# Patient Record
Sex: FEMALE | Race: WHITE | Employment: OTHER | URBAN - METROPOLITAN AREA
[De-identification: names, ages, dates, MRNs, and addresses within clinical notes are randomized per-mention and may not be internally consistent; named-entity substitution may affect disease eponyms.]

---

## 2017-01-03 RX ORDER — SODIUM CHLORIDE, SODIUM LACTATE, POTASSIUM CHLORIDE, CALCIUM CHLORIDE 600; 310; 30; 20 MG/100ML; MG/100ML; MG/100ML; MG/100ML
INJECTION, SOLUTION INTRAVENOUS CONTINUOUS
Status: CANCELLED | OUTPATIENT
Start: 2017-01-06

## 2017-01-06 ENCOUNTER — HOSPITAL ENCOUNTER (OUTPATIENT)
Dept: PAIN MANAGEMENT | Age: 67
Discharge: HOME | End: 2017-01-06
Attending: PHYSICAL MEDICINE & REHABILITATION | Admitting: PHYSICAL MEDICINE & REHABILITATION

## 2017-01-09 RX ORDER — SODIUM CHLORIDE, SODIUM LACTATE, POTASSIUM CHLORIDE, CALCIUM CHLORIDE 600; 310; 30; 20 MG/100ML; MG/100ML; MG/100ML; MG/100ML
INJECTION, SOLUTION INTRAVENOUS CONTINUOUS
Status: CANCELLED | OUTPATIENT
Start: 2017-01-16

## 2017-01-10 ENCOUNTER — NURSE ONLY (OUTPATIENT)
Dept: FAMILY MEDICINE CLINIC | Age: 67
End: 2017-01-10

## 2017-01-10 DIAGNOSIS — E53.8 B12 DEFICIENCY: Primary | ICD-10-CM

## 2017-01-10 PROCEDURE — 96372 THER/PROPH/DIAG INJ SC/IM: CPT | Performed by: FAMILY MEDICINE

## 2017-01-10 RX ORDER — CYANOCOBALAMIN 1000 UG/ML
1000 INJECTION INTRAMUSCULAR; SUBCUTANEOUS ONCE
Status: COMPLETED | OUTPATIENT
Start: 2017-01-10 | End: 2017-01-10

## 2017-01-10 RX ADMIN — CYANOCOBALAMIN 1000 MCG: 1000 INJECTION INTRAMUSCULAR; SUBCUTANEOUS at 09:39

## 2017-01-16 ENCOUNTER — HOSPITAL ENCOUNTER (OUTPATIENT)
Dept: PAIN MANAGEMENT | Age: 67
Discharge: HOME | End: 2017-01-16
Attending: PHYSICAL MEDICINE & REHABILITATION | Admitting: PHYSICAL MEDICINE & REHABILITATION

## 2017-01-16 VITALS
WEIGHT: 113 LBS | RESPIRATION RATE: 18 BRPM | HEIGHT: 64 IN | SYSTOLIC BLOOD PRESSURE: 134 MMHG | DIASTOLIC BLOOD PRESSURE: 66 MMHG | BODY MASS INDEX: 19.29 KG/M2 | HEART RATE: 63 BPM | TEMPERATURE: 98.5 F | OXYGEN SATURATION: 96 %

## 2017-01-16 RX ORDER — SODIUM CHLORIDE, SODIUM LACTATE, POTASSIUM CHLORIDE, CALCIUM CHLORIDE 600; 310; 30; 20 MG/100ML; MG/100ML; MG/100ML; MG/100ML
INJECTION, SOLUTION INTRAVENOUS
Status: DISPENSED
Start: 2017-01-16 | End: 2017-01-16

## 2017-01-16 RX ORDER — LIDOCAINE HYDROCHLORIDE 10 MG/ML
INJECTION, SOLUTION EPIDURAL; INFILTRATION; INTRACAUDAL; PERINEURAL
Status: DISPENSED
Start: 2017-01-16 | End: 2017-01-16

## 2017-01-16 RX ORDER — SODIUM CHLORIDE, SODIUM LACTATE, POTASSIUM CHLORIDE, CALCIUM CHLORIDE 600; 310; 30; 20 MG/100ML; MG/100ML; MG/100ML; MG/100ML
INJECTION, SOLUTION INTRAVENOUS CONTINUOUS
Status: DISCONTINUED | OUTPATIENT
Start: 2017-01-16 | End: 2017-01-17 | Stop reason: HOSPADM

## 2017-01-16 RX ADMIN — SODIUM CHLORIDE, SODIUM LACTATE, POTASSIUM CHLORIDE, CALCIUM CHLORIDE: 600; 310; 30; 20 INJECTION, SOLUTION INTRAVENOUS at 08:34

## 2017-01-16 ASSESSMENT — PAIN - FUNCTIONAL ASSESSMENT
PAIN_FUNCTIONAL_ASSESSMENT: 0-10
PAIN_FUNCTIONAL_ASSESSMENT: 0-10

## 2017-01-16 ASSESSMENT — PAIN DESCRIPTION - DESCRIPTORS: DESCRIPTORS: ACHING

## 2017-01-16 ASSESSMENT — ACTIVITIES OF DAILY LIVING (ADL): EFFECT OF PAIN ON DAILY ACTIVITIES: LIFTING ANYTHING HEAVY

## 2017-01-20 ENCOUNTER — HOSPITAL ENCOUNTER (OUTPATIENT)
Dept: SURGERY | Age: 67
Discharge: HOME | End: 2017-01-20
Attending: OPHTHALMOLOGY | Admitting: OPHTHALMOLOGY

## 2017-01-20 VITALS
DIASTOLIC BLOOD PRESSURE: 73 MMHG | HEIGHT: 64 IN | WEIGHT: 113 LBS | RESPIRATION RATE: 16 BRPM | TEMPERATURE: 98.8 F | SYSTOLIC BLOOD PRESSURE: 166 MMHG | BODY MASS INDEX: 19.29 KG/M2 | OXYGEN SATURATION: 96 % | HEART RATE: 85 BPM

## 2017-01-20 RX ORDER — LIDOCAINE HYDROCHLORIDE 10 MG/ML
2 INJECTION, SOLUTION INFILTRATION; PERINEURAL
Status: COMPLETED | OUTPATIENT
Start: 2017-01-20 | End: 2017-01-20

## 2017-01-20 RX ORDER — LIDOCAINE HYDROCHLORIDE 10 MG/ML
INJECTION, SOLUTION EPIDURAL; INFILTRATION; INTRACAUDAL; PERINEURAL
Status: DISPENSED
Start: 2017-01-20 | End: 2017-01-20

## 2017-01-20 RX ORDER — PREDNISOLONE ACETATE 10 MG/ML
1 SUSPENSION/ DROPS OPHTHALMIC SEE ADMIN INSTRUCTIONS
Status: DISCONTINUED | OUTPATIENT
Start: 2017-01-20 | End: 2017-01-21 | Stop reason: HOSPADM

## 2017-01-20 RX ORDER — PHENYLEPHRINE HYDROCHLORIDE 100 MG/ML
SOLUTION/ DROPS OPHTHALMIC
Status: DISPENSED
Start: 2017-01-20 | End: 2017-01-20

## 2017-01-20 RX ORDER — PHENYLEPHRINE HYDROCHLORIDE 100 MG/ML
1 SOLUTION/ DROPS OPHTHALMIC ONCE
Status: COMPLETED | OUTPATIENT
Start: 2017-01-20 | End: 2017-01-20

## 2017-01-20 RX ORDER — SODIUM CHLORIDE, SODIUM LACTATE, POTASSIUM CHLORIDE, CALCIUM CHLORIDE 600; 310; 30; 20 MG/100ML; MG/100ML; MG/100ML; MG/100ML
INJECTION, SOLUTION INTRAVENOUS CONTINUOUS
Status: DISCONTINUED | OUTPATIENT
Start: 2017-01-20 | End: 2017-01-21 | Stop reason: HOSPADM

## 2017-01-20 RX ORDER — SODIUM CHLORIDE, SODIUM LACTATE, POTASSIUM CHLORIDE, CALCIUM CHLORIDE 600; 310; 30; 20 MG/100ML; MG/100ML; MG/100ML; MG/100ML
INJECTION, SOLUTION INTRAVENOUS
Status: DISPENSED
Start: 2017-01-20 | End: 2017-01-20

## 2017-01-20 RX ORDER — TOBRAMYCIN AND DEXAMETHASONE 3; 1 MG/ML; MG/ML
1 SUSPENSION/ DROPS OPHTHALMIC SEE ADMIN INSTRUCTIONS
Status: DISCONTINUED | OUTPATIENT
Start: 2017-01-20 | End: 2017-01-21 | Stop reason: HOSPADM

## 2017-01-20 RX ADMIN — SODIUM CHLORIDE, SODIUM LACTATE, POTASSIUM CHLORIDE, CALCIUM CHLORIDE: 600; 310; 30; 20 INJECTION, SOLUTION INTRAVENOUS at 08:07

## 2017-01-20 RX ADMIN — LIDOCAINE HYDROCHLORIDE 2 ML: 10 INJECTION, SOLUTION INFILTRATION; PERINEURAL at 08:06

## 2017-01-20 RX ADMIN — PHENYLEPHRINE HYDROCHLORIDE 1 DROP: 100 SOLUTION/ DROPS OPHTHALMIC at 07:55

## 2017-01-20 ASSESSMENT — PAIN SCALES - GENERAL: PAINLEVEL_OUTOF10: 0

## 2017-01-20 ASSESSMENT — PAIN - FUNCTIONAL ASSESSMENT: PAIN_FUNCTIONAL_ASSESSMENT: 0-10

## 2017-01-20 ASSESSMENT — ENCOUNTER SYMPTOMS: SHORTNESS OF BREATH: 0

## 2017-02-10 ENCOUNTER — NURSE ONLY (OUTPATIENT)
Dept: FAMILY MEDICINE CLINIC | Age: 67
End: 2017-02-10

## 2017-02-10 DIAGNOSIS — E53.8 B12 DEFICIENCY: Primary | ICD-10-CM

## 2017-02-10 PROCEDURE — 96372 THER/PROPH/DIAG INJ SC/IM: CPT | Performed by: FAMILY MEDICINE

## 2017-02-10 RX ORDER — CYANOCOBALAMIN 1000 UG/ML
1000 INJECTION INTRAMUSCULAR; SUBCUTANEOUS ONCE
Status: COMPLETED | OUTPATIENT
Start: 2017-02-10 | End: 2017-02-10

## 2017-02-10 RX ADMIN — CYANOCOBALAMIN 1000 MCG: 1000 INJECTION INTRAMUSCULAR; SUBCUTANEOUS at 11:47

## 2017-02-27 RX ORDER — DICYCLOMINE HYDROCHLORIDE 10 MG/1
CAPSULE ORAL
Qty: 100 CAPSULE | Refills: 3 | Status: SHIPPED | OUTPATIENT
Start: 2017-02-27 | End: 2018-02-27 | Stop reason: SDUPTHER

## 2017-02-28 ENCOUNTER — NURSE ONLY (OUTPATIENT)
Dept: FAMILY MEDICINE CLINIC | Age: 67
End: 2017-02-28

## 2017-02-28 DIAGNOSIS — E53.8 B12 DEFICIENCY: Primary | ICD-10-CM

## 2017-02-28 PROCEDURE — 96372 THER/PROPH/DIAG INJ SC/IM: CPT | Performed by: FAMILY MEDICINE

## 2017-02-28 RX ORDER — CYANOCOBALAMIN 1000 UG/ML
1000 INJECTION INTRAMUSCULAR; SUBCUTANEOUS ONCE
Status: COMPLETED | OUTPATIENT
Start: 2017-02-28 | End: 2017-02-28

## 2017-02-28 RX ADMIN — CYANOCOBALAMIN 1000 MCG: 1000 INJECTION INTRAMUSCULAR; SUBCUTANEOUS at 08:37

## 2017-03-08 ASSESSMENT — ENCOUNTER SYMPTOMS: SHORTNESS OF BREATH: 1

## 2017-03-09 ENCOUNTER — HOSPITAL ENCOUNTER (OUTPATIENT)
Dept: SURGERY | Age: 67
Discharge: HOME | End: 2017-03-09
Attending: OPHTHALMOLOGY | Admitting: OPHTHALMOLOGY

## 2017-03-09 VITALS
DIASTOLIC BLOOD PRESSURE: 64 MMHG | HEIGHT: 64 IN | SYSTOLIC BLOOD PRESSURE: 132 MMHG | WEIGHT: 114 LBS | BODY MASS INDEX: 19.46 KG/M2 | OXYGEN SATURATION: 95 % | RESPIRATION RATE: 16 BRPM | TEMPERATURE: 97.2 F | HEART RATE: 79 BPM

## 2017-03-09 DIAGNOSIS — R06.09 OTHER DYSPNEA AND RESPIRATORY ABNORMALITY: ICD-10-CM

## 2017-03-09 DIAGNOSIS — R09.89 OTHER DYSPNEA AND RESPIRATORY ABNORMALITY: ICD-10-CM

## 2017-03-09 RX ORDER — MORPHINE SULFATE 4 MG/ML
2 INJECTION, SOLUTION INTRAMUSCULAR; INTRAVENOUS EVERY 5 MIN PRN
Status: DISCONTINUED | OUTPATIENT
Start: 2017-03-09 | End: 2017-03-10 | Stop reason: HOSPADM

## 2017-03-09 RX ORDER — PROMETHAZINE HYDROCHLORIDE 25 MG/ML
6.25 INJECTION, SOLUTION INTRAMUSCULAR; INTRAVENOUS
Status: DISCONTINUED | OUTPATIENT
Start: 2017-03-09 | End: 2017-03-10 | Stop reason: HOSPADM

## 2017-03-09 RX ORDER — DIPHENHYDRAMINE HYDROCHLORIDE 50 MG/ML
12.5 INJECTION INTRAMUSCULAR; INTRAVENOUS
Status: ACTIVE | OUTPATIENT
Start: 2017-03-09 | End: 2017-03-09

## 2017-03-09 RX ORDER — HYDRALAZINE HYDROCHLORIDE 20 MG/ML
5 INJECTION INTRAMUSCULAR; INTRAVENOUS EVERY 10 MIN PRN
Status: DISCONTINUED | OUTPATIENT
Start: 2017-03-09 | End: 2017-03-10 | Stop reason: HOSPADM

## 2017-03-09 RX ORDER — MORPHINE SULFATE 4 MG/ML
1 INJECTION, SOLUTION INTRAMUSCULAR; INTRAVENOUS EVERY 5 MIN PRN
Status: DISCONTINUED | OUTPATIENT
Start: 2017-03-09 | End: 2017-03-10 | Stop reason: HOSPADM

## 2017-03-09 RX ORDER — TOBRAMYCIN AND DEXAMETHASONE 3; 1 MG/ML; MG/ML
1 SUSPENSION/ DROPS OPHTHALMIC CONTINUOUS
Status: DISCONTINUED | OUTPATIENT
Start: 2017-03-09 | End: 2017-03-10 | Stop reason: HOSPADM

## 2017-03-09 RX ORDER — SODIUM CHLORIDE 0.9 % (FLUSH) 0.9 %
10 SYRINGE (ML) INJECTION EVERY 12 HOURS SCHEDULED
Status: DISCONTINUED | OUTPATIENT
Start: 2017-03-09 | End: 2017-03-10 | Stop reason: HOSPADM

## 2017-03-09 RX ORDER — HYDROMORPHONE HCL 110MG/55ML
0.25 PATIENT CONTROLLED ANALGESIA SYRINGE INTRAVENOUS EVERY 5 MIN PRN
Status: DISCONTINUED | OUTPATIENT
Start: 2017-03-09 | End: 2017-03-10 | Stop reason: HOSPADM

## 2017-03-09 RX ORDER — LIDOCAINE HYDROCHLORIDE 10 MG/ML
1 INJECTION, SOLUTION EPIDURAL; INFILTRATION; INTRACAUDAL; PERINEURAL
Status: COMPLETED | OUTPATIENT
Start: 2017-03-09 | End: 2017-03-09

## 2017-03-09 RX ORDER — MEPERIDINE HYDROCHLORIDE 25 MG/ML
12.5 INJECTION INTRAMUSCULAR; INTRAVENOUS; SUBCUTANEOUS EVERY 5 MIN PRN
Status: DISCONTINUED | OUTPATIENT
Start: 2017-03-09 | End: 2017-03-10 | Stop reason: HOSPADM

## 2017-03-09 RX ORDER — LABETALOL HYDROCHLORIDE 5 MG/ML
5 INJECTION, SOLUTION INTRAVENOUS EVERY 10 MIN PRN
Status: DISCONTINUED | OUTPATIENT
Start: 2017-03-09 | End: 2017-03-10 | Stop reason: HOSPADM

## 2017-03-09 RX ORDER — PREDNISOLONE ACETATE 10 MG/ML
1 SUSPENSION/ DROPS OPHTHALMIC CONTINUOUS
Status: DISCONTINUED | OUTPATIENT
Start: 2017-03-09 | End: 2017-03-10 | Stop reason: HOSPADM

## 2017-03-09 RX ORDER — SODIUM CHLORIDE, SODIUM LACTATE, POTASSIUM CHLORIDE, CALCIUM CHLORIDE 600; 310; 30; 20 MG/100ML; MG/100ML; MG/100ML; MG/100ML
INJECTION, SOLUTION INTRAVENOUS CONTINUOUS
Status: DISCONTINUED | OUTPATIENT
Start: 2017-03-09 | End: 2017-03-10 | Stop reason: HOSPADM

## 2017-03-09 RX ORDER — ONDANSETRON 2 MG/ML
4 INJECTION INTRAMUSCULAR; INTRAVENOUS PRN
Status: DISCONTINUED | OUTPATIENT
Start: 2017-03-09 | End: 2017-03-10 | Stop reason: HOSPADM

## 2017-03-09 RX ORDER — SODIUM CHLORIDE 0.9 % (FLUSH) 0.9 %
10 SYRINGE (ML) INJECTION PRN
Status: DISCONTINUED | OUTPATIENT
Start: 2017-03-09 | End: 2017-03-10 | Stop reason: HOSPADM

## 2017-03-09 RX ORDER — OXYCODONE HYDROCHLORIDE AND ACETAMINOPHEN 5; 325 MG/1; MG/1
2 TABLET ORAL PRN
Status: ACTIVE | OUTPATIENT
Start: 2017-03-09 | End: 2017-03-09

## 2017-03-09 RX ORDER — OXYCODONE HYDROCHLORIDE AND ACETAMINOPHEN 5; 325 MG/1; MG/1
1 TABLET ORAL PRN
Status: ACTIVE | OUTPATIENT
Start: 2017-03-09 | End: 2017-03-09

## 2017-03-09 RX ORDER — HYDROMORPHONE HCL 110MG/55ML
0.5 PATIENT CONTROLLED ANALGESIA SYRINGE INTRAVENOUS EVERY 5 MIN PRN
Status: DISCONTINUED | OUTPATIENT
Start: 2017-03-09 | End: 2017-03-10 | Stop reason: HOSPADM

## 2017-03-09 RX ADMIN — LIDOCAINE HYDROCHLORIDE: 10 INJECTION, SOLUTION EPIDURAL; INFILTRATION; INTRACAUDAL; PERINEURAL at 07:02

## 2017-03-09 RX ADMIN — SODIUM CHLORIDE, SODIUM LACTATE, POTASSIUM CHLORIDE, CALCIUM CHLORIDE: 600; 310; 30; 20 INJECTION, SOLUTION INTRAVENOUS at 07:02

## 2017-03-09 ASSESSMENT — PAIN SCALES - GENERAL
PAINLEVEL_OUTOF10: 0
PAINLEVEL_OUTOF10: 0

## 2017-03-09 ASSESSMENT — PAIN - FUNCTIONAL ASSESSMENT: PAIN_FUNCTIONAL_ASSESSMENT: 0-10

## 2017-04-27 ENCOUNTER — TELEPHONE (OUTPATIENT)
Dept: ORTHOPEDIC SURGERY | Age: 67
End: 2017-04-27

## 2017-04-27 ENCOUNTER — OFFICE VISIT (OUTPATIENT)
Dept: ORTHOPEDIC SURGERY | Age: 67
End: 2017-04-27

## 2017-04-27 VITALS
SYSTOLIC BLOOD PRESSURE: 124 MMHG | HEIGHT: 64 IN | HEART RATE: 78 BPM | WEIGHT: 113.98 LBS | BODY MASS INDEX: 19.46 KG/M2 | DIASTOLIC BLOOD PRESSURE: 75 MMHG

## 2017-04-27 DIAGNOSIS — M43.22 CERVICAL VERTEBRAL FUSION: ICD-10-CM

## 2017-04-27 DIAGNOSIS — M50.30 DDD (DEGENERATIVE DISC DISEASE), CERVICAL: Primary | ICD-10-CM

## 2017-04-27 DIAGNOSIS — M54.12 CERVICAL RADICULITIS: ICD-10-CM

## 2017-04-27 PROCEDURE — 4040F PNEUMOC VAC/ADMIN/RCVD: CPT | Performed by: PHYSICIAN ASSISTANT

## 2017-04-27 PROCEDURE — 99214 OFFICE O/P EST MOD 30 MIN: CPT | Performed by: PHYSICIAN ASSISTANT

## 2017-04-27 PROCEDURE — 3014F SCREEN MAMMO DOC REV: CPT | Performed by: PHYSICIAN ASSISTANT

## 2017-04-27 PROCEDURE — G8427 DOCREV CUR MEDS BY ELIG CLIN: HCPCS | Performed by: PHYSICIAN ASSISTANT

## 2017-04-27 PROCEDURE — 1123F ACP DISCUSS/DSCN MKR DOCD: CPT | Performed by: PHYSICIAN ASSISTANT

## 2017-04-27 PROCEDURE — 3017F COLORECTAL CA SCREEN DOC REV: CPT | Performed by: PHYSICIAN ASSISTANT

## 2017-04-27 PROCEDURE — G8419 CALC BMI OUT NRM PARAM NOF/U: HCPCS | Performed by: PHYSICIAN ASSISTANT

## 2017-04-27 PROCEDURE — 4004F PT TOBACCO SCREEN RCVD TLK: CPT | Performed by: PHYSICIAN ASSISTANT

## 2017-04-27 PROCEDURE — G8400 PT W/DXA NO RESULTS DOC: HCPCS | Performed by: PHYSICIAN ASSISTANT

## 2017-04-27 PROCEDURE — G8599 NO ASA/ANTIPLAT THER USE RNG: HCPCS | Performed by: PHYSICIAN ASSISTANT

## 2017-04-27 PROCEDURE — 1090F PRES/ABSN URINE INCON ASSESS: CPT | Performed by: PHYSICIAN ASSISTANT

## 2017-04-28 ENCOUNTER — HOSPITAL ENCOUNTER (OUTPATIENT)
Dept: CT IMAGING | Age: 67
Discharge: HOME | End: 2017-04-28
Attending: PHYSICAL MEDICINE & REHABILITATION | Admitting: PHYSICAL MEDICINE & REHABILITATION

## 2017-04-28 DIAGNOSIS — M43.22 CERVICAL VERTEBRAL FUSION: ICD-10-CM

## 2017-04-28 DIAGNOSIS — M50.30 DDD (DEGENERATIVE DISC DISEASE), CERVICAL: ICD-10-CM

## 2017-04-28 DIAGNOSIS — M54.12 CERVICAL RADICULITIS: ICD-10-CM

## 2017-04-28 DIAGNOSIS — M50.30 OTHER CERVICAL DISC DEGENERATION, UNSPECIFIED CERVICAL REGION: ICD-10-CM

## 2017-05-18 ENCOUNTER — OFFICE VISIT (OUTPATIENT)
Dept: ORTHOPEDIC SURGERY | Age: 67
End: 2017-05-18

## 2017-05-18 VITALS
BODY MASS INDEX: 19.46 KG/M2 | SYSTOLIC BLOOD PRESSURE: 132 MMHG | HEIGHT: 64 IN | WEIGHT: 113.98 LBS | DIASTOLIC BLOOD PRESSURE: 75 MMHG | HEART RATE: 81 BPM

## 2017-05-18 DIAGNOSIS — G56.01 CARPAL TUNNEL SYNDROME OF RIGHT WRIST: Primary | ICD-10-CM

## 2017-05-18 DIAGNOSIS — S16.1XXD CERVICAL MYOFASCIAL STRAIN, SUBSEQUENT ENCOUNTER: Primary | ICD-10-CM

## 2017-05-18 DIAGNOSIS — M54.12 CERVICAL RADICULOPATHY: ICD-10-CM

## 2017-05-18 PROCEDURE — G8599 NO ASA/ANTIPLAT THER USE RNG: HCPCS | Performed by: PHYSICAL MEDICINE & REHABILITATION

## 2017-05-18 PROCEDURE — 1123F ACP DISCUSS/DSCN MKR DOCD: CPT | Performed by: PHYSICAL MEDICINE & REHABILITATION

## 2017-05-18 PROCEDURE — G8419 CALC BMI OUT NRM PARAM NOF/U: HCPCS | Performed by: PHYSICAL MEDICINE & REHABILITATION

## 2017-05-18 PROCEDURE — 3014F SCREEN MAMMO DOC REV: CPT | Performed by: PHYSICAL MEDICINE & REHABILITATION

## 2017-05-18 PROCEDURE — 3017F COLORECTAL CA SCREEN DOC REV: CPT | Performed by: PHYSICAL MEDICINE & REHABILITATION

## 2017-05-18 PROCEDURE — G8427 DOCREV CUR MEDS BY ELIG CLIN: HCPCS | Performed by: PHYSICAL MEDICINE & REHABILITATION

## 2017-05-18 PROCEDURE — 4004F PT TOBACCO SCREEN RCVD TLK: CPT | Performed by: PHYSICAL MEDICINE & REHABILITATION

## 2017-05-18 PROCEDURE — 95908 NRV CNDJ TST 3-4 STUDIES: CPT | Performed by: PHYSICAL MEDICINE & REHABILITATION

## 2017-05-18 PROCEDURE — G8400 PT W/DXA NO RESULTS DOC: HCPCS | Performed by: PHYSICAL MEDICINE & REHABILITATION

## 2017-05-18 PROCEDURE — 95886 MUSC TEST DONE W/N TEST COMP: CPT | Performed by: PHYSICAL MEDICINE & REHABILITATION

## 2017-05-18 PROCEDURE — 4040F PNEUMOC VAC/ADMIN/RCVD: CPT | Performed by: PHYSICAL MEDICINE & REHABILITATION

## 2017-05-18 PROCEDURE — 1090F PRES/ABSN URINE INCON ASSESS: CPT | Performed by: PHYSICAL MEDICINE & REHABILITATION

## 2017-05-18 PROCEDURE — 99213 OFFICE O/P EST LOW 20 MIN: CPT | Performed by: PHYSICAL MEDICINE & REHABILITATION

## 2017-05-18 RX ORDER — MELOXICAM 15 MG/1
15 TABLET ORAL DAILY PRN
Qty: 30 TABLET | Refills: 1 | Status: SHIPPED | OUTPATIENT
Start: 2017-05-18 | End: 2017-07-07

## 2017-06-29 RX ORDER — DULOXETIN HYDROCHLORIDE 60 MG/1
CAPSULE, DELAYED RELEASE ORAL
Qty: 30 CAPSULE | Refills: 5 | Status: SHIPPED | OUTPATIENT
Start: 2017-06-29 | End: 2018-01-02 | Stop reason: SDUPTHER

## 2017-07-05 ENCOUNTER — OFFICE VISIT (OUTPATIENT)
Dept: ORTHOPEDIC SURGERY | Age: 67
End: 2017-07-05

## 2017-07-05 VITALS — WEIGHT: 113.98 LBS | BODY MASS INDEX: 19.46 KG/M2 | HEIGHT: 64 IN

## 2017-07-05 DIAGNOSIS — G56.01 CARPAL TUNNEL SYNDROME OF RIGHT WRIST: Primary | ICD-10-CM

## 2017-07-05 DIAGNOSIS — M54.12 CERVICAL RADICULOPATHY: ICD-10-CM

## 2017-07-05 PROCEDURE — 3017F COLORECTAL CA SCREEN DOC REV: CPT | Performed by: ORTHOPAEDIC SURGERY

## 2017-07-05 PROCEDURE — 4004F PT TOBACCO SCREEN RCVD TLK: CPT | Performed by: ORTHOPAEDIC SURGERY

## 2017-07-05 PROCEDURE — G8599 NO ASA/ANTIPLAT THER USE RNG: HCPCS | Performed by: ORTHOPAEDIC SURGERY

## 2017-07-05 PROCEDURE — 1090F PRES/ABSN URINE INCON ASSESS: CPT | Performed by: ORTHOPAEDIC SURGERY

## 2017-07-05 PROCEDURE — G8420 CALC BMI NORM PARAMETERS: HCPCS | Performed by: ORTHOPAEDIC SURGERY

## 2017-07-05 PROCEDURE — 3014F SCREEN MAMMO DOC REV: CPT | Performed by: ORTHOPAEDIC SURGERY

## 2017-07-05 PROCEDURE — G8427 DOCREV CUR MEDS BY ELIG CLIN: HCPCS | Performed by: ORTHOPAEDIC SURGERY

## 2017-07-05 PROCEDURE — 99215 OFFICE O/P EST HI 40 MIN: CPT | Performed by: ORTHOPAEDIC SURGERY

## 2017-07-05 PROCEDURE — 4040F PNEUMOC VAC/ADMIN/RCVD: CPT | Performed by: ORTHOPAEDIC SURGERY

## 2017-07-06 ENCOUNTER — OFFICE VISIT (OUTPATIENT)
Dept: ORTHOPEDIC SURGERY | Age: 67
End: 2017-07-06

## 2017-07-06 ENCOUNTER — TELEPHONE (OUTPATIENT)
Dept: ORTHOPEDIC SURGERY | Age: 67
End: 2017-07-06

## 2017-07-06 VITALS
BODY MASS INDEX: 19.46 KG/M2 | HEIGHT: 64 IN | HEART RATE: 78 BPM | SYSTOLIC BLOOD PRESSURE: 150 MMHG | DIASTOLIC BLOOD PRESSURE: 88 MMHG | WEIGHT: 113.98 LBS

## 2017-07-06 DIAGNOSIS — M51.36 DDD (DEGENERATIVE DISC DISEASE), LUMBAR: Primary | ICD-10-CM

## 2017-07-06 PROCEDURE — G8400 PT W/DXA NO RESULTS DOC: HCPCS | Performed by: PHYSICIAN ASSISTANT

## 2017-07-06 PROCEDURE — 4040F PNEUMOC VAC/ADMIN/RCVD: CPT | Performed by: PHYSICIAN ASSISTANT

## 2017-07-06 PROCEDURE — 1123F ACP DISCUSS/DSCN MKR DOCD: CPT | Performed by: PHYSICIAN ASSISTANT

## 2017-07-06 PROCEDURE — G8427 DOCREV CUR MEDS BY ELIG CLIN: HCPCS | Performed by: PHYSICIAN ASSISTANT

## 2017-07-06 PROCEDURE — 99213 OFFICE O/P EST LOW 20 MIN: CPT | Performed by: PHYSICIAN ASSISTANT

## 2017-07-06 PROCEDURE — 1036F TOBACCO NON-USER: CPT | Performed by: PHYSICIAN ASSISTANT

## 2017-07-06 PROCEDURE — G8599 NO ASA/ANTIPLAT THER USE RNG: HCPCS | Performed by: PHYSICIAN ASSISTANT

## 2017-07-06 PROCEDURE — 3017F COLORECTAL CA SCREEN DOC REV: CPT | Performed by: PHYSICIAN ASSISTANT

## 2017-07-06 PROCEDURE — 1090F PRES/ABSN URINE INCON ASSESS: CPT | Performed by: PHYSICIAN ASSISTANT

## 2017-07-06 PROCEDURE — G8420 CALC BMI NORM PARAMETERS: HCPCS | Performed by: PHYSICIAN ASSISTANT

## 2017-07-06 PROCEDURE — 3014F SCREEN MAMMO DOC REV: CPT | Performed by: PHYSICIAN ASSISTANT

## 2017-07-07 ENCOUNTER — OFFICE VISIT (OUTPATIENT)
Dept: FAMILY MEDICINE CLINIC | Age: 67
End: 2017-07-07

## 2017-07-07 VITALS
WEIGHT: 112.6 LBS | BODY MASS INDEX: 19.22 KG/M2 | HEIGHT: 64 IN | TEMPERATURE: 97.7 F | OXYGEN SATURATION: 96 % | DIASTOLIC BLOOD PRESSURE: 72 MMHG | HEART RATE: 77 BPM | SYSTOLIC BLOOD PRESSURE: 110 MMHG

## 2017-07-07 DIAGNOSIS — Z01.818 PREOP EXAMINATION: Primary | ICD-10-CM

## 2017-07-07 DIAGNOSIS — G56.01 CARPAL TUNNEL SYNDROME OF RIGHT WRIST: ICD-10-CM

## 2017-07-07 DIAGNOSIS — J44.9 CHRONIC OBSTRUCTIVE PULMONARY DISEASE, UNSPECIFIED COPD TYPE (HCC): ICD-10-CM

## 2017-07-07 DIAGNOSIS — D48.5 NEOPLASM OF UNCERTAIN BEHAVIOR OF SKIN: ICD-10-CM

## 2017-07-07 PROCEDURE — 3023F SPIROM DOC REV: CPT | Performed by: FAMILY MEDICINE

## 2017-07-07 PROCEDURE — 1036F TOBACCO NON-USER: CPT | Performed by: FAMILY MEDICINE

## 2017-07-07 PROCEDURE — G8400 PT W/DXA NO RESULTS DOC: HCPCS | Performed by: FAMILY MEDICINE

## 2017-07-07 PROCEDURE — G8926 SPIRO NO PERF OR DOC: HCPCS | Performed by: FAMILY MEDICINE

## 2017-07-07 PROCEDURE — G8599 NO ASA/ANTIPLAT THER USE RNG: HCPCS | Performed by: FAMILY MEDICINE

## 2017-07-07 PROCEDURE — 99213 OFFICE O/P EST LOW 20 MIN: CPT | Performed by: FAMILY MEDICINE

## 2017-07-07 PROCEDURE — G8420 CALC BMI NORM PARAMETERS: HCPCS | Performed by: FAMILY MEDICINE

## 2017-07-07 PROCEDURE — 1123F ACP DISCUSS/DSCN MKR DOCD: CPT | Performed by: FAMILY MEDICINE

## 2017-07-07 PROCEDURE — G8427 DOCREV CUR MEDS BY ELIG CLIN: HCPCS | Performed by: FAMILY MEDICINE

## 2017-07-07 PROCEDURE — 3017F COLORECTAL CA SCREEN DOC REV: CPT | Performed by: FAMILY MEDICINE

## 2017-07-07 PROCEDURE — 3014F SCREEN MAMMO DOC REV: CPT | Performed by: FAMILY MEDICINE

## 2017-07-07 PROCEDURE — 1090F PRES/ABSN URINE INCON ASSESS: CPT | Performed by: FAMILY MEDICINE

## 2017-07-07 PROCEDURE — 4040F PNEUMOC VAC/ADMIN/RCVD: CPT | Performed by: FAMILY MEDICINE

## 2017-07-07 ASSESSMENT — ENCOUNTER SYMPTOMS
SHORTNESS OF BREATH: 0
CHEST TIGHTNESS: 0

## 2017-07-13 ENCOUNTER — HOSPITAL ENCOUNTER (OUTPATIENT)
Dept: SURGERY | Age: 67
Discharge: HOME | End: 2017-07-13
Attending: ORTHOPAEDIC SURGERY | Admitting: ORTHOPAEDIC SURGERY

## 2017-07-13 VITALS
SYSTOLIC BLOOD PRESSURE: 137 MMHG | WEIGHT: 112 LBS | RESPIRATION RATE: 16 BRPM | HEART RATE: 53 BPM | HEIGHT: 64 IN | TEMPERATURE: 97 F | DIASTOLIC BLOOD PRESSURE: 75 MMHG | BODY MASS INDEX: 19.12 KG/M2 | OXYGEN SATURATION: 97 %

## 2017-07-13 DIAGNOSIS — R06.09 OTHER DYSPNEA AND RESPIRATORY ABNORMALITY: ICD-10-CM

## 2017-07-13 DIAGNOSIS — R09.89 OTHER DYSPNEA AND RESPIRATORY ABNORMALITY: ICD-10-CM

## 2017-07-13 RX ORDER — MEPERIDINE HYDROCHLORIDE 25 MG/ML
12.5 INJECTION INTRAMUSCULAR; INTRAVENOUS; SUBCUTANEOUS EVERY 5 MIN PRN
Status: DISCONTINUED | OUTPATIENT
Start: 2017-07-13 | End: 2017-07-14 | Stop reason: HOSPADM

## 2017-07-13 RX ORDER — OXYCODONE HYDROCHLORIDE AND ACETAMINOPHEN 5; 325 MG/1; MG/1
1 TABLET ORAL EVERY 8 HOURS PRN
Qty: 25 TABLET | Refills: 0 | Status: SHIPPED | OUTPATIENT
Start: 2017-07-13 | End: 2017-07-20

## 2017-07-13 RX ORDER — MORPHINE SULFATE 10 MG/ML
1 INJECTION, SOLUTION INTRAMUSCULAR; INTRAVENOUS EVERY 5 MIN PRN
Status: DISCONTINUED | OUTPATIENT
Start: 2017-07-13 | End: 2017-07-14 | Stop reason: HOSPADM

## 2017-07-13 RX ORDER — OXYCODONE HYDROCHLORIDE AND ACETAMINOPHEN 5; 325 MG/1; MG/1
1 TABLET ORAL PRN
Status: ACTIVE | OUTPATIENT
Start: 2017-07-13 | End: 2017-07-13

## 2017-07-13 RX ORDER — LIDOCAINE HYDROCHLORIDE 10 MG/ML
INJECTION, SOLUTION EPIDURAL; INFILTRATION; INTRACAUDAL; PERINEURAL
Status: DISPENSED
Start: 2017-07-13 | End: 2017-07-13

## 2017-07-13 RX ORDER — SODIUM CHLORIDE, SODIUM LACTATE, POTASSIUM CHLORIDE, CALCIUM CHLORIDE 600; 310; 30; 20 MG/100ML; MG/100ML; MG/100ML; MG/100ML
INJECTION, SOLUTION INTRAVENOUS CONTINUOUS
Status: DISCONTINUED | OUTPATIENT
Start: 2017-07-13 | End: 2017-07-14 | Stop reason: HOSPADM

## 2017-07-13 RX ORDER — HYDROMORPHONE HCL 110MG/55ML
0.25 PATIENT CONTROLLED ANALGESIA SYRINGE INTRAVENOUS EVERY 5 MIN PRN
Status: DISCONTINUED | OUTPATIENT
Start: 2017-07-13 | End: 2017-07-14 | Stop reason: HOSPADM

## 2017-07-13 RX ORDER — MORPHINE SULFATE 10 MG/ML
2 INJECTION, SOLUTION INTRAMUSCULAR; INTRAVENOUS EVERY 5 MIN PRN
Status: DISCONTINUED | OUTPATIENT
Start: 2017-07-13 | End: 2017-07-14 | Stop reason: HOSPADM

## 2017-07-13 RX ORDER — ONDANSETRON 2 MG/ML
4 INJECTION INTRAMUSCULAR; INTRAVENOUS
Status: ACTIVE | OUTPATIENT
Start: 2017-07-13 | End: 2017-07-13

## 2017-07-13 RX ORDER — LABETALOL HYDROCHLORIDE 5 MG/ML
5 INJECTION, SOLUTION INTRAVENOUS EVERY 10 MIN PRN
Status: DISCONTINUED | OUTPATIENT
Start: 2017-07-13 | End: 2017-07-14 | Stop reason: HOSPADM

## 2017-07-13 RX ORDER — SODIUM CHLORIDE, SODIUM LACTATE, POTASSIUM CHLORIDE, CALCIUM CHLORIDE 600; 310; 30; 20 MG/100ML; MG/100ML; MG/100ML; MG/100ML
INJECTION, SOLUTION INTRAVENOUS
Status: DISPENSED
Start: 2017-07-13 | End: 2017-07-13

## 2017-07-13 RX ORDER — HYDROMORPHONE HCL 110MG/55ML
0.5 PATIENT CONTROLLED ANALGESIA SYRINGE INTRAVENOUS EVERY 5 MIN PRN
Status: DISCONTINUED | OUTPATIENT
Start: 2017-07-13 | End: 2017-07-14 | Stop reason: HOSPADM

## 2017-07-13 RX ORDER — OXYCODONE HYDROCHLORIDE AND ACETAMINOPHEN 5; 325 MG/1; MG/1
2 TABLET ORAL PRN
Status: ACTIVE | OUTPATIENT
Start: 2017-07-13 | End: 2017-07-13

## 2017-07-13 RX ORDER — HYDRALAZINE HYDROCHLORIDE 20 MG/ML
5 INJECTION INTRAMUSCULAR; INTRAVENOUS
Status: DISCONTINUED | OUTPATIENT
Start: 2017-07-13 | End: 2017-07-14 | Stop reason: HOSPADM

## 2017-07-13 RX ADMIN — SODIUM CHLORIDE, SODIUM LACTATE, POTASSIUM CHLORIDE, CALCIUM CHLORIDE: 600; 310; 30; 20 INJECTION, SOLUTION INTRAVENOUS at 10:11

## 2017-07-13 ASSESSMENT — PAIN SCALES - GENERAL
PAINLEVEL_OUTOF10: 0

## 2017-07-13 ASSESSMENT — ENCOUNTER SYMPTOMS: SHORTNESS OF BREATH: 1

## 2017-07-13 ASSESSMENT — PAIN - FUNCTIONAL ASSESSMENT: PAIN_FUNCTIONAL_ASSESSMENT: 0-10

## 2017-07-13 ASSESSMENT — ACTIVITIES OF DAILY LIVING (ADL): EFFECT OF PAIN ON DAILY ACTIVITIES: USING HAND

## 2017-07-13 ASSESSMENT — PAIN DESCRIPTION - DESCRIPTORS: DESCRIPTORS: ACHING

## 2017-07-24 ENCOUNTER — OFFICE VISIT (OUTPATIENT)
Dept: ORTHOPEDIC SURGERY | Age: 67
End: 2017-07-24

## 2017-07-24 DIAGNOSIS — G56.01 CARPAL TUNNEL SYNDROME OF RIGHT WRIST: Primary | ICD-10-CM

## 2017-07-24 PROCEDURE — 99024 POSTOP FOLLOW-UP VISIT: CPT | Performed by: ORTHOPAEDIC SURGERY

## 2017-07-24 PROCEDURE — L3908 WHO COCK-UP NONMOLDE PRE OTS: HCPCS | Performed by: ORTHOPAEDIC SURGERY

## 2017-07-26 DIAGNOSIS — Z12.31 SCREENING MAMMOGRAM, ENCOUNTER FOR: Primary | ICD-10-CM

## 2017-09-05 ENCOUNTER — OFFICE VISIT (OUTPATIENT)
Dept: ORTHOPEDIC SURGERY | Age: 67
End: 2017-09-05

## 2017-09-05 VITALS
SYSTOLIC BLOOD PRESSURE: 118 MMHG | HEIGHT: 64 IN | WEIGHT: 111.99 LBS | DIASTOLIC BLOOD PRESSURE: 72 MMHG | BODY MASS INDEX: 19.12 KG/M2 | HEART RATE: 70 BPM

## 2017-09-05 DIAGNOSIS — M51.36 LUMBAR DEGENERATIVE DISC DISEASE: Primary | ICD-10-CM

## 2017-09-05 PROCEDURE — 1090F PRES/ABSN URINE INCON ASSESS: CPT | Performed by: ORTHOPAEDIC SURGERY

## 2017-09-05 PROCEDURE — G8400 PT W/DXA NO RESULTS DOC: HCPCS | Performed by: ORTHOPAEDIC SURGERY

## 2017-09-05 PROCEDURE — 1123F ACP DISCUSS/DSCN MKR DOCD: CPT | Performed by: ORTHOPAEDIC SURGERY

## 2017-09-05 PROCEDURE — 1036F TOBACCO NON-USER: CPT | Performed by: ORTHOPAEDIC SURGERY

## 2017-09-05 PROCEDURE — G8599 NO ASA/ANTIPLAT THER USE RNG: HCPCS | Performed by: ORTHOPAEDIC SURGERY

## 2017-09-05 PROCEDURE — 3014F SCREEN MAMMO DOC REV: CPT | Performed by: ORTHOPAEDIC SURGERY

## 2017-09-05 PROCEDURE — G8420 CALC BMI NORM PARAMETERS: HCPCS | Performed by: ORTHOPAEDIC SURGERY

## 2017-09-05 PROCEDURE — 3017F COLORECTAL CA SCREEN DOC REV: CPT | Performed by: ORTHOPAEDIC SURGERY

## 2017-09-05 PROCEDURE — 99213 OFFICE O/P EST LOW 20 MIN: CPT | Performed by: ORTHOPAEDIC SURGERY

## 2017-09-05 PROCEDURE — 4040F PNEUMOC VAC/ADMIN/RCVD: CPT | Performed by: ORTHOPAEDIC SURGERY

## 2017-09-05 PROCEDURE — G8427 DOCREV CUR MEDS BY ELIG CLIN: HCPCS | Performed by: ORTHOPAEDIC SURGERY

## 2017-09-11 PROBLEM — G95.9 CERVICAL MYELOPATHY (HCC): Status: ACTIVE | Noted: 2017-09-11

## 2017-09-19 ENCOUNTER — TELEPHONE (OUTPATIENT)
Dept: ORTHOPEDIC SURGERY | Age: 67
End: 2017-09-19

## 2017-11-06 ENCOUNTER — TELEPHONE (OUTPATIENT)
Dept: FAMILY MEDICINE CLINIC | Age: 67
End: 2017-11-06

## 2017-11-08 ENCOUNTER — OFFICE VISIT (OUTPATIENT)
Dept: FAMILY MEDICINE CLINIC | Age: 67
End: 2017-11-08

## 2017-11-08 VITALS
WEIGHT: 116 LBS | HEART RATE: 80 BPM | DIASTOLIC BLOOD PRESSURE: 92 MMHG | HEIGHT: 64 IN | BODY MASS INDEX: 19.81 KG/M2 | SYSTOLIC BLOOD PRESSURE: 130 MMHG | OXYGEN SATURATION: 97 %

## 2017-11-08 DIAGNOSIS — G95.9 CERVICAL MYELOPATHY (HCC): Primary | ICD-10-CM

## 2017-11-08 DIAGNOSIS — F33.41 MAJOR DEPRESSIVE DISORDER, RECURRENT, IN PARTIAL REMISSION (HCC): ICD-10-CM

## 2017-11-08 DIAGNOSIS — F41.9 ANXIETY: ICD-10-CM

## 2017-11-08 DIAGNOSIS — F32.A DEPRESSION, UNSPECIFIED DEPRESSION TYPE: ICD-10-CM

## 2017-11-08 DIAGNOSIS — M54.5 CHRONIC LOW BACK PAIN, UNSPECIFIED BACK PAIN LATERALITY, WITH SCIATICA PRESENCE UNSPECIFIED: ICD-10-CM

## 2017-11-08 DIAGNOSIS — G89.29 CHRONIC LOW BACK PAIN, UNSPECIFIED BACK PAIN LATERALITY, WITH SCIATICA PRESENCE UNSPECIFIED: ICD-10-CM

## 2017-11-08 PROCEDURE — 3014F SCREEN MAMMO DOC REV: CPT | Performed by: FAMILY MEDICINE

## 2017-11-08 PROCEDURE — 4040F PNEUMOC VAC/ADMIN/RCVD: CPT | Performed by: FAMILY MEDICINE

## 2017-11-08 PROCEDURE — 3017F COLORECTAL CA SCREEN DOC REV: CPT | Performed by: FAMILY MEDICINE

## 2017-11-08 PROCEDURE — G8484 FLU IMMUNIZE NO ADMIN: HCPCS | Performed by: FAMILY MEDICINE

## 2017-11-08 PROCEDURE — 4004F PT TOBACCO SCREEN RCVD TLK: CPT | Performed by: FAMILY MEDICINE

## 2017-11-08 PROCEDURE — G8427 DOCREV CUR MEDS BY ELIG CLIN: HCPCS | Performed by: FAMILY MEDICINE

## 2017-11-08 PROCEDURE — G8420 CALC BMI NORM PARAMETERS: HCPCS | Performed by: FAMILY MEDICINE

## 2017-11-08 PROCEDURE — 99213 OFFICE O/P EST LOW 20 MIN: CPT | Performed by: FAMILY MEDICINE

## 2017-11-08 PROCEDURE — G8599 NO ASA/ANTIPLAT THER USE RNG: HCPCS | Performed by: FAMILY MEDICINE

## 2017-11-08 PROCEDURE — G8400 PT W/DXA NO RESULTS DOC: HCPCS | Performed by: FAMILY MEDICINE

## 2017-11-08 PROCEDURE — 1123F ACP DISCUSS/DSCN MKR DOCD: CPT | Performed by: FAMILY MEDICINE

## 2017-11-08 PROCEDURE — 1090F PRES/ABSN URINE INCON ASSESS: CPT | Performed by: FAMILY MEDICINE

## 2017-11-08 RX ORDER — GABAPENTIN 100 MG/1
100 CAPSULE ORAL 3 TIMES DAILY
Qty: 90 CAPSULE | Refills: 3 | Status: SHIPPED | OUTPATIENT
Start: 2017-11-08 | End: 2019-01-22

## 2017-11-08 ASSESSMENT — ENCOUNTER SYMPTOMS
BACK PAIN: 1
SHORTNESS OF BREATH: 0

## 2017-11-08 NOTE — PROGRESS NOTES
Subjective:      Patient ID: James Thayer is a 75 y.o. female. Chief Complaint   Patient presents with    Anxiety   has had episodes where dream she had dreamed before and the assoc emotions come over her while awake. Feeling of dread. Has diff sleeping. Neck pain worse. Has been on cymbalta for some time. Did not magda higher dose. Has been seeing dr Dixon Robbins and then dr Michelle Covarrubias for neck and back. Injections in neck have not helped much. HPI    Review of Systems   Constitutional: Negative for fever. Respiratory: Negative for shortness of breath. Musculoskeletal: Positive for back pain and neck pain. Psychiatric/Behavioral: Positive for dysphoric mood and sleep disturbance. Objective:   Physical Exam   Constitutional: She is oriented to person, place, and time. She appears well-developed and well-nourished. HENT:   Head: Normocephalic and atraumatic. Right Ear: Hearing normal.   Left Ear: Hearing normal.   Eyes: Conjunctivae and EOM are normal.   Cardiovascular: Normal rate, regular rhythm, S1 normal, S2 normal and normal heart sounds. Pulmonary/Chest: Effort normal and breath sounds normal. No accessory muscle usage. No respiratory distress. Abdominal: Soft. There is no tenderness. Musculoskeletal: Normal range of motion. Cervical back: She exhibits tenderness. Lumbar back: She exhibits tenderness. Neurological: She is alert and oriented to person, place, and time. Skin: Skin is warm and dry. Psychiatric: She exhibits a depressed mood. Vitals reviewed. BP (!) 148/100   Pulse 80   Ht 5' 4.17\" (1.63 m)   Wt 116 lb (52.6 kg)   SpO2 97%   BMI 19.81 kg/m²    Assessment/Plan   1. Cervical myelopathy ([address]  Ongoing issues w/ next pain. May be aggravating sleep and how she feels. Try add back neurontin and see how tolerates and if improves sleep and pain, may improve mood. F/u in 6 weeks  - gabapentin (NEURONTIN) 100 MG capsule;  Take 1 capsule by mouth 3 times daily  Dispense: 90 capsule; Refill: 3    2. Anxiety  See above. Cont cymbalta. May need to adjust meds if not better    3. Chronic low back pain, unspecified back pain laterality, with sciatica presence unspecified  See above. Has been seen by ortho. Reviewed prior notes    4. Depression, unspecified depression type  See above. If not better w/ improved pain control and sleep, may need to adjust meds. bp elevated today. Typically well controlled. Rpt better. Monitor.

## 2017-11-08 NOTE — PATIENT INSTRUCTIONS
Patient Education        Stopping Smoking: Care Instructions  Your Care Instructions  Cigarette smokers crave the nicotine in cigarettes. Giving it up is much harder than simply changing a habit. Your body has to stop craving the nicotine. It is hard to quit, but you can do it. There are many tools that people use to quit smoking. You may find that combining tools works best for you. There are several steps to quitting. First you get ready to quit. Then you get support to help you. After that, you learn new skills and behaviors to become a nonsmoker. For many people, a necessary step is getting and using medicine. Your doctor will help you set up the plan that best meets your needs. You may want to attend a smoking cessation program to help you quit smoking. When you choose a program, look for one that has proven success. Ask your doctor for ideas. You will greatly increase your chances of success if you take medicine as well as get counseling or join a cessation program.  Some of the changes you feel when you first quit tobacco are uncomfortable. Your body will miss the nicotine at first, and you may feel short-tempered and grumpy. You may have trouble sleeping or concentrating. Medicine can help you deal with these symptoms. You may struggle with changing your smoking habits and rituals. The last step is the tricky one: Be prepared for the smoking urge to continue for a time. This is a lot to deal with, but keep at it. You will feel better. Follow-up care is a key part of your treatment and safety. Be sure to make and go to all appointments, and call your doctor if you are having problems. Its also a good idea to know your test results and keep a list of the medicines you take. How can you care for yourself at home? · Ask your family, friends, and coworkers for support. You have a better chance of quitting if you have help and support.   · Join a support group, such as Nicotine Anonymous, for people who are trying to quit smoking. · Consider signing up for a smoking cessation program, such as the American Lung Association's Freedom from Smoking program.  · Set a quit date. Pick your date carefully so that it is not right in the middle of a big deadline or stressful time. Once you quit, do not even take a puff. Get rid of all ashtrays and lighters after your last cigarette. Clean your house and your clothes so that they do not smell of smoke. · Learn how to be a nonsmoker. Think about ways you can avoid those things that make you reach for a cigarette. ¨ Avoid situations that put you at greatest risk for smoking. For some people, it is hard to have a drink with friends without smoking. For others, they might skip a coffee break with coworkers who smoke. ¨ Change your daily routine. Take a different route to work or eat a meal in a different place. · Cut down on stress. Calm yourself or release tension by doing an activity you enjoy, such as reading a book, taking a hot bath, or gardening. · Talk to your doctor or pharmacist about nicotine replacement therapy, which replaces the nicotine in your body. You still get nicotine but you do not use tobacco. Nicotine replacement products help you slowly reduce the amount of nicotine you need. These products come in several forms, many of them available over-the-counter:  ¨ Nicotine patches  ¨ Nicotine gum and lozenges  ¨ Nicotine inhaler  · Ask your doctor about bupropion (Wellbutrin) or varenicline (Chantix), which are prescription medicines. They do not contain nicotine. They help you by reducing withdrawal symptoms, such as stress and anxiety. · Some people find hypnosis, acupuncture, and massage helpful for ending the smoking habit. · Eat a healthy diet and get regular exercise. Having healthy habits will help your body move past its craving for nicotine. · Be prepared to keep trying. Most people are not successful the first few times they try to quit.  Do not get mad at yourself if you smoke again. Make a list of things you learned and think about when you want to try again, such as next week, next month, or next year. Where can you learn more? Go to https://Color Eighteduardo.Doctor Evidence. org and sign in to your [employer] account. Enter U899 in the Package Concierge box to learn more about \"Stopping Smoking: Care Instructions. \"     If you do not have an account, please click on the \"Sign Up Now\" link. Current as of: March 20, 2017  Content Version: 11.3  © 5903-9568 [employer]. Care instructions adapted under license by [facility] ([facility]). If you have questions about a medical condition or this instruction, always ask your healthcare professional. [address] any warranty or liability for your use of this information.

## 2017-11-27 ENCOUNTER — OFFICE VISIT (OUTPATIENT)
Dept: FAMILY MEDICINE CLINIC | Age: 67
End: 2017-11-27

## 2017-11-27 VITALS
SYSTOLIC BLOOD PRESSURE: 104 MMHG | HEART RATE: 75 BPM | BODY MASS INDEX: 19.46 KG/M2 | DIASTOLIC BLOOD PRESSURE: 60 MMHG | TEMPERATURE: 97.6 F | OXYGEN SATURATION: 98 % | HEIGHT: 64 IN | WEIGHT: 114 LBS

## 2017-11-27 DIAGNOSIS — H61.21 IMPACTED CERUMEN OF RIGHT EAR: ICD-10-CM

## 2017-11-27 DIAGNOSIS — H60.392 OTHER INFECTIVE ACUTE OTITIS EXTERNA OF LEFT EAR: Primary | ICD-10-CM

## 2017-11-27 DIAGNOSIS — H92.02 OTALGIA, LEFT EAR: ICD-10-CM

## 2017-11-27 PROCEDURE — 4040F PNEUMOC VAC/ADMIN/RCVD: CPT | Performed by: NURSE PRACTITIONER

## 2017-11-27 PROCEDURE — G8400 PT W/DXA NO RESULTS DOC: HCPCS | Performed by: NURSE PRACTITIONER

## 2017-11-27 PROCEDURE — 1090F PRES/ABSN URINE INCON ASSESS: CPT | Performed by: NURSE PRACTITIONER

## 2017-11-27 PROCEDURE — 99213 OFFICE O/P EST LOW 20 MIN: CPT | Performed by: NURSE PRACTITIONER

## 2017-11-27 PROCEDURE — G8420 CALC BMI NORM PARAMETERS: HCPCS | Performed by: NURSE PRACTITIONER

## 2017-11-27 PROCEDURE — 3017F COLORECTAL CA SCREEN DOC REV: CPT | Performed by: NURSE PRACTITIONER

## 2017-11-27 PROCEDURE — 1123F ACP DISCUSS/DSCN MKR DOCD: CPT | Performed by: NURSE PRACTITIONER

## 2017-11-27 PROCEDURE — G8599 NO ASA/ANTIPLAT THER USE RNG: HCPCS | Performed by: NURSE PRACTITIONER

## 2017-11-27 PROCEDURE — 4004F PT TOBACCO SCREEN RCVD TLK: CPT | Performed by: NURSE PRACTITIONER

## 2017-11-27 PROCEDURE — G8484 FLU IMMUNIZE NO ADMIN: HCPCS | Performed by: NURSE PRACTITIONER

## 2017-11-27 PROCEDURE — G8427 DOCREV CUR MEDS BY ELIG CLIN: HCPCS | Performed by: NURSE PRACTITIONER

## 2017-11-27 PROCEDURE — 4130F TOPICAL PREP RX AOE: CPT | Performed by: NURSE PRACTITIONER

## 2017-11-27 PROCEDURE — 3014F SCREEN MAMMO DOC REV: CPT | Performed by: NURSE PRACTITIONER

## 2017-11-27 RX ORDER — OFLOXACIN 3 MG/ML
5 SOLUTION AURICULAR (OTIC) 2 TIMES DAILY
Qty: 10 ML | Refills: 0 | Status: SHIPPED | OUTPATIENT
Start: 2017-11-27 | End: 2017-12-07

## 2017-11-27 ASSESSMENT — ENCOUNTER SYMPTOMS
SORE THROAT: 0
GASTROINTESTINAL NEGATIVE: 1
SINUS PRESSURE: 0
COUGH: 0
EYES NEGATIVE: 1
SINUS PAIN: 0
RESPIRATORY NEGATIVE: 1

## 2017-11-27 NOTE — PROGRESS NOTES
bowel sounds are normal. There is no tenderness. Musculoskeletal: Normal range of motion. Lymphadenopathy:     She has cervical adenopathy. Right cervical: No superficial cervical, no deep cervical and no posterior cervical adenopathy present. Left cervical: Superficial cervical adenopathy present. No deep cervical and no posterior cervical adenopathy present. Neurological: She is alert and oriented to person, place, and time. Skin: Skin is warm, dry and intact. Psychiatric: She has a normal mood and affect. Her speech is normal and behavior is normal.   Vitals reviewed. Assessment/Plan:   1. Other infective acute otitis externa of left ear  Patient presents today with complaints of left ear pain ×2 weeks. On exam noted canal swollen, slightly erythematous and with yellow exudate. Recommend treatment as below. Also see patient instructions. - ofloxacin (FLOXIN OTIC) 0.3 % otic solution; Place 5 drops into the left ear 2 times daily for 10 days  Dispense: 10 mL; Refill: 0    2. Otalgia, left ear  See note above. 3. Impacted cerumen of right ear  Right TM with cerumen blockage. Advised to try over-the-counter Debrox. Patient verbalized understanding.

## 2017-11-27 NOTE — PATIENT INSTRUCTIONS
Patient Education     Use ear drops as prescribed and f/u if no better or worsening of symptoms. Earache: Care Instructions  Your Care Instructions  Even though infection is a common cause of ear pain, not all ear pain means an infection. If you have ear pain and don't have an infection, it could be because of a jaw problem, such as temporomandibular joint (TMJ) pain. Or it could be because of a neck problem. When ear discomfort or pain is mild or comes and goes without other symptoms, home treatment may be all you need. Follow-up care is a key part of your treatment and safety. Be sure to make and go to all appointments, and call your doctor if you are having problems. It's also a good idea to know your test results and keep a list of the medicines you take. How can you care for yourself at home? · Apply heat on the ear to ease pain. To apply heat, put a warm water bottle, a heating pad set on low, or a warm cloth on your ear. Do not go to sleep with a heating pad on your skin. · Take an over-the-counter pain medicine, such as acetaminophen (Tylenol), ibuprofen (Advil, Motrin), or naproxen (Aleve). Be safe with medicines. Read and follow all instructions on the label. · Do not take two or more pain medicines at the same time unless the doctor told you to. Many pain medicines have acetaminophen, which is Tylenol. Too much acetaminophen (Tylenol) can be harmful. · Never insert anything, such as a cotton swab or a jesse pin, into the ear. When should you call for help? Call your doctor now or seek immediate medical care if:  · You have new or worse symptoms of infection, such as:  ¨ Increased pain, swelling, warmth, or redness. ¨ Red streaks leading from the area. ¨ Pus draining from the area. ¨ A fever. Watch closely for changes in your health, and be sure to contact your doctor if:  · You have new or worse discharge coming from the ear. · You do not get better as expected.   Where can you learn should know about ofloxacin otic? Follow all directions on your medicine label and package. Tell each of your healthcare providers about all your medical conditions, allergies, and all medicines you use. What is ofloxacin otic? Ofloxacin is an antibiotic that treats infections caused by bacteria. Ofloxacin otic (for the ear) is used to treat infections of the ear canal in adults and children who are at least 6 months old. Ofloxacin otic is used in adults and children at least 3year old to treat an inner ear infection (also called otitis media). Ofloxacin otic may be used on a long-term basis to treat an infection that causes a hole in the ear drum (ruptured ear drum) in adults and children who are at least 15years old. Ofloxacin may also be used for purposes not listed in this medication guide. What should I discuss with my healthcare provider before using ofloxacin otic? You should not use this medicine if you are allergic to ofloxacin or similar antibiotics, such as ciprofloxacin (Cipro), gatifloxacin (Tequin), levofloxacin (Levaquin), lomefloxacin (Maxaquin), moxifloxacin (Avelox), or norfloxacin (Noroxin). FDA pregnancy category C. It is not known whether ofloxacin otic will harm an unborn baby. Tell your doctor if you are pregnant or plan to become pregnant while using this medicine. It is not known whether this medicine passes into breast milk or if it could harm a nursing baby. You should not breast-feed while using this medicine. Do not give this medicine to a child without medical advice. How should I use ofloxacin otic? Follow all directions on your prescription label. Do not use this medicine in larger or smaller amounts or for longer than recommended. Shake the medicine well just before each use. You may warm the medicine before use by holding the bottle in your hand for 1 or 2 minutes. Using cold ear drops can cause dizziness.   To use the ear drops:  · Lie down or tilt your head with your ear facing upward. Open the ear canal by gently pulling your ear back, or pulling downward on the earlobe when giving this medicine to a child. · Hold the dropper upside down over your ear and drop the correct number of drops into the ear. · Stay lying down or with your head tilted for at least 5 minutes. You may use a small piece of cotton to plug the ear and keep the medicine from draining out. · If the patient being treated has ear tubes, the doctor may recommend gently pressing the tragus (part of the ear in front of the opening of the ear canal) four to five times in a pumping motion after administration of the drops. This may allow the drops to pass through the tubes into the middle ear. Follow the doctor's instructions. Do not touch the dropper tip or place it directly in your ear. It may become contaminated. Wipe the tip with a clean tissue but do not wash with water or soap. Use this medicine for the full prescribed length of time. Your symptoms may improve before the infection is completely cleared. Skipping doses may also increase your risk of further infection that is resistant to antibiotics. Call your doctor if your symptoms do not improve after 7 days of treatment, or if you have new symptoms. Store at room temperature away from moisture, heat, and light. Throw away any unused medicine after your treatment is finished. What happens if I miss a dose? Use the missed dose as soon as you remember. Skip the missed dose if it is almost time for your next scheduled dose. Do not use extra medicine to make up the missed dose. What happens if I overdose? An overdose of this medicine is not expected to be dangerous. Seek emergency medical attention or call the Poison Help line at 1-477.256.9614 if anyone has accidentally swallowed the medication. What should I avoid while taking ofloxacin otic? This medicine is for use only in the ears.  Avoid getting the medicine in your eyes, mouth, and nose, or on your lips. Rinse with water if this medicine gets in or on these areas. Do not use other ear medications unless your doctor tells you to. What are the possible side effects of ofloxacin otic? Get emergency medical help if you have any of these signs of an allergic reaction: hives, rash, itching; slow heart rate, weak pulse, fainting; difficult breathing, slow breathing (breathing may stop); swelling of your face, lips, tongue, or throat. Stop using this medicine and call your doctor at once if you have:  · the first sign of any skin rash, no matter how mild; or  · ear drainage, discharge, or worsening pain. Common side effects may include:  · headache;  · dizziness; or  · mild ear pain or itching after using the ear drops. This is not a complete list of side effects and others may occur. Call your doctor for medical advice about side effects. You may report side effects to FDA at 5-497-FDA-0844. What other drugs will affect ofloxacin otic? It is not likely that other drugs you take orally or inject will have an effect on ofloxacin used in the ears. But many drugs can interact with each other. Tell each of your healthcare providers about all medicines you use, including prescription and over-the-counter medicines, vitamins, and herbal products. Where can I get more information? Your pharmacist can provide more information about ofloxacin otic. Remember, keep this and all other medicines out of the reach of children, never share your medicines with others, and use this medication only for the indication prescribed. Every effort has been made to ensure that the information provided by [address] is accurate, up-to-date, and complete, but no guarantee is made to that effect. Drug information contained herein may be time sensitive.  [facility] information has been compiled for use by healthcare practitioners and consumers in the [place] and therefore [facility] does not warrant that uses outside of the [place] are appropriate, unless specifically indicated otherwise. [employer] drug information does not endorse drugs, diagnose patients or recommend therapy. [employer] drug information is an informational resource designed to assist licensed healthcare practitioners in caring for their patients and/or to serve consumers viewing this service as a supplement to, and not a substitute for, the expertise, skill, knowledge and judgment of healthcare practitioners. The absence of a warning for a given drug or drug combination in no way should be construed to indicate that the drug or drug combination is safe, effective or appropriate for any given patient. [employer] does not assume any responsibility for any aspect of healthcare administered with the aid of information [employer] provides. The information contained herein is not intended to cover all possible uses, directions, precautions, warnings, drug interactions, allergic reactions, or adverse effects. If you have questions about the drugs you are taking, check with your doctor, nurse or pharmacist.  Copyright 3805-2677 [address]: 2.. Revision date: 6/6/2014. Care instructions adapted under license by [facility] ([facility]). If you have questions about a medical condition or this instruction, always ask your healthcare professional. [place] [address] any warranty or liability for your use of this information. Patient Education        Earwax Blockage: Care Instructions  Your Care Instructions    Earwax is a natural substance that protects the ear canal. Normally, earwax drains from the ears and does not cause problems. Sometimes earwax builds up and hardens. Earwax blockage (also called cerumen impaction) can cause some loss of hearing and pain. When wax is tightly packed, you will need to have your doctor remove it. Follow-up care is a key part of your treatment and safety.  Be sure to make and go to all appointments, and call your doctor if you are having problems. Its also a good idea to know your test results and keep a list of the medicines you take. How can you care for yourself at home? · Do not try to remove earwax with cotton swabs, fingers, or other objects. This can make the blockage worse and damage the eardrum. · If your doctor recommends that you try to remove earwax at home:  ¨ Soften and loosen the earwax with warm mineral oil. You also can try hydrogen peroxide mixed with an equal amount of room temperature water. Place 2 drops of the fluid, warmed to body temperature, in the ear two times a day for up to 5 days. ¨ Once the wax is loose and soft, all that is usually needed to remove it from the ear canal is a gentle, warm shower. Direct the water into the ear, then tip your head to let the earwax drain out. Dry your ear thoroughly with a hair dryer set on low. Hold the dryer several inches from your ear. ¨ If the warm mineral oil and shower do not work, use an over-the-counter wax softener followed by gentle flushing with an ear syringe each night for a week or two. Make sure the flushing solution is body temperature. Cool or hot fluids in the ear can cause dizziness. When should you call for help? Call your doctor now or seek immediate medical care if:  · Pus or blood drains from your ear. · Your ears are ringing or feel full. · You have a loss of hearing. Watch closely for changes in your health, and be sure to contact your doctor if:  · You have pain or reduced hearing after 1 week of home treatment. · You have any new symptoms, such as nausea or balance problems. Where can you learn more? Go to https://VASS TechnologiespeRobotsAliveeb.HowStuffWorks. org and sign in to your [employer] account. Enter G366 in the Egully box to learn more about \"Earwax Blockage: Care Instructions. \"     If you do not have an account, please click on the \"Sign Up Now\" link.   Current as of: March 20, 2017  Content Version: warnings, drug interactions, allergic reactions, or adverse effects. If you have questions about the drugs you are taking, check with your doctor, nurse or pharmacist.  Copyright 2355-1615 [address] Version: 3.01. Revision date: 9/13/2014. Care instructions adapted under license by [facility] ([facility]). If you have questions about a medical condition or this instruction, always ask your healthcare professional. [place] [address] any warranty or liability for your use of this information.

## 2017-12-12 ENCOUNTER — TELEPHONE (OUTPATIENT)
Dept: FAMILY MEDICINE CLINIC | Age: 67
End: 2017-12-12

## 2017-12-12 ENCOUNTER — TELEPHONE (OUTPATIENT)
Dept: CASE MANAGEMENT | Age: 67
End: 2017-12-12

## 2017-12-12 DIAGNOSIS — Z12.39 SCREENING FOR BREAST CANCER: ICD-10-CM

## 2017-12-12 DIAGNOSIS — E78.00 HYPERCHOLESTEROLEMIA: ICD-10-CM

## 2017-12-12 NOTE — TELEPHONE ENCOUNTER
Dr. Ceci Kennedy: This patient has an upcoming appointment with you on 12/20 for Hyperlipidemia. In planning for that visit I have completed the following pre-visit planning:     Pre-Visit Planning Checklist:  Patient contacted: yes  Verified patient by name and date of birth: yes    Health Maintenance items reviewed:    Breast Cancer Screening:  patient would like a referral for a Mammogram. Order pended. Labs and procedures pended: Fasting Lipid Panel  and Mammography   Labs and procedures discussed with patient: yes  Reminded patient to check with their insurance company about coverage for lab tests and lab location: yes    Preliminary Medication Reconciliation: was performed. Reminded patient to bring medications to appointment: no    Reminded patient to arrive early: yes    Other notes: Patient reports that she will have labs completed at [facility] prior to appointment. Will fax completed lab orders to [facility] once completed. Please complete the med-reconciliation and sign the appropriate labs as soon as possible.       Mayra Harris  Pre-Services Specialist

## 2017-12-13 ENCOUNTER — OFFICE VISIT (OUTPATIENT)
Dept: FAMILY MEDICINE CLINIC | Age: 67
End: 2017-12-13

## 2017-12-13 VITALS
HEIGHT: 65 IN | DIASTOLIC BLOOD PRESSURE: 80 MMHG | WEIGHT: 115.8 LBS | TEMPERATURE: 97.4 F | OXYGEN SATURATION: 97 % | HEART RATE: 76 BPM | BODY MASS INDEX: 19.29 KG/M2 | SYSTOLIC BLOOD PRESSURE: 140 MMHG

## 2017-12-13 DIAGNOSIS — H61.23 BILATERAL IMPACTED CERUMEN: ICD-10-CM

## 2017-12-13 DIAGNOSIS — H92.03 OTALGIA OF BOTH EARS: Primary | ICD-10-CM

## 2017-12-13 DIAGNOSIS — Z11.59 NEED FOR HEPATITIS C SCREENING TEST: ICD-10-CM

## 2017-12-13 PROCEDURE — 3017F COLORECTAL CA SCREEN DOC REV: CPT | Performed by: FAMILY MEDICINE

## 2017-12-13 PROCEDURE — G8420 CALC BMI NORM PARAMETERS: HCPCS | Performed by: FAMILY MEDICINE

## 2017-12-13 PROCEDURE — 4040F PNEUMOC VAC/ADMIN/RCVD: CPT | Performed by: FAMILY MEDICINE

## 2017-12-13 PROCEDURE — 99214 OFFICE O/P EST MOD 30 MIN: CPT | Performed by: FAMILY MEDICINE

## 2017-12-13 PROCEDURE — G8400 PT W/DXA NO RESULTS DOC: HCPCS | Performed by: FAMILY MEDICINE

## 2017-12-13 PROCEDURE — G8427 DOCREV CUR MEDS BY ELIG CLIN: HCPCS | Performed by: FAMILY MEDICINE

## 2017-12-13 PROCEDURE — 1090F PRES/ABSN URINE INCON ASSESS: CPT | Performed by: FAMILY MEDICINE

## 2017-12-13 PROCEDURE — 1123F ACP DISCUSS/DSCN MKR DOCD: CPT | Performed by: FAMILY MEDICINE

## 2017-12-13 PROCEDURE — G8599 NO ASA/ANTIPLAT THER USE RNG: HCPCS | Performed by: FAMILY MEDICINE

## 2017-12-13 PROCEDURE — G8484 FLU IMMUNIZE NO ADMIN: HCPCS | Performed by: FAMILY MEDICINE

## 2017-12-13 PROCEDURE — 69210 REMOVE IMPACTED EAR WAX UNI: CPT | Performed by: FAMILY MEDICINE

## 2017-12-13 PROCEDURE — 3014F SCREEN MAMMO DOC REV: CPT | Performed by: FAMILY MEDICINE

## 2017-12-13 PROCEDURE — 4004F PT TOBACCO SCREEN RCVD TLK: CPT | Performed by: FAMILY MEDICINE

## 2017-12-13 RX ORDER — CEFDINIR 300 MG/1
300 CAPSULE ORAL 2 TIMES DAILY
Qty: 20 CAPSULE | Refills: 0 | Status: SHIPPED | OUTPATIENT
Start: 2017-12-13 | End: 2017-12-23

## 2017-12-13 ASSESSMENT — ENCOUNTER SYMPTOMS
VOMITING: 0
COUGH: 0
NAUSEA: 0
SORE THROAT: 1

## 2017-12-13 NOTE — PATIENT INSTRUCTIONS
Patient Education        Stopping Smoking: Care Instructions  Your Care Instructions    Cigarette smokers crave the nicotine in cigarettes. Giving it up is much harder than simply changing a habit. Your body has to stop craving the nicotine. It is hard to quit, but you can do it. There are many tools that people use to quit smoking. You may find that combining tools works best for you. There are several steps to quitting. First you get ready to quit. Then you get support to help you. After that, you learn new skills and behaviors to become a nonsmoker. For many people, a necessary step is getting and using medicine. Your doctor will help you set up the plan that best meets your needs. You may want to attend a smoking cessation program to help you quit smoking. When you choose a program, look for one that has proven success. Ask your doctor for ideas. You will greatly increase your chances of success if you take medicine as well as get counseling or join a cessation program.  Some of the changes you feel when you first quit tobacco are uncomfortable. Your body will miss the nicotine at first, and you may feel short-tempered and grumpy. You may have trouble sleeping or concentrating. Medicine can help you deal with these symptoms. You may struggle with changing your smoking habits and rituals. The last step is the tricky one: Be prepared for the smoking urge to continue for a time. This is a lot to deal with, but keep at it. You will feel better. Follow-up care is a key part of your treatment and safety. Be sure to make and go to all appointments, and call your doctor if you are having problems. It's also a good idea to know your test results and keep a list of the medicines you take. How can you care for yourself at home? · Ask your family, friends, and coworkers for support. You have a better chance of quitting if you have help and support.   · Join a support group, such as Nicotine Anonymous, for people who are

## 2017-12-19 LAB
A/G RATIO: 1 (ref 1–2.5)
ALBUMIN SERPL-MCNC: 3.5 G/DL (ref 3.6–5)
ALP BLD-CCNC: 63 U/L (ref 46–116)
ALT SERPL-CCNC: 17 U/L (ref 12–78)
ANION GAP SERPL CALCULATED.3IONS-SCNC: 9.6 MMOL/L (ref 8–16)
AST SERPL-CCNC: 20 U/L (ref 12–36)
BILIRUB SERPL-MCNC: 0.4 MG/DL (ref 0–1.1)
BUN BLDV-MCNC: 12 MG/DL (ref 5–19)
CALCIUM SERPL-MCNC: 9.3 MG/DL (ref 8.4–10.2)
CHLORIDE BLD-SCNC: 106 MMOL/L (ref 99–111)
CHOLESTEROL, TOTAL: 194 MG/DL (ref 0–200)
CO2: 31 MMOL/L (ref 21–33)
CREAT SERPL-MCNC: 1 MG/DL (ref 0.6–1.3)
GFR CALCULATED: 55
GLOBULIN: 3.5 G/DL (ref 2–3.5)
GLUCOSE BLD-MCNC: 96 MG/DL (ref 74–99)
HDLC SERPL-MCNC: 61 MG/DL (ref 18–88)
LDL CHOLESTEROL DIRECT: 116.6 MG/DL
LDL/HDL RATIO: 1.9
POTASSIUM SERPL-SCNC: 4.2 MMOL/L (ref 3.4–5)
SODIUM BLD-SCNC: 142 MMOL/L (ref 136–146)
TOTAL PROTEIN: 7 G/DL (ref 6.3–8)
TRIGL SERPL-MCNC: 82 MG/DL (ref 33–163)
VLDLC SERPL CALC-MCNC: 16.4 MG/DL (ref 10–50)

## 2017-12-20 ENCOUNTER — OFFICE VISIT (OUTPATIENT)
Dept: FAMILY MEDICINE CLINIC | Age: 67
End: 2017-12-20

## 2017-12-20 VITALS
WEIGHT: 114 LBS | SYSTOLIC BLOOD PRESSURE: 130 MMHG | HEART RATE: 74 BPM | HEIGHT: 64 IN | DIASTOLIC BLOOD PRESSURE: 84 MMHG | OXYGEN SATURATION: 98 % | BODY MASS INDEX: 19.46 KG/M2

## 2017-12-20 DIAGNOSIS — F41.9 ANXIETY: ICD-10-CM

## 2017-12-20 DIAGNOSIS — G95.9 CERVICAL MYELOPATHY (HCC): ICD-10-CM

## 2017-12-20 DIAGNOSIS — E53.8 B12 DEFICIENCY: ICD-10-CM

## 2017-12-20 DIAGNOSIS — F17.210 NICOTINE DEPENDENCE, CIGARETTES, UNCOMPLICATED: ICD-10-CM

## 2017-12-20 DIAGNOSIS — E78.5 HYPERLIPIDEMIA, UNSPECIFIED HYPERLIPIDEMIA TYPE: ICD-10-CM

## 2017-12-20 DIAGNOSIS — F32.A DEPRESSION, UNSPECIFIED DEPRESSION TYPE: ICD-10-CM

## 2017-12-20 DIAGNOSIS — J44.1 CHRONIC OBSTRUCTIVE PULMONARY DISEASE WITH ACUTE EXACERBATION (HCC): Primary | ICD-10-CM

## 2017-12-20 PROCEDURE — 3023F SPIROM DOC REV: CPT | Performed by: FAMILY MEDICINE

## 2017-12-20 PROCEDURE — G8400 PT W/DXA NO RESULTS DOC: HCPCS | Performed by: FAMILY MEDICINE

## 2017-12-20 PROCEDURE — G8484 FLU IMMUNIZE NO ADMIN: HCPCS | Performed by: FAMILY MEDICINE

## 2017-12-20 PROCEDURE — 96372 THER/PROPH/DIAG INJ SC/IM: CPT | Performed by: FAMILY MEDICINE

## 2017-12-20 PROCEDURE — G8599 NO ASA/ANTIPLAT THER USE RNG: HCPCS | Performed by: FAMILY MEDICINE

## 2017-12-20 PROCEDURE — 1090F PRES/ABSN URINE INCON ASSESS: CPT | Performed by: FAMILY MEDICINE

## 2017-12-20 PROCEDURE — G8420 CALC BMI NORM PARAMETERS: HCPCS | Performed by: FAMILY MEDICINE

## 2017-12-20 PROCEDURE — 4040F PNEUMOC VAC/ADMIN/RCVD: CPT | Performed by: FAMILY MEDICINE

## 2017-12-20 PROCEDURE — 3288F FALL RISK ASSESSMENT DOCD: CPT | Performed by: FAMILY MEDICINE

## 2017-12-20 PROCEDURE — G0296 VISIT TO DETERM LDCT ELIG: HCPCS | Performed by: FAMILY MEDICINE

## 2017-12-20 PROCEDURE — 4004F PT TOBACCO SCREEN RCVD TLK: CPT | Performed by: FAMILY MEDICINE

## 2017-12-20 PROCEDURE — 99214 OFFICE O/P EST MOD 30 MIN: CPT | Performed by: FAMILY MEDICINE

## 2017-12-20 PROCEDURE — 3017F COLORECTAL CA SCREEN DOC REV: CPT | Performed by: FAMILY MEDICINE

## 2017-12-20 PROCEDURE — 3014F SCREEN MAMMO DOC REV: CPT | Performed by: FAMILY MEDICINE

## 2017-12-20 PROCEDURE — G8427 DOCREV CUR MEDS BY ELIG CLIN: HCPCS | Performed by: FAMILY MEDICINE

## 2017-12-20 PROCEDURE — 1123F ACP DISCUSS/DSCN MKR DOCD: CPT | Performed by: FAMILY MEDICINE

## 2017-12-20 PROCEDURE — G8926 SPIRO NO PERF OR DOC: HCPCS | Performed by: FAMILY MEDICINE

## 2017-12-20 PROCEDURE — G8510 SCR DEP NEG, NO PLAN REQD: HCPCS | Performed by: FAMILY MEDICINE

## 2017-12-20 RX ORDER — CYANOCOBALAMIN 1000 UG/ML
1000 INJECTION INTRAMUSCULAR; SUBCUTANEOUS ONCE
Qty: 1 ML | Refills: 0 | Status: CANCELLED | OUTPATIENT
Start: 2017-12-20 | End: 2017-12-20

## 2017-12-20 RX ORDER — CYANOCOBALAMIN 1000 UG/ML
1000 INJECTION INTRAMUSCULAR; SUBCUTANEOUS ONCE
Status: COMPLETED | OUTPATIENT
Start: 2017-12-20 | End: 2017-12-20

## 2017-12-20 RX ORDER — ALBUTEROL SULFATE 90 UG/1
2 AEROSOL, METERED RESPIRATORY (INHALATION) 2 TIMES DAILY PRN
Qty: 1 INHALER | Refills: 3 | Status: SHIPPED | OUTPATIENT
Start: 2017-12-20 | End: 2021-07-21 | Stop reason: SDUPTHER

## 2017-12-20 RX ADMIN — CYANOCOBALAMIN 1000 MCG: 1000 INJECTION INTRAMUSCULAR; SUBCUTANEOUS at 09:28

## 2017-12-20 ASSESSMENT — PATIENT HEALTH QUESTIONNAIRE - PHQ9
1. LITTLE INTEREST OR PLEASURE IN DOING THINGS: 2
SUM OF ALL RESPONSES TO PHQ9 QUESTIONS 1 & 2: 3
10. IF YOU CHECKED OFF ANY PROBLEMS, HOW DIFFICULT HAVE THESE PROBLEMS MADE IT FOR YOU TO DO YOUR WORK, TAKE CARE OF THINGS AT HOME, OR GET ALONG WITH OTHER PEOPLE: 1
7. TROUBLE CONCENTRATING ON THINGS, SUCH AS READING THE NEWSPAPER OR WATCHING TELEVISION: 3
4. FEELING TIRED OR HAVING LITTLE ENERGY: 3
2. FEELING DOWN, DEPRESSED OR HOPELESS: 1
5. POOR APPETITE OR OVEREATING: 1
6. FEELING BAD ABOUT YOURSELF - OR THAT YOU ARE A FAILURE OR HAVE LET YOURSELF OR YOUR FAMILY DOWN: 0
3. TROUBLE FALLING OR STAYING ASLEEP: 3
9. THOUGHTS THAT YOU WOULD BE BETTER OFF DEAD, OR OF HURTING YOURSELF: 0
SUM OF ALL RESPONSES TO PHQ QUESTIONS 1-9: 14
8. MOVING OR SPEAKING SO SLOWLY THAT OTHER PEOPLE COULD HAVE NOTICED. OR THE OPPOSITE, BEING SO FIGETY OR RESTLESS THAT YOU HAVE BEEN MOVING AROUND A LOT MORE THAN USUAL: 1

## 2017-12-20 ASSESSMENT — ENCOUNTER SYMPTOMS: SHORTNESS OF BREATH: 1

## 2017-12-20 NOTE — PATIENT INSTRUCTIONS
trying to quit smoking. · Consider signing up for a smoking cessation program, such as the American Lung Association's Freedom from Smoking program.  · Set a quit date. Pick your date carefully so that it is not right in the middle of a big deadline or stressful time. Once you quit, do not even take a puff. Get rid of all ashtrays and lighters after your last cigarette. Clean your house and your clothes so that they do not smell of smoke. · Learn how to be a nonsmoker. Think about ways you can avoid those things that make you reach for a cigarette. ¨ Avoid situations that put you at greatest risk for smoking. For some people, it is hard to have a drink with friends without smoking. For others, they might skip a coffee break with coworkers who smoke. ¨ Change your daily routine. Take a different route to work or eat a meal in a different place. · Cut down on stress. Calm yourself or release tension by doing an activity you enjoy, such as reading a book, taking a hot bath, or gardening. · Talk to your doctor or pharmacist about nicotine replacement therapy, which replaces the nicotine in your body. You still get nicotine but you do not use tobacco. Nicotine replacement products help you slowly reduce the amount of nicotine you need. These products come in several forms, many of them available over-the-counter:  ¨ Nicotine patches  ¨ Nicotine gum and lozenges  ¨ Nicotine inhaler  · Ask your doctor about bupropion (Wellbutrin) or varenicline (Chantix), which are prescription medicines. They do not contain nicotine. They help you by reducing withdrawal symptoms, such as stress and anxiety. · Some people find hypnosis, acupuncture, and massage helpful for ending the smoking habit. · Eat a healthy diet and get regular exercise. Having healthy habits will help your body move past its craving for nicotine. · Be prepared to keep trying. Most people are not successful the first few times they try to quit.  Do not get mad at yourself if you smoke again. Make a list of things you learned and think about when you want to try again, such as next week, next month, or next year. Where can you learn more? Go to https://FibroGeneduardo.Apieron. org and sign in to your [employer] account. Enter D154 in the [facility] box to learn more about \"Stopping Smoking: Care Instructions. \"     If you do not have an account, please click on the \"Sign Up Now\" link. Current as of: March 20, 2017  Content Version: 11.4  © 8854-8214 [employer]. Care instructions adapted under license by [facility] [employer] [facility] ([facility]). If you have questions about a medical condition or this instruction, always ask your healthcare professional. [address] any warranty or liability for your use of this information. What is lung cancer screening? Lung cancer screening is a way in which doctors check the lungs for early signs of cancer in people who have no symptoms of lung cancer. A low-dose CT scan uses much less radiation than a normal CT scan and shows a more detailed image of the lungs than a standard X-ray. The goal of lung cancer screening is to find cancer early, before it has a chance to grow, spread, or cause problems. One large study found that smokers who were screened with low-dose CT scans were less likely to die of lung cancer than those who were screened with standard X-ray. Below is a summary of the things you need to know regarding screening for lung cancer with low-dose computed tomography (LDCT). This is a screening program that involves routine annual screening with LDCT studies of the lung. The LDCTs are done using low-dose radiation that is not thought to increase your cancer risk. If you have other serious medical conditions (other cancers, congestive heart failure) that limit your life expectancy to less than 10 years, you should not undergo lung cancer screening with LDCT.   The chance is

## 2017-12-20 NOTE — PROGRESS NOTES
Narrative    No narrative on file       Prior to Visit Medications    Medication Sig Taking? Authorizing Provider   cefdinir (OMNICEF) 300 MG capsule Take 1 capsule by mouth 2 times daily for 10 days Yes Riaz Queen MD   gabapentin (NEURONTIN) 100 MG capsule Take 1 capsule by mouth 3 times daily  Patient taking differently: Take 100 mg by mouth daily  Yes Riaz Queen MD   DULoxetine (CYMBALTA) 60 MG extended release capsule TAKE 1 CAPSULE BY MOUTH DAILY Yes Sandra Kowalski CNP   dicyclomine (BENTYL) 10 MG capsule TAKE ONE CAPSULE BY MOUTH FOUR TIMES DAILY AS NEEDED Yes Riaz Queen MD   tiotropium (Fleurette Telma) 18 MCG inhalation capsule Inhale 1 capsule into the lungs daily Yes Riaz Queen MD   fluticasone (FLONASE) 50 MCG/ACT nasal spray 2 sprays by Nasal route daily  Patient taking differently: 2 sprays by Nasal route daily as needed  Yes Sandra Kowalski CNP   aspirin EC 81 MG EC tablet Take 1 tablet by mouth daily. Yes Hunter Chou MD   albuterol (VENTOLIN HFA) 108 (90 BASE) MCG/ACT inhaler Inhale 2 puffs into the lungs 2 times daily as needed  Yes Historical Provider, MD     Allergies   Allergen Reactions    Sulfa Antibiotics Rash       OBJECTIVE:    /84   Pulse 74   Ht 5' 4.17\" (1.63 m)   Wt 114 lb (51.7 kg)   SpO2 98%   BMI 19.46 kg/m²   BP Readings from Last 2 Encounters:   12/20/17 130/84   12/13/17 (!) 140/80     Wt Readings from Last 3 Encounters:   12/20/17 114 lb (51.7 kg)   12/13/17 115 lb 12.8 oz (52.5 kg)   11/27/17 114 lb (51.7 kg)       Physical Exam   Constitutional: She appears well-developed and well-nourished. Eyes: Pupils are equal, round, and reactive to light. Neck: No tracheal deviation present. Cardiovascular: Normal rate and regular rhythm. No murmur heard. Pulmonary/Chest: Effort normal and breath sounds normal. She has no wheezes. She has no rales. Abdominal: Soft. There is tenderness in the epigastric area.    Musculoskeletal: She exhibits no edema. Skin: Skin is warm and dry. Psychiatric: She has a normal mood and affect. ASSESSMENT/PLAN:    1. Chronic obstructive pulmonary disease with acute exacerbation (HCC)  The current medical regimen is effective;  continue present plan and medications. magda meds well    - albuterol sulfate HFA (VENTOLIN HFA) 108 (90 Base) MCG/ACT inhaler; Inhale 2 puffs into the lungs 2 times daily as needed for Wheezing or Shortness of Breath  Dispense: 1 Inhaler; Refill: 3    2. Hyperlipidemia, unspecified hyperlipidemia type  The current medical regimen is effective;  continue present plan and medications. Cont to work on diet      3. Depression, unspecified depression type  Ongoing issues. Pt does not want to add meds. Try inc neurontin as below    4. Anxiety  Cont cymbalta      5. Vitamin B12 deficiency  resume B12 injections. Weekly x 4, then every other week, then monthly. 6.  Chronic neck pain  Inc neurontin to 200mg at bedtime, 100mg in am.  Can titrate upward    7.  tob use. Due for rpt lung screen. Will plan on ordering  Scribe attestation: Yusuf Phillip am scribing for and in the presence of [place] MD Noe. Electronically signed by Reina Oneal on 12/20/2017 at 9:10 AM      Provider attestation: Rosalina Ruelas MD  personally performed the services described in this documentation, as scribed by the user listed above in my presence, and it is both accurate and complete. I agree with the Chief Complaint, ROS, and Past Histories independently gathered by the clinical support staff and the remaining scribed note accurately describes my personal service to the patient.     12/20/2017    9:24 AM                Low Dose CT (LDCT) Lung Screening criteria met   Age 55-77   Pack year smoking >30   Still smoking or less than 15 year since quit   No sign or symptoms of lung cancer   > 11 months since last LDCT     Risks and benefits of lung cancer screening with LDCT scans

## 2017-12-28 ENCOUNTER — NURSE ONLY (OUTPATIENT)
Dept: FAMILY MEDICINE CLINIC | Age: 67
End: 2017-12-28

## 2017-12-28 DIAGNOSIS — E53.8 B12 DEFICIENCY: Primary | ICD-10-CM

## 2017-12-28 PROCEDURE — 96372 THER/PROPH/DIAG INJ SC/IM: CPT | Performed by: FAMILY MEDICINE

## 2017-12-28 RX ORDER — CYANOCOBALAMIN 1000 UG/ML
1000 INJECTION INTRAMUSCULAR; SUBCUTANEOUS ONCE
Status: COMPLETED | OUTPATIENT
Start: 2017-12-28 | End: 2017-12-28

## 2017-12-28 RX ADMIN — CYANOCOBALAMIN 1000 MCG: 1000 INJECTION INTRAMUSCULAR; SUBCUTANEOUS at 10:23

## 2018-01-02 RX ORDER — DULOXETIN HYDROCHLORIDE 60 MG/1
CAPSULE, DELAYED RELEASE ORAL
Qty: 30 CAPSULE | Refills: 5 | Status: SHIPPED | OUTPATIENT
Start: 2018-01-02 | End: 2018-01-02 | Stop reason: SDUPTHER

## 2018-01-02 RX ORDER — DULOXETIN HYDROCHLORIDE 60 MG/1
CAPSULE, DELAYED RELEASE ORAL
Qty: 30 CAPSULE | Refills: 5 | Status: SHIPPED | OUTPATIENT
Start: 2018-01-02 | End: 2018-07-06 | Stop reason: SDUPTHER

## 2018-01-05 ENCOUNTER — NURSE ONLY (OUTPATIENT)
Dept: FAMILY MEDICINE CLINIC | Age: 68
End: 2018-01-05

## 2018-01-05 DIAGNOSIS — E53.8 B12 DEFICIENCY: Primary | ICD-10-CM

## 2018-01-05 PROCEDURE — 96372 THER/PROPH/DIAG INJ SC/IM: CPT | Performed by: FAMILY MEDICINE

## 2018-01-05 RX ORDER — CYANOCOBALAMIN 1000 UG/ML
1000 INJECTION INTRAMUSCULAR; SUBCUTANEOUS ONCE
Status: COMPLETED | OUTPATIENT
Start: 2018-01-05 | End: 2018-01-05

## 2018-01-05 RX ADMIN — CYANOCOBALAMIN 1000 MCG: 1000 INJECTION INTRAMUSCULAR; SUBCUTANEOUS at 10:41

## 2018-01-11 ENCOUNTER — OFFICE VISIT (OUTPATIENT)
Dept: FAMILY MEDICINE CLINIC | Age: 68
End: 2018-01-11

## 2018-01-11 VITALS
TEMPERATURE: 98 F | DIASTOLIC BLOOD PRESSURE: 70 MMHG | HEART RATE: 90 BPM | HEIGHT: 64 IN | SYSTOLIC BLOOD PRESSURE: 120 MMHG | OXYGEN SATURATION: 96 % | WEIGHT: 111.2 LBS | BODY MASS INDEX: 18.98 KG/M2

## 2018-01-11 DIAGNOSIS — F33.41 MAJOR DEPRESSIVE DISORDER, RECURRENT, IN PARTIAL REMISSION (HCC): ICD-10-CM

## 2018-01-11 DIAGNOSIS — J01.10 ACUTE FRONTAL SINUSITIS, RECURRENCE NOT SPECIFIED: ICD-10-CM

## 2018-01-11 DIAGNOSIS — J44.9 CHRONIC OBSTRUCTIVE PULMONARY DISEASE, UNSPECIFIED COPD TYPE (HCC): ICD-10-CM

## 2018-01-11 DIAGNOSIS — H60.503 ACUTE OTITIS EXTERNA OF BOTH EARS, UNSPECIFIED TYPE: Primary | ICD-10-CM

## 2018-01-11 DIAGNOSIS — E53.8 B12 DEFICIENCY: ICD-10-CM

## 2018-01-11 PROCEDURE — 3014F SCREEN MAMMO DOC REV: CPT | Performed by: FAMILY MEDICINE

## 2018-01-11 PROCEDURE — G8420 CALC BMI NORM PARAMETERS: HCPCS | Performed by: FAMILY MEDICINE

## 2018-01-11 PROCEDURE — G8599 NO ASA/ANTIPLAT THER USE RNG: HCPCS | Performed by: FAMILY MEDICINE

## 2018-01-11 PROCEDURE — 4004F PT TOBACCO SCREEN RCVD TLK: CPT | Performed by: FAMILY MEDICINE

## 2018-01-11 PROCEDURE — 3017F COLORECTAL CA SCREEN DOC REV: CPT | Performed by: FAMILY MEDICINE

## 2018-01-11 PROCEDURE — 96372 THER/PROPH/DIAG INJ SC/IM: CPT | Performed by: FAMILY MEDICINE

## 2018-01-11 PROCEDURE — G8427 DOCREV CUR MEDS BY ELIG CLIN: HCPCS | Performed by: FAMILY MEDICINE

## 2018-01-11 PROCEDURE — 4130F TOPICAL PREP RX AOE: CPT | Performed by: FAMILY MEDICINE

## 2018-01-11 PROCEDURE — 99214 OFFICE O/P EST MOD 30 MIN: CPT | Performed by: FAMILY MEDICINE

## 2018-01-11 PROCEDURE — 4040F PNEUMOC VAC/ADMIN/RCVD: CPT | Performed by: FAMILY MEDICINE

## 2018-01-11 PROCEDURE — G8484 FLU IMMUNIZE NO ADMIN: HCPCS | Performed by: FAMILY MEDICINE

## 2018-01-11 PROCEDURE — G8400 PT W/DXA NO RESULTS DOC: HCPCS | Performed by: FAMILY MEDICINE

## 2018-01-11 PROCEDURE — 1123F ACP DISCUSS/DSCN MKR DOCD: CPT | Performed by: FAMILY MEDICINE

## 2018-01-11 PROCEDURE — G8926 SPIRO NO PERF OR DOC: HCPCS | Performed by: FAMILY MEDICINE

## 2018-01-11 PROCEDURE — 3023F SPIROM DOC REV: CPT | Performed by: FAMILY MEDICINE

## 2018-01-11 PROCEDURE — 1090F PRES/ABSN URINE INCON ASSESS: CPT | Performed by: FAMILY MEDICINE

## 2018-01-11 RX ORDER — AMOXICILLIN AND CLAVULANATE POTASSIUM 875; 125 MG/1; MG/1
1 TABLET, FILM COATED ORAL EVERY 12 HOURS
Qty: 20 TABLET | Refills: 0 | Status: SHIPPED | OUTPATIENT
Start: 2018-01-11 | End: 2019-01-08 | Stop reason: SDUPTHER

## 2018-01-11 RX ORDER — CYANOCOBALAMIN 1000 UG/ML
1000 INJECTION INTRAMUSCULAR; SUBCUTANEOUS ONCE
Status: COMPLETED | OUTPATIENT
Start: 2018-01-11 | End: 2018-01-11

## 2018-01-11 RX ORDER — HYDROCORTISONE AND ACETIC ACID 20.75; 10.375 MG/ML; MG/ML
3 SOLUTION AURICULAR (OTIC) 3 TIMES DAILY
Qty: 1 BOTTLE | Refills: 0 | Status: SHIPPED | OUTPATIENT
Start: 2018-01-11 | End: 2018-01-18

## 2018-01-11 RX ADMIN — CYANOCOBALAMIN 1000 MCG: 1000 INJECTION INTRAMUSCULAR; SUBCUTANEOUS at 14:45

## 2018-01-11 ASSESSMENT — ENCOUNTER SYMPTOMS
SINUS PRESSURE: 0
COUGH: 0
SINUS PAIN: 1

## 2018-01-31 ENCOUNTER — NURSE ONLY (OUTPATIENT)
Dept: FAMILY MEDICINE CLINIC | Age: 68
End: 2018-01-31

## 2018-01-31 DIAGNOSIS — E53.8 B12 DEFICIENCY: Primary | ICD-10-CM

## 2018-01-31 PROCEDURE — 96372 THER/PROPH/DIAG INJ SC/IM: CPT | Performed by: FAMILY MEDICINE

## 2018-01-31 RX ORDER — CYANOCOBALAMIN 1000 UG/ML
1000 INJECTION INTRAMUSCULAR; SUBCUTANEOUS ONCE
Status: COMPLETED | OUTPATIENT
Start: 2018-01-31 | End: 2018-01-31

## 2018-01-31 RX ADMIN — CYANOCOBALAMIN 1000 MCG: 1000 INJECTION INTRAMUSCULAR; SUBCUTANEOUS at 10:11

## 2018-02-27 RX ORDER — DICYCLOMINE HYDROCHLORIDE 10 MG/1
CAPSULE ORAL
Qty: 100 CAPSULE | Refills: 4 | Status: SHIPPED | OUTPATIENT
Start: 2018-02-27 | End: 2019-07-15 | Stop reason: SDUPTHER

## 2018-04-13 ENCOUNTER — TELEPHONE (OUTPATIENT)
Dept: FAMILY MEDICINE CLINIC | Age: 68
End: 2018-04-13

## 2018-04-19 ENCOUNTER — TELEPHONE (OUTPATIENT)
Dept: FAMILY MEDICINE CLINIC | Age: 68
End: 2018-04-19

## 2018-04-19 DIAGNOSIS — F41.9 ANXIETY: ICD-10-CM

## 2018-04-19 RX ORDER — AZITHROMYCIN 250 MG/1
TABLET, FILM COATED ORAL
Qty: 1 PACKET | Refills: 0 | Status: SHIPPED | OUTPATIENT
Start: 2018-04-19 | End: 2018-04-23

## 2018-04-19 RX ORDER — ALPRAZOLAM 0.25 MG/1
0.25 TABLET ORAL NIGHTLY PRN
Qty: 15 TABLET | Refills: 0 | Status: SHIPPED | OUTPATIENT
Start: 2018-04-19 | End: 2019-09-10 | Stop reason: SDUPTHER

## 2018-05-23 DIAGNOSIS — R92.8 ABNORMAL SCREENING MAMMOGRAM: Primary | ICD-10-CM

## 2018-05-29 ENCOUNTER — HOSPITAL ENCOUNTER (OUTPATIENT)
Dept: CT IMAGING | Facility: MEDICAL CENTER | Age: 68
Discharge: HOME | End: 2018-05-29
Attending: FAMILY MEDICINE | Admitting: FAMILY MEDICINE

## 2018-05-29 DIAGNOSIS — F17.210 CIGARETTE NICOTINE DEPENDENCE, UNCOMPLICATED: ICD-10-CM

## 2018-05-29 DIAGNOSIS — F17.210 NICOTINE DEPENDENCE, CIGARETTES, UNCOMPLICATED: ICD-10-CM

## 2018-05-31 ENCOUNTER — TELEPHONE (OUTPATIENT)
Dept: CASE MANAGEMENT | Age: 68
End: 2018-05-31

## 2018-07-03 ENCOUNTER — OFFICE VISIT (OUTPATIENT)
Dept: FAMILY MEDICINE CLINIC | Age: 68
End: 2018-07-03

## 2018-07-03 VITALS
HEART RATE: 72 BPM | BODY MASS INDEX: 19.7 KG/M2 | DIASTOLIC BLOOD PRESSURE: 88 MMHG | SYSTOLIC BLOOD PRESSURE: 132 MMHG | HEIGHT: 64 IN | OXYGEN SATURATION: 97 % | WEIGHT: 115.4 LBS

## 2018-07-03 DIAGNOSIS — E53.8 B12 DEFICIENCY: ICD-10-CM

## 2018-07-03 DIAGNOSIS — I65.29 STENOSIS OF CAROTID ARTERY, UNSPECIFIED LATERALITY: ICD-10-CM

## 2018-07-03 DIAGNOSIS — R41.3 MEMORY LOSS: ICD-10-CM

## 2018-07-03 DIAGNOSIS — Z01.818 PRE-OP EXAM: Primary | ICD-10-CM

## 2018-07-03 DIAGNOSIS — Z11.59 NEED FOR HEPATITIS C SCREENING TEST: ICD-10-CM

## 2018-07-03 DIAGNOSIS — J44.9 CHRONIC OBSTRUCTIVE PULMONARY DISEASE, UNSPECIFIED COPD TYPE (HCC): ICD-10-CM

## 2018-07-03 DIAGNOSIS — M79.672 LEFT FOOT PAIN: ICD-10-CM

## 2018-07-03 DIAGNOSIS — R09.89 ABDOMINAL BRUIT: ICD-10-CM

## 2018-07-03 DIAGNOSIS — F32.A DEPRESSION, UNSPECIFIED DEPRESSION TYPE: ICD-10-CM

## 2018-07-03 LAB
BASOPHILS ABSOLUTE: 0.1 K/UL (ref 0–0.2)
BASOPHILS RELATIVE PERCENT: 1.1 %
EOSINOPHILS ABSOLUTE: 0.2 K/UL (ref 0–0.6)
EOSINOPHILS RELATIVE PERCENT: 2.8 %
HCT VFR BLD CALC: 41.2 % (ref 36–48)
HEMOGLOBIN: 14.1 G/DL (ref 12–16)
LYMPHOCYTES ABSOLUTE: 2.7 K/UL (ref 1–5.1)
LYMPHOCYTES RELATIVE PERCENT: 43.3 %
MCH RBC QN AUTO: 31.3 PG (ref 26–34)
MCHC RBC AUTO-ENTMCNC: 34.3 G/DL (ref 31–36)
MCV RBC AUTO: 91.2 FL (ref 80–100)
MONOCYTES ABSOLUTE: 0.5 K/UL (ref 0–1.3)
MONOCYTES RELATIVE PERCENT: 8.3 %
NEUTROPHILS ABSOLUTE: 2.8 K/UL (ref 1.7–7.7)
NEUTROPHILS RELATIVE PERCENT: 44.5 %
PDW BLD-RTO: 13.4 % (ref 12.4–15.4)
PLATELET # BLD: 220 K/UL (ref 135–450)
PMV BLD AUTO: 9.8 FL (ref 5–10.5)
RBC # BLD: 4.51 M/UL (ref 4–5.2)
WBC # BLD: 6.3 K/UL (ref 4–11)

## 2018-07-03 PROCEDURE — 99214 OFFICE O/P EST MOD 30 MIN: CPT | Performed by: FAMILY MEDICINE

## 2018-07-03 PROCEDURE — 4040F PNEUMOC VAC/ADMIN/RCVD: CPT | Performed by: FAMILY MEDICINE

## 2018-07-03 PROCEDURE — 3023F SPIROM DOC REV: CPT | Performed by: FAMILY MEDICINE

## 2018-07-03 PROCEDURE — 4004F PT TOBACCO SCREEN RCVD TLK: CPT | Performed by: FAMILY MEDICINE

## 2018-07-03 PROCEDURE — 3017F COLORECTAL CA SCREEN DOC REV: CPT | Performed by: FAMILY MEDICINE

## 2018-07-03 PROCEDURE — 1090F PRES/ABSN URINE INCON ASSESS: CPT | Performed by: FAMILY MEDICINE

## 2018-07-03 PROCEDURE — G8400 PT W/DXA NO RESULTS DOC: HCPCS | Performed by: FAMILY MEDICINE

## 2018-07-03 PROCEDURE — G8420 CALC BMI NORM PARAMETERS: HCPCS | Performed by: FAMILY MEDICINE

## 2018-07-03 PROCEDURE — G8926 SPIRO NO PERF OR DOC: HCPCS | Performed by: FAMILY MEDICINE

## 2018-07-03 PROCEDURE — 3014F SCREEN MAMMO DOC REV: CPT | Performed by: FAMILY MEDICINE

## 2018-07-03 PROCEDURE — 1123F ACP DISCUSS/DSCN MKR DOCD: CPT | Performed by: FAMILY MEDICINE

## 2018-07-03 PROCEDURE — 36415 COLL VENOUS BLD VENIPUNCTURE: CPT | Performed by: FAMILY MEDICINE

## 2018-07-03 PROCEDURE — G8599 NO ASA/ANTIPLAT THER USE RNG: HCPCS | Performed by: FAMILY MEDICINE

## 2018-07-03 PROCEDURE — G8427 DOCREV CUR MEDS BY ELIG CLIN: HCPCS | Performed by: FAMILY MEDICINE

## 2018-07-03 ASSESSMENT — ENCOUNTER SYMPTOMS
COUGH: 0
ABDOMINAL PAIN: 0
COLOR CHANGE: 0
CONSTIPATION: 0
BLOOD IN STOOL: 0
CHEST TIGHTNESS: 0
DIARRHEA: 0
SHORTNESS OF BREATH: 0

## 2018-07-03 NOTE — PATIENT INSTRUCTIONS
trying to quit smoking. · Consider signing up for a smoking cessation program, such as the American Lung Association's Freedom from Smoking program.  · Get text messaging support. Go to the website at www.smokefree. gov to sign up for the [facility] program.  · Set a quit date. Pick your date carefully so that it is not right in the middle of a big deadline or stressful time. Once you quit, do not even take a puff. Get rid of all ashtrays and lighters after your last cigarette. Clean your house and your clothes so that they do not smell of smoke. · Learn how to be a nonsmoker. Think about ways you can avoid those things that make you reach for a cigarette. ¨ Avoid situations that put you at greatest risk for smoking. For some people, it is hard to have a drink with friends without smoking. For others, they might skip a coffee break with coworkers who smoke. ¨ Change your daily routine. Take a different route to work or eat a meal in a different place. · Cut down on stress. Calm yourself or release tension by doing an activity you enjoy, such as reading a book, taking a hot bath, or gardening. · Talk to your doctor or pharmacist about nicotine replacement therapy, which replaces the nicotine in your body. You still get nicotine but you do not use tobacco. Nicotine replacement products help you slowly reduce the amount of nicotine you need. These products come in several forms, many of them available over-the-counter:  ¨ Nicotine patches  ¨ Nicotine gum and lozenges  ¨ Nicotine inhaler  · Ask your doctor about bupropion (Wellbutrin) or varenicline (Chantix), which are prescription medicines. They do not contain nicotine. They help you by reducing withdrawal symptoms, such as stress and anxiety. · Some people find hypnosis, acupuncture, and massage helpful for ending the smoking habit. · Eat a healthy diet and get regular exercise.  Having healthy habits will help your body move past its craving for

## 2018-07-03 NOTE — PROGRESS NOTES
Chief Complaint   Patient presents with   Ernie Knight Pre-op Exam     Pt is having left foot surgery by Dr. Pablito Flores on 18 at [facility].       HPI:  Marv Watt is a 75 y.o. (: 1950) here today   for pre op exam.  Pt is having foot surgery by Dr. Pablito Flores next week. Pt is having bone resection in her left 3rd metatarsal on 18. Pain to plantar aspect of left foot. Pt is also having continuing issues with forgetfulness. Pt is under a lot of stress with taking care of her  and she is having a hard time remembering things. Worried because her sister has alzheimer's. Pt is also having dizziness and pressure in her head. She feels like she is going to pass out at times. Feels sinuses and allergies aggravating. Has had inc stress as well        HPI    Patient's medications, allergies, past medical, surgical, social and family histories were reviewed and updated as appropriate. ROS:  Review of Systems   Constitutional: Negative for activity change, appetite change, fatigue and unexpected weight change. HENT: Negative for congestion, ear discharge, ear pain, hearing loss and sneezing. Eyes: Negative for visual disturbance. Respiratory: Negative for cough, chest tightness and shortness of breath. Cardiovascular: Negative for chest pain and palpitations. Gastrointestinal: Negative for abdominal pain, blood in stool, constipation and diarrhea. Genitourinary: Negative for difficulty urinating and flank pain. Skin: Negative for color change and rash. Neurological: Positive for dizziness. Psychiatric/Behavioral: Negative for sleep disturbance. Prior to Visit Medications    Medication Sig Taking?  Authorizing Provider   tiotropium (SPIRIVA HANDIHALER) 18 MCG inhalation capsule Inhale 1 capsule into the lungs daily Yes Sharrell Frankel, MD   dicyclomine (BENTYL) 10 MG capsule TAKE ONE CAPSULE BY MOUTH FOUR TIMES DAILY AS NEEDED Yes Sharrell Frankel, MD   DULoxetine dry.   Psychiatric: She has a normal mood and affect. ASSESSMENT/PLAN:    1. Pre-op exam  Labs today. Breathing near baseline. No chest pain or new sob noted. Given no sig hx of cad and minimal anesthesia, ok to proceed if labs ok  - CBC Auto Differential  - COMPREHENSIVE METABOLIC PANEL    2. Left foot pain  See above    3. Chronic obstructive pulmonary disease, unspecified COPD type ([address]  Refill meds. - tiotropium (SPIRIVA HANDIHALER) 18 MCG inhalation capsule; Inhale 1 capsule into the lungs daily  Dispense: 30 capsule; Refill: 3    4. Stenosis of carotid artery, unspecified laterality  Due for rpt u/s. Reviewed prior. Due for rpt. Wishes to address above first    5. B12 deficiency  Rpt labs. - Vitamin B12    6. Need for hepatitis C screening test    - Hepatitis C Antibody    7. Memory loss  Add labs. Ongoing issues w/ memory loss. Address above first.  May need neuro referral  - T4, Free  - TSH without Reflex    8. Depression, unspecified depression type  Inc stressors may be aggravating issues. Will need f/u     9. Abdominal bruit  Noted on exam.  Arrange aorta u/s and renal artery u/s to eval.  Marilu given hx of carotid stenosis  - US ABDOMINAL AORTA LIMITED; Future          Scribe attestation: Claudia Starks, am scribing for and in the presence of Janeice Goodell, MD. Electronically signed by Gabriel Vasquez on 7/3/2018 at 4:16 PM      Provider attestation: Sidney Canchola MD, personally performed the services scribed by the user listed above in my presence, and it is both accurate and complete. I agree with the ROS and Past Histories independently gathered by the clinical support staff and the remaining scribed note accurately describes my personal service to the patient.     [unfilled]    7/3/2018  4:19 PM

## 2018-07-04 LAB
A/G RATIO: 2.1 (ref 1.1–2.2)
ALBUMIN SERPL-MCNC: 4.1 G/DL (ref 3.4–5)
ALP BLD-CCNC: 54 U/L (ref 40–129)
ALT SERPL-CCNC: 9 U/L (ref 10–40)
ANION GAP SERPL CALCULATED.3IONS-SCNC: 8 MMOL/L (ref 3–16)
AST SERPL-CCNC: 16 U/L (ref 15–37)
BILIRUB SERPL-MCNC: 0.3 MG/DL (ref 0–1)
BUN BLDV-MCNC: 11 MG/DL (ref 7–20)
CALCIUM SERPL-MCNC: 9.4 MG/DL (ref 8.3–10.6)
CHLORIDE BLD-SCNC: 100 MMOL/L (ref 99–110)
CO2: 31 MMOL/L (ref 21–32)
CREAT SERPL-MCNC: 0.8 MG/DL (ref 0.6–1.2)
GFR AFRICAN AMERICAN: >60
GFR NON-AFRICAN AMERICAN: >60
GLOBULIN: 2 G/DL
GLUCOSE BLD-MCNC: 86 MG/DL (ref 70–99)
HEPATITIS C ANTIBODY INTERPRETATION: NORMAL
POTASSIUM SERPL-SCNC: 5 MMOL/L (ref 3.5–5.1)
SODIUM BLD-SCNC: 139 MMOL/L (ref 136–145)
T4 FREE: 1.2 NG/DL (ref 0.9–1.8)
TOTAL PROTEIN: 6.1 G/DL (ref 6.4–8.2)
TSH SERPL DL<=0.05 MIU/L-ACNC: 3.55 UIU/ML (ref 0.27–4.2)
VITAMIN B-12: 469 PG/ML (ref 211–911)

## 2018-07-06 RX ORDER — DULOXETIN HYDROCHLORIDE 60 MG/1
CAPSULE, DELAYED RELEASE ORAL
Qty: 30 CAPSULE | Refills: 5 | Status: SHIPPED | OUTPATIENT
Start: 2018-07-06 | End: 2018-12-04 | Stop reason: ALTCHOICE

## 2018-07-09 ENCOUNTER — TELEPHONE (OUTPATIENT)
Dept: FAMILY MEDICINE CLINIC | Age: 68
End: 2018-07-09

## 2018-07-09 DIAGNOSIS — I65.29 STENOSIS OF CAROTID ARTERY, UNSPECIFIED LATERALITY: Primary | ICD-10-CM

## 2018-07-09 DIAGNOSIS — E78.5 HYPERLIPIDEMIA, UNSPECIFIED HYPERLIPIDEMIA TYPE: ICD-10-CM

## 2018-07-17 NOTE — PROGRESS NOTES
Left carotid w/ 50-69% stenosis. Appears to be similar to prior.   May be seeing vascular specialist.  If not, rpt in 6 mo

## 2018-07-23 ENCOUNTER — TELEPHONE (OUTPATIENT)
Dept: FAMILY MEDICINE CLINIC | Age: 68
End: 2018-07-23

## 2018-10-15 ENCOUNTER — TELEPHONE (OUTPATIENT)
Dept: FAMILY MEDICINE CLINIC | Age: 68
End: 2018-10-15

## 2018-10-29 ENCOUNTER — OFFICE VISIT (OUTPATIENT)
Dept: FAMILY MEDICINE CLINIC | Age: 68
End: 2018-10-29
Payer: MEDICARE

## 2018-10-29 ENCOUNTER — TELEPHONE (OUTPATIENT)
Dept: FAMILY MEDICINE CLINIC | Age: 68
End: 2018-10-29

## 2018-10-29 VITALS
SYSTOLIC BLOOD PRESSURE: 120 MMHG | HEART RATE: 102 BPM | DIASTOLIC BLOOD PRESSURE: 74 MMHG | HEIGHT: 64 IN | OXYGEN SATURATION: 96 % | WEIGHT: 116 LBS | BODY MASS INDEX: 19.81 KG/M2

## 2018-10-29 DIAGNOSIS — R41.3 MEMORY LOSS: ICD-10-CM

## 2018-10-29 DIAGNOSIS — Z23 NEED FOR INFLUENZA VACCINATION: ICD-10-CM

## 2018-10-29 DIAGNOSIS — F32.A DEPRESSION, UNSPECIFIED DEPRESSION TYPE: Primary | ICD-10-CM

## 2018-10-29 DIAGNOSIS — Z23 NEED FOR PNEUMOCOCCAL VACCINATION: ICD-10-CM

## 2018-10-29 PROCEDURE — 90662 IIV NO PRSV INCREASED AG IM: CPT | Performed by: FAMILY MEDICINE

## 2018-10-29 PROCEDURE — 1090F PRES/ABSN URINE INCON ASSESS: CPT | Performed by: FAMILY MEDICINE

## 2018-10-29 PROCEDURE — G0008 ADMIN INFLUENZA VIRUS VAC: HCPCS | Performed by: FAMILY MEDICINE

## 2018-10-29 PROCEDURE — G8599 NO ASA/ANTIPLAT THER USE RNG: HCPCS | Performed by: FAMILY MEDICINE

## 2018-10-29 PROCEDURE — 3017F COLORECTAL CA SCREEN DOC REV: CPT | Performed by: FAMILY MEDICINE

## 2018-10-29 PROCEDURE — G0009 ADMIN PNEUMOCOCCAL VACCINE: HCPCS | Performed by: FAMILY MEDICINE

## 2018-10-29 PROCEDURE — 90732 PPSV23 VACC 2 YRS+ SUBQ/IM: CPT | Performed by: FAMILY MEDICINE

## 2018-10-29 PROCEDURE — G8420 CALC BMI NORM PARAMETERS: HCPCS | Performed by: FAMILY MEDICINE

## 2018-10-29 PROCEDURE — G8427 DOCREV CUR MEDS BY ELIG CLIN: HCPCS | Performed by: FAMILY MEDICINE

## 2018-10-29 PROCEDURE — 1123F ACP DISCUSS/DSCN MKR DOCD: CPT | Performed by: FAMILY MEDICINE

## 2018-10-29 PROCEDURE — G8482 FLU IMMUNIZE ORDER/ADMIN: HCPCS | Performed by: FAMILY MEDICINE

## 2018-10-29 PROCEDURE — G8400 PT W/DXA NO RESULTS DOC: HCPCS | Performed by: FAMILY MEDICINE

## 2018-10-29 PROCEDURE — 4040F PNEUMOC VAC/ADMIN/RCVD: CPT | Performed by: FAMILY MEDICINE

## 2018-10-29 PROCEDURE — 99214 OFFICE O/P EST MOD 30 MIN: CPT | Performed by: FAMILY MEDICINE

## 2018-10-29 PROCEDURE — 1101F PT FALLS ASSESS-DOCD LE1/YR: CPT | Performed by: FAMILY MEDICINE

## 2018-10-29 PROCEDURE — 3014F SCREEN MAMMO DOC REV: CPT | Performed by: FAMILY MEDICINE

## 2018-10-29 PROCEDURE — 4004F PT TOBACCO SCREEN RCVD TLK: CPT | Performed by: FAMILY MEDICINE

## 2018-10-29 RX ORDER — DULOXETIN HYDROCHLORIDE 20 MG/1
20 CAPSULE, DELAYED RELEASE ORAL DAILY
Qty: 30 CAPSULE | Refills: 3 | Status: SHIPPED | OUTPATIENT
Start: 2018-10-29 | End: 2018-12-04 | Stop reason: ALTCHOICE

## 2018-10-29 RX ORDER — BUPROPION HYDROCHLORIDE 150 MG/1
150 TABLET ORAL EVERY MORNING
Qty: 30 TABLET | Refills: 3 | Status: CANCELLED | OUTPATIENT
Start: 2018-10-29

## 2018-10-29 RX ORDER — VILAZODONE HYDROCHLORIDE 20 MG/1
20 TABLET ORAL DAILY
Qty: 30 TABLET | Refills: 4 | Status: SHIPPED | OUTPATIENT
Start: 2018-10-29 | End: 2018-11-26 | Stop reason: SDUPTHER

## 2018-10-29 RX ORDER — ESOMEPRAZOLE MAGNESIUM 20 MG/1
20 FOR SUSPENSION ORAL DAILY
COMMUNITY
End: 2018-12-04 | Stop reason: ALTCHOICE

## 2018-10-29 ASSESSMENT — ENCOUNTER SYMPTOMS
CHEST TIGHTNESS: 0
DIARRHEA: 0
SHORTNESS OF BREATH: 1
BLOOD IN STOOL: 0
CONSTIPATION: 0

## 2018-10-29 NOTE — PROGRESS NOTES
Chief Complaint   Patient presents with    Memory Loss    Depression       HPI:  Delisa Jason is a 70 y.o. (: 1950) here today   for   HPI  Memory loss and depression. Feels like she stays confused and goes into these depression moods, that she has issues coming out of. Crying spells have become more frequent. Has been unable to sleep at night, stated she wakes up at 2am and can not go back to sleep. Feels like she has no motivation to do anything at all. She does not feel like the Cymbalta is helping much at all. Does not seem be helping pain or mood. Has been on zoloft, lexapro and effexor in the past.        Patient'smedications, allergies, past medical, surgical, social and family histories were reviewed and updated as appropriate. ROS:  Review of Systems   Constitutional: Negative for chills, fatigue and fever. Respiratory: Positive for shortness of breath. Negative for chest tightness. Cardiovascular: Negative for chest pain and palpitations. Gastrointestinal: Negative for blood in stool, constipation and diarrhea. Neurological: Negative for dizziness, light-headedness and headaches. Psychiatric/Behavioral: Positive for confusion. Prior to Visit Medications    Medication Sig Taking?  Authorizing Provider   esomeprazole Magnesium (NEXIUM) 20 MG PACK Take 20 mg by mouth daily Yes Historical Provider, MD   DULoxetine (CYMBALTA) 20 MG extended release capsule Take 1 capsule by mouth daily Yes No Gant MD   Vilazodone HCl (VIIBRYD STARTER PACK) 10 & 20 MG KIT Take as directed Yes No Gant MD   vilazodone HCl (VILAZODONE HCL) 20 MG TABS Take 1 tablet by mouth daily Yes No Gant MD   DULoxetine (CYMBALTA) 60 MG extended release capsule TAKE 1 CAPSULE BY MOUTH DAILY Yes No Gant MD   tiotropium (SPIRIVA HANDIHALER) 18 MCG inhalation capsule Inhale 1 capsule into the lungs daily Yes No Gant MD   dicyclomine (BENTYL) 10 MG capsule TAKE ONE CAPSULE

## 2018-11-26 ENCOUNTER — TELEPHONE (OUTPATIENT)
Dept: FAMILY MEDICINE CLINIC | Age: 68
End: 2018-11-26

## 2018-11-26 DIAGNOSIS — F32.A DEPRESSION, UNSPECIFIED DEPRESSION TYPE: ICD-10-CM

## 2018-11-26 RX ORDER — VILAZODONE HYDROCHLORIDE 20 MG/1
20 TABLET ORAL DAILY
Qty: 30 TABLET | Refills: 4 | Status: SHIPPED | OUTPATIENT
Start: 2018-11-26 | End: 2018-12-04 | Stop reason: SDUPTHER

## 2018-11-28 NOTE — TELEPHONE ENCOUNTER
Received APPROVAL for Viibryd Starter Pack 10 & 20MG OR KIT through 11/26/2020. Please advise patient.

## 2018-12-04 ENCOUNTER — OFFICE VISIT (OUTPATIENT)
Dept: FAMILY MEDICINE CLINIC | Age: 68
End: 2018-12-04
Payer: MEDICARE

## 2018-12-04 VITALS
SYSTOLIC BLOOD PRESSURE: 138 MMHG | DIASTOLIC BLOOD PRESSURE: 80 MMHG | HEART RATE: 88 BPM | OXYGEN SATURATION: 97 % | WEIGHT: 116 LBS | BODY MASS INDEX: 19.81 KG/M2 | HEIGHT: 64 IN

## 2018-12-04 DIAGNOSIS — F32.A DEPRESSION, UNSPECIFIED DEPRESSION TYPE: Primary | ICD-10-CM

## 2018-12-04 DIAGNOSIS — R10.13 EPIGASTRIC PAIN: ICD-10-CM

## 2018-12-04 DIAGNOSIS — F32.A DEPRESSION, UNSPECIFIED DEPRESSION TYPE: ICD-10-CM

## 2018-12-04 DIAGNOSIS — F41.9 ANXIETY: ICD-10-CM

## 2018-12-04 PROCEDURE — 4004F PT TOBACCO SCREEN RCVD TLK: CPT | Performed by: FAMILY MEDICINE

## 2018-12-04 PROCEDURE — 3017F COLORECTAL CA SCREEN DOC REV: CPT | Performed by: FAMILY MEDICINE

## 2018-12-04 PROCEDURE — 4040F PNEUMOC VAC/ADMIN/RCVD: CPT | Performed by: FAMILY MEDICINE

## 2018-12-04 PROCEDURE — 1101F PT FALLS ASSESS-DOCD LE1/YR: CPT | Performed by: FAMILY MEDICINE

## 2018-12-04 PROCEDURE — G8482 FLU IMMUNIZE ORDER/ADMIN: HCPCS | Performed by: FAMILY MEDICINE

## 2018-12-04 PROCEDURE — 99214 OFFICE O/P EST MOD 30 MIN: CPT | Performed by: FAMILY MEDICINE

## 2018-12-04 PROCEDURE — G8599 NO ASA/ANTIPLAT THER USE RNG: HCPCS | Performed by: FAMILY MEDICINE

## 2018-12-04 PROCEDURE — G8420 CALC BMI NORM PARAMETERS: HCPCS | Performed by: FAMILY MEDICINE

## 2018-12-04 PROCEDURE — G8400 PT W/DXA NO RESULTS DOC: HCPCS | Performed by: FAMILY MEDICINE

## 2018-12-04 PROCEDURE — 1090F PRES/ABSN URINE INCON ASSESS: CPT | Performed by: FAMILY MEDICINE

## 2018-12-04 PROCEDURE — 1123F ACP DISCUSS/DSCN MKR DOCD: CPT | Performed by: FAMILY MEDICINE

## 2018-12-04 PROCEDURE — 3014F SCREEN MAMMO DOC REV: CPT | Performed by: FAMILY MEDICINE

## 2018-12-04 PROCEDURE — G8427 DOCREV CUR MEDS BY ELIG CLIN: HCPCS | Performed by: FAMILY MEDICINE

## 2018-12-04 RX ORDER — VILAZODONE HYDROCHLORIDE 20 MG/1
40 TABLET ORAL DAILY
Qty: 30 TABLET | Refills: 2 | Status: SHIPPED | OUTPATIENT
Start: 2018-12-04 | End: 2018-12-04 | Stop reason: SDUPTHER

## 2018-12-04 RX ORDER — OMEPRAZOLE 10 MG/1
10 CAPSULE, DELAYED RELEASE ORAL DAILY
COMMUNITY
End: 2020-05-26

## 2018-12-04 RX ORDER — VILAZODONE HYDROCHLORIDE 40 MG/1
TABLET ORAL
Qty: 30 TABLET | Refills: 1 | Status: SHIPPED | OUTPATIENT
Start: 2018-12-04 | End: 2019-01-08 | Stop reason: SDUPTHER

## 2018-12-04 ASSESSMENT — ENCOUNTER SYMPTOMS
SHORTNESS OF BREATH: 0
BLOOD IN STOOL: 0
CONSTIPATION: 0
DIARRHEA: 0
CHEST TIGHTNESS: 0

## 2018-12-04 NOTE — PROGRESS NOTES
sounds normal.   Abdominal: Soft. There is tenderness in the epigastric area. Musculoskeletal: She exhibits no edema. Neurological: She is alert and oriented to person, place, and time. Skin: Skin is warm and dry. Psychiatric: She has a normal mood and affect. ASSESSMENT/PLAN:    1. Depression, unspecified depression type  Cont viibryd. Consider add wellbutrin. Hold off for now. Some of current issues may be situational.  Seems to have gotten benefit from med    2. Anxiety  See above. 3. Epigastric pain  Prior CTA chest did not have abn findings on upper abdomen. Try add zantac. Cont other meds. Consider egd if ongoing issues vs other imaging         Consider adding Wellbutrin    Scribe attestation: Hi AGUILERA, tabatha scribing for and in the presence of Mireya Rose MD. Electronically signed by Reese AGUILERA on 12/4/2018 at 1:43 PM      Provider attestation: Saritha Jasmine MD, personally performed the services scribed by the user listed above in my presence, and it is both accurate and complete. I agree with the ROS and Past Histories independently gathered by the clinical support staff and the remaining scribed note accurately describes my personal service to the patient.     [facility]    12/4/2018  1:46 PM

## 2019-01-08 ENCOUNTER — OFFICE VISIT (OUTPATIENT)
Dept: FAMILY MEDICINE CLINIC | Age: 69
End: 2019-01-08
Payer: MEDICARE

## 2019-01-08 VITALS
TEMPERATURE: 98.2 F | WEIGHT: 114 LBS | BODY MASS INDEX: 19.46 KG/M2 | HEART RATE: 82 BPM | OXYGEN SATURATION: 97 % | SYSTOLIC BLOOD PRESSURE: 122 MMHG | DIASTOLIC BLOOD PRESSURE: 70 MMHG

## 2019-01-08 DIAGNOSIS — F33.41 RECURRENT MAJOR DEPRESSIVE DISORDER IN PARTIAL REMISSION (HCC): ICD-10-CM

## 2019-01-08 DIAGNOSIS — F32.A DEPRESSION, UNSPECIFIED DEPRESSION TYPE: ICD-10-CM

## 2019-01-08 DIAGNOSIS — J30.2 OTHER SEASONAL ALLERGIC RHINITIS: ICD-10-CM

## 2019-01-08 DIAGNOSIS — J44.9 CHRONIC OBSTRUCTIVE PULMONARY DISEASE, UNSPECIFIED COPD TYPE (HCC): ICD-10-CM

## 2019-01-08 DIAGNOSIS — F17.200 NEEDS SMOKING CESSATION EDUCATION: ICD-10-CM

## 2019-01-08 DIAGNOSIS — J01.10 ACUTE NON-RECURRENT FRONTAL SINUSITIS: Primary | ICD-10-CM

## 2019-01-08 DIAGNOSIS — H61.23 BILATERAL IMPACTED CERUMEN: ICD-10-CM

## 2019-01-08 PROCEDURE — 1090F PRES/ABSN URINE INCON ASSESS: CPT | Performed by: NURSE PRACTITIONER

## 2019-01-08 PROCEDURE — 3023F SPIROM DOC REV: CPT | Performed by: NURSE PRACTITIONER

## 2019-01-08 PROCEDURE — G8400 PT W/DXA NO RESULTS DOC: HCPCS | Performed by: NURSE PRACTITIONER

## 2019-01-08 PROCEDURE — 3014F SCREEN MAMMO DOC REV: CPT | Performed by: NURSE PRACTITIONER

## 2019-01-08 PROCEDURE — 1123F ACP DISCUSS/DSCN MKR DOCD: CPT | Performed by: NURSE PRACTITIONER

## 2019-01-08 PROCEDURE — 69210 REMOVE IMPACTED EAR WAX UNI: CPT | Performed by: NURSE PRACTITIONER

## 2019-01-08 PROCEDURE — 4004F PT TOBACCO SCREEN RCVD TLK: CPT | Performed by: NURSE PRACTITIONER

## 2019-01-08 PROCEDURE — G8926 SPIRO NO PERF OR DOC: HCPCS | Performed by: NURSE PRACTITIONER

## 2019-01-08 PROCEDURE — 1101F PT FALLS ASSESS-DOCD LE1/YR: CPT | Performed by: NURSE PRACTITIONER

## 2019-01-08 PROCEDURE — G8482 FLU IMMUNIZE ORDER/ADMIN: HCPCS | Performed by: NURSE PRACTITIONER

## 2019-01-08 PROCEDURE — G8427 DOCREV CUR MEDS BY ELIG CLIN: HCPCS | Performed by: NURSE PRACTITIONER

## 2019-01-08 PROCEDURE — 3017F COLORECTAL CA SCREEN DOC REV: CPT | Performed by: NURSE PRACTITIONER

## 2019-01-08 PROCEDURE — G8599 NO ASA/ANTIPLAT THER USE RNG: HCPCS | Performed by: NURSE PRACTITIONER

## 2019-01-08 PROCEDURE — 99214 OFFICE O/P EST MOD 30 MIN: CPT | Performed by: NURSE PRACTITIONER

## 2019-01-08 PROCEDURE — G8420 CALC BMI NORM PARAMETERS: HCPCS | Performed by: NURSE PRACTITIONER

## 2019-01-08 PROCEDURE — 4040F PNEUMOC VAC/ADMIN/RCVD: CPT | Performed by: NURSE PRACTITIONER

## 2019-01-08 RX ORDER — FLUTICASONE PROPIONATE 50 MCG
2 SPRAY, SUSPENSION (ML) NASAL DAILY
Qty: 1 BOTTLE | Refills: 5 | Status: SHIPPED | OUTPATIENT
Start: 2019-01-08 | End: 2022-08-19 | Stop reason: SDUPTHER

## 2019-01-08 RX ORDER — VILAZODONE HYDROCHLORIDE 20 MG/1
TABLET ORAL
Qty: 30 TABLET | Refills: 3 | Status: SHIPPED | OUTPATIENT
Start: 2019-01-08 | End: 2019-01-28 | Stop reason: ALTCHOICE

## 2019-01-08 RX ORDER — BUPROPION HYDROCHLORIDE 150 MG/1
150 TABLET ORAL EVERY MORNING
Qty: 30 TABLET | Refills: 5 | Status: SHIPPED | OUTPATIENT
Start: 2019-01-08 | End: 2019-01-22 | Stop reason: SINTOL

## 2019-01-08 RX ORDER — AMOXICILLIN AND CLAVULANATE POTASSIUM 875; 125 MG/1; MG/1
1 TABLET, FILM COATED ORAL EVERY 12 HOURS
Qty: 20 TABLET | Refills: 0 | Status: SHIPPED | OUTPATIENT
Start: 2019-01-08 | End: 2019-05-21 | Stop reason: SDUPTHER

## 2019-01-08 ASSESSMENT — ENCOUNTER SYMPTOMS
BACK PAIN: 0
STRIDOR: 0
EYE REDNESS: 0
CHOKING: 0
EYE DISCHARGE: 0
CHEST TIGHTNESS: 0
SINUS PRESSURE: 1
WHEEZING: 0
SORE THROAT: 1
SINUS PAIN: 1
COUGH: 1
EYE PAIN: 0
TROUBLE SWALLOWING: 0
ABDOMINAL PAIN: 0
RHINORRHEA: 1
NAUSEA: 0
DIARRHEA: 0
PHOTOPHOBIA: 0
VOICE CHANGE: 0
SHORTNESS OF BREATH: 1
CONSTIPATION: 0
EYE ITCHING: 0
COLOR CHANGE: 0
BLOOD IN STOOL: 0
VOMITING: 0

## 2019-01-14 ENCOUNTER — TELEPHONE (OUTPATIENT)
Dept: FAMILY MEDICINE CLINIC | Age: 69
End: 2019-01-14

## 2019-01-14 DIAGNOSIS — B37.9 YEAST INFECTION: Primary | ICD-10-CM

## 2019-01-14 RX ORDER — FLUCONAZOLE 150 MG/1
150 TABLET ORAL ONCE
Qty: 1 TABLET | Refills: 0 | Status: SHIPPED | OUTPATIENT
Start: 2019-01-14 | End: 2019-01-14

## 2019-01-22 ENCOUNTER — OFFICE VISIT (OUTPATIENT)
Dept: FAMILY MEDICINE CLINIC | Age: 69
End: 2019-01-22
Payer: MEDICARE

## 2019-01-22 VITALS
HEIGHT: 64 IN | DIASTOLIC BLOOD PRESSURE: 78 MMHG | HEART RATE: 92 BPM | OXYGEN SATURATION: 98 % | WEIGHT: 112.4 LBS | BODY MASS INDEX: 19.19 KG/M2 | SYSTOLIC BLOOD PRESSURE: 122 MMHG

## 2019-01-22 DIAGNOSIS — R63.4 WEIGHT LOSS: ICD-10-CM

## 2019-01-22 DIAGNOSIS — F33.41 RECURRENT MAJOR DEPRESSIVE DISORDER IN PARTIAL REMISSION (HCC): Primary | ICD-10-CM

## 2019-01-22 DIAGNOSIS — R00.2 PALPITATIONS: ICD-10-CM

## 2019-01-22 LAB
A/G RATIO: 1.9 (ref 1.1–2.2)
ALBUMIN SERPL-MCNC: 4.4 G/DL (ref 3.4–5)
ALP BLD-CCNC: 64 U/L (ref 40–129)
ALT SERPL-CCNC: 8 U/L (ref 10–40)
ANION GAP SERPL CALCULATED.3IONS-SCNC: 14 MMOL/L (ref 3–16)
AST SERPL-CCNC: 17 U/L (ref 15–37)
BASOPHILS ABSOLUTE: 0 K/UL (ref 0–0.2)
BASOPHILS RELATIVE PERCENT: 0.7 %
BILIRUB SERPL-MCNC: 0.3 MG/DL (ref 0–1)
BUN BLDV-MCNC: 12 MG/DL (ref 7–20)
CALCIUM SERPL-MCNC: 9.7 MG/DL (ref 8.3–10.6)
CHLORIDE BLD-SCNC: 100 MMOL/L (ref 99–110)
CO2: 30 MMOL/L (ref 21–32)
CREAT SERPL-MCNC: 0.8 MG/DL (ref 0.6–1.2)
EOSINOPHILS ABSOLUTE: 0.1 K/UL (ref 0–0.6)
EOSINOPHILS RELATIVE PERCENT: 1.6 %
GFR AFRICAN AMERICAN: >60
GFR NON-AFRICAN AMERICAN: >60
GLOBULIN: 2.3 G/DL
GLUCOSE BLD-MCNC: 96 MG/DL (ref 70–99)
HCT VFR BLD CALC: 43.4 % (ref 36–48)
HEMOGLOBIN: 14.6 G/DL (ref 12–16)
LYMPHOCYTES ABSOLUTE: 2 K/UL (ref 1–5.1)
LYMPHOCYTES RELATIVE PERCENT: 30.1 %
MAGNESIUM: 2.1 MG/DL (ref 1.8–2.4)
MCH RBC QN AUTO: 31.2 PG (ref 26–34)
MCHC RBC AUTO-ENTMCNC: 33.8 G/DL (ref 31–36)
MCV RBC AUTO: 92.4 FL (ref 80–100)
MONOCYTES ABSOLUTE: 0.5 K/UL (ref 0–1.3)
MONOCYTES RELATIVE PERCENT: 7.9 %
NEUTROPHILS ABSOLUTE: 4 K/UL (ref 1.7–7.7)
NEUTROPHILS RELATIVE PERCENT: 59.7 %
PDW BLD-RTO: 13.7 % (ref 12.4–15.4)
PLATELET # BLD: 246 K/UL (ref 135–450)
PMV BLD AUTO: 10.5 FL (ref 5–10.5)
POTASSIUM SERPL-SCNC: 4.5 MMOL/L (ref 3.5–5.1)
RBC # BLD: 4.69 M/UL (ref 4–5.2)
SODIUM BLD-SCNC: 144 MMOL/L (ref 136–145)
T4 FREE: 1.1 NG/DL (ref 0.9–1.8)
TOTAL PROTEIN: 6.7 G/DL (ref 6.4–8.2)
TSH SERPL DL<=0.05 MIU/L-ACNC: 3.5 UIU/ML (ref 0.27–4.2)
WBC # BLD: 6.8 K/UL (ref 4–11)

## 2019-01-22 PROCEDURE — G8427 DOCREV CUR MEDS BY ELIG CLIN: HCPCS | Performed by: FAMILY MEDICINE

## 2019-01-22 PROCEDURE — 4004F PT TOBACCO SCREEN RCVD TLK: CPT | Performed by: FAMILY MEDICINE

## 2019-01-22 PROCEDURE — G8482 FLU IMMUNIZE ORDER/ADMIN: HCPCS | Performed by: FAMILY MEDICINE

## 2019-01-22 PROCEDURE — 1101F PT FALLS ASSESS-DOCD LE1/YR: CPT | Performed by: FAMILY MEDICINE

## 2019-01-22 PROCEDURE — 1090F PRES/ABSN URINE INCON ASSESS: CPT | Performed by: FAMILY MEDICINE

## 2019-01-22 PROCEDURE — G8400 PT W/DXA NO RESULTS DOC: HCPCS | Performed by: FAMILY MEDICINE

## 2019-01-22 PROCEDURE — 3014F SCREEN MAMMO DOC REV: CPT | Performed by: FAMILY MEDICINE

## 2019-01-22 PROCEDURE — 36415 COLL VENOUS BLD VENIPUNCTURE: CPT | Performed by: FAMILY MEDICINE

## 2019-01-22 PROCEDURE — G8420 CALC BMI NORM PARAMETERS: HCPCS | Performed by: FAMILY MEDICINE

## 2019-01-22 PROCEDURE — 99214 OFFICE O/P EST MOD 30 MIN: CPT | Performed by: FAMILY MEDICINE

## 2019-01-22 PROCEDURE — 4040F PNEUMOC VAC/ADMIN/RCVD: CPT | Performed by: FAMILY MEDICINE

## 2019-01-22 PROCEDURE — 1123F ACP DISCUSS/DSCN MKR DOCD: CPT | Performed by: FAMILY MEDICINE

## 2019-01-22 PROCEDURE — G8599 NO ASA/ANTIPLAT THER USE RNG: HCPCS | Performed by: FAMILY MEDICINE

## 2019-01-22 PROCEDURE — 3017F COLORECTAL CA SCREEN DOC REV: CPT | Performed by: FAMILY MEDICINE

## 2019-01-22 ASSESSMENT — ENCOUNTER SYMPTOMS: DIARRHEA: 1

## 2019-01-23 ENCOUNTER — VIRTUAL VISIT (OUTPATIENT)
Dept: PSYCHOLOGY | Age: 69
End: 2019-01-23
Payer: MEDICARE

## 2019-01-23 DIAGNOSIS — F43.10 PTSD (POST-TRAUMATIC STRESS DISORDER): ICD-10-CM

## 2019-01-23 PROCEDURE — 90791 PSYCH DIAGNOSTIC EVALUATION: CPT | Performed by: SOCIAL WORKER

## 2019-01-28 ENCOUNTER — OFFICE VISIT (OUTPATIENT)
Dept: FAMILY MEDICINE CLINIC | Age: 69
End: 2019-01-28
Payer: MEDICARE

## 2019-01-28 VITALS
WEIGHT: 114.6 LBS | SYSTOLIC BLOOD PRESSURE: 138 MMHG | HEART RATE: 84 BPM | OXYGEN SATURATION: 96 % | BODY MASS INDEX: 19.56 KG/M2 | TEMPERATURE: 97.7 F | HEIGHT: 64 IN | DIASTOLIC BLOOD PRESSURE: 88 MMHG

## 2019-01-28 DIAGNOSIS — Z00.00 ROUTINE GENERAL MEDICAL EXAMINATION AT A HEALTH CARE FACILITY: Primary | ICD-10-CM

## 2019-01-28 PROCEDURE — 4040F PNEUMOC VAC/ADMIN/RCVD: CPT | Performed by: FAMILY MEDICINE

## 2019-01-28 PROCEDURE — G8482 FLU IMMUNIZE ORDER/ADMIN: HCPCS | Performed by: FAMILY MEDICINE

## 2019-01-28 PROCEDURE — G8599 NO ASA/ANTIPLAT THER USE RNG: HCPCS | Performed by: FAMILY MEDICINE

## 2019-01-28 PROCEDURE — G0444 DEPRESSION SCREEN ANNUAL: HCPCS | Performed by: FAMILY MEDICINE

## 2019-01-28 PROCEDURE — G0439 PPPS, SUBSEQ VISIT: HCPCS | Performed by: FAMILY MEDICINE

## 2019-01-28 ASSESSMENT — PATIENT HEALTH QUESTIONNAIRE - PHQ9: SUM OF ALL RESPONSES TO PHQ QUESTIONS 1-9: 19

## 2019-01-28 ASSESSMENT — ANXIETY QUESTIONNAIRES: GAD7 TOTAL SCORE: 2

## 2019-01-28 ASSESSMENT — LIFESTYLE VARIABLES: HOW OFTEN DO YOU HAVE A DRINK CONTAINING ALCOHOL: 0

## 2019-02-05 ENCOUNTER — OFFICE VISIT (OUTPATIENT)
Dept: FAMILY MEDICINE CLINIC | Age: 69
End: 2019-02-05
Payer: MEDICARE

## 2019-02-05 VITALS
WEIGHT: 114 LBS | DIASTOLIC BLOOD PRESSURE: 72 MMHG | HEIGHT: 64 IN | BODY MASS INDEX: 19.46 KG/M2 | OXYGEN SATURATION: 95 % | SYSTOLIC BLOOD PRESSURE: 120 MMHG | HEART RATE: 71 BPM

## 2019-02-05 DIAGNOSIS — Z13.31 POSITIVE DEPRESSION SCREENING: ICD-10-CM

## 2019-02-05 DIAGNOSIS — F33.41 RECURRENT MAJOR DEPRESSIVE DISORDER IN PARTIAL REMISSION (HCC): Primary | ICD-10-CM

## 2019-02-05 PROCEDURE — G8400 PT W/DXA NO RESULTS DOC: HCPCS | Performed by: FAMILY MEDICINE

## 2019-02-05 PROCEDURE — G8427 DOCREV CUR MEDS BY ELIG CLIN: HCPCS | Performed by: FAMILY MEDICINE

## 2019-02-05 PROCEDURE — G8431 POS CLIN DEPRES SCRN F/U DOC: HCPCS | Performed by: FAMILY MEDICINE

## 2019-02-05 PROCEDURE — 1123F ACP DISCUSS/DSCN MKR DOCD: CPT | Performed by: FAMILY MEDICINE

## 2019-02-05 PROCEDURE — 1090F PRES/ABSN URINE INCON ASSESS: CPT | Performed by: FAMILY MEDICINE

## 2019-02-05 PROCEDURE — 3017F COLORECTAL CA SCREEN DOC REV: CPT | Performed by: FAMILY MEDICINE

## 2019-02-05 PROCEDURE — 3014F SCREEN MAMMO DOC REV: CPT | Performed by: FAMILY MEDICINE

## 2019-02-05 PROCEDURE — G8599 NO ASA/ANTIPLAT THER USE RNG: HCPCS | Performed by: FAMILY MEDICINE

## 2019-02-05 PROCEDURE — 1101F PT FALLS ASSESS-DOCD LE1/YR: CPT | Performed by: FAMILY MEDICINE

## 2019-02-05 PROCEDURE — G8482 FLU IMMUNIZE ORDER/ADMIN: HCPCS | Performed by: FAMILY MEDICINE

## 2019-02-05 PROCEDURE — G8420 CALC BMI NORM PARAMETERS: HCPCS | Performed by: FAMILY MEDICINE

## 2019-02-05 PROCEDURE — 4040F PNEUMOC VAC/ADMIN/RCVD: CPT | Performed by: FAMILY MEDICINE

## 2019-02-05 PROCEDURE — 4004F PT TOBACCO SCREEN RCVD TLK: CPT | Performed by: FAMILY MEDICINE

## 2019-02-05 PROCEDURE — 99213 OFFICE O/P EST LOW 20 MIN: CPT | Performed by: FAMILY MEDICINE

## 2019-02-05 ASSESSMENT — ENCOUNTER SYMPTOMS
DIARRHEA: 0
CONSTIPATION: 1
BLOOD IN STOOL: 0
SHORTNESS OF BREATH: 1
CHEST TIGHTNESS: 0

## 2019-02-06 ENCOUNTER — VIRTUAL VISIT (OUTPATIENT)
Dept: PSYCHOLOGY | Age: 69
End: 2019-02-06
Payer: MEDICARE

## 2019-02-06 DIAGNOSIS — F43.10 PTSD (POST-TRAUMATIC STRESS DISORDER): ICD-10-CM

## 2019-02-06 PROCEDURE — 90832 PSYTX W PT 30 MINUTES: CPT | Performed by: SOCIAL WORKER

## 2019-02-15 ENCOUNTER — TELEPHONE (OUTPATIENT)
Dept: FAMILY MEDICINE CLINIC | Age: 69
End: 2019-02-15

## 2019-02-18 ENCOUNTER — TELEPHONE (OUTPATIENT)
Dept: FAMILY MEDICINE CLINIC | Age: 69
End: 2019-02-18

## 2019-02-20 ENCOUNTER — VIRTUAL VISIT (OUTPATIENT)
Dept: PSYCHOLOGY | Age: 69
End: 2019-02-20
Payer: MEDICARE

## 2019-02-20 DIAGNOSIS — F43.10 PTSD (POST-TRAUMATIC STRESS DISORDER): Primary | ICD-10-CM

## 2019-02-20 DIAGNOSIS — F33.1 MDD (MAJOR DEPRESSIVE DISORDER), RECURRENT EPISODE, MODERATE (HCC): ICD-10-CM

## 2019-02-20 PROCEDURE — 90832 PSYTX W PT 30 MINUTES: CPT | Performed by: SOCIAL WORKER

## 2019-02-22 DIAGNOSIS — F33.41 RECURRENT MAJOR DEPRESSIVE DISORDER IN PARTIAL REMISSION (HCC): Primary | ICD-10-CM

## 2019-04-08 ENCOUNTER — OFFICE VISIT (OUTPATIENT)
Dept: FAMILY MEDICINE CLINIC | Age: 69
End: 2019-04-08
Payer: MEDICARE

## 2019-04-08 ENCOUNTER — NURSE TRIAGE (OUTPATIENT)
Dept: OTHER | Facility: CLINIC | Age: 69
End: 2019-04-08

## 2019-04-08 VITALS
HEART RATE: 65 BPM | SYSTOLIC BLOOD PRESSURE: 152 MMHG | BODY MASS INDEX: 19.4 KG/M2 | WEIGHT: 113.6 LBS | OXYGEN SATURATION: 98 % | DIASTOLIC BLOOD PRESSURE: 90 MMHG

## 2019-04-08 DIAGNOSIS — R41.89 COGNITIVE CHANGE: Primary | ICD-10-CM

## 2019-04-08 PROCEDURE — G8427 DOCREV CUR MEDS BY ELIG CLIN: HCPCS | Performed by: NURSE PRACTITIONER

## 2019-04-08 PROCEDURE — G8400 PT W/DXA NO RESULTS DOC: HCPCS | Performed by: NURSE PRACTITIONER

## 2019-04-08 PROCEDURE — 3014F SCREEN MAMMO DOC REV: CPT | Performed by: NURSE PRACTITIONER

## 2019-04-08 PROCEDURE — 4004F PT TOBACCO SCREEN RCVD TLK: CPT | Performed by: NURSE PRACTITIONER

## 2019-04-08 PROCEDURE — G8420 CALC BMI NORM PARAMETERS: HCPCS | Performed by: NURSE PRACTITIONER

## 2019-04-08 PROCEDURE — 1123F ACP DISCUSS/DSCN MKR DOCD: CPT | Performed by: NURSE PRACTITIONER

## 2019-04-08 PROCEDURE — 1090F PRES/ABSN URINE INCON ASSESS: CPT | Performed by: NURSE PRACTITIONER

## 2019-04-08 PROCEDURE — 4040F PNEUMOC VAC/ADMIN/RCVD: CPT | Performed by: NURSE PRACTITIONER

## 2019-04-08 PROCEDURE — 3017F COLORECTAL CA SCREEN DOC REV: CPT | Performed by: NURSE PRACTITIONER

## 2019-04-08 PROCEDURE — 99214 OFFICE O/P EST MOD 30 MIN: CPT | Performed by: NURSE PRACTITIONER

## 2019-04-08 PROCEDURE — G8599 NO ASA/ANTIPLAT THER USE RNG: HCPCS | Performed by: NURSE PRACTITIONER

## 2019-04-08 ASSESSMENT — ENCOUNTER SYMPTOMS
SINUS PAIN: 0
EYE ITCHING: 0
WHEEZING: 0
NAUSEA: 1
CHOKING: 0
RHINORRHEA: 0
CHEST TIGHTNESS: 0
BACK PAIN: 0
PHOTOPHOBIA: 0
ABDOMINAL PAIN: 0
COLOR CHANGE: 0
EYE PAIN: 0
EYE REDNESS: 0
CONSTIPATION: 0
SINUS PRESSURE: 0
BLOOD IN STOOL: 0
SHORTNESS OF BREATH: 0
COUGH: 0
VOICE CHANGE: 0
STRIDOR: 0
TROUBLE SWALLOWING: 0
VOMITING: 0
DIARRHEA: 0
SORE THROAT: 0
EYE DISCHARGE: 0

## 2019-04-08 NOTE — TELEPHONE ENCOUNTER
Reason for Disposition   Brief confusion (now gone)    Protocols used: CONFUSION - DELIRIUM-ADULT-OH  Patient called pre-service center [facility]) to schedule appointment, with red flag complaint, transferred to RN access for triage. Patient reports she has been experiencing random episodes of confusion and forgetfulness over the past 2 weeks. Patient reports she was having a conversation with a tenant this morning and could not formulate what she wanted to say. Patient reports this has happened several times within the past 2 weeks. Patient also reports that her  has mentioned that \"something is wrong with her\" and that \"she isn't right in the head\". Patient is concerned that it might be due to a medication adjustment with her antidepressant. Patient denies weakness or numbness in extremities or face. Patient denies dysuria, fever, or frequency. Patient with same day appointment today.

## 2019-04-08 NOTE — PROGRESS NOTES
2019     Luma Crandall (:  1950) is a 70 y.o. female, here for evaluation of the following medical concerns:    About a week ago she noticed feeling \"off\". Yesterday she had a bad headache and neck fullness. She does not feel \"right\" She has a hard time talking and having some expressive aphasia. This is still occurring every once in awhile. She is forgetting what she is doing. For example she walked in to [place] with a lit cigarrette and did not notice until she was leaving.  states she has been staggering some when walking but he has not notice a facial droop. Oriented X 4, able to describe her word finding problems. MME done in office. Review of Systems   Constitutional: Negative for activity change, appetite change, chills, diaphoresis, fatigue, fever and unexpected weight change. HENT: Negative for congestion, ear discharge, ear pain, hearing loss, nosebleeds, postnasal drip, rhinorrhea, sinus pressure, sinus pain, sneezing, sore throat, tinnitus, trouble swallowing and voice change. Eyes: Negative for photophobia, pain, discharge, redness and itching. Respiratory: Negative for cough, choking, chest tightness, shortness of breath, wheezing and stridor. Cardiovascular: Negative for chest pain, palpitations and leg swelling. Gastrointestinal: Positive for nausea. Negative for abdominal pain, blood in stool, constipation, diarrhea and vomiting. Endocrine: Negative for cold intolerance, heat intolerance, polydipsia and polyuria. Genitourinary: Negative for difficulty urinating, dysuria, enuresis, flank pain, frequency, hematuria and urgency. Musculoskeletal: Positive for gait problem. Negative for back pain, joint swelling, neck pain and neck stiffness. Skin: Negative for color change, pallor, rash and wound. Allergic/Immunologic: Negative for environmental allergies and food allergies.    Neurological: Positive for dizziness, light-headedness and headaches. Negative for tremors, syncope, speech difficulty, weakness and numbness. Hematological: Negative for adenopathy. Does not bruise/bleed easily. Psychiatric/Behavioral: Negative for agitation, behavioral problems, confusion, decreased concentration, dysphoric mood, hallucinations, self-injury, sleep disturbance and suicidal ideas. The patient is not nervous/anxious and is not hyperactive. Prior to Visit Medications    Medication Sig Taking? Authorizing Provider   VORTIoxetine (TRINTELLIX) 10 MG TABS tablet Take 10 mg by mouth daily Yes Mika Hardy MD   fluticasone (FLONASE) 50 MCG/ACT nasal spray 2 sprays by Nasal route daily Yes JAYLYN Taylor Do, CNP   tiotropium (Brendan Pellant) 18 MCG inhalation capsule Inhale 1 capsule into the lungs daily Yes JAYLYN Taylor Do, CNP   omeprazole (PRILOSEC) 10 MG delayed release capsule Take 10 mg by mouth daily Yes Historical Provider, MD   dicyclomine (BENTYL) 10 MG capsule TAKE ONE CAPSULE BY MOUTH FOUR TIMES DAILY AS NEEDED Yes Mika Hardy MD   albuterol sulfate HFA (VENTOLIN HFA) 108 (90 Base) MCG/ACT inhaler Inhale 2 puffs into the lungs 2 times daily as needed for Wheezing or Shortness of Breath Yes Mika Hardy MD   aspirin EC 81 MG EC tablet Take 1 tablet by mouth daily. Yes Luz Flores MD        Social History     Tobacco Use    Smoking status: Current Every Day Smoker     Packs/day: 1.00     Years: 40.00     Pack years: 40.00    Smokeless tobacco: Never Used   Substance Use Topics    Alcohol use: No     Alcohol/week: 0.0 oz        Vitals:    04/08/19 1357 04/08/19 1418   BP: (!) 160/98 (!) 152/90   Pulse: 65    SpO2: 98%    Weight: 113 lb 9.6 oz (51.5 kg)      Estimated body mass index is 19.4 kg/m² as calculated from the following:    Height as of 2/5/19: 5' 4.17\" (1.63 m). Weight as of this encounter: 113 lb 9.6 oz (51.5 kg). Physical Exam   Constitutional: She is oriented to person, place, and time.  She appears well-developed and well-nourished. Elevated BP   HENT:   Head: Normocephalic. Eyes: Pupils are equal, round, and reactive to light. Neck: Normal range of motion. Pulmonary/Chest: Effort normal.   Musculoskeletal: Normal range of motion. Neurological: She is alert and oriented to person, place, and time. She has normal strength. She displays no atrophy, no tremor and normal reflexes. No cranial nerve deficit or sensory deficit. She displays a negative Romberg sign. Coordination and gait normal. GCS eye subscore is 4. GCS verbal subscore is 5. GCS motor subscore is 6. EOMS intact. Able to name objects. Negative for ataxia but had to keep being prompted on what to do. Disconjugate gaze. Strength equal bilaterally. Skin: Skin is warm. Capillary refill takes less than 2 seconds. Psychiatric: She has a normal mood and affect. Her behavior is normal. Judgment and thought content normal.       ASSESSMENT/PLAN:  1. Cognitive change  -[facility], updated them on her expressive aphasia and recent headache. She has been acting \"off\" . Suspected TIA vs stroke. Sent to ER,  driving her to [facility]. Will follow up once discharged      Return in about 1 month (around 5/6/2019), or if symptoms worsen or fail to improve. An electronic signature was used to authenticate this note.     --JAYLYN Johnson - CNP on 4/8/2019 at 2:24 PM

## 2019-04-10 DIAGNOSIS — J35.9 LESION OF TONSIL: Primary | ICD-10-CM

## 2019-04-10 DIAGNOSIS — R41.3 MEMORY LOSS: Primary | ICD-10-CM

## 2019-05-21 ENCOUNTER — OFFICE VISIT (OUTPATIENT)
Dept: FAMILY MEDICINE CLINIC | Age: 69
End: 2019-05-21
Payer: MEDICARE

## 2019-05-21 VITALS
BODY MASS INDEX: 19.12 KG/M2 | OXYGEN SATURATION: 98 % | DIASTOLIC BLOOD PRESSURE: 80 MMHG | SYSTOLIC BLOOD PRESSURE: 144 MMHG | WEIGHT: 112 LBS | HEART RATE: 64 BPM

## 2019-05-21 DIAGNOSIS — B37.9 YEAST INFECTION: ICD-10-CM

## 2019-05-21 DIAGNOSIS — J01.10 ACUTE NON-RECURRENT FRONTAL SINUSITIS: Primary | ICD-10-CM

## 2019-05-21 DIAGNOSIS — R50.9 FEVER, UNSPECIFIED FEVER CAUSE: ICD-10-CM

## 2019-05-21 DIAGNOSIS — I65.22 STENOSIS OF LEFT CAROTID ARTERY: ICD-10-CM

## 2019-05-21 DIAGNOSIS — R79.89 ABNORMAL CBC: ICD-10-CM

## 2019-05-21 DIAGNOSIS — G45.9 TIA (TRANSIENT ISCHEMIC ATTACK): ICD-10-CM

## 2019-05-21 DIAGNOSIS — I95.1 ORTHOSTATIC HYPOTENSION: ICD-10-CM

## 2019-05-21 DIAGNOSIS — R53.83 FATIGUE, UNSPECIFIED TYPE: ICD-10-CM

## 2019-05-21 DIAGNOSIS — Z09 HOSPITAL DISCHARGE FOLLOW-UP: ICD-10-CM

## 2019-05-21 DIAGNOSIS — J02.9 SORE THROAT: ICD-10-CM

## 2019-05-21 DIAGNOSIS — R55 CARDIAC SYNCOPE: ICD-10-CM

## 2019-05-21 LAB
A/G RATIO: 1.8 (ref 1.1–2.2)
ALBUMIN SERPL-MCNC: 4.1 G/DL (ref 3.4–5)
ALP BLD-CCNC: 72 U/L (ref 40–129)
ALT SERPL-CCNC: 11 U/L (ref 10–40)
ANION GAP SERPL CALCULATED.3IONS-SCNC: 14 MMOL/L (ref 3–16)
AST SERPL-CCNC: 15 U/L (ref 15–37)
BASOPHILS ABSOLUTE: 0.1 K/UL (ref 0–0.2)
BASOPHILS RELATIVE PERCENT: 0.6 %
BILIRUB SERPL-MCNC: 0.7 MG/DL (ref 0–1)
BUN BLDV-MCNC: 14 MG/DL (ref 7–20)
CALCIUM SERPL-MCNC: 9.4 MG/DL (ref 8.3–10.6)
CHLORIDE BLD-SCNC: 100 MMOL/L (ref 99–110)
CO2: 28 MMOL/L (ref 21–32)
CREAT SERPL-MCNC: 0.8 MG/DL (ref 0.6–1.2)
EOSINOPHILS ABSOLUTE: 0.1 K/UL (ref 0–0.6)
EOSINOPHILS RELATIVE PERCENT: 1.7 %
FERRITIN: 194.3 NG/ML (ref 15–150)
GFR AFRICAN AMERICAN: >60
GFR NON-AFRICAN AMERICAN: >60
GLOBULIN: 2.3 G/DL
GLUCOSE BLD-MCNC: 94 MG/DL (ref 70–99)
HCT VFR BLD CALC: 36.9 % (ref 36–48)
HEMOGLOBIN: 12.4 G/DL (ref 12–16)
IRON SATURATION: 15 % (ref 15–50)
IRON: 46 UG/DL (ref 37–145)
LYMPHOCYTES ABSOLUTE: 1.9 K/UL (ref 1–5.1)
LYMPHOCYTES RELATIVE PERCENT: 21.1 %
MCH RBC QN AUTO: 31.3 PG (ref 26–34)
MCHC RBC AUTO-ENTMCNC: 33.5 G/DL (ref 31–36)
MCV RBC AUTO: 93.4 FL (ref 80–100)
MONOCYTES ABSOLUTE: 0.7 K/UL (ref 0–1.3)
MONOCYTES RELATIVE PERCENT: 7.6 %
NEUTROPHILS ABSOLUTE: 6.1 K/UL (ref 1.7–7.7)
NEUTROPHILS RELATIVE PERCENT: 69 %
PDW BLD-RTO: 13.5 % (ref 12.4–15.4)
PLATELET # BLD: 302 K/UL (ref 135–450)
PMV BLD AUTO: 9.7 FL (ref 5–10.5)
POTASSIUM SERPL-SCNC: 3.8 MMOL/L (ref 3.5–5.1)
RBC # BLD: 3.95 M/UL (ref 4–5.2)
S PYO AG THROAT QL: NORMAL
SODIUM BLD-SCNC: 142 MMOL/L (ref 136–145)
TOTAL IRON BINDING CAPACITY: 297 UG/DL (ref 260–445)
TOTAL PROTEIN: 6.4 G/DL (ref 6.4–8.2)
WBC # BLD: 8.9 K/UL (ref 4–11)

## 2019-05-21 PROCEDURE — 87880 STREP A ASSAY W/OPTIC: CPT | Performed by: NURSE PRACTITIONER

## 2019-05-21 PROCEDURE — 1111F DSCHRG MED/CURRENT MED MERGE: CPT | Performed by: NURSE PRACTITIONER

## 2019-05-21 PROCEDURE — 99215 OFFICE O/P EST HI 40 MIN: CPT | Performed by: NURSE PRACTITIONER

## 2019-05-21 PROCEDURE — 36415 COLL VENOUS BLD VENIPUNCTURE: CPT | Performed by: NURSE PRACTITIONER

## 2019-05-21 RX ORDER — CLOPIDOGREL BISULFATE 75 MG/1
75 TABLET ORAL DAILY
COMMUNITY
Start: 2019-05-14 | End: 2019-06-13 | Stop reason: SDUPTHER

## 2019-05-21 RX ORDER — AMOXICILLIN AND CLAVULANATE POTASSIUM 875; 125 MG/1; MG/1
1 TABLET, FILM COATED ORAL EVERY 12 HOURS
Qty: 20 TABLET | Refills: 0 | Status: SHIPPED | OUTPATIENT
Start: 2019-05-21 | End: 2021-03-17 | Stop reason: SDUPTHER

## 2019-05-21 RX ORDER — FLUCONAZOLE 150 MG/1
150 TABLET ORAL ONCE
Qty: 1 TABLET | Refills: 0 | Status: SHIPPED | OUTPATIENT
Start: 2019-05-21 | End: 2019-05-21

## 2019-05-21 RX ORDER — ATORVASTATIN CALCIUM 40 MG/1
40 TABLET, FILM COATED ORAL DAILY
COMMUNITY
Start: 2019-05-13 | End: 2019-06-12

## 2019-05-21 ASSESSMENT — ENCOUNTER SYMPTOMS
COLOR CHANGE: 0
EYE PAIN: 0
ABDOMINAL PAIN: 0
BACK PAIN: 0
PHOTOPHOBIA: 0
RHINORRHEA: 0
WHEEZING: 0
EYE ITCHING: 0
DIARRHEA: 0
BLOOD IN STOOL: 0
TROUBLE SWALLOWING: 0
CHEST TIGHTNESS: 0
NAUSEA: 0
SINUS PRESSURE: 1
EYE DISCHARGE: 0
CONSTIPATION: 0
VOMITING: 0
EYE REDNESS: 0
SORE THROAT: 0
CHOKING: 0
STRIDOR: 0
COUGH: 1
SHORTNESS OF BREATH: 0
SINUS PAIN: 1
VOICE CHANGE: 0

## 2019-05-21 NOTE — PROGRESS NOTES
Post-Discharge Transitional Care Management Services or Hospital Follow Up        Hollie Hercules   YOB: 1950    Date of Office Visit:  5/21/2019  Date of Hospital Admission: 5/11/19  Date of Hospital Discharge: 5/15/19    Care management risk score Rising risk (score 2-5) and Complex Care (Scores >=6): 2     Non face to face  following discharge, date last encounter closed (first attempt may have been earlier): *No documented post hospital discharge outreach found in the last 14 days     Call initiated 2 business days of discharge: *No response recorded in the last 14 days    Patient Active Problem List   Diagnosis    Abdominal colic    [facility]    Hyperlipidemia    Depression    Carotid stenosis    REYNOSO (dyspnea on exertion)    Anxiety    Incarcerated ventral hernia    Carpal tunnel syndrome of right wrist    Cervical myelopathy ([facility]    B12 deficiency    Recurrent major depressive disorder in partial remission ([facility]       Allergies   Allergen Reactions    Iodides Hives and Itching    Sulfa Antibiotics Rash       Medications listed as ordered at the time of discharge from hospital  Plavix, aspirin 81mg, atorvastain     Medications marked \"taking\" at this time  Outpatient Medications Marked as Taking for the 5/21/19 encounter (Office Visit) with JAYLYN Madrid CNP   Medication Sig Dispense Refill    atorvastatin (LIPITOR) 40 MG tablet Take 40 mg by mouth daily       clopidogrel (PLAVIX) 75 MG tablet Take 75 mg by mouth daily       amoxicillin-clavulanate (AUGMENTIN) 875-125 MG per tablet Take 1 tablet by mouth every 12 hours for 10 days 20 tablet 0    fluconazole (DIFLUCAN) 150 MG tablet Take 1 tablet by mouth once for 1 dose 1 tablet 0    fluticasone (FLONASE) 50 MCG/ACT nasal spray 2 sprays by Nasal route daily 1 Bottle 5    tiotropium (SPIRIVA HANDIHALER) 18 MCG inhalation capsule Inhale 1 capsule into the lungs daily 30 capsule 5  omeprazole (PRILOSEC) 10 MG delayed release capsule Take 10 mg by mouth daily      dicyclomine (BENTYL) 10 MG capsule TAKE ONE CAPSULE BY MOUTH FOUR TIMES DAILY AS NEEDED 100 capsule 4    albuterol sulfate HFA (VENTOLIN HFA) 108 (90 Base) MCG/ACT inhaler Inhale 2 puffs into the lungs 2 times daily as needed for Wheezing or Shortness of Breath 1 Inhaler 3    aspirin EC 81 MG EC tablet Take 1 tablet by mouth daily. 30 tablet 3        Medications patient taking as of now reconciled against medications ordered at time of hospital discharge: Yes    Chief Complaint   Patient presents with    Follow-Up from Hospital     F/u from [facility] admitted 5/10/19-5/15/19       She was in [facility] to get a heart Cath, results showed EF was 59-63%, angio showed her left carotid is 49% occluded and the US showed 70-99% carotid stenosis of the left internal carotid artery. Left Carodtid. They she had orthostatic Hypotension so she was admitted to the [address] [place] [address]. On her way home her daughter was driving they stopped at the pharmacy to get her medication and she has expressive aphasia. Her daughter took her to [facility] and she was transferred back to [facility] for a stroke workup. Neuorology was consulted and stated these were TIA's either from from the angio or carotid stenosis. They started her on Plavix. She still needs to see Dr. Coe(Cardiology) and Dr. Guy Paniagua (Vascular) She Still needs to follow up with a neurologist.     Fever:  She \"was down\" all day yesterday. This started yesterday. Sleeping all day. Took Tylenol and 3AM woke up in sweat. Took tylenol 10AM Denies cough. Inpatient course: Discharge summary reviewed- see chart. Vitals:    05/21/19 1452 05/21/19 1506   BP: 126/60 (!) 144/80   Site: Left Upper Arm Right Upper Arm   Position: Sitting Sitting   Cuff Size: Medium Adult Medium Adult   Pulse: 64    SpO2: 98%    Weight: 112 lb (50.8 kg)      Body mass index is 19.12 kg/m².    Wt Readings from Last 3 Encounters: 05/21/19 112 lb (50.8 kg)   04/08/19 113 lb 9.6 oz (51.5 kg)   02/05/19 114 lb (51.7 kg)     BP Readings from Last 3 Encounters:   05/21/19 (!) 144/80   04/08/19 (!) 152/90   02/05/19 120/72       Review of Systems   Constitutional: Positive for fatigue. Negative for activity change, appetite change, chills, diaphoresis, fever and unexpected weight change. HENT: Positive for sinus pressure and sinus pain. Negative for congestion, ear discharge, ear pain, hearing loss, nosebleeds, postnasal drip, rhinorrhea, sneezing, sore throat, tinnitus, trouble swallowing and voice change. Eyes: Negative for photophobia, pain, discharge, redness and itching. Respiratory: Positive for cough. Negative for choking, chest tightness, shortness of breath, wheezing and stridor. Cardiovascular: Negative for chest pain, palpitations and leg swelling. Gastrointestinal: Negative for abdominal pain, blood in stool, constipation, diarrhea, nausea and vomiting. Endocrine: Negative for cold intolerance, heat intolerance, polydipsia and polyuria. Genitourinary: Negative for difficulty urinating, dysuria, enuresis, flank pain, frequency, hematuria and urgency. Musculoskeletal: Negative for back pain, gait problem, joint swelling, neck pain and neck stiffness. Skin: Negative for color change, pallor, rash and wound. Allergic/Immunologic: Negative for environmental allergies and food allergies. Neurological: Positive for headaches. Negative for dizziness, tremors, syncope, speech difficulty, weakness, light-headedness and numbness. Hematological: Negative for adenopathy. Does not bruise/bleed easily. Psychiatric/Behavioral: Negative for agitation, behavioral problems, confusion, decreased concentration, dysphoric mood, hallucinations, self-injury, sleep disturbance and suicidal ideas. The patient is not nervous/anxious and is not hyperactive.         Physical Exam   Constitutional: She is oriented to person, place, and time. Vital signs are normal. She appears well-developed and well-nourished. She appears ill. No distress. HENT:   Head: Normocephalic and atraumatic. Right Ear: Hearing, tympanic membrane, external ear and ear canal normal.   Left Ear: Hearing, tympanic membrane, external ear and ear canal normal.   Nose: Right sinus exhibits maxillary sinus tenderness and frontal sinus tenderness. Left sinus exhibits maxillary sinus tenderness and frontal sinus tenderness. Mouth/Throat: Uvula is midline and mucous membranes are normal. Posterior oropharyngeal edema and posterior oropharyngeal erythema present. No oropharyngeal exudate. Eyes: Pupils are equal, round, and reactive to light. Conjunctivae are normal. Right eye exhibits no discharge. Left eye exhibits no discharge. Neck: Normal range of motion. No JVD present. No tracheal deviation present. No thyromegaly present. Cardiovascular: Normal rate, regular rhythm, normal heart sounds and intact distal pulses. Exam reveals no friction rub. No murmur heard. Pulmonary/Chest: Effort normal and breath sounds normal. No stridor. No respiratory distress. She has no wheezes. She has no rhonchi. She has no rales. Coarse lung sounds cleared with cough   Abdominal: Soft. Bowel sounds are normal. She exhibits no distension and no mass. There is no tenderness. There is no rebound and no guarding. Musculoskeletal: Normal range of motion. She exhibits no edema or tenderness. Lymphadenopathy:     She has no cervical adenopathy. Neurological: She is alert and oriented to person, place, and time. She has normal strength. Coordination normal.   Skin: Skin is warm and dry. Capillary refill takes less than 2 seconds. No rash noted. Psychiatric: She has a normal mood and affect. Her speech is normal and behavior is normal. Judgment and thought content normal. Cognition and memory are normal.       Assessment/Plan:  1.  Acute non-recurrent frontal sinusitis    - amoxicillin-clavulanate (AUGMENTIN) 875-125 MG per tablet; Take 1 tablet by mouth every 12 hours for 10 days  Dispense: 20 tablet; Refill: 0  - fluconazole (DIFLUCAN) 150 MG tablet; Take 1 tablet by mouth once for 1 dose  Dispense: 1 tablet; Refill: 0    2. Hospital discharge follow-up  -Reviewed multiple admissions and discharges see HPI for brief summary    3. TIA (transient ischemic attack)    - PA DISCHARGE MEDS RECONCILED W/ CURRENT OUTPATIENT MED LIST  - PA DISCHARGE MEDS RECONCILED W/ CURRENT OUTPATIENT MED LIST    -They had not made a follow up appointment with neurology yet and did not know they were supposed to. Upon reviewing the discharge summaries I gave them the name and number of the neurologist that they were instructed to follow up with. 4. Stenosis of left carotid artery    - PA DISCHARGE MEDS RECONCILED W/ CURRENT OUTPATIENT MED LIST  -She is following up with vascular and cardiology    5. Orthostatic hypotension    - PA DISCHARGE MEDS RECONCILED W/ CURRENT OUTPATIENT MED LIST    6. Cardiac syncope    - PA DISCHARGE MEDS RECONCILED W/ CURRENT OUTPATIENT MED LIST  - PA DISCHARGE MEDS RECONCILED W/ CURRENT OUTPATIENT MED LIST  -Following up with cardiology in next few weeks. 7. Fever, unspecified fever cause    - CBC Auto Differential  - FERRITIN  - Comprehensive Metabolic Panel  - POCT rapid strep A-NEG    8. Fatigue, unspecified type    - CBC Auto Differential  - FERRITIN  - Comprehensive Metabolic Panel  - POCT rapid strep A-NEG    9. Sore throat    - POCT rapid strep A-NEG    10. Abnormal CBC    - CBC Auto Differential  - FERRITIN  - Comprehensive Metabolic Panel  - IRON AND TIBC    11. Yeast infection    - fluconazole (DIFLUCAN) 150 MG tablet; Take 1 tablet by mouth once for 1 dose  Dispense: 1 tablet; Refill: 0    Medical Decision Making: high complexity    Maintain hydration by drinking small amounts of clear fluids frequently, then soft diet, and then advance diet as tolerated.  May use OTC Imodium if desired for any diarrhea. Call if symptoms worsen, high fever, severe weakness or fainting, increased abdominal pain, blood in stool or vomit, or failure to improve in 2-3 days. Follow up if symptoms do not improve or worsen. If the patient becomes short of breath go straight to the ER or call 911.

## 2019-05-23 NOTE — ADDENDUM NOTE
Addended by: Karin Dozier on: 5/23/2019 11:33 AM     Modules accepted: Orders, Level of Service, SmartSet

## 2019-06-28 ENCOUNTER — TELEPHONE (OUTPATIENT)
Dept: FAMILY MEDICINE CLINIC | Age: 69
End: 2019-06-28

## 2019-06-28 PROBLEM — Z95.0 PACEMAKER: Status: ACTIVE | Noted: 2019-06-25

## 2019-07-15 ENCOUNTER — OFFICE VISIT (OUTPATIENT)
Dept: FAMILY MEDICINE CLINIC | Age: 69
End: 2019-07-15
Payer: MEDICARE

## 2019-07-15 VITALS
TEMPERATURE: 98.3 F | DIASTOLIC BLOOD PRESSURE: 68 MMHG | WEIGHT: 111.8 LBS | SYSTOLIC BLOOD PRESSURE: 112 MMHG | BODY MASS INDEX: 19.09 KG/M2 | OXYGEN SATURATION: 97 % | HEART RATE: 80 BPM

## 2019-07-15 DIAGNOSIS — Z87.891 PERSONAL HISTORY OF TOBACCO USE: ICD-10-CM

## 2019-07-15 DIAGNOSIS — J44.1 CHRONIC OBSTRUCTIVE PULMONARY DISEASE WITH ACUTE EXACERBATION (HCC): ICD-10-CM

## 2019-07-15 DIAGNOSIS — F33.41 RECURRENT MAJOR DEPRESSIVE DISORDER IN PARTIAL REMISSION (HCC): ICD-10-CM

## 2019-07-15 DIAGNOSIS — H60.501 ACUTE OTITIS EXTERNA OF RIGHT EAR, UNSPECIFIED TYPE: Primary | ICD-10-CM

## 2019-07-15 DIAGNOSIS — J02.9 ACUTE PHARYNGITIS, UNSPECIFIED ETIOLOGY: ICD-10-CM

## 2019-07-15 PROCEDURE — G8926 SPIRO NO PERF OR DOC: HCPCS | Performed by: FAMILY MEDICINE

## 2019-07-15 PROCEDURE — G8400 PT W/DXA NO RESULTS DOC: HCPCS | Performed by: FAMILY MEDICINE

## 2019-07-15 PROCEDURE — 3023F SPIROM DOC REV: CPT | Performed by: FAMILY MEDICINE

## 2019-07-15 PROCEDURE — G8427 DOCREV CUR MEDS BY ELIG CLIN: HCPCS | Performed by: FAMILY MEDICINE

## 2019-07-15 PROCEDURE — G8420 CALC BMI NORM PARAMETERS: HCPCS | Performed by: FAMILY MEDICINE

## 2019-07-15 PROCEDURE — 99214 OFFICE O/P EST MOD 30 MIN: CPT | Performed by: FAMILY MEDICINE

## 2019-07-15 PROCEDURE — 3017F COLORECTAL CA SCREEN DOC REV: CPT | Performed by: FAMILY MEDICINE

## 2019-07-15 PROCEDURE — 3014F SCREEN MAMMO DOC REV: CPT | Performed by: FAMILY MEDICINE

## 2019-07-15 PROCEDURE — 1090F PRES/ABSN URINE INCON ASSESS: CPT | Performed by: FAMILY MEDICINE

## 2019-07-15 PROCEDURE — G8598 ASA/ANTIPLAT THER USED: HCPCS | Performed by: FAMILY MEDICINE

## 2019-07-15 PROCEDURE — G0296 VISIT TO DETERM LDCT ELIG: HCPCS | Performed by: FAMILY MEDICINE

## 2019-07-15 PROCEDURE — 4130F TOPICAL PREP RX AOE: CPT | Performed by: FAMILY MEDICINE

## 2019-07-15 PROCEDURE — 4040F PNEUMOC VAC/ADMIN/RCVD: CPT | Performed by: FAMILY MEDICINE

## 2019-07-15 PROCEDURE — 1036F TOBACCO NON-USER: CPT | Performed by: FAMILY MEDICINE

## 2019-07-15 PROCEDURE — 1123F ACP DISCUSS/DSCN MKR DOCD: CPT | Performed by: FAMILY MEDICINE

## 2019-07-15 RX ORDER — OFLOXACIN 3 MG/ML
5 SOLUTION AURICULAR (OTIC) 2 TIMES DAILY
Qty: 10 ML | Refills: 0 | Status: SHIPPED | OUTPATIENT
Start: 2019-07-15 | End: 2019-07-25

## 2019-07-15 RX ORDER — DICYCLOMINE HYDROCHLORIDE 10 MG/1
CAPSULE ORAL
Qty: 100 CAPSULE | Refills: 4 | Status: SHIPPED | OUTPATIENT
Start: 2019-07-15 | End: 2020-08-10

## 2019-07-15 RX ORDER — ATORVASTATIN CALCIUM 10 MG/1
10 TABLET, FILM COATED ORAL DAILY
COMMUNITY
Start: 2013-01-17 | End: 2019-10-10

## 2019-07-15 RX ORDER — CEFDINIR 300 MG/1
300 CAPSULE ORAL 2 TIMES DAILY
Qty: 20 CAPSULE | Refills: 0 | Status: SHIPPED | OUTPATIENT
Start: 2019-07-15 | End: 2019-07-25

## 2019-07-15 ASSESSMENT — ENCOUNTER SYMPTOMS
DIARRHEA: 0
COUGH: 0
CHEST TIGHTNESS: 0
VOMITING: 0
SORE THROAT: 1
CONSTIPATION: 0
BLOOD IN STOOL: 0

## 2019-07-15 NOTE — PROGRESS NOTES
solution Place 5 drops into the right ear 2 times daily for 10 days Yes William Shook MD   cefdinir (OMNICEF) 300 MG capsule Take 1 capsule by mouth 2 times daily for 10 days Yes William Shook MD   clopidogrel (PLAVIX) 75 MG tablet TAKE 1 TABLET BY MOUTH DAILY. Yes William Shook MD   fluticasone [facility]) 50 MCG/ACT nasal spray 2 sprays by Nasal route daily Yes JAYLYN Chavira CNP   tiotropium (Gabriela Breath) 18 MCG inhalation capsule Inhale 1 capsule into the lungs daily Yes JAYLYN Chavira CNP   omeprazole (PRILOSEC) 10 MG delayed release capsule Take 10 mg by mouth daily Yes Betzy Bloom MD   dicyclomine (BENTYL) 10 MG capsule TAKE ONE CAPSULE BY MOUTH FOUR TIMES DAILY AS NEEDED Yes William Shook MD   albuterol sulfate HFA (VENTOLIN HFA) 108 (90 Base) MCG/ACT inhaler Inhale 2 puffs into the lungs 2 times daily as needed for Wheezing or Shortness of Breath Yes William Shook MD   aspirin EC 81 MG EC tablet Take 1 tablet by mouth daily. Yes Kinga Chavis MD       Allergies   Allergen Reactions    Iodides Hives and Itching    Sulfa Antibiotics Rash       OBJECTIVE:    /68   Pulse 80   Temp 98.3 °F (36.8 °C) (Tympanic)   Wt 111 lb 12.8 oz (50.7 kg)   SpO2 97%   BMI 19.09 kg/m²     BP Readings from Last 2 Encounters:   07/15/19 112/68   05/21/19 (!) 144/80       Wt Readings from Last 3 Encounters:   07/15/19 111 lb 12.8 oz (50.7 kg)   05/21/19 112 lb (50.8 kg)   04/08/19 113 lb 9.6 oz (51.5 kg)        Physical Exam   Constitutional: She is oriented to person, place, and time. She appears well-developed and well-nourished. HENT:   Head: Normocephalic and atraumatic. Right Ear: Tympanic membrane and external ear normal. There is swelling (in canal) and tenderness.    Left Ear: Tympanic membrane and external ear normal.   Mouth/Throat: Oropharynx is clear and moist.   Sig wax bilat, worse on the right   Eyes: Conjunctivae and EOM are normal.   Neck: No tracheal deviation present. Cardiovascular: Normal rate, regular rhythm and normal heart sounds. Pulmonary/Chest: Effort normal and breath sounds normal.   Abdominal: Soft. There is no tenderness. Neurological: She is alert and oriented to person, place, and time. Skin: Skin is warm and dry. Psychiatric: She has a normal mood and affect. ASSESSMENT/PLAN:    1. Acute otitis externa of right ear, unspecified type  meds as below. Call if not better  - ofloxacin (FLOXIN OTIC) 0.3 % otic solution; Place 5 drops into the right ear 2 times daily for 10 days  Dispense: 10 mL; Refill: 0    2. Acute pharyngitis, unspecified etiology  meds as below. Call if not better  - cefdinir (OMNICEF) 300 MG capsule; Take 1 capsule by mouth 2 times daily for 10 days  Dispense: 20 capsule; Refill: 0    3. Personal history of tobacco use    - CT LUNG SCREENING; Future  - GA VISIT TO DISCUSS LUNG CA SCREEN W LDCT    4. Chronic obstructive pulmonary disease with acute exacerbation (HCC)  Improved overall. Had quit smoking, but resumed. Cont meds. 5. Recurrent major depressive disorder in partial remission ([facility] [address]  The current medical regimen is effective;  continue present plan and medications. Improved overall. Overall feels better on current med for mood. Feels better after pacer as well. Scribe attestation:I, Susie AGUILERA, am scribing for and in the presence of Cheryl Bush MD. Electronically signed by Susie AGUILERA, on 7/15/2019 at 11:12 AM      Provider attestation: Rissa Dienro MD, personally performed the services scribed by the user listed above in my presence, and it is both accurate and complete. I agree with the ROS and Past Histories independently gathered by the clinical support staff and the remaining scribed note accurately describes my personal service to the patient.     [facility]    7/15/2019  11:12 AM                  Low Dose CT (LDCT) Lung Screening criteria met   Age

## 2019-07-22 ENCOUNTER — TELEPHONE (OUTPATIENT)
Dept: FAMILY MEDICINE CLINIC | Age: 69
End: 2019-07-22

## 2019-07-22 RX ORDER — AZITHROMYCIN 250 MG/1
250 TABLET, FILM COATED ORAL SEE ADMIN INSTRUCTIONS
Qty: 6 TABLET | Refills: 0 | Status: SHIPPED | OUTPATIENT
Start: 2019-07-22 | End: 2019-07-27

## 2019-07-25 ENCOUNTER — TELEPHONE (OUTPATIENT)
Dept: CASE MANAGEMENT | Age: 69
End: 2019-07-25

## 2019-07-26 ENCOUNTER — HOSPITAL ENCOUNTER (OUTPATIENT)
Dept: CT IMAGING | Age: 69
Discharge: HOME | End: 2019-07-26
Payer: MEDICARE

## 2019-07-26 DIAGNOSIS — Z87.891 PERSONAL HISTORY OF TOBACCO USE: ICD-10-CM

## 2019-07-26 PROCEDURE — G0297 LDCT FOR LUNG CA SCREEN: HCPCS

## 2019-08-03 DIAGNOSIS — F33.41 RECURRENT MAJOR DEPRESSIVE DISORDER IN PARTIAL REMISSION (HCC): ICD-10-CM

## 2019-08-05 RX ORDER — VORTIOXETINE 10 MG/1
TABLET, FILM COATED ORAL
Qty: 30 TABLET | Refills: 5 | Status: SHIPPED | OUTPATIENT
Start: 2019-08-05 | End: 2020-01-07 | Stop reason: SDUPTHER

## 2019-09-10 ENCOUNTER — TELEPHONE (OUTPATIENT)
Dept: FAMILY MEDICINE CLINIC | Age: 69
End: 2019-09-10

## 2019-09-10 DIAGNOSIS — F41.9 ANXIETY: ICD-10-CM

## 2019-09-10 RX ORDER — ALPRAZOLAM 0.25 MG/1
0.25 TABLET ORAL NIGHTLY PRN
Qty: 15 TABLET | Refills: 0 | Status: SHIPPED | OUTPATIENT
Start: 2019-09-10 | End: 2020-01-07 | Stop reason: SDUPTHER

## 2019-09-10 NOTE — TELEPHONE ENCOUNTER
Pt is requesting orders for a pulmonary function test.     Patient is also requesting a medication refill. Medication:  ALPRAZolam (XANAX) 0.25 MG tablet   Pharmacy: [address] [place] [address] [address] [phone] - E [phone] Sherly Sample [phone]   Last office visit: 07/15/2019  Next office visit: 2/3/2020    Please advise.

## 2019-09-11 DIAGNOSIS — J44.1 CHRONIC OBSTRUCTIVE PULMONARY DISEASE WITH ACUTE EXACERBATION (HCC): Primary | ICD-10-CM

## 2019-09-26 ENCOUNTER — VIRTUAL VISIT (OUTPATIENT)
Dept: PSYCHOLOGY | Age: 69
End: 2019-09-26
Payer: MEDICARE

## 2019-09-26 DIAGNOSIS — F33.1 MDD (MAJOR DEPRESSIVE DISORDER), RECURRENT EPISODE, MODERATE (HCC): ICD-10-CM

## 2019-09-26 DIAGNOSIS — F43.10 PTSD (POST-TRAUMATIC STRESS DISORDER): Primary | ICD-10-CM

## 2019-09-26 DIAGNOSIS — F41.1 GAD (GENERALIZED ANXIETY DISORDER): ICD-10-CM

## 2019-09-26 PROCEDURE — 90791 PSYCH DIAGNOSTIC EVALUATION: CPT | Performed by: SOCIAL WORKER

## 2019-09-26 ASSESSMENT — PATIENT HEALTH QUESTIONNAIRE - PHQ9
SUM OF ALL RESPONSES TO PHQ QUESTIONS 1-9: 19
9. THOUGHTS THAT YOU WOULD BE BETTER OFF DEAD, OR OF HURTING YOURSELF: 0
10. IF YOU CHECKED OFF ANY PROBLEMS, HOW DIFFICULT HAVE THESE PROBLEMS MADE IT FOR YOU TO DO YOUR WORK, TAKE CARE OF THINGS AT HOME, OR GET ALONG WITH OTHER PEOPLE: 2
4. FEELING TIRED OR HAVING LITTLE ENERGY: 2
1. LITTLE INTEREST OR PLEASURE IN DOING THINGS: 2
5. POOR APPETITE OR OVEREATING: 3
6. FEELING BAD ABOUT YOURSELF - OR THAT YOU ARE A FAILURE OR HAVE LET YOURSELF OR YOUR FAMILY DOWN: 1
SUM OF ALL RESPONSES TO PHQ QUESTIONS 1-9: 19
3. TROUBLE FALLING OR STAYING ASLEEP: 3
7. TROUBLE CONCENTRATING ON THINGS, SUCH AS READING THE NEWSPAPER OR WATCHING TELEVISION: 3
2. FEELING DOWN, DEPRESSED OR HOPELESS: 2
SUM OF ALL RESPONSES TO PHQ9 QUESTIONS 1 & 2: 4
8. MOVING OR SPEAKING SO SLOWLY THAT OTHER PEOPLE COULD HAVE NOTICED. OR THE OPPOSITE, BEING SO FIGETY OR RESTLESS THAT YOU HAVE BEEN MOVING AROUND A LOT MORE THAN USUAL: 3

## 2019-09-26 NOTE — PROGRESS NOTES
Virtual Behavioral Health Consultation  Stacieashleigh Burnette. [address]  9/26/2019  10:16 AM      Time spent with Patient: 30 minutes  This is patient's first  [facility] appointment. Reason for Consult:  depression and anxiety  Referring Provider: Daniella Andrea MD  [address] [facility]    Pt provided informed consent for the behavioral health program. Discussed with patient model of service to include the limits of confidentiality (i.e. abuse reporting, suicide intervention, etc.) and short-term intervention focused approach. Pt indicated understanding. Feedback given to PCP. Patient Location:   [facility]), [address] [facility], [address] [place] [address]      Provider Location:   [place], [address]:  Pt has had 4 TIA's since she has last seen me. She also had a pace maker put in, her heart rate was dropping in to the 30s. Pt is still on the trintellix. She is still struggling with anxiety and depression. Daughters have been amazing through all of the health issues. It has been very difficult b/c she feels like a burden on her daughters. She is struggling to accept all the medical issues that are occurring. She has had 2 past psychiatric hospitalizations, years ago and has been in therapy in the past.  Is struggling so wanted to return to [facility]. Quit smoking, no etoh or drug use. Denied SI.  Less nightmares, but struggling again with depression.  has been very supportive. Did have concerns prior to all this that she was having early onset on alzheimer's as her sister has this. After seeing neurology, this was ruled out, which was a relief. She fears being a burden on family, even though they have repeatedly told her they were waiting for the opportunity to help take care of her.       O:    MSE:    Appearance    alert, cooperative  Appetite abnormal: low  Sleep disturbance Yes  Fatigue Yes  Loss of pleasure Yes  Impulsive behavior No  Speech struggling with accepting current medical conditions, and feeling like a burden on her family. No SI/HI, insight and motivation good. Diagnosis:  Major depressive disorder; recurrent and moderate  Generalized anxiety disorder and Posttraumatic stress disorder      Past Diagnosis:      Diagnosis Date    Arthritis     COPD (chronic obstructive pulmonary disease) (HCC)     Depression     Hyperlipidemia     Irritable bowel syndrome (IBS)        No diagnosis found.       Plan:  Pt interventions:  Provided handout on  radical acceptance, Trained in strategies for increasing balanced thinking, Trained in relaxation strategies, Discussed self-care (sleep, nutrition, rewarding activities, social support, exercise), Motivational Interviewing to increase patient confidence and compliance with adhering to behavioral change plan, Motivational Interviewing to determine importance and readiness for change, Supportive techniques and Identified maladaptive thoughts      Pt Behavioral Change Plan:  See Pt Instructions

## 2019-09-27 ENCOUNTER — HOSPITAL ENCOUNTER (OUTPATIENT)
Dept: PULMONOLOGY | Age: 69
Discharge: HOME | End: 2019-09-27
Payer: MEDICARE

## 2019-09-27 VITALS — OXYGEN SATURATION: 98 %

## 2019-09-27 DIAGNOSIS — J44.1 CHRONIC OBSTRUCTIVE PULMONARY DISEASE WITH ACUTE EXACERBATION (HCC): ICD-10-CM

## 2019-09-27 PROCEDURE — 94060 EVALUATION OF WHEEZING: CPT

## 2019-09-27 PROCEDURE — 6360000002 HC RX W HCPCS: Performed by: FAMILY MEDICINE

## 2019-09-27 PROCEDURE — 94640 AIRWAY INHALATION TREATMENT: CPT

## 2019-09-27 PROCEDURE — 94760 N-INVAS EAR/PLS OXIMETRY 1: CPT

## 2019-09-27 PROCEDURE — 94729 DIFFUSING CAPACITY: CPT

## 2019-09-27 PROCEDURE — 94726 PLETHYSMOGRAPHY LUNG VOLUMES: CPT

## 2019-09-27 RX ORDER — ALBUTEROL SULFATE 2.5 MG/3ML
2.5 SOLUTION RESPIRATORY (INHALATION) ONCE
Status: COMPLETED | OUTPATIENT
Start: 2019-09-27 | End: 2019-09-27

## 2019-09-27 RX ADMIN — ALBUTEROL SULFATE 2.5 MG: 2.5 SOLUTION RESPIRATORY (INHALATION) at 10:42

## 2019-10-01 LAB
DLCO %PRED: 58 %
DLCO PRED: NORMAL ML/MIN/MMHG
DLCO/VA %PRED: NORMAL %
DLCO/VA PRED: NORMAL ML/MIN/MMHG
DLCO/VA: NORMAL ML/MIN/MMHG
DLCO: NORMAL ML/MIN/MMHG
EXPIRATORY TIME-POST: NORMAL SEC
EXPIRATORY TIME: NORMAL SEC
FEF 25-75% %CHNG: NORMAL
FEF 25-75% %PRED-POST: NORMAL %
FEF 25-75% %PRED-PRE: NORMAL L/SEC
FEF 25-75% PRED: NORMAL L/SEC
FEF 25-75%-POST: NORMAL L/SEC
FEF 25-75%-PRE: NORMAL L/SEC
FEV1 %PRED-POST: 80 %
FEV1 %PRED-PRE: 77 %
FEV1 PRED: NORMAL L
FEV1-POST: NORMAL L
FEV1-PRE: NORMAL L
FEV1/FVC %PRED-POST: NORMAL %
FEV1/FVC %PRED-PRE: NORMAL %
FEV1/FVC PRED: NORMAL %
FEV1/FVC-POST: 61 %
FEV1/FVC-PRE: 60 %
FVC %PRED-POST: NORMAL L
FVC %PRED-PRE: NORMAL %
FVC PRED: NORMAL L
FVC-POST: NORMAL L
FVC-PRE: NORMAL L
GAW %PRED: NORMAL %
GAW PRED: NORMAL L/S/CMH2O
GAW: NORMAL L/S/CMH2O
IC %PRED: NORMAL %
IC PRED: NORMAL L
IC: NORMAL L
MEP: NORMAL
MIP: NORMAL
MVV %PRED-PRE: NORMAL %
MVV PRED: NORMAL L/MIN
MVV-PRE: NORMAL L/MIN
PEF %PRED-POST: NORMAL %
PEF %PRED-PRE: NORMAL L/SEC
PEF PRED: NORMAL L/SEC
PEF%CHNG: NORMAL
PEF-POST: NORMAL L/SEC
PEF-PRE: NORMAL L/SEC
RAW %PRED: NORMAL %
RAW PRED: NORMAL CMH2O/L/S
RAW: NORMAL CMH2O/L/S
RV %PRED: NORMAL %
RV PRED: NORMAL L
RV: NORMAL L
SVC %PRED: NORMAL %
SVC PRED: NORMAL L
SVC: NORMAL L
TLC %PRED: 122 %
TLC PRED: NORMAL L
TLC: NORMAL L
VA %PRED: NORMAL %
VA PRED: NORMAL L
VA: NORMAL L
VTG %PRED: NORMAL %
VTG PRED: NORMAL L
VTG: NORMAL L

## 2019-10-01 ASSESSMENT — PULMONARY FUNCTION TESTS
FEV1_PERCENT_PREDICTED_PRE: 77
FEV1/FVC_PRE: 60
FEV1/FVC_POST: 61
FEV1_PERCENT_PREDICTED_POST: 80

## 2019-10-03 ENCOUNTER — TELEPHONE (OUTPATIENT)
Dept: FAMILY MEDICINE CLINIC | Age: 69
End: 2019-10-03

## 2019-10-03 RX ORDER — EZETIMIBE 10 MG/1
10 TABLET ORAL DAILY
COMMUNITY
Start: 2019-09-12 | End: 2020-01-24

## 2019-10-09 RX ORDER — METOPROLOL SUCCINATE 25 MG/1
12.5 TABLET, EXTENDED RELEASE ORAL DAILY
Qty: 45 TABLET | Refills: 1 | COMMUNITY
Start: 2019-10-09 | End: 2019-10-10

## 2019-10-10 ENCOUNTER — OFFICE VISIT (OUTPATIENT)
Dept: FAMILY MEDICINE CLINIC | Age: 69
End: 2019-10-10
Payer: MEDICARE

## 2019-10-10 ENCOUNTER — VIRTUAL VISIT (OUTPATIENT)
Dept: PSYCHOLOGY | Age: 69
End: 2019-10-10
Payer: MEDICARE

## 2019-10-10 VITALS
HEART RATE: 64 BPM | DIASTOLIC BLOOD PRESSURE: 72 MMHG | WEIGHT: 110.8 LBS | BODY MASS INDEX: 18.92 KG/M2 | OXYGEN SATURATION: 97 % | SYSTOLIC BLOOD PRESSURE: 120 MMHG | HEIGHT: 64 IN

## 2019-10-10 DIAGNOSIS — Z23 NEED FOR INFLUENZA VACCINATION: ICD-10-CM

## 2019-10-10 DIAGNOSIS — F33.41 RECURRENT MAJOR DEPRESSIVE DISORDER IN PARTIAL REMISSION (HCC): ICD-10-CM

## 2019-10-10 DIAGNOSIS — F33.1 MDD (MAJOR DEPRESSIVE DISORDER), RECURRENT EPISODE, MODERATE (HCC): ICD-10-CM

## 2019-10-10 DIAGNOSIS — J44.9 CHRONIC OBSTRUCTIVE PULMONARY DISEASE, UNSPECIFIED COPD TYPE (HCC): Primary | ICD-10-CM

## 2019-10-10 DIAGNOSIS — F41.1 GAD (GENERALIZED ANXIETY DISORDER): ICD-10-CM

## 2019-10-10 DIAGNOSIS — F43.10 PTSD (POST-TRAUMATIC STRESS DISORDER): Primary | ICD-10-CM

## 2019-10-10 DIAGNOSIS — R00.2 PALPITATIONS: ICD-10-CM

## 2019-10-10 PROCEDURE — G0008 ADMIN INFLUENZA VIRUS VAC: HCPCS | Performed by: FAMILY MEDICINE

## 2019-10-10 PROCEDURE — 3017F COLORECTAL CA SCREEN DOC REV: CPT | Performed by: FAMILY MEDICINE

## 2019-10-10 PROCEDURE — G8482 FLU IMMUNIZE ORDER/ADMIN: HCPCS | Performed by: FAMILY MEDICINE

## 2019-10-10 PROCEDURE — G8926 SPIRO NO PERF OR DOC: HCPCS | Performed by: FAMILY MEDICINE

## 2019-10-10 PROCEDURE — 90832 PSYTX W PT 30 MINUTES: CPT | Performed by: SOCIAL WORKER

## 2019-10-10 PROCEDURE — G8427 DOCREV CUR MEDS BY ELIG CLIN: HCPCS | Performed by: FAMILY MEDICINE

## 2019-10-10 PROCEDURE — 1090F PRES/ABSN URINE INCON ASSESS: CPT | Performed by: FAMILY MEDICINE

## 2019-10-10 PROCEDURE — G8400 PT W/DXA NO RESULTS DOC: HCPCS | Performed by: FAMILY MEDICINE

## 2019-10-10 PROCEDURE — G8598 ASA/ANTIPLAT THER USED: HCPCS | Performed by: FAMILY MEDICINE

## 2019-10-10 PROCEDURE — 3014F SCREEN MAMMO DOC REV: CPT | Performed by: FAMILY MEDICINE

## 2019-10-10 PROCEDURE — 1036F TOBACCO NON-USER: CPT | Performed by: FAMILY MEDICINE

## 2019-10-10 PROCEDURE — 99214 OFFICE O/P EST MOD 30 MIN: CPT | Performed by: FAMILY MEDICINE

## 2019-10-10 PROCEDURE — 3023F SPIROM DOC REV: CPT | Performed by: FAMILY MEDICINE

## 2019-10-10 PROCEDURE — G8420 CALC BMI NORM PARAMETERS: HCPCS | Performed by: FAMILY MEDICINE

## 2019-10-10 PROCEDURE — 1123F ACP DISCUSS/DSCN MKR DOCD: CPT | Performed by: FAMILY MEDICINE

## 2019-10-10 PROCEDURE — 4040F PNEUMOC VAC/ADMIN/RCVD: CPT | Performed by: FAMILY MEDICINE

## 2019-10-10 PROCEDURE — 90653 IIV ADJUVANT VACCINE IM: CPT | Performed by: FAMILY MEDICINE

## 2019-10-10 RX ORDER — FLUTICASONE FUROATE AND VILANTEROL 100; 25 UG/1; UG/1
1 POWDER RESPIRATORY (INHALATION) DAILY
Qty: 1 EACH | Refills: 5 | Status: SHIPPED | OUTPATIENT
Start: 2019-10-10 | End: 2021-04-05 | Stop reason: SINTOL

## 2019-10-10 RX ORDER — METOPROLOL SUCCINATE 25 MG/1
12.5 TABLET, EXTENDED RELEASE ORAL
COMMUNITY
Start: 2019-10-09 | End: 2020-01-07 | Stop reason: SDUPTHER

## 2019-10-10 ASSESSMENT — ENCOUNTER SYMPTOMS
BLOOD IN STOOL: 0
SPUTUM PRODUCTION: 1
CHEST TIGHTNESS: 0
DIARRHEA: 0
COUGH: 1
CONSTIPATION: 0

## 2019-10-10 ASSESSMENT — COPD QUESTIONNAIRES: COPD: 1

## 2019-10-14 ENCOUNTER — TELEPHONE (OUTPATIENT)
Dept: FAMILY MEDICINE CLINIC | Age: 69
End: 2019-10-14

## 2019-10-14 RX ORDER — DOXYCYCLINE HYCLATE 100 MG
100 TABLET ORAL 2 TIMES DAILY
Qty: 20 TABLET | Refills: 0 | Status: SHIPPED | OUTPATIENT
Start: 2019-10-14 | End: 2019-10-24

## 2019-10-15 ENCOUNTER — TELEPHONE (OUTPATIENT)
Dept: FAMILY MEDICINE CLINIC | Age: 69
End: 2019-10-15

## 2019-10-15 ENCOUNTER — NURSE TRIAGE (OUTPATIENT)
Dept: OTHER | Facility: CLINIC | Age: 69
End: 2019-10-15

## 2019-10-15 RX ORDER — PREDNISONE 20 MG/1
40 TABLET ORAL DAILY
Qty: 10 TABLET | Refills: 0 | Status: SHIPPED | OUTPATIENT
Start: 2019-10-15 | End: 2019-10-20

## 2019-10-25 ENCOUNTER — OFFICE VISIT (OUTPATIENT)
Dept: FAMILY MEDICINE CLINIC | Age: 69
End: 2019-10-25
Payer: MEDICARE

## 2019-10-25 VITALS
DIASTOLIC BLOOD PRESSURE: 80 MMHG | SYSTOLIC BLOOD PRESSURE: 142 MMHG | BODY MASS INDEX: 19.22 KG/M2 | OXYGEN SATURATION: 98 % | HEART RATE: 65 BPM | WEIGHT: 112 LBS | TEMPERATURE: 97.7 F

## 2019-10-25 DIAGNOSIS — J44.9 CHRONIC OBSTRUCTIVE PULMONARY DISEASE, UNSPECIFIED COPD TYPE (HCC): Primary | ICD-10-CM

## 2019-10-25 DIAGNOSIS — J02.9 ACUTE PHARYNGITIS, UNSPECIFIED ETIOLOGY: ICD-10-CM

## 2019-10-25 DIAGNOSIS — R52 GENERALIZED BODY ACHES: ICD-10-CM

## 2019-10-25 DIAGNOSIS — R53.83 OTHER FATIGUE: ICD-10-CM

## 2019-10-25 DIAGNOSIS — R11.0 NAUSEA: ICD-10-CM

## 2019-10-25 LAB
A/G RATIO: 1.1 (ref 1–2.5)
ALBUMIN SERPL-MCNC: 3.7 G/DL (ref 3.6–5)
ALP BLD-CCNC: 81 U/L (ref 46–116)
ALT SERPL-CCNC: 19 U/L (ref 12–78)
ANION GAP SERPL CALCULATED.3IONS-SCNC: 12 MMOL/L (ref 8–16)
AST SERPL-CCNC: 21 U/L (ref 12–36)
BASOPHILS RELATIVE PERCENT: 0.6 % (ref 0–1)
BILIRUB SERPL-MCNC: 0.7 MG/DL (ref 0–1.1)
BUN BLDV-MCNC: 15 MG/DL (ref 5–19)
CALCIUM SERPL-MCNC: 9.5 MG/DL (ref 8.4–10.2)
CHLORIDE BLD-SCNC: 105 MMOL/L (ref 99–111)
CO2: 33 MMOL/L (ref 21–33)
CREAT SERPL-MCNC: 0.9 MG/DL (ref 0.6–1.3)
EOSINOPHILS RELATIVE PERCENT: 2.4 % (ref 0–5)
ERYTHROCYTE DISTRIBUTION WIDTH RBC RATIO: 13.5 % (ref 11.6–14.8)
GFR CALCULATED: 62
GLOBULIN: 3.4 G/DL (ref 2–3.5)
GLUCOSE BLD-MCNC: 102 MG/DL (ref 74–99)
GRANULOCYTE ABSOLUTE COUNT: 4.9 X(10)3/UL (ref 1.8–7.2)
HCT VFR BLD CALC: 46.7 % (ref 35.7–46.7)
HEMOGLOBIN: 15.1 G/DL (ref 11.9–15.9)
IMMATURE GRANULOCYTES %: 0.5 % (ref 0–0.5)
LYMPHOCYTES ABSOLUTE: 2.9 X(10)3/UL (ref 1.1–2.7)
LYMPHOCYTES RELATIVE PERCENT: 32.6 % (ref 15–45)
MCH RBC QN AUTO: 30 PG (ref 28.1–33.6)
MCHC RBC AUTO-ENTMCNC: 32.3 G/DL (ref 32.8–34.7)
MCV RBC AUTO: 92.7 FL (ref 82.9–99.9)
MONOCYTES RELATIVE PERCENT: 7.6 % (ref 0–12)
NEUTROPHILS/100 LEUKOCYTES: 56.3 % (ref 40–70)
PLATELETS: 253 X1000 (ref 175–420)
POTASSIUM SERPL-SCNC: 4.4 MMOL/L (ref 3.4–5)
RBC # BLD: 5.04 X1000000 (ref 3.72–5.31)
SODIUM BLD-SCNC: 146 MMOL/L (ref 136–146)
TOTAL PROTEIN: 7.1 G/DL (ref 6.3–8)
WBC # BLD: 8.8 X1000 (ref 4.5–10.3)

## 2019-10-25 PROCEDURE — G8427 DOCREV CUR MEDS BY ELIG CLIN: HCPCS | Performed by: NURSE PRACTITIONER

## 2019-10-25 PROCEDURE — 99213 OFFICE O/P EST LOW 20 MIN: CPT | Performed by: NURSE PRACTITIONER

## 2019-10-25 PROCEDURE — 1036F TOBACCO NON-USER: CPT | Performed by: NURSE PRACTITIONER

## 2019-10-25 PROCEDURE — G8420 CALC BMI NORM PARAMETERS: HCPCS | Performed by: NURSE PRACTITIONER

## 2019-10-25 PROCEDURE — 4040F PNEUMOC VAC/ADMIN/RCVD: CPT | Performed by: NURSE PRACTITIONER

## 2019-10-25 PROCEDURE — 1123F ACP DISCUSS/DSCN MKR DOCD: CPT | Performed by: NURSE PRACTITIONER

## 2019-10-25 PROCEDURE — 3023F SPIROM DOC REV: CPT | Performed by: NURSE PRACTITIONER

## 2019-10-25 PROCEDURE — G8598 ASA/ANTIPLAT THER USED: HCPCS | Performed by: NURSE PRACTITIONER

## 2019-10-25 PROCEDURE — G8926 SPIRO NO PERF OR DOC: HCPCS | Performed by: NURSE PRACTITIONER

## 2019-10-25 PROCEDURE — G8400 PT W/DXA NO RESULTS DOC: HCPCS | Performed by: NURSE PRACTITIONER

## 2019-10-25 PROCEDURE — G8482 FLU IMMUNIZE ORDER/ADMIN: HCPCS | Performed by: NURSE PRACTITIONER

## 2019-10-25 PROCEDURE — 1090F PRES/ABSN URINE INCON ASSESS: CPT | Performed by: NURSE PRACTITIONER

## 2019-10-25 PROCEDURE — G9899 SCRN MAM PERF RSLTS DOC: HCPCS | Performed by: NURSE PRACTITIONER

## 2019-10-25 PROCEDURE — 3017F COLORECTAL CA SCREEN DOC REV: CPT | Performed by: NURSE PRACTITIONER

## 2019-10-25 RX ORDER — PREDNISONE 10 MG/1
TABLET ORAL
Qty: 10 TABLET | Refills: 0 | Status: SHIPPED | OUTPATIENT
Start: 2019-10-25 | End: 2020-01-07 | Stop reason: ALTCHOICE

## 2019-10-25 RX ORDER — CEFDINIR 300 MG/1
300 CAPSULE ORAL 2 TIMES DAILY
Qty: 20 CAPSULE | Refills: 0 | Status: CANCELLED | OUTPATIENT
Start: 2019-10-25 | End: 2019-11-04

## 2019-10-25 RX ORDER — METHYLPREDNISOLONE 4 MG/1
TABLET ORAL
Qty: 1 KIT | Refills: 0 | Status: CANCELLED | OUTPATIENT
Start: 2019-10-25 | End: 2019-10-31

## 2019-10-25 RX ORDER — CEFDINIR 300 MG/1
300 CAPSULE ORAL 2 TIMES DAILY
Qty: 20 CAPSULE | Refills: 0 | Status: SHIPPED | OUTPATIENT
Start: 2019-10-25 | End: 2019-11-04

## 2019-10-25 ASSESSMENT — ENCOUNTER SYMPTOMS
COLOR CHANGE: 0
ABDOMINAL PAIN: 0
DIARRHEA: 0
EYE PAIN: 0
WHEEZING: 1
PHOTOPHOBIA: 0
RHINORRHEA: 1
VOMITING: 0
EYE REDNESS: 0
EYE ITCHING: 0
CHEST TIGHTNESS: 1
NAUSEA: 0
COUGH: 1
SHORTNESS OF BREATH: 1
CHOKING: 0
TROUBLE SWALLOWING: 0
STRIDOR: 0
BLOOD IN STOOL: 0
SORE THROAT: 0
VOICE CHANGE: 0
CONSTIPATION: 0
SINUS PAIN: 1
EYE DISCHARGE: 0
BACK PAIN: 0
SINUS PRESSURE: 1

## 2019-12-03 RX ORDER — OMEPRAZOLE 20 MG/1
20 CAPSULE, DELAYED RELEASE ORAL
Qty: 180 CAPSULE | Refills: 1 | Status: SHIPPED | OUTPATIENT
Start: 2019-12-03 | End: 2020-05-26

## 2020-01-07 ENCOUNTER — OFFICE VISIT (OUTPATIENT)
Dept: FAMILY MEDICINE CLINIC | Age: 70
End: 2020-01-07
Payer: MEDICARE

## 2020-01-07 VITALS
OXYGEN SATURATION: 97 % | HEART RATE: 91 BPM | BODY MASS INDEX: 18.88 KG/M2 | WEIGHT: 110 LBS | SYSTOLIC BLOOD PRESSURE: 132 MMHG | DIASTOLIC BLOOD PRESSURE: 80 MMHG

## 2020-01-07 PROCEDURE — 99215 OFFICE O/P EST HI 40 MIN: CPT | Performed by: NURSE PRACTITIONER

## 2020-01-07 PROCEDURE — G8926 SPIRO NO PERF OR DOC: HCPCS | Performed by: NURSE PRACTITIONER

## 2020-01-07 PROCEDURE — 3017F COLORECTAL CA SCREEN DOC REV: CPT | Performed by: NURSE PRACTITIONER

## 2020-01-07 PROCEDURE — 1036F TOBACCO NON-USER: CPT | Performed by: NURSE PRACTITIONER

## 2020-01-07 PROCEDURE — G8420 CALC BMI NORM PARAMETERS: HCPCS | Performed by: NURSE PRACTITIONER

## 2020-01-07 PROCEDURE — 1090F PRES/ABSN URINE INCON ASSESS: CPT | Performed by: NURSE PRACTITIONER

## 2020-01-07 PROCEDURE — 3023F SPIROM DOC REV: CPT | Performed by: NURSE PRACTITIONER

## 2020-01-07 PROCEDURE — 1123F ACP DISCUSS/DSCN MKR DOCD: CPT | Performed by: NURSE PRACTITIONER

## 2020-01-07 PROCEDURE — G8400 PT W/DXA NO RESULTS DOC: HCPCS | Performed by: NURSE PRACTITIONER

## 2020-01-07 PROCEDURE — G8427 DOCREV CUR MEDS BY ELIG CLIN: HCPCS | Performed by: NURSE PRACTITIONER

## 2020-01-07 PROCEDURE — 4040F PNEUMOC VAC/ADMIN/RCVD: CPT | Performed by: NURSE PRACTITIONER

## 2020-01-07 PROCEDURE — G8482 FLU IMMUNIZE ORDER/ADMIN: HCPCS | Performed by: NURSE PRACTITIONER

## 2020-01-07 RX ORDER — ALPRAZOLAM 0.25 MG/1
0.25 TABLET ORAL NIGHTLY PRN
Qty: 15 TABLET | Refills: 0 | Status: SHIPPED | OUTPATIENT
Start: 2020-01-07 | End: 2020-06-19 | Stop reason: SDUPTHER

## 2020-01-07 RX ORDER — METOPROLOL SUCCINATE 25 MG/1
12.5 TABLET, EXTENDED RELEASE ORAL DAILY
Qty: 30 TABLET | Refills: 5 | Status: SHIPPED | OUTPATIENT
Start: 2020-01-07 | End: 2021-07-21 | Stop reason: SDUPTHER

## 2020-01-07 RX ORDER — PREDNISONE 10 MG/1
10 TABLET ORAL DAILY
Qty: 5 TABLET | Refills: 0 | Status: SHIPPED | OUTPATIENT
Start: 2020-01-07 | End: 2020-01-12

## 2020-01-07 ASSESSMENT — ENCOUNTER SYMPTOMS
PHOTOPHOBIA: 0
TROUBLE SWALLOWING: 0
STRIDOR: 0
SORE THROAT: 0
CHOKING: 0
SHORTNESS OF BREATH: 0
RHINORRHEA: 0
BACK PAIN: 0
ABDOMINAL PAIN: 0
EYE ITCHING: 0
EYE REDNESS: 0
WHEEZING: 0
SINUS PRESSURE: 0
NAUSEA: 0
EYE DISCHARGE: 0
COLOR CHANGE: 0
DIARRHEA: 0
EYE PAIN: 0
VOMITING: 0
COUGH: 0
VOICE CHANGE: 0
CONSTIPATION: 0
CHEST TIGHTNESS: 0
BLOOD IN STOOL: 0
SINUS PAIN: 0

## 2020-01-07 NOTE — PROGRESS NOTES
2020     Khang Campoverde (:  1950) is a 78 y.o. female, here for evaluation of the following medical concerns:    She has \"spells\" with her heart rate. She states yesterday it got worse. She has anxiety and shakes when this occurs. She used a pulse ox and O2 97% and Heart rate 130's. She gets a heavy chest and some neck pain. She has had increased anxiety. We discussed ways to help cope such as deep breathing and getting outside. She states she saw [employer] and Nicola Tinoco gave her a list of exercises to do while she is anxious. We discussed to place this list on her fridge and to try two of these when you are feeling anxious. We also discussed how since her stroke she has been more short tempered and angry. Educated that there are some chemical releases that can alter personalities after a large ischemic event such as Hear attack or stroke. We made a plan that when this occurs to take a step back and to try a few exercises on her list. She used to really enjoy reading so another angle was that she will read a paragraph in a book (she likes historical fiction) when she starts getting anxious. She is hard on herself for not being congitivly as sharp as before her stroke. We discussed being kinder to herself and giving herself a break. Overall she stated she felt better just talking about her stressors and having these plans in place. Shoulder pain:    Started a few months ago, she has not dropped anyhing or had decreased strength. Right hip pain:    Radiates down right leg, feels like her past sciatica pain. She has taken Tylenol for this and it helps some. Hand and foot cramping:    Intermittent will relax and get back full ROM. We discussed eating a bannan daily and follow up if this continues for lab work. Review of Systems   Constitutional: Positive for fatigue. Negative for activity change, appetite change, chills, diaphoresis, fever and unexpected weight change.    HENT: - CNP   predniSONE (DELTASONE) 10 MG tablet Take 1 tablet by mouth daily for 5 days Yes Bryson Cyphers, APRN - CNP   omeprazole (PRILOSEC) 20 MG delayed release capsule Take 1 capsule by mouth 2 times daily (before meals) Yes Bryson Cyphers, APRN - CNP   fluticasone-vilanterol (BREO ELLIPTA) 100-25 MCG/INH AEPB inhaler Inhale 1 puff into the lungs daily Yes Ede Merino MD   ezetimibe (ZETIA) 10 MG tablet Take 10 mg by mouth daily Yes Historical Provider, MD   Lactobacillus (PROBIOTIC ACIDOPHILUS PO) Take 1 tablet by mouth daily  Yes Historical Provider, MD   dicyclomine (BENTYL) 10 MG capsule TAKE ONE CAPSULE BY MOUTH FOUR TIMES DAILY AS NEEDED Yes Ede Merino MD   fluticasone (FLONASE) 50 MCG/ACT nasal spray 2 sprays by Nasal route daily Yes Bryson Cyphers, APRN - CNP   tiotropium (Vicie Right) 18 MCG inhalation capsule Inhale 1 capsule into the lungs daily Yes Bryson Cyphers, APRN - CNP   omeprazole (PRILOSEC) 10 MG delayed release capsule Take 10 mg by mouth daily Yes Historical Provider, MD   albuterol sulfate HFA (VENTOLIN HFA) 108 (90 Base) MCG/ACT inhaler Inhale 2 puffs into the lungs 2 times daily as needed for Wheezing or Shortness of Breath Yes Ede Merino MD   aspirin EC 81 MG EC tablet Take 1 tablet by mouth daily. Yes Lelo Conde MD        Social History     Tobacco Use    Smoking status: Former Smoker     Packs/day: 1.00     Years: 40.00     Pack years: 40.00     Types: Cigarettes     Last attempt to quit: 5/10/2019     Years since quittin.6    Smokeless tobacco: Never Used   Substance Use Topics    Alcohol use: No     Alcohol/week: 0.0 standard drinks        Vitals:    20 1326 20 1331   BP: (!) 144/86 132/80   Pulse: 91    SpO2: 97%    Weight: 110 lb (49.9 kg)      Estimated body mass index is 18.88 kg/m² as calculated from the following:    Height as of 10/10/19: 5' 4\" (1.626 m). Weight as of this encounter: 110 lb (49.9 kg).     Physical Exam  Vitals signs reviewed. Constitutional:       General: She is not in acute distress. Appearance: Normal appearance. She is well-developed. HENT:      Head: Normocephalic and atraumatic. Right Ear: Hearing normal.      Left Ear: Hearing normal.      Nose: Nose normal.      Right Sinus: No maxillary sinus tenderness or frontal sinus tenderness. Left Sinus: No maxillary sinus tenderness or frontal sinus tenderness. Mouth/Throat:      Pharynx: No oropharyngeal exudate. Eyes:      General:         Right eye: No discharge. Left eye: No discharge. Conjunctiva/sclera: Conjunctivae normal.      Pupils: Pupils are equal, round, and reactive to light. Neck:      Musculoskeletal: Normal range of motion. Thyroid: No thyromegaly. Vascular: No JVD. Trachea: No tracheal deviation. Cardiovascular:      Rate and Rhythm: Normal rate and regular rhythm. Heart sounds: Normal heart sounds. No murmur. No friction rub. Pulmonary:      Effort: Pulmonary effort is normal. No respiratory distress. Breath sounds: Normal breath sounds. No stridor. No decreased breath sounds, wheezing, rhonchi or rales. Abdominal:      General: Bowel sounds are normal. There is no distension. Palpations: Abdomen is soft. There is no mass. Tenderness: There is no tenderness. There is no guarding or rebound. Musculoskeletal: Normal range of motion. General: Tenderness present. Right shoulder: She exhibits tenderness. She exhibits normal range of motion, no pain, normal pulse and normal strength. Arms:    Lymphadenopathy:      Cervical: No cervical adenopathy. Skin:     General: Skin is warm and dry. Capillary Refill: Capillary refill takes less than 2 seconds. Findings: No rash. Neurological:      Mental Status: She is alert and oriented to person, place, and time. Sensory: Sensation is intact. Motor: Motor function is intact.       Coordination: Coordination normal.   Psychiatric:         Attention and Perception: Attention and perception normal.         Mood and Affect: Mood normal.         Speech: Speech normal.         Behavior: Behavior normal. Behavior is cooperative. Thought Content: Thought content normal.         Cognition and Memory: Cognition normal.         Judgment: Judgment normal.         ASSESSMENT/PLAN:  1. Anxiety    - ALPRAZolam (XANAX) 0.25 MG tablet; Take 1 tablet by mouth nightly as needed for Sleep or Anxiety for up to 60 days. Dispense: 15 tablet; Refill: 0  -Dicussed plan of coping mechanisms and deep breathing when this occurs. 2. Recurrent major depressive disorder in partial remission (HCC)    - VORTIoxetine (TRINTELLIX) 10 MG TABS tablet; TAKE 1 TABLET BY MOUTH EVERY DAY  Dispense: 30 tablet; Refill: 5  -See HPI    3. Sprain of right rotator cuff capsule, initial encounter    - predniSONE (DELTASONE) 10 MG tablet; Take 1 tablet by mouth daily for 5 days  Dispense: 5 tablet; Refill: 0    -Apply a compressive ACE bandage. Rest and elevate the affected painful area. Apply cold compresses intermittently as needed. As pain recedes, begin normal activities slowly as tolerated. Call if symptoms persist.    4. Hand cramps    -Eat a banana daily and follow up if this continues. 5. Foot cramps     -Eat a banana daily and follow up if this continues. 6. Sciatica of right side    - predniSONE (DELTASONE) 10 MG tablet; Take 1 tablet by mouth daily for 5 days  Dispense: 5 tablet; Refill: 0    7. Cervical myelopathy (HCC)    - Stable at this time. 8. Chronic obstructive pulmonary disease, unspecified COPD type ([facility] [address]    - Stable at this time. See HPI for full plan. Follow up if symptoms do not improve or worsen. If the patient becomes short of breath go straight to the ER or call 911. Return if symptoms worsen or fail to improve. An electronic signature was used to authenticate this note.     --Addisonne Gun

## 2020-01-07 NOTE — PATIENT INSTRUCTIONS
Patient Education        Rotator Cuff: Exercises  Introduction  Here are some examples of exercises for you to try. The exercises may be suggested for a condition or for rehabilitation. Start each exercise slowly. Ease off the exercises if you start to have pain. You will be told when to start these exercises and which ones will work best for you. How to do the exercises  Pendulum swing    1. Hold on to a table or the back of a chair with your good arm. Then bend forward a little and let your sore arm hang straight down. This exercise does not use the arm muscles. Rather, use your legs and your hips to create movement that makes your arm swing freely. 2. Use the movement from your hips and legs to guide the slightly swinging arm back and forth like a pendulum (or elephant trunk). Then guide it in circles that start small (about the size of a dinner plate). Make the circles a bit larger each day, as your pain allows. 3. Do this exercise for 5 minutes, 5 to 7 times each day. 4. As you have less pain, try bending over a little farther to do this exercise. This will increase the amount of movement at your shoulder. Posterior stretching exercise    1. Hold the elbow of your injured arm with your other hand. 2. Use your hand to pull your injured arm gently up and across your body. You will feel a gentle stretch across the back of your injured shoulder. 3. Hold for at least 15 to 30 seconds. Then slowly lower your arm. 4. Repeat 2 to 4 times. Up-the-back stretch    1. Put your hand in your back pocket. Let it rest there to stretch your shoulder. 2. With your other hand, hold your injured arm (palm outward) behind your back by the wrist. Pull your arm up gently to stretch your shoulder. 3. Next, put a towel over your other shoulder. Put the hand of your injured arm behind your back. Now hold the back end of the towel. With the other hand, hold the front end of the towel in front of your body.  Pull gently on the front end of the towel. This will bring your hand farther up your back to stretch your shoulder. Overhead stretch    1. Standing about an arm's length away, grasp onto a solid surface. You could use a countertop, a doorknob, or the back of a sturdy chair. 2. With your knees slightly bent, bend forward with your arms straight. Lower your upper body, and let your shoulders stretch. 3. As your shoulders are able to stretch farther, you may need to take a step or two backward. 4. Hold for at least 15 to 30 seconds. Then stand up and relax. If you had stepped back during your stretch, step forward so you can keep your hands on the solid surface. 5. Repeat 2 to 4 times. Shoulder flexion (lying down)    1. Lie on your back, holding a wand with both hands. Your palms should face down as you hold the wand. 2. Keeping your elbows straight, slowly raise your arms over your head. Raise them until you feel a stretch in your shoulders, upper back, and chest.  3. Hold for 15 to 30 seconds. 4. Repeat 2 to 4 times. Shoulder rotation (lying down)    1. Lie on your back. Hold a wand with both hands with your elbows bent and palms up. 2. Keep your elbows close to your body, and move the wand across your body toward the sore arm. 3. Hold for 8 to 12 seconds. 4. Repeat 2 to 4 times. Wall climbing (to the side)    1. Stand with your side to a wall so that your fingers can just touch it at an angle about 30 degrees toward the front of your body. 2. Walk the fingers of your injured arm up the wall as high as pain permits. Try not to shrug your shoulder up toward your ear as you move your arm up. 3. Hold that position for a count of at least 15 to 20.  4. Walk your fingers back down to the starting position. 5. Repeat at least 2 to 4 times. Try to reach higher each time. Wall climbing (to the front)    1. Face a wall, and stand so your fingers can just touch it.   2. Keeping your shoulder down, walk the fingers of your injured arm up the wall as high as pain permits. (Don't shrug your shoulder up toward your ear.)  3. Hold your arm in that position for at least 15 to 30 seconds. 4. Slowly walk your fingers back down to where you started. 5. Repeat at least 2 to 4 times. Try to reach higher each time. Shoulder blade squeeze    1. Stand with your arms at your sides, and squeeze your shoulder blades together. Do not raise your shoulders up as you squeeze. 2. Hold 6 seconds. 3. Repeat 8 to 12 times. Scapular exercise: Arm reach    1. Lie flat on your back. This exercise is a very slight motion that starts with your arms raised (elbows straight, arms straight). 2. From this position, reach higher toward the kimberly or ceiling. Keep your elbows straight. All motion should be from your shoulder blade only. 3. Relax your arms back to where you started. 4. Repeat 8 to 12 times. Arm raise to the side    1. Slowly raise your injured arm to the side, with your thumb facing up. Raise your arm 60 degrees at the most (shoulder level is 90 degrees). 2. Hold the position for 3 to 5 seconds. Then lower your arm back to your side. If you need to, bring your \"good\" arm across your body and place it under the elbow as you lower your injured arm. Use your good arm to keep your injured arm from dropping down too fast.  3. Repeat 8 to 12 times. 4. When you first start out, don't hold any extra weight in your hand. As you get stronger, you may use a 1-pound to 2-pound dumbbell or a small can of food. Shoulder flexor and extensor exercise    1. Push forward (flex): Stand facing a wall or doorjamb, about 6 inches or less back. Hold your injured arm against your body. Make a closed fist with your thumb on top. Then gently push your hand forward into the wall with about 25% to 50% of your strength. Don't let your body move backward as you push. Hold for about 6 seconds. Relax for a few seconds. Repeat 8 to 12 times.   2. Push backward (extend): Stand with your back flat against a wall. Your upper arm should be against the wall, with your elbow bent 90 degrees (your hand straight ahead). Push your elbow gently back against the wall with about 25% to 50% of your strength. Don't let your body move forward as you push. Hold for about 6 seconds. Relax for a few seconds. Repeat 8 to 12 times. Scapular exercise: Wall push-ups    1. Stand facing a wall, about 12 inches to 18 inches away. 2. Place your hands on the wall at shoulder height. 3. Slowly bend your elbows and bring your face to the wall. Keep your back and hips straight. 4. Push back to where you started. 5. Repeat 8 to 12 times. 6. When you can do this exercise against a wall comfortably, you can try it against a counter. You can then slowly progress to the end of a couch, then to a sturdy chair, and finally to the floor. Scapular exercise: Retraction    1. Put the band around a solid object at about waist level. (A bedpost will work well.) Each hand should hold an end of the band. 2. With your elbows at your sides and bent to 90 degrees, pull the band back. Your shoulder blades should move toward each other. Then move your arms back where you started. 3. Repeat 8 to 12 times. 4. If you have good range of motion in your shoulders, try this exercise with your arms lifted out to the sides. Keep your elbows at a 90-degree angle. Raise the elastic band up to about shoulder level. Pull the band back to move your shoulder blades toward each other. Then move your arms back where you started. Internal rotator strengthening exercise    1. Start by tying a piece of elastic exercise material to a doorknob. You can use surgical tubing or Thera-Band. 2. Stand or sit with your shoulder relaxed and your elbow bent 90 degrees. Your upper arm should rest comfortably against your side. Squeeze a rolled towel between your elbow and your body for comfort.  This will help keep your arm at your side.  3. Hold one end of the elastic band in the hand of the painful arm. 4. Slowly rotate your forearm toward your body until it touches your belly. Slowly move it back to where you started. 5. Keep your elbow and upper arm firmly tucked against the towel roll or at your side. 6. Repeat 8 to 12 times. External rotator strengthening exercise    1. Start by tying a piece of elastic exercise material to a doorknob. You can use surgical tubing or Thera-Band. (You may also hold one end of the band in each hand.)  2. Stand or sit with your shoulder relaxed and your elbow bent 90 degrees. Your upper arm should rest comfortably against your side. Squeeze a rolled towel between your elbow and your body for comfort. This will help keep your arm at your side. 3. Hold one end of the elastic band with the hand of the painful arm. 4. Start with your forearm across your belly. Slowly rotate the forearm out away from your body. Keep your elbow and upper arm tucked against the towel roll or the side of your body until you begin to feel tightness in your shoulder. Slowly move your arm back to where you started. 5. Repeat 8 to 12 times. Follow-up care is a key part of your treatment and safety. Be sure to make and go to all appointments, and call your doctor if you are having problems. It's also a good idea to know your test results and keep a list of the medicines you take. Where can you learn more? Go to https://[email or web address]. org and sign in to your [employer] account. Enter Suzy Spencer in the [facility] box to learn more about \"Rotator Cuff: Exercises. \"     If you do not have an account, please click on the \"Sign Up Now\" link. Current as of: September 20, 2018  Content Version: 12.1  © 3051-4593 [employer]. Care instructions adapted under license by [facility] ([facility]).  If you have questions about a medical condition or this instruction, always ask your healthcare professional. Mini Dave, \"Sciatica: Exercises. \"     If you do not have an account, please click on the \"Sign Up Now\" link. Current as of: September 20, 2018  Content Version: 12.1  © 3299-1945 [employer], [employer]orporated. Care instructions adapted under license by [facility] ([facility]). If you have questions about a medical condition or this instruction, always ask your healthcare professional. [address] any warranty or liability for your use of this information.

## 2020-01-24 RX ORDER — EZETIMIBE 10 MG/1
TABLET ORAL
Qty: 30 TABLET | Refills: 5 | Status: SHIPPED | OUTPATIENT
Start: 2020-01-24 | End: 2020-09-08

## 2020-01-27 ENCOUNTER — OFFICE VISIT (OUTPATIENT)
Dept: FAMILY MEDICINE CLINIC | Age: 70
End: 2020-01-27
Payer: MEDICARE

## 2020-01-27 VITALS
DIASTOLIC BLOOD PRESSURE: 78 MMHG | BODY MASS INDEX: 19.57 KG/M2 | HEART RATE: 60 BPM | WEIGHT: 114 LBS | OXYGEN SATURATION: 97 % | SYSTOLIC BLOOD PRESSURE: 124 MMHG

## 2020-01-27 PROCEDURE — 99213 OFFICE O/P EST LOW 20 MIN: CPT | Performed by: NURSE PRACTITIONER

## 2020-01-27 PROCEDURE — 4040F PNEUMOC VAC/ADMIN/RCVD: CPT | Performed by: NURSE PRACTITIONER

## 2020-01-27 PROCEDURE — G8427 DOCREV CUR MEDS BY ELIG CLIN: HCPCS | Performed by: NURSE PRACTITIONER

## 2020-01-27 PROCEDURE — G8400 PT W/DXA NO RESULTS DOC: HCPCS | Performed by: NURSE PRACTITIONER

## 2020-01-27 PROCEDURE — 1036F TOBACCO NON-USER: CPT | Performed by: NURSE PRACTITIONER

## 2020-01-27 PROCEDURE — 1090F PRES/ABSN URINE INCON ASSESS: CPT | Performed by: NURSE PRACTITIONER

## 2020-01-27 PROCEDURE — 1123F ACP DISCUSS/DSCN MKR DOCD: CPT | Performed by: NURSE PRACTITIONER

## 2020-01-27 PROCEDURE — G8482 FLU IMMUNIZE ORDER/ADMIN: HCPCS | Performed by: NURSE PRACTITIONER

## 2020-01-27 PROCEDURE — 3017F COLORECTAL CA SCREEN DOC REV: CPT | Performed by: NURSE PRACTITIONER

## 2020-01-27 PROCEDURE — G8420 CALC BMI NORM PARAMETERS: HCPCS | Performed by: NURSE PRACTITIONER

## 2020-01-27 RX ORDER — POTASSIUM CHLORIDE 750 MG/1
10 CAPSULE, EXTENDED RELEASE ORAL DAILY
COMMUNITY
End: 2021-07-21 | Stop reason: SDUPTHER

## 2020-01-27 ASSESSMENT — ENCOUNTER SYMPTOMS
NAUSEA: 0
VOMITING: 0
CHEST TIGHTNESS: 0
CONSTIPATION: 0
DIARRHEA: 0
ABDOMINAL PAIN: 0
PHOTOPHOBIA: 0
TROUBLE SWALLOWING: 0
EYE REDNESS: 0
COLOR CHANGE: 0
SHORTNESS OF BREATH: 0
SINUS PRESSURE: 0
VOICE CHANGE: 0
SORE THROAT: 0
BLOOD IN STOOL: 0
COUGH: 0
SINUS PAIN: 0
STRIDOR: 0
BACK PAIN: 1
EYE ITCHING: 0
CHOKING: 0
RHINORRHEA: 0
EYE DISCHARGE: 0
WHEEZING: 0
EYE PAIN: 0

## 2020-01-27 NOTE — PROGRESS NOTES
Chief Complaint   Patient presents with    Hip Pain     right hip     Shoulder Pain     right shoulder        /78   Pulse 60   Wt 114 lb (51.7 kg)   SpO2 97%   BMI 19.57 kg/m²     HPI:  Dahlia Verma is a 78 y.o. (: 1950) here today   for   Right Shoulder:    She does not have full ROM with her right arm. It is hard to lift above her head. She denies numbness and tingling. She denies weakness in her . The steroids did not help much per patient. We agreed she needs to be evaluated by an orthopedic physician. Right hip Pain:    She fell in the bathroom \"the other day\" her right leg gave out. She is having right hip pain shoots down her leg. If she stands the pain gets worse and her leg will go numb. After the fall she was \"layed up for a couple of days\" She took some Norco that she had from prior surgery and this helped some. She has tried sciatica stretches which did not help. Hip Pain    The incident occurred more than 1 week ago. There was no injury mechanism. The pain is present in the right leg and right hip. The quality of the pain is described as shooting and stabbing. The pain has been fluctuating since onset. Pertinent negatives include no inability to bear weight, loss of motion, muscle weakness, numbness or tingling. She reports no foreign bodies present. The symptoms are aggravated by movement and palpation. She has tried elevation, ice, acetaminophen, NSAIDs and rest for the symptoms. The treatment provided no relief. Shoulder Pain    The pain is present in the right shoulder. This is a recurrent problem. The current episode started more than 1 year ago. The problem occurs intermittently. The problem has been gradually worsening. The quality of the pain is described as dull and aching. The pain is moderate. Associated symptoms include a limited range of motion.  Pertinent negatives include no fever, inability to bear weight, itching, joint locking, joint swelling, numbness, stiffness or tingling. The symptoms are aggravated by activity. She has tried NSAIDS, rest, OTC pain meds, heat and movement for the symptoms. The treatment provided mild relief. There is no history of diabetes or gout. Patient's medications, allergies, past medical, surgical, social and family histories were reviewed and updated asappropriate. ROS:  Review of Systems   Constitutional: Negative for activity change, appetite change, chills, diaphoresis, fatigue, fever and unexpected weight change. HENT: Negative for congestion, ear discharge, ear pain, hearing loss, nosebleeds, postnasal drip, rhinorrhea, sinus pressure, sinus pain, sneezing, sore throat, tinnitus, trouble swallowing and voice change. Eyes: Negative for photophobia, pain, discharge, redness and itching. Respiratory: Negative for cough, choking, chest tightness, shortness of breath, wheezing and stridor. Cardiovascular: Negative for chest pain, palpitations and leg swelling. Gastrointestinal: Negative for abdominal pain, blood in stool, constipation, diarrhea, nausea and vomiting. Endocrine: Negative for cold intolerance, heat intolerance, polydipsia and polyuria. Genitourinary: Negative for difficulty urinating, dysuria, enuresis, flank pain, frequency, hematuria and urgency. Musculoskeletal: Positive for arthralgias, back pain and gait problem. Negative for gout, joint swelling, neck pain, neck stiffness and stiffness. Skin: Negative for color change, itching, pallor, rash and wound. Allergic/Immunologic: Negative for environmental allergies and food allergies. Neurological: Negative for dizziness, tingling, tremors, syncope, speech difficulty, weakness, light-headedness, numbness and headaches. Hematological: Negative for adenopathy. Does not bruise/bleed easily.    Psychiatric/Behavioral: Negative for agitation, behavioral problems, confusion, decreased concentration, dysphoric mood, hallucinations, self-injury, sleep disturbance and suicidal ideas. The patient is not nervous/anxious and is not hyperactive. Prior to Visit Medications    Medication Sig Taking? Authorizing Provider   potassium chloride (MICRO-K) 10 MEQ extended release capsule Take 10 mEq by mouth daily Yes Historical Provider, MD   ezetimibe (ZETIA) 10 MG tablet TAKE 1 TABLET BY MOUTH DAILY. Yes Colt Post, APRN - CNP   metoprolol succinate (TOPROL XL) 25 MG extended release tablet Take 0.5 tablets by mouth daily  Patient taking differently: Take 25 mg by mouth daily  Yes Colt Post, APRN - CNP   ALPRAZolam Ulysess Miracle) 0.25 MG tablet Take 1 tablet by mouth nightly as needed for Sleep or Anxiety for up to 60 days. Yes Colt Post, APRN - CNP   VORTIoxetine (TRINTELLIX) 10 MG TABS tablet TAKE 1 TABLET BY MOUTH EVERY DAY Yes Colt Post, APRN - CNP   omeprazole (PRILOSEC) 20 MG delayed release capsule Take 1 capsule by mouth 2 times daily (before meals) Yes Colt Post, APRN - CNP   Lactobacillus (PROBIOTIC ACIDOPHILUS PO) Take 1 tablet by mouth daily  Yes Historical Provider, MD   dicyclomine (BENTYL) 10 MG capsule TAKE ONE CAPSULE BY MOUTH FOUR TIMES DAILY AS NEEDED Yes Estuardo Chahal MD   fluticasone (FLONASE) 50 MCG/ACT nasal spray 2 sprays by Nasal route daily Yes Colt Post, APRN - CNP   tiotropium (SPIRIVA HANDIHALER) 18 MCG inhalation capsule Inhale 1 capsule into the lungs daily Yes Colt Post, APRN - CNP   omeprazole (PRILOSEC) 10 MG delayed release capsule Take 10 mg by mouth daily Yes Historical Provider, MD   albuterol sulfate HFA (VENTOLIN HFA) 108 (90 Base) MCG/ACT inhaler Inhale 2 puffs into the lungs 2 times daily as needed for Wheezing or Shortness of Breath Yes Estuardo Chahal MD   aspirin EC 81 MG EC tablet Take 1 tablet by mouth daily.  Yes Pinky Awan MD   fluticasone-vilanterol (BREO ELLIPTA) 100-25 MCG/INH AEPB inhaler Inhale 1 puff into the lungs daily  Estuardo Chahal MD       Allergies Allergen Reactions    Iodides Hives and Itching    Nsaids     Sulfa Antibiotics Rash       OBJECTIVE:      BP Readings from Last 2 Encounters:   01/27/20 124/78   01/07/20 132/80       Wt Readings from Last 3 Encounters:   01/27/20 114 lb (51.7 kg)   01/07/20 110 lb (49.9 kg)   10/25/19 112 lb (50.8 kg)       Physical Exam  Vitals signs reviewed. Constitutional:       General: She is not in acute distress. Appearance: Normal appearance. She is well-developed. HENT:      Head: Normocephalic and atraumatic. Right Ear: Hearing, tympanic membrane, ear canal and external ear normal.      Left Ear: Hearing, tympanic membrane, ear canal and external ear normal.      Nose: Nose normal.      Right Sinus: No maxillary sinus tenderness or frontal sinus tenderness. Left Sinus: No maxillary sinus tenderness or frontal sinus tenderness. Mouth/Throat:      Pharynx: No oropharyngeal exudate. Eyes:      General:         Right eye: No discharge. Left eye: No discharge. Conjunctiva/sclera: Conjunctivae normal.      Pupils: Pupils are equal, round, and reactive to light. Neck:      Musculoskeletal: Normal range of motion. Thyroid: No thyromegaly. Vascular: No JVD. Trachea: No tracheal deviation. Cardiovascular:      Rate and Rhythm: Normal rate and regular rhythm. Heart sounds: Normal heart sounds. No murmur. No friction rub. Pulmonary:      Effort: Pulmonary effort is normal. No respiratory distress. Breath sounds: Normal breath sounds. No stridor. No decreased breath sounds, wheezing, rhonchi or rales. Musculoskeletal:         General: Tenderness present. No swelling, deformity or signs of injury. Right shoulder: She exhibits decreased range of motion, tenderness, pain and abnormal pulse. She exhibits no spasm and normal strength. Lumbar back: She exhibits tenderness and pain. She exhibits no swelling, no edema and normal pulse.         Back: Arms:       Right lower leg: No edema. Left lower leg: No edema. Lymphadenopathy:      Cervical: No cervical adenopathy. Skin:     General: Skin is warm and dry. Capillary Refill: Capillary refill takes less than 2 seconds. Findings: No rash. Neurological:      Mental Status: She is alert and oriented to person, place, and time. Sensory: Sensation is intact. Motor: Motor function is intact. Coordination: Coordination normal.   Psychiatric:         Attention and Perception: Attention and perception normal.         Mood and Affect: Mood normal.         Speech: Speech normal.         Behavior: Behavior normal. Behavior is cooperative. Thought Content: Thought content normal.         Cognition and Memory: Cognition normal.         Judgment: Judgment normal.       ASSESSMENT/PLAN:    1. Acute pain of right shoulder    - Lev Hayes [facility]  -Apply a compressive ACE bandage. Rest and elevate the affected painful area. Apply cold compresses intermittently as needed. As pain recedes, begin normal activities slowly as tolerated. Call if symptoms persist.    2. Right hip pain    - Lev [facility]    3. Acute midline low back pain with right-sided sciatica    - Lev Hayes [facility]    Follow up if symptoms do not improve or worsen. If the patient becomes short of breath go straight to the ER or call 911.

## 2020-02-13 ENCOUNTER — TELEPHONE (OUTPATIENT)
Dept: FAMILY MEDICINE CLINIC | Age: 70
End: 2020-02-13

## 2020-02-18 ENCOUNTER — OFFICE VISIT (OUTPATIENT)
Dept: ORTHOPEDIC SURGERY | Age: 70
End: 2020-02-18
Payer: MEDICARE

## 2020-02-18 VITALS
HEART RATE: 77 BPM | SYSTOLIC BLOOD PRESSURE: 109 MMHG | BODY MASS INDEX: 19.46 KG/M2 | HEIGHT: 64 IN | WEIGHT: 113.98 LBS | DIASTOLIC BLOOD PRESSURE: 70 MMHG

## 2020-02-18 PROCEDURE — G8427 DOCREV CUR MEDS BY ELIG CLIN: HCPCS | Performed by: PHYSICIAN ASSISTANT

## 2020-02-18 PROCEDURE — 99204 OFFICE O/P NEW MOD 45 MIN: CPT | Performed by: PHYSICIAN ASSISTANT

## 2020-02-18 PROCEDURE — 3017F COLORECTAL CA SCREEN DOC REV: CPT | Performed by: PHYSICIAN ASSISTANT

## 2020-02-18 PROCEDURE — 4040F PNEUMOC VAC/ADMIN/RCVD: CPT | Performed by: PHYSICIAN ASSISTANT

## 2020-02-18 PROCEDURE — 1123F ACP DISCUSS/DSCN MKR DOCD: CPT | Performed by: PHYSICIAN ASSISTANT

## 2020-02-18 PROCEDURE — G8400 PT W/DXA NO RESULTS DOC: HCPCS | Performed by: PHYSICIAN ASSISTANT

## 2020-02-18 PROCEDURE — G8420 CALC BMI NORM PARAMETERS: HCPCS | Performed by: PHYSICIAN ASSISTANT

## 2020-02-18 PROCEDURE — G8482 FLU IMMUNIZE ORDER/ADMIN: HCPCS | Performed by: PHYSICIAN ASSISTANT

## 2020-02-18 PROCEDURE — 1090F PRES/ABSN URINE INCON ASSESS: CPT | Performed by: PHYSICIAN ASSISTANT

## 2020-02-18 PROCEDURE — 1036F TOBACCO NON-USER: CPT | Performed by: PHYSICIAN ASSISTANT

## 2020-02-18 NOTE — PROGRESS NOTES
massage she was in the worst pain that she has been in since this started 3 to 4 months ago. She has been treated with oral steroids by her primary care provider which did not help with her pain. Pain Assessment  Location of Pain: Back(LSP)  Location Modifiers: Right, Posterior  Severity of Pain: 2  Quality of Pain: Aching, Other (Comment), Sharp(Burning; Pinching; Heavy sensation)  Duration of Pain: Persistent  Frequency of Pain: Constant  Aggravating Factors: Walking, Bending, Other (Comment)(Lifting; Pushing; Prolonged sitting)  Limiting Behavior: Yes  Relieving Factors: Rest  Result of Injury: No  Work-Related Injury: No  Are there other pain locations you wish to document?: No      Associated signs and symptoms:   Neurogenic bowel or bladder symptoms:  yes (incontience issues)    Perceived weakness:  yes   Difficulty walking:  yes    Recent Imaging (within past one year)   Xrays: no   MRI or CT of spine: no    Current/Past Treatment:   · Physical Therapy:  none  · Chiropractic:  yes  · Injection:  none  · Medications:   NSAIDS:  none   Muscle relaxer:  none   Steriods:  none   Neuropathic medications:  none   Opioids:  none  · Previous surgery:  no  · Previous surgical consult:  no  · Other:  · Infection control  · Tested positive for MRSA in past 12 months:  no  · Tested positive for MSSA \"staph infection\" in past 12 months: no  · Tested positive for VRE (Vancomycin Resistant Enterococci) in past 12 months:   no  · Currently on any antibiotics for an infection: no  · Anticoagulants:  · On a blood thinner:  yes ASA  · Any history of bleeding disorder: no   · MRI Contraindication: yes (pacemaker)   · Previous Pain Management: no   · Goal for treatment: Reduce pain level and functional ability   · How long can you stand?   10 minutes     Sit?   10 minutes      Walk?  10 minutes                  Past Medical History:   Past Medical History:   Diagnosis Date    Arthritis     COPD (chronic obstructive 5    tiotropium (SPIRIVA HANDIHALER) 18 MCG inhalation capsule, Inhale 1 capsule into the lungs daily, Disp: 30 capsule, Rfl: 5    omeprazole (PRILOSEC) 10 MG delayed release capsule, Take 10 mg by mouth daily, Disp: , Rfl:     albuterol sulfate HFA (VENTOLIN HFA) 108 (90 Base) MCG/ACT inhaler, Inhale 2 puffs into the lungs 2 times daily as needed for Wheezing or Shortness of Breath, Disp: 1 Inhaler, Rfl: 3    aspirin EC 81 MG EC tablet, Take 1 tablet by mouth daily. , Disp: 30 tablet, Rfl: 3    fluticasone-vilanterol (BREO ELLIPTA) 100-25 MCG/INH AEPB inhaler, Inhale 1 puff into the lungs daily, Disp: 1 each, Rfl: 5  Allergies: Iodides; Nsaids; and Sulfa antibiotics  Social History:    reports that she quit smoking about 9 months ago. Her smoking use included cigarettes. She has a 40.00 pack-year smoking history. She has never used smokeless tobacco. She reports that she does not drink alcohol or use drugs. Family History:   Family History   Problem Relation Age of Onset    Heart Disease Father     High Blood Pressure Father     Cancer Sister         uterine         REVIEW OF SYSTEMS: Full ROS reviewed & scanned from 2/18/2020           PHYSICAL EXAM:    Vitals: Blood pressure 109/70, pulse 77, height 5' 4.02\" (1.626 m), weight 113 lb 15.7 oz (51.7 kg), not currently breastfeeding. GENERAL EXAM:  · General Apparence: Patient is adequately groomed with no evidence of malnutrition. · Psychiatric: Orientation: The patient is oriented to time, place and person. The patient's mood and affect are appropriate   · Vascular: Examination reveals no swelling and palpation reveals no tenderness in upper or lower extremities. Good capillary refill.    · The lymphatic examination of the neck, axillae and groin reveals all areas to be without enlargement or induration   Sensation is intact without deficit in the upper and lower extremities to light touch and pinprick  · Coordination of the upper and lower extremities are normal.    CERVICAL EXAMINATION:  · Inspection: Local inspection shows no step-off or bruising. Cervical alignment is normal. No instability is noted. · Palpation and Percussion: No evidence of tenderness at the midline. Paraspinal tenderness is not present. There is no paraspinal spasm. · Range of Motion:  limited by 25% in all planes due to pain   · Strength: 5/5 bilateral upper extremities  · Special Tests:   Spurling's and Bran's are negative bilaterally. Stevens and Impingement tests are negative bilaterally. · Skin:There are no rashes, ulcerations or lesions. · Reflexes: Bilaterally triceps, biceps and brachioradialis are 2+. Clonus absent bilaterally at the feet. No pathological reflexes are noted. · Gait & station:  normal, patient ambulates without assistance and no ataxia  · Additional Examinations:  · RIGHT UPPER EXTREMITY:  Inspection/examination of the right upper extremity does not show any tenderness, deformity or injury. Range of motion is normal and pain-free. There is no gross instability. There are no rashes, ulcerations or lesions. Strength and tone are normal. No atrophy or abnormal movements are noted. · LEFT UPPER EXTREMITY: Inspection/examination of the left upper extremity does not show any tenderness, deformity or injury. Range of motion is normal and pain-free. There is no gross instability. There are no rashes, ulcerations or lesions. Strength and tone are normal. No atrophy or abnormal movements are noted. LUMBAR/SACRAL EXAMINATION:  · Inspection: Local inspection shows no step-off or bruising. Lumbar alignment is normal. No instability is noted. · Palpation:   No evidence of tenderness at the midline. Lumbar paraspinal tenderness Mild L4/5 and L5/S1 tenderness  Bursal tenderness No tenderness bilaterally  There is no paraspinal spasm.   · Range of Motion: limited by 50% in all planes due to pain  · Strength:   Strength testing is 5/5 in all muscle groups Basophils % 0.6 0 - 1 %    Immature Granulocytes % 0.5 0.0 - 0.5 %    GRANULOCYTE ABSOLUTE COUNT 4.9 1.8 - 7.2 x(10)3/uL    Lymphocytes Absolute 2.9 (H) 1.1 - 2.7 x(10)3/uL   Comprehensive Metabolic Panel   Result Value Ref Range    Sodium 146 136 - 146 mmol/L    Potassium 4.4 3.4 - 5.0 mmol/L    Chloride 105 99 - 111 mmol/L    CO2 33 21 - 33 mmol/L    BUN 15 5 - 19 mg/dL    CREATININE 0.9 0.6 - 1.3 mg/dL    POC Glucose 102 (H) 74 - 99 mg/dL    Calcium 9.5 8.4 - 10.2 mg/dL    Total Protein 7.1 6.3 - 8.0 g/dL    Alb 3.7 3.6 - 5.0 g/dL    Globulin 3.4 2.0 - 3.5 g/dL    Albumin/Globulin Ratio 1.1 1.0 - 2.5    Total Bilirubin 0.7 0 - 1.1 mg/dL    AST 21 12 - 36 U/L    ALT 19 12 - 78 U/L    Alkaline Phosphatase 81 46 - 116 U/L    Anion Gap 12.0 8 - 16 mmol/L    Gfr Calculated 62        Impression (Medical Decision Making):       1. Lumbar radiculopathy    2. Spondylosis without myelopathy or radiculopathy, lumbar region    3. DDD (degenerative disc disease), lumbar    4. Pain of lumbar spine        Plan (Medical Decision Making):    I discussed the diagnosis and the treatment options with Michael Wilkes today. In Summary:  The various treatment options were outlined and discussed with Michael Wilkes including:  Conservative care options: physical therapy, ice, medications, bracing, and activity modification. The indications for therapeutic injections. The indications for additional imaging/laboratory studies. The indications for (possible future) interventions. After considering the various options discussed, Rodriguez Chung elected to pursue a course of treatment that includes the followin. Medications: No further recommendations for new medications. 2. PT:  No PT or HEP at this time as this makes the pain worse in the lower back and down the right leg. 3. Further studies: Setup Lumbar MR without contrast to evaluate for soft tissue pathology or stenosis contributing to the back pain and paresthesia. The patient has failed a six week trial of a HEP program within the last 3 months. 4. Interventional:  After further imaging is obtained, interventional options will be reviewed and recommended. 5. Healthy Lifestyle Measures:  Patient education material reviewing the following was distributed to Jose Rodriguez  Anatomic drawings  Healthy lifestyle education  Osteoporosis prevention,   Back and neck pain educational information   Advanced imaging preparedness    Posture education   Proper lifting and carrying techniques,   Weight management  Quitting smoking and   Minor ways to treat back pain  For further information regarding the spine conditions and to review interventional treatments the patient was directed to [employer].    6.  Follow up:  1-2 weeks    Jose Rodriguez was instructed to call the office if her symptoms worsen or if new symptoms appear prior to the next scheduled visit. She is specifically instructed to contact the office between now & her scheduled appointment if she has concerns related to her condition or if she needs assistance in scheduling the above tests. She is welcome to call for an appointment sooner if she has any additional concerns or questions. I, Rachel Payne ATC, am scribing for and in the presence of Mikel Hamilton, [id number].  02/18/20 11:54 AM Rachel Mcguire. The physical examination was performed between the patient and MIRANDA Guardado. All counseling during the appointment was performed between the patient and the provider. Rex Hamilton PA-C, personally performed the services described in this documentation as scribed by Rachel Payne ATC in my presence and it is both accurate and complete.             JUAN R Gagnon PA-C  Board Certified by the [employer] on Certification of Physician Assistants  [id number]  Partner of [facility] ([facility])       This dictation was performed with a verbal recognition program (DRAGON) and it was checked for errors. It is possible that there are still dictated errors within this office note. If so, please bring any errors to my attention for an addendum. All efforts were made to ensure that this office note is accurate.

## 2020-02-25 ENCOUNTER — OFFICE VISIT (OUTPATIENT)
Dept: FAMILY MEDICINE CLINIC | Age: 70
End: 2020-02-25
Payer: MEDICARE

## 2020-02-25 VITALS
HEART RATE: 63 BPM | WEIGHT: 115.4 LBS | HEIGHT: 64 IN | BODY MASS INDEX: 19.7 KG/M2 | OXYGEN SATURATION: 97 % | DIASTOLIC BLOOD PRESSURE: 80 MMHG | SYSTOLIC BLOOD PRESSURE: 110 MMHG | RESPIRATION RATE: 14 BRPM

## 2020-02-25 PROCEDURE — 4040F PNEUMOC VAC/ADMIN/RCVD: CPT | Performed by: FAMILY MEDICINE

## 2020-02-25 PROCEDURE — 3017F COLORECTAL CA SCREEN DOC REV: CPT | Performed by: FAMILY MEDICINE

## 2020-02-25 PROCEDURE — G8482 FLU IMMUNIZE ORDER/ADMIN: HCPCS | Performed by: FAMILY MEDICINE

## 2020-02-25 PROCEDURE — 1123F ACP DISCUSS/DSCN MKR DOCD: CPT | Performed by: FAMILY MEDICINE

## 2020-02-25 PROCEDURE — G0439 PPPS, SUBSEQ VISIT: HCPCS | Performed by: FAMILY MEDICINE

## 2020-02-25 ASSESSMENT — PATIENT HEALTH QUESTIONNAIRE - PHQ9
SUM OF ALL RESPONSES TO PHQ QUESTIONS 1-9: 1
SUM OF ALL RESPONSES TO PHQ QUESTIONS 1-9: 1

## 2020-02-25 ASSESSMENT — LIFESTYLE VARIABLES: HOW OFTEN DO YOU HAVE A DRINK CONTAINING ALCOHOL: 0

## 2020-02-25 NOTE — PROGRESS NOTES
MCG inhalation capsule Inhale 1 capsule into the lungs daily Yes JAYLYN Estrada - CNP   omeprazole (PRILOSEC) 10 MG delayed release capsule Take 10 mg by mouth daily Yes Historical Provider, MD   albuterol sulfate HFA (VENTOLIN HFA) 108 (90 Base) MCG/ACT inhaler Inhale 2 puffs into the lungs 2 times daily as needed for Wheezing or Shortness of Breath Yes Kelly Lipscomb MD   aspirin EC 81 MG EC tablet Take 1 tablet by mouth daily.  Yes Ernestine Bagley MD   fluticasone-vilanterol (BREO ELLIPTA) 100-25 MCG/INH AEPB inhaler Inhale 1 puff into the lungs daily  Kelly Lipscomb MD       Past Medical History:   Diagnosis Date    Arthritis     COPD (chronic obstructive pulmonary disease) ([address]     Depression     Hyperlipidemia     Irritable bowel syndrome (IBS)        Past Surgical History:   Procedure Laterality Date    CARPAL TUNNEL RELEASE Right 07/13/2017    CATARACT REMOVAL WITH IMPLANT Left 12/23/2016    Phacoemulsification/ Posterior Chamber Intraocular Lens Implantation with dr Deisy Nascimento  2012    CHOLECYSTECTOMY, LAPAROSCOPIC  5/25/2011    COLONOSCOPY  7/28/2014    CORONARY ANGIOPLASTY      EYE SURGERY Right 03/09/2017    catarct removal with lens implant    HERNIA REPAIR  08/09/2016    open ventral incisional    HYSTERECTOMY      OTHER SURGICAL HISTORY  08/09/2016    OPEN VENTRAL INCISIONAL HERNIA REPAIR     PAC[facility]AC[facility]  [facility]/25/2019    [facility]    UPPER GASTROINTESTINAL ENDOSCOPY         Family History   Problem Relation Age of Onset    Heart Disease Father     High Blood Pressure Father     Cancer Sister         uterine       CareTeam (Including outside providers/suppliers regularly involved in providing care):   Patient Care Team:  Kelly Lipscomb MD as PCP - General  Kelly Lipscomb MD as PCP - [facility] Empaneled Provider  Jose Jolly MD as Consulting Physician (Physical Medicine and Rehab)  Cy Hernández PA-C (Physician Assistant)  Rea Quinones RN as Nurse Navigator    Wt Readings from Last 3 Encounters:   02/25/20 115 lb 6.4 oz (52.3 kg)   02/18/20 113 lb 15.7 oz (51.7 kg)   01/27/20 114 lb (51.7 kg)     Vitals:    02/25/20 0927   BP: 110/80   Pulse: 63   Resp: 14   SpO2: 97%   Weight: 115 lb 6.4 oz (52.3 kg)   Height: 5' 4\" (1.626 m)     Body mass index is 19.81 kg/m². Based upon direct observation of the patient, evaluation of cognition reveals recent and remote memory intact. Patient's complete Health Risk Assessment and screening values have been reviewed and are found in Flowsheets. The following problems were reviewed today and where indicated follow up appointments were made and/or referrals ordered. Positive Risk Factor Screenings with Interventions:     Fall Risk:  Timed Up and Go Test > 12 seconds? (Complete if either Fall Risk answers are Yes): no  2 or more falls in past year?: (!) yes  Fall with injury in past year?: (!) yes  Fall Risk Interventions:    · Home safety tips provided    Health Habits/Nutrition:  Health Habits/Nutrition  Do you exercise for at least 20 minutes 2-3 times per week?: (!) No  Have you lost any weight without trying in the past 3 months?: No  Do you eat fewer than 2 meals per day?: No  Have you seen a dentist within the past year?: (!) No  Body mass index is 19.81 kg/m².   Health Habits/Nutrition Interventions:  · Inadequate physical activity:  educational materials provided to promote increased physical activity    Hearing/Vision:  No exam data present  Hearing/Vision  Do you or your family notice any trouble with your hearing?: (!) Yes  Do you have difficulty driving, watching TV, or doing any of your daily activities because of your eyesight?: No  Have you had an eye exam within the past year?: (!) No  Hearing/Vision Interventions:  · Hearing concerns:  patient declines any further evaluation/treatment for hearing issues  · Vision concerns:  patient encouraged to make appointment with his/her eye

## 2020-02-25 NOTE — PATIENT INSTRUCTIONS
Personalized Preventive Plan for Naun Lam - 2/25/2020  Medicare offers a range of preventive health benefits. Some of the tests and screenings are paid in full while other may be subject to a deductible, co-insurance, and/or copay. Some of these benefits include a comprehensive review of your medical history including lifestyle, illnesses that may run in your family, and various assessments and screenings as appropriate. After reviewing your medical record and screening and assessments performed today your provider may have ordered immunizations, labs, imaging, and/or referrals for you. A list of these orders (if applicable) as well as your Preventive Care list are included within your After Visit Summary for your review. Other Preventive Recommendations:    · A preventive eye exam performed by an eye specialist is recommended every 1-2 years to screen for glaucoma; cataracts, macular degeneration, and other eye disorders. · A preventive dental visit is recommended every 6 months. · Try to get at least 150 minutes of exercise per week or 10,000 steps per day on a pedometer . · Order or download the FREE \"Exercise & Physical Activity: Your Everyday Guide\" from [employer]. Call 7-419.586.7277 or search [employer] online. · You need 1475-2479 mg of calcium and 1058-2447 IU of vitamin D per day. It is possible to meet your calcium requirement with diet alone, but a vitamin D supplement is usually necessary to meet this goal.  · When exposed to the sun, use a sunscreen that protects against both UVA and UVB radiation with an SPF of 30 or greater. Reapply every 2 to 3 hours or after sweating, drying off with a towel, or swimming. · Always wear a seat belt when traveling in a car. Always wear a helmet when riding a bicycle or motorcycle. Heart-Healthy Diet   Sodium, Fat, and Cholesterol Controlled Diet       What Is a Heart Healthy Diet?    A heart-healthy diet is one that limits sodium , certain types of fat , and cholesterol . This type of diet is recommended for:   People with any form of cardiovascular disease (eg, coronary heart disease , peripheral vascular disease , previous heart attack , previous stroke )   People with risk factors for cardiovascular disease, such as high blood pressure , high cholesterol , or diabetes   Anyone who wants to lower their risk of developing cardiovascular disease   Sodium    Sodium is a mineral found in many foods. In general, most people consume much more sodium than they need. Diets high in sodium can increase blood pressure and lead to edema (water retention). On a heart-healthy diet, you should consume no more than 2,300 mg (milligrams) of sodium per dayabout the amount in one teaspoon of table salt. The foods highest in sodium include table salt (about 50% sodium), processed foods, convenience foods, and preserved foods. Cholesterol    Cholesterol is a fat-like, waxy substance in your blood. Our bodies make some cholesterol. It is also found in animal products, with the highest amounts in fatty meat, egg yolks, whole milk, cheese, shellfish, and organ meats. On a heart-healthy diet, you should limit your cholesterol intake to less than 200 mg per day. It is normal and important to have some cholesterol in your bloodstream. But too much cholesterol can cause plaque to build up within your arteries, which can eventually lead to a heart attack or stroke. The two types of cholesterol that are most commonly referred to are:   Low-density lipoprotein (LDL) cholesterol  Also known as bad cholesterol, this is the cholesterol that tends to build up along your arteries. Bad cholesterol levels are increased by eating fats that are saturated or hydrogenated. Optimal level of this cholesterol is less than 100. Over 130 starts to get risky for heart disease.    High-density lipoprotein (HDL) cholesterol  Also known as good cholesterol, this type of cholesterol actually carries cholesterol away from your arteries and may, therefore, help lower your risk of having a heart attack. You want this level to be high (ideally greater than 60). It is a risk to have a level less than 40. You can raise this good cholesterol by eating olive oil, canola oil, avocados, or nuts. Exercise raises this level, too. Fat    Fat is calorie dense and packs a lot of calories into a small amount of food. Even though fats should be limited due to their high calorie content, not all fats are bad. In fact, some fats are quite healthful. Fat can be broken down into four main types. The good-for-you fats are:   Monounsaturated fat  found in oils such as olive and canola, avocados, and nuts and natural nut butters; can decrease cholesterol levels, while keeping levels of HDL cholesterol high   Polyunsaturated fat  found in oils such as safflower, sunflower, soybean, corn, and sesame; can decrease total cholesterol and LDL cholesterol   Omega-3 fatty acids  particularly those found in fatty fish (such as salmon, trout, tuna, mackerel, herring, and sardines); can decrease risk of arrhythmias, decrease triglyceride levels, and slightly lower blood pressure   The fats that you want to limit are:   Saturated fat  found in animal products, many fast foods, and a few vegetables; increases total blood cholesterol, including LDL levels   Animal fats that are saturated include: butter, lard, whole-milk dairy products, meat fat, and poultry skin   Vegetable fats that are saturated include: hydrogenated shortening, palm oil, coconut oil, cocoa butter   Hydrogenated or trans fat  found in margarine and vegetable shortening, most shelf stable snack foods, and fried foods; increases LDL and decreases HDL     It is generally recommended that you limit your total fat for the day to less than 30% of your total calories.  If you follow an 1800-calorie heart healthy diet, for example, this would mean 60 grams of fat or less per day. Saturated fat and trans fat in your diet raises your blood cholesterol the most, much more than dietary cholesterol does. For this reason, on a heart-healthy diet, less than 7% of your calories should come from saturated fat and ideally 0% from trans fat. On an 1800-calorie diet, this translates into less than 14 grams of saturated fat per day, leaving 46 grams of fat to come from mono- and polyunsaturated fats.    Food Choices on a Heart Healthy Diet   Food Category   Foods Recommended   Foods to Avoid   Grains   Breads and rolls without salted tops Most dry and cooked cereals Unsalted crackers and breadsticks Low-sodium or homemade breadcrumbs or stuffing All rice and pastas   Breads, rolls, and crackers with salted tops High-fat baked goods (eg, muffins, donuts, pastries) Quick breads, self-rising flour, and biscuit mixes Regular bread crumbs Instant hot cereals Commercially prepared rice, pasta, or stuffing mixes   Vegetables   Most fresh, frozen, and low-sodium canned vegetables Low-sodium and salt-free vegetable juices Canned vegetables if unsalted or rinsed   Regular canned vegetables and juices, including sauerkraut and pickled vegetables Frozen vegetables with sauces Commercially prepared potato and vegetable mixes   Fruits   Most fresh, frozen, and canned fruits All fruit juices   Fruits processed with salt or sodium   Milk   Nonfat or low-fat (1%) milk Nonfat or low-fat yogurt Cottage cheese, low-fat ricotta, cheeses labeled as low-fat and low-sodium   Whole milk Reduced-fat (2%) milk Malted and chocolate milk Full fat yogurt Most cheeses (unless low-fat and low salt) Buttermilk (no more than 1 cup per week)   Meats and Beans   Lean cuts of fresh or frozen beef, veal, lamb, or pork (look for the word loin) Fresh or frozen poultry without the skin Fresh or frozen fish and some shellfish Egg whites and egg substitutes (Limit whole eggs to three per week) Tofu Nuts or seeds (unsalted, dry-roasted), low-sodium peanut butter Dried peas, beans, and lentils   Any smoked, cured, salted, or canned meat, fish, or poultry (including bo, chipped beef, cold cuts, hot dogs, sausages, sardines, and anchovies) Poultry skins Breaded and/or fried fish or meats Canned peas, beans, and lentils Salted nuts   Fats and Oils   Olive oil and canola oil Low-sodium, low-fat salad dressings and mayonnaise   Butter, margarine, coconut and palm oils, bo fat   Snacks, Sweets, and Condiments   Low-sodium or unsalted versions of broths, soups, soy sauce, and condiments Pepper, herbs, and spices; vinegar, lemon, or lime juice Low-fat frozen desserts (yogurt, sherbet, fruit bars) Sugar, cocoa powder, honey, syrup, jam, and preserves Low-fat, trans-fat free cookies, cakes, and pies Sudhir and animal crackers, fig bars, sahil snaps   High-fat desserts Broth, soups, gravies, and sauces, made from instant mixes or other high-sodium ingredients Salted snack foods Canned olives Meat tenderizers, seasoning salt, and most flavored vinegars   Beverages   Low-sodium carbonated beverages Tea and coffee in moderation Soy milk   Commercially softened water   Suggestions   Make whole grains, fruits, and vegetables the base of your diet. Choose heart-healthy fats such as canola, olive, and flaxseed oil, and foods high in heart-healthy fats, such as nuts, seeds, soybeans, tofu, and fish. Eat fish at least twice per week; the fish highest in omega-3 fatty acids and lowest in mercury include salmon, herring, mackerel, sardines, and canned chunk light tuna. If you eat fish less than twice per week or have high triglycerides, talk to your doctor about taking fish oil supplements. Read food labels.    For products low in fat and cholesterol, look for fat free, low-fat, cholesterol free, saturated fat free, and trans fat freeAlso scan the Nutrition Facts Label, which lists saturated fat, trans fat, and cholesterol amounts. For products low in sodium, look for sodium free, very low sodium, low sodium, no added salt, and unsalted   Skip the salt when cooking or at the table; if food needs more flavor, get creative and try out different herbs and spices. Garlic and onion also add substantial flavor to foods. Trim any visible fat off meat and poultry before cooking, and drain the fat off after whittaker. Use cooking methods that require little or no added fat, such as grilling, boiling, baking, poaching, broiling, roasting, steaming, stir-frying, and sauting. Avoid fast food and convenience food. They tend to be high in saturated and trans fat and have a lot of added salt. Talk to a registered dietitian for individualized diet advice. Last Reviewed: March 2011 Ricardo Solomon MS, MPH, RD   Updated: 3/29/2011   ·     Keep Your Memory Javier Vivas       Many factors can affect your ability to remembera hectic lifestyle, aging, stress, chronic disease, and certain medicines. But, there are steps you can take to sharpen your mind and help preserve your memory. Challenge Your Brain   Regularly challenging your mind may help keeps it in top shape. Good mental exercises include:   Crossword puzzlesUse a dictionary if you need it; you will learn more that way. Brainteasers Try some! Crafts, such as wood working and sewing   Hobbies, such as gardening and building model airplanes   SocializingVisit old friends or join groups to meet new ones. Reading   Learning a new language   Taking a class, whether it be art history or vaishali chi   TravelingExperience the food, history, and culture of your destination   Learning to use a computer   Going to museums, the theater, or thought-provoking movies   Changing things in your daily life, such as reversing your pattern in the grocery store or brushing your teeth using your nondominant hand   Use Memory Aids   There is no need to remember every detail on your own.  These memory aids can help:   Calendars and day planners   Electronic organizers to store all sorts of helpful informationThese devices can \"beep\" to remind you of appointments. A book of days to record birthdays, anniversaries, and other occasions that occur on the same date every year   Detailed \"to-do\" lists and strategically placed sticky notes   Quick \"study\" sessionsBefore a gathering, review who will be there so their names will be fresh in your mind. Establish routinesFor example, keep your keys, wallet, and umbrella in the same place all the time or take medicine with your 8:00 AM glass of juice   Live a Healthy Life   Many actions that will keep your body strong will do the same for your mind. For example:   Talk to Your Doctor About Herbs and Supplements    Malnutrition and vitamin deficiencies can impair your mental function. For example, vitamin B12 deficiency can cause a range of symptoms, including confusion. But, what if your nutritional needs are being met? Can herbs and supplements still offer a benefit? Researchers have investigated a range of natural remedies, such as ginkgo , ginseng , and the supplement phosphatidylserine (PS). So far, though, the evidence is inconsistent as to whether these products can improve memory or thinking. If you are interested in taking herbs and supplements, talk to your doctor first because they may interact with other medicines that you are taking. Exercise Regularly    Among the many benefits of regular exercise are increased blood flow to the brain and decreased risk of certain diseases that can interfere with memory function. One study found that even moderate exercise has a beneficial effect. Examples of \"moderate\" exercise include:   Playing 18 holes of golf once a week, without a cart   Playing tennis twice a week   Walking one mile per day   Manage Stress    It can be tough to remember what is important when your mind is cluttered.  Make time for relaxation. Choose activities that calm you down, and make it routine. Manage Chronic Conditions    Side effects of high blood pressure , diabetes, and heart disease can interfere with mental function. Many of the lifestyle steps discussed here can help manage these conditions. Strive to eat a healthy diet, exercise regularly, get stress under control, and follow your doctor's advice for your condition. Minimize Medications    Talk to your doctor about the medicines that you take. Some may be unnecessary. Also, healthy lifestyle habits may lower the need for certain drugs. Last Reviewed: April 2010 Gabriela Carrington MD   Updated: 4/13/2010   ·     [address]       As we get older, changes in balance, gait, strength, vision, hearing, and cognition make even the most youthful senior more prone to accidents. Falls are one of the leading health risks for older people. This increased risk of falling is related to:   Aging process (eg, decreased muscle strength, slowed reflexes)   Higher incidence of chronic health problems (eg, arthritis, diabetes) that may limit mobility, agility or sensory awareness   Side effects of medicine (eg, dizziness, blurred vision)especially medicines like prescription pain medicines and drugs used to treat mental health conditions   Depending on the brittleness of your bones, the consequences of a fall can be serious and long lasting. Home Life   Research by the [employer] of [employer] [facility]) shows that some home accidents among older adults can be prevented by making simple lifestyle changes and basic modifications and repairs to the home environment. Here are some lifestyle changes that experts recommend:   Have your hearing and vision checked regularly. Be sure to wear prescription glasses that are right for you. Speak to your doctor or pharmacist about the possible side effects of your medicines. A number of medicines can cause dizziness.    If you have problems with mobility, bathtub transfer benches allow you to slide safely into the tub. Raised toilet seats and toilet safety rails are helpful for those with knee or hip problems. In the [facility]    Make sure you use a nightlight and that the area around your bed is clear of potential obstacles. Be careful with electric blankets and never go to sleep with a heating pad, which can cause serious burns even if on a low setting. Use fire-resistant mattress covers and pillows, and NEVER smoke in bed. Keep a phone next to the bed that is programmed to dial 911 at the push of a button. If you have a chronic condition, you may want to sign on with an automatic call-in service. Typically the system includes a small pendant that connects directly to an emergency medical voice-response system. You should also make arrangements to stay in contact with someonefriend, neighbor, family memberon a regular schedule. Fire Prevention   According to the [employer]. (Smoke Alarms for Every) [address] [place] [address], senior citizens are one of the two highest risk groups for death and serious injuries due to residential fires. When cooking, wear short-sleeved items, never a bulky long-sleeved robe. The [facility]'s Safety Checklist for Older Consumers emphasizes the importance of checking basements, garages, workshops and storage areas for fire hazards, such as volatile liquids, piles of old rags or clothing and overloaded circuits. Never smoke in bed or when lying down on a couch or recliner chair. Small portable electric or kerosene heaters are responsible for many home fires and should be used cautiously if at all. If you do use one, be sure to keep them away from flammable materials. In case of fire, make sure you have a pre-established emergency exit plan. Have a professional check your fireplace and other fuel-burning appliances yearly.     Helping Hands   Baby boomers entering the henson years will continue to see the development of new products to help older adults live safely and independently in spite of age-related changes. Making Life More Livable  , by Jia Hu[employer], lists over 1,000 products for \"living well in the mature years,\" such as bathing and mobility aids, household security devices, ergonomically designed knives and peelers, and faucet valves and knobs for temperature control. Medical supply stores and organizations are good sources of information about products that improve your quality of life and insure your safety.      Last Reviewed: November 2009 Mendel Bloodgood, MD   Updated: 3/7/2011     ·

## 2020-03-03 ENCOUNTER — OFFICE VISIT (OUTPATIENT)
Dept: ORTHOPEDIC SURGERY | Age: 70
End: 2020-03-03
Payer: MEDICARE

## 2020-03-03 VITALS
WEIGHT: 115.3 LBS | DIASTOLIC BLOOD PRESSURE: 70 MMHG | HEIGHT: 64 IN | SYSTOLIC BLOOD PRESSURE: 139 MMHG | BODY MASS INDEX: 19.68 KG/M2 | HEART RATE: 83 BPM

## 2020-03-03 PROCEDURE — 1123F ACP DISCUSS/DSCN MKR DOCD: CPT | Performed by: PHYSICIAN ASSISTANT

## 2020-03-03 PROCEDURE — G8400 PT W/DXA NO RESULTS DOC: HCPCS | Performed by: PHYSICIAN ASSISTANT

## 2020-03-03 PROCEDURE — 1090F PRES/ABSN URINE INCON ASSESS: CPT | Performed by: PHYSICIAN ASSISTANT

## 2020-03-03 PROCEDURE — G8420 CALC BMI NORM PARAMETERS: HCPCS | Performed by: PHYSICIAN ASSISTANT

## 2020-03-03 PROCEDURE — 99214 OFFICE O/P EST MOD 30 MIN: CPT | Performed by: PHYSICIAN ASSISTANT

## 2020-03-03 PROCEDURE — G8427 DOCREV CUR MEDS BY ELIG CLIN: HCPCS | Performed by: PHYSICIAN ASSISTANT

## 2020-03-03 PROCEDURE — 4040F PNEUMOC VAC/ADMIN/RCVD: CPT | Performed by: PHYSICIAN ASSISTANT

## 2020-03-03 PROCEDURE — 1036F TOBACCO NON-USER: CPT | Performed by: PHYSICIAN ASSISTANT

## 2020-03-03 PROCEDURE — 3017F COLORECTAL CA SCREEN DOC REV: CPT | Performed by: PHYSICIAN ASSISTANT

## 2020-03-03 PROCEDURE — G8482 FLU IMMUNIZE ORDER/ADMIN: HCPCS | Performed by: PHYSICIAN ASSISTANT

## 2020-03-03 NOTE — LETTER
Please schedule the following with:     Date:   03/10/20 @ 10:30     Account: [de-identified]  Patient: Nydia Tavarez    : 1950  Address:  [address]    Phone (H):  [phone] (home)      ----------------------------------------------------------------------------------------------  Diagnosis:     ICD-10-CM    1. Lumbar radiculopathy M54.16    2. Spondylosis without myelopathy or radiculopathy, lumbar region M47.816    3. DDD (degenerative disc disease), lumbar M51.36      Procedure: Transforaminal Epidural Steroid Injection     Levels: L4 and L5   Side: Right   CPT Codes 55456, 84372    ----------------------------------------------------------------------------------------------  Injection # 1  [email or web address]    Attending Physician       Keila Rivero.  Aurelia Roldan MD.  ---------------------------------------------------------------------------------------------  1st Insurance [facility]    Pre-Cert#    2nd Insurance [facility]   Pre-Cert#    Comments:    · Infection control  · Tested positive for MRSA in past 12 months:  no  · Tested positive for MSSA \"staph infection\" in past 12 months: no  · Tested positive for VRE (Vancomycin Resistant Enterococci) in past 12 months:   no  · Currently on any antibiotics for an infection: no  · Anticoagulants:  · On a blood thinner:  yes , ASA  · Any history of bleeding disorder: no   · Advanced Liver disease: no   · Advanced Renal disease: no   · Glaucoma: no   · Diabetes: no     Sedation:  Yes  -----------------------------------------------------------------------------------------------  Allergies   Allergen Reactions    Iodides Hives and Itching    Nsaids      Previous stomach bleed      Sulfa Antibiotics Rash

## 2020-03-03 NOTE — PROGRESS NOTES
Follow up: [facility]  1950  [id number]         Chief Complaint   Patient presents with    Follow-up     follow up on ct scan          HISTORY OF PRESENT ILLNESS:  Ms. Tierney Mercado is a 78 y.o. female returns for a follow up visit for multiple medical problems. Her current presenting problems are   1. Lumbar radiculopathy    2. Spondylosis without myelopathy or radiculopathy, lumbar region    3. DDD (degenerative disc disease), lumbar    . As per information/history obtained from the PADT(patient assessment and documentation tool) - She complains of pain in the lower back with radiation to the upper leg Right and lower leg Right She rates the pain 2/10 and describes it as aching, burning, numbness, tingling. Pain is made worse by: movement, walking, sitting. She denies side effects from the current pain regimen. Patient reports that since the last follow up visit the physical functioning is worse, family/social relationships are worse, mood is worse and sleep patterns are worse, and that the overall functioning is worse. Patient denies neurological bowel or bladder. The patient presents today in follow-up after a CT scan of the lumbar spine was completed on 2/21/2020. The patient continues to have lower back and more specifically right leg pain radiating from the lower back to the ankle. The patient describes that her pain today is a 2/10 in severity however her pain has been worsening whenever she does any activity around the house her pain will increase to a 6-7/10 in severity. She also has some mild weakness in the leg and her leg will sometimes feel like it is going to give out. She has not fallen yet from this however she does sometimes feel that the pain will cause her to fall. Patient is present today in the office with her daughter.       Associated signs and symptoms:   Neurogenic bowel or bladder symptoms:  no   Perceived weakness:  yes   Difficulty walking:  yes              Past Medical History:   Past Medical History:   Diagnosis Date    Arthritis     COPD [facility]chronic obstructive pulmonary disease) ([facility] [address]     Depression     Hyperlipidemia     Irritable bowel syndrome (IBS)       Past Surgical History:     Past Surgical History:   Procedure Laterality Date    CARPAL TUNNEL RELEASE Right 07/13/2017    CATARACT REMOVAL WITH IMPLANT Left 12/23/2016    Phacoemulsification/ Posterior Chamber Intraocular Lens Implantation with dr Karrie Orozco  2012    CHOLECYSTECTOMY, LAPAROSCOPIC  5/25/2011    COLONOSCOPY  7/28/2014    CORONARY ANGIOPLASTY      EYE SURGERY Right 03/09/2017    catarct removal with lens implant    HERNIA REPAIR  08/09/2016    open ventral incisional    HYSTERECTOMY      OTHER SURGICAL HISTORY  08/09/2016    OPEN VENTRAL INCISIONAL HERNIA REPAIR     [facility]    UPPER GASTROINTESTINAL ENDOSCOPY       Current Medications:     Current Outpatient Medications:     WIXELA INHUB 100-50 MCG/DOSE diskus inhaler, , Disp: , Rfl:     potassium chloride (MICRO-K) 10 MEQ extended release capsule, Take 10 mEq by mouth daily, Disp: , Rfl:     ezetimibe (ZETIA) 10 MG tablet, TAKE 1 TABLET BY MOUTH DAILY. , Disp: 30 tablet, Rfl: 5    metoprolol succinate (TOPROL XL) 25 MG extended release tablet, Take 0.5 tablets by mouth daily (Patient taking differently: Take 25 mg by mouth daily ), Disp: 30 tablet, Rfl: 5    ALPRAZolam (XANAX) 0.25 MG tablet, Take 1 tablet by mouth nightly as needed for Sleep or Anxiety for up to 60 days. , Disp: 15 tablet, Rfl: 0    VORTIoxetine (TRINTELLIX) 10 MG TABS tablet, TAKE 1 TABLET BY MOUTH EVERY DAY, Disp: 30 tablet, Rfl: 5    omeprazole (PRILOSEC) 20 MG delayed release capsule, Take 1 capsule by mouth 2 times daily (before meals), Disp: 180 capsule, Rfl: 1    Lactobacillus (PROBIOTIC ACIDOPHILUS PO), Take 1 tablet by mouth daily , Disp: , Rfl:     dicyclomine (BENTYL) 10 MG capsule, TAKE ONE CAPSULE Clonus absent bilaterally at the feet. · Gait & station: antalgic on the right and no ataxia  · Additional Examinations:  · RIGHT LOWER EXTREMITY: Inspection/examination of the right lower extremity does not show any tenderness, deformity or injury. Range of motion is normal and pain-free. There is no gross instability. There are no rashes, ulcerations or lesions. Strength and tone are normal. No atrophy or abnormal movements are noted. · LEFT LOWER EXTREMITY:  Inspection/examination of the left lower extremity does not show any tenderness, deformity or injury. Range of motion is normal and pain-free. There is no gross instability. There are no rashes, ulcerations or lesions. Strength and tone are normal. No atrophy or abnormal movements are noted.       Diagnostic Testing:    CT of the Lumbar Spine from 2/21/20:   FINDINGS:   BONES/ALIGNMENT: There is a stable mild chronic levoscoliosis of the lumbar   spine.  There is no acute fracture or subluxation.  The vertebral bodies are       normal in height and alignment.  The posterior elements are intact and   aligned.  No destructive osseous lesion is seen.  Mild multilevel Schmorl's   node deformities are evident.       DEGENERATIVE CHANGES: There is mild multilevel anterior endplate spondylosis       at the L3 through L5 levels.  Multilevel degenerative changes are as   follows:       At T12-L1, the disc space is stable and unremarkable.       At L1-L2, the disc space appears stable and unremarkable.       At L2-L3, there remains a stable mild diffuse disc bulge causing mild thecal   Legally authenticated by Karla Monaco 2020-02-22 19:00:49               Patient Name: Enrique Saint       sac compression though no central canal or neural foraminal stenosis.  This   is unchanged from the study of 07/11/2017.       At L3-L4, there remains a mild diffuse disc bulge causing mild thecal sac   compression though no central canal stenosis.  There is stable mild bilateral   neural foraminal stenosis at this level.  These findings are unchanged from   the prior study.       At L4-L5, there remains a stable mild-to-moderate diffuse disc bulge which   combines with mild bilateral ligamentum flavum hypertrophy to form mild   thecal sac compression though no significant central canal stenosis. However, there is resultant mild bilateral neural foraminal stenosis at this       level as well as mild asymmetric left lateral recess stenosis with the disc   mildly encroaching upon the left exiting L5 nerve root, similar to the prior       study of 07/11/2017.       At L5-S1, there remains a stable minimal diffuse disc bulge without thecal   sac compression, central canal stenosis, or neural foraminal stenosis.       SOFT TISSUES/RETROPERITONEUM: The visualized portions of the lung bases are   clear.  Mildly dilated bilateral extrarenal pelves are again noted. Louis Hoose   is   a somewhat nonspecific 2.2 cm cystic structure within the right adnexal   space.  The patient is status post cholecystectomy.  The remainder of the   visualized abdominopelvic soft tissue structures are grossly unremarkable.       IMPRESSION:   1. Chronic mild levoscoliosis of the lumbar spine without acute fracture or   subluxation. 2. Mild multilevel lumbar degenerative disc disease, as fully detailed   above,   similar to the prior study of 07/11/2017. Vidhya Whitaker most notable level is at   L4-L5, whereby a mild-to-moderate diffuse disc bulge combines with bilateral       ligamentum flavum hypertrophy to form bilateral neural foraminal stenosis as       well as asymmetric left lateral recess stenosis and mild encroachment of the       disc at this level upon the left exiting L5 nerve root. 3. Nonspecific 2.2 cm cystic structure within the right adnexal space. Recommend further characterization with a prompt follow-up pelvic   ultrasound. See below.      Results for orders placed or performed in visit on 10/25/19   CBC Auto Differential   Result Value Ref Range    WBC 8.8 4.5 - 10.3 x1000    RBC 5.04 3.72 - 5.31 x1000000    Hemoglobin 15.1 11.9 - 15.9 g/dL    Hematocrit 46.7 35.7 - 46.7 %    MCV 92.7 82.9 - 99.9 fl    MCH 30.0 28.1 - 33.6 pg    MCHC 32.3 (L) 32.8 - 34.7 g/dL    Erythrocyte Distribution Width RBC Ratio 13.5 11.6 - 14.8 %    Platelets 812 391 - 204 x1000    Neutrophils/100 leukocytes 56.3 40 - 70 %    Lymphocytes % 32.6 15 - 45 %    Monocytes % 7.6 0 - 12 %    Eosinophils % 2.4 0 - 5 %    Basophils % 0.6 0 - 1 %    Immature Granulocytes % 0.5 0.0 - 0.5 %    GRANULOCYTE ABSOLUTE COUNT 4.9 1.8 - 7.2 x(10)3/uL    Lymphocytes Absolute 2.9 (H) 1.1 - 2.7 x(10)3/uL   Comprehensive Metabolic Panel   Result Value Ref Range    Sodium 146 136 - 146 mmol/L    Potassium 4.4 3.4 - 5.0 mmol/L    Chloride 105 99 - 111 mmol/L    CO2 33 21 - 33 mmol/L    BUN 15 5 - 19 mg/dL    CREATININE 0.9 0.6 - 1.3 mg/dL    POC Glucose 102 (H) 74 - 99 mg/dL    Calcium 9.5 8.4 - 10.2 mg/dL    Total Protein 7.1 6.3 - 8.0 g/dL    Alb 3.7 3.6 - 5.0 g/dL    Globulin 3.4 2.0 - 3.5 g/dL    Albumin/Globulin Ratio 1.1 1.0 - 2.5    Total Bilirubin 0.7 0 - 1.1 mg/dL    AST 21 12 - 36 U/L    ALT 19 12 - 78 U/L    Alkaline Phosphatase 81 46 - 116 U/L    Anion Gap 12.0 8 - 16 mmol/L    Gfr Calculated 62      Impression:       1. Lumbar radiculopathy    2. Spondylosis without myelopathy or radiculopathy, lumbar region    3. DDD (degenerative disc disease), lumbar        Plan:  Clinical Course: Above diagnoses are worsening    I discussed the diagnosis and the treatment options with Cy Pulliam today. In Summary:  The various treatment options were outlined and discussed with Cy Pulliam including:  Conservative care options: physical therapy, ice, medications, bracing, and activity modification. The indications for therapeutic injections. The indications for additional imaging/laboratory studies.   The indications for (possible future)

## 2020-03-09 ENCOUNTER — TELEPHONE (OUTPATIENT)
Dept: ORTHOPEDIC SURGERY | Age: 70
End: 2020-03-09

## 2020-03-10 ENCOUNTER — HOSPITAL ENCOUNTER (OUTPATIENT)
Age: 70
Setting detail: OUTPATIENT SURGERY
Discharge: HOME | End: 2020-03-10
Attending: PHYSICAL MEDICINE & REHABILITATION | Admitting: PHYSICAL MEDICINE & REHABILITATION
Payer: MEDICARE

## 2020-03-10 VITALS
SYSTOLIC BLOOD PRESSURE: 146 MMHG | BODY MASS INDEX: 18.78 KG/M2 | DIASTOLIC BLOOD PRESSURE: 76 MMHG | RESPIRATION RATE: 16 BRPM | HEART RATE: 60 BPM | TEMPERATURE: 97.5 F | HEIGHT: 64 IN | OXYGEN SATURATION: 97 % | WEIGHT: 110 LBS

## 2020-03-10 PROCEDURE — 2500000003 HC RX 250 WO HCPCS: Performed by: PHYSICAL MEDICINE & REHABILITATION

## 2020-03-10 PROCEDURE — 3600000002 HC SURGERY LEVEL 2 BASE: Performed by: PHYSICAL MEDICINE & REHABILITATION

## 2020-03-10 PROCEDURE — 7100000011 HC PHASE II RECOVERY - ADDTL 15 MIN: Performed by: PHYSICAL MEDICINE & REHABILITATION

## 2020-03-10 PROCEDURE — 99152 MOD SED SAME PHYS/QHP 5/>YRS: CPT | Performed by: PHYSICAL MEDICINE & REHABILITATION

## 2020-03-10 PROCEDURE — 2709999900 HC NON-CHARGEABLE SUPPLY: Performed by: PHYSICAL MEDICINE & REHABILITATION

## 2020-03-10 PROCEDURE — 6360000002 HC RX W HCPCS: Performed by: PHYSICAL MEDICINE & REHABILITATION

## 2020-03-10 PROCEDURE — 2580000003 HC RX 258: Performed by: PHYSICAL MEDICINE & REHABILITATION

## 2020-03-10 PROCEDURE — 7100000010 HC PHASE II RECOVERY - FIRST 15 MIN: Performed by: PHYSICAL MEDICINE & REHABILITATION

## 2020-03-10 RX ORDER — SODIUM CHLORIDE, SODIUM LACTATE, POTASSIUM CHLORIDE, CALCIUM CHLORIDE 600; 310; 30; 20 MG/100ML; MG/100ML; MG/100ML; MG/100ML
INJECTION, SOLUTION INTRAVENOUS CONTINUOUS
Status: DISCONTINUED | OUTPATIENT
Start: 2020-03-10 | End: 2020-03-10 | Stop reason: HOSPADM

## 2020-03-10 RX ORDER — MIDAZOLAM HYDROCHLORIDE 1 MG/ML
INJECTION INTRAMUSCULAR; INTRAVENOUS PRN
Status: DISCONTINUED | OUTPATIENT
Start: 2020-03-10 | End: 2020-03-10 | Stop reason: ALTCHOICE

## 2020-03-10 RX ADMIN — LIDOCAINE HYDROCHLORIDE 0.1 ML: 10 INJECTION, SOLUTION EPIDURAL; INFILTRATION; INTRACAUDAL; PERINEURAL at 10:56

## 2020-03-10 RX ADMIN — SODIUM CHLORIDE, POTASSIUM CHLORIDE, SODIUM LACTATE AND CALCIUM CHLORIDE: 600; 310; 30; 20 INJECTION, SOLUTION INTRAVENOUS at 10:56

## 2020-03-10 ASSESSMENT — PAIN - FUNCTIONAL ASSESSMENT
PAIN_FUNCTIONAL_ASSESSMENT: PREVENTS OR INTERFERES SOME ACTIVE ACTIVITIES AND ADLS
PAIN_FUNCTIONAL_ASSESSMENT: 0-10

## 2020-03-10 ASSESSMENT — PAIN DESCRIPTION - DESCRIPTORS: DESCRIPTORS: ACHING;STABBING

## 2020-03-10 NOTE — H&P
HISTORY AND PHYSICAL/PRE-SEDATION ASSESSMENT    Patient:  Penny Caldwell   :  1950  Medical Record No.:  [id number]   Date:  3/10/2020  Physician:  Юлия Gacria M.D. Facility: [facility]    HISTORY OF PRESENT ILLNESS:                 The patient is a 78 y.o. female whom presents with lower back and right leg pain. Review of the imaging and physical exam of the patient confirmed the pre-procedure diagnosis. After a thorough discussion of risks, benefits and alternatives informed consent was obtained. Past Medical History:   Past Medical History:   Diagnosis Date    Arthritis     [facility] disease) ([facility] [address]     Depression     Hyperlipidemia     Irritable bowel syndrome (IBS)       Past Surgical History:     Past Surgical History:   Procedure Laterality Date    CARPAL TUNNEL RELEASE Right 2017    CATARACT REMOVAL WITH IMPLANT Left 2016    Phacoemulsification/ Posterior Chamber Intraocular Lens Implantation with dr Delwin Fothergill      CHOLECYSTECTOMY, LAPAROSCOPIC  2011    COLONOSCOPY  2014    CORONARY ANGIOPLASTY      EYE SURGERY Right 2017    catarct removal with lens implant    HERNIA REPAIR  2016    open ventral incisional    HYSTERECTOMY      OTHER SURGICAL HISTORY  2016    OPEN VENTRAL INCISIONAL HERNIA REPAIR     PACEMAKER PLACEMENT  2019    [address]    UPPER GASTROINTESTINAL ENDOSCOPY       Current Medications:   Prior to Admission medications    Medication Sig Start Date End Date Taking? Authorizing Provider   Tawanna Moreno 100-50 MCG/DOSE diskus inhaler  2/3/20  Yes Historical Provider, MD   potassium chloride (MICRO-K) 10 MEQ extended release capsule Take 10 mEq by mouth daily   Yes Historical Provider, MD   ezetimibe (ZETIA) 10 MG tablet TAKE 1 TABLET BY MOUTH DAILY.  20  Yes JAYLYN Larry CNP   metoprolol succinate (TOPROL XL) 25 MG extended release tablet Take 0.5 tablets by mouth daily  Patient taking differently: Take 25 mg by mouth daily  1/7/20  Yes JAYLYN Larry CNP   VORTIoxetine (TRINTELLIX) 10 MG TABS tablet TAKE 1 TABLET BY MOUTH EVERY DAY 1/7/20  Yes JAYLYN Larry CNP   omeprazole (PRILOSEC) 20 MG delayed release capsule Take 1 capsule by mouth 2 times daily (before meals) 12/3/19  Yes JAYLYN Larry CNP   Lactobacillus (PROBIOTIC ACIDOPHILUS PO) Take 1 tablet by mouth daily    Yes Historical Provider, MD   dicyclomine (BENTYL) 10 MG capsule TAKE ONE CAPSULE BY MOUTH FOUR TIMES DAILY AS NEEDED 7/15/19  Yes Keyonna Harris MD   fluticasone [facility]) 50 MCG/ACT nasal spray 2 sprays by Nasal route daily 1/8/19  Yes JAYLYN Larry CNP   tiotropium (Fredia Captain) 18 MCG inhalation capsule Inhale 1 capsule into the lungs daily 1/8/19  Yes JAYLYN Larry CNP   albuterol sulfate HFA (VENTOLIN HFA) 108 (90 Base) MCG/ACT inhaler Inhale 2 puffs into the lungs 2 times daily as needed for Wheezing or Shortness of Breath 12/20/17  Yes Keyonna Harris MD   fluticasone-vilanterol (BREO ELLIPTA) 100-25 MCG/INH AEPB inhaler Inhale 1 puff into the lungs daily 10/10/19 1/7/20  Keyonna Harris MD   omeprazole (PRILOSEC) 10 MG delayed release capsule Take 10 mg by mouth daily    Historical Provider, MD   aspirin EC 81 MG EC tablet Take 1 tablet by mouth daily. 3/17/15   Chevy Stover MD     Allergies: Iodides; Nsaids; and Sulfa antibiotics  Social History:    reports that she has quit smoking. Her smoking use included cigarettes. She started smoking about 10 months ago. She has a 40.00 pack-year smoking history. She has never used smokeless tobacco. She reports that she does not drink alcohol or use drugs.   Family History:   Family History   Problem Relation Age of Onset    Heart Disease Father     High Blood Pressure Father     Cancer Sister         uterine       Vitals: Blood pressure (!) 158/99, pulse 58, temperature 97.5 °F (36.4 °C), temperature source Temporal, resp. rate 18, height 5' 4\" (1.626 m), weight 110 lb (49.9 kg), SpO2 97 %, not currently breastfeeding. PHYSICAL EXAM:  HENT: Airway patent and reviewed  Cardiovascular: Normal rate, regular rhythm, normal heart sounds. Pulmonary/Chest: No wheezes. No rhonchi. No rales. Abdominal: Soft. Bowel sounds are normal. No distension. Extremities: Moves all extremities equally  Lumbar Spine: Painful range of motion, no midline tenderness       Diagnosis:Lumbar radiculopathy    Plan: Proceed with planned procedure      ASA CLASS:         []   I. Normal, healthy adult           [x]   II.  Mild systemic disease            []   III. Severe systemic disease      Mallampati: Mallampati Class II - (soft palate, fauces & uvula are visible)      Sedation plan:   [x]  Local              []  Minimal                  []  General anesthesia    Patient's condition acceptable for planned procedure/sedation. Post Procedure Plan   Return to same level of care   ______________________     The risks and benefits as well as alternatives to the procedure have been discussed with the patient and or family. The patient and or next of kin understands and agrees to proceed.     Alyssa Olmos M.D.

## 2020-04-29 NOTE — TELEPHONE ENCOUNTER
LOV 2/25/2020 OZZY BUCHANAN with Dr Gilbert Reed 3/2/2021 for [facility] SANDYV

## 2020-05-15 ENCOUNTER — NURSE TRIAGE (OUTPATIENT)
Dept: OTHER | Facility: CLINIC | Age: 70
End: 2020-05-15

## 2020-05-26 RX ORDER — OMEPRAZOLE 20 MG/1
20 CAPSULE, DELAYED RELEASE ORAL
Qty: 180 CAPSULE | Refills: 3 | Status: SHIPPED | OUTPATIENT
Start: 2020-05-26 | End: 2021-06-10 | Stop reason: SDUPTHER

## 2020-05-29 ENCOUNTER — TELEPHONE (OUTPATIENT)
Dept: ORTHOPEDIC SURGERY | Age: 70
End: 2020-05-29

## 2020-06-01 ENCOUNTER — TELEPHONE (OUTPATIENT)
Dept: ORTHOPEDIC SURGERY | Age: 70
End: 2020-06-01

## 2020-06-01 NOTE — TELEPHONE ENCOUNTER
Per protocol, patient has not been seen since undergoing maggie on 3/10/20. She did not have a follow up. She will need an office appt or vv prior to scheduling. Lm on v/m to call back to schedule ov or vv.

## 2020-06-02 ENCOUNTER — OFFICE VISIT (OUTPATIENT)
Dept: ORTHOPEDIC SURGERY | Age: 70
End: 2020-06-02
Payer: MEDICARE

## 2020-06-02 VITALS — WEIGHT: 110.01 LBS | HEIGHT: 64 IN | RESPIRATION RATE: 12 BRPM | BODY MASS INDEX: 18.78 KG/M2

## 2020-06-02 PROCEDURE — 99214 OFFICE O/P EST MOD 30 MIN: CPT | Performed by: PHYSICIAN ASSISTANT

## 2020-06-02 PROCEDURE — 1090F PRES/ABSN URINE INCON ASSESS: CPT | Performed by: PHYSICIAN ASSISTANT

## 2020-06-02 PROCEDURE — 1036F TOBACCO NON-USER: CPT | Performed by: PHYSICIAN ASSISTANT

## 2020-06-02 PROCEDURE — G8420 CALC BMI NORM PARAMETERS: HCPCS | Performed by: PHYSICIAN ASSISTANT

## 2020-06-02 PROCEDURE — 1123F ACP DISCUSS/DSCN MKR DOCD: CPT | Performed by: PHYSICIAN ASSISTANT

## 2020-06-02 PROCEDURE — 4040F PNEUMOC VAC/ADMIN/RCVD: CPT | Performed by: PHYSICIAN ASSISTANT

## 2020-06-02 PROCEDURE — 3017F COLORECTAL CA SCREEN DOC REV: CPT | Performed by: PHYSICIAN ASSISTANT

## 2020-06-02 PROCEDURE — G8400 PT W/DXA NO RESULTS DOC: HCPCS | Performed by: PHYSICIAN ASSISTANT

## 2020-06-02 PROCEDURE — G8427 DOCREV CUR MEDS BY ELIG CLIN: HCPCS | Performed by: PHYSICIAN ASSISTANT

## 2020-06-02 NOTE — PROGRESS NOTES
another procedure at this time. Patient does not ambulate with any assistive devices today in the office. She has noticed that she is wetting the bed overnight now. This happened once and occurred last week. Ms Sherwin Patton continues to express experiencing weakness in her right lower extremity. She denies falling from this but notes she has been stumbling a lot from this symptom. Associated signs and symptoms:   Neurogenic bowel or bladder symptoms:  yes   Perceived weakness:  yes   Difficulty walking:  yes            Past medical, surgical, social and family history reviewed with the patient.      Past Medical History:   Past Medical History:   Diagnosis Date    Arthritis     [facility]chronic obstructive [facility] disease) ([facility] [address]     Depression     Hyperlipidemia     Irritable bowel syndrome (IBS)       Past Surgical History:     Past Surgical History:   Procedure Laterality Date    CARPAL TUNNEL RELEASE Right 07/13/2017    CATARACT REMOVAL WITH IMPLANT Left 12/23/2016    Phacoemulsification/ Posterior Chamber Intraocular Lens Implantation with dr Flor Elam  2012    CHOLECYSTECTOMY, LAPAROSCOPIC  5/25/2011    COLONOSCOPY  7/28/2014    CORONARY ANGIOPLASTY      EYE SURGERY Right 03/09/2017    catarct removal with lens implant    HERNIA REPAIR  08/09/2016    open ventral incisional    HYSTERECTOMY      OTHER SURGICAL HISTORY  08/09/2016    OPEN VENTRAL INCISIONAL HERNIA REPAIR     PACEMAKER PLACEMENT  06/25/2019    [address]    PAIN MANAGEMENT PROCEDURE Right 3/10/2020    RIGHT LUMBAR FOUR AND LUMBAR FIVE TRANSFORAMINAL EPIDURAL STEROID INJECTION SITE CONFIRMED BY FLUOROSCOPY performed by Dioni Archibald MD at [address] [place] [address]       Current Medications:     Current Outpatient Medications:     omeprazole (PRILOSEC) 20 MG delayed release capsule, TAKE 1 CAPSULE BY MOUTH 2 TIMES DAILY (BEFORE MEALS), Disp: 180 capsule, Rfl: 3    VORTIoxetine chills or fatigue  NEUROLOGICAL: Denies unsteady gait or progressive weakness  MUSCULOSKELETAL: Denies joint swelling or redness  GI: Denies nausea, vomiting, diarrhea   : Denies bowel or bladder issues       PHYSICAL EXAM:    Vitals: Resp. rate 12, height 5' 4.02\" (1.626 m), weight 110 lb 0.2 oz (49.9 kg), not currently breastfeeding. GENERAL EXAM:  · General Apparence: Patient is adequately groomed with no evidence of malnutrition. · Psychiatric: Orientation: The patient is oriented to time, place and person. The patient's mood and affect are appropriate   · Vascular: Examination reveals no swelling and palpation reveals no tenderness in upper or lower extremities. Good capillary refill. · The lymphatic examination of the neck, axillae and groin reveals all areas to be without enlargement or induration  · Sensation is intact without deficit in the upper and lower extremities to light touch and pinprick  · Coordination of the upper and lower extremities are normal.  · RIGHT UPPER EXTREMITY:  Inspection/examination of the right upper extremity does not show any tenderness, deformity or injury. Range of motion is unremarkable and pain-free. There is no gross instability. There are no rashes, ulcerations or lesions. Strength and tone are normal. No atrophy or abnormal movements are noted. · LEFT UPPER EXTREMITY: Inspection/examination of the left upper extremity does not show any tenderness, deformity or injury. Range of motion is unremarkable and pain-free. There is no gross instability. There are no rashes, ulcerations or lesions. Strength and tone are normal. No atrophy or abnormal movements are noted. LUMBAR/SACRAL EXAMINATION:  · Inspection: Local inspection shows no step-off or bruising. Lumbar alignment is normal. No instability is noted. · Palpation:   No evidence of tenderness at the midline.   Lumbar paraspinal tenderness: Mild L4/5 and L5/S1 tenderness  Bursal tenderness No tenderness Triglycerides 71 33 - 163 MG/DL    HDL 53 18 - 88 MG/DL    LDL Direct 93.8 MG/DL    VLDL 14.2 10 - 50 MG/DL    LDl/HDL Ratio 1.8      Impression:       1. Lumbar radiculopathy    2. Spondylosis without myelopathy or radiculopathy, lumbar region    3. DDD (degenerative disc disease), lumbar        Plan:  Clinical Course: Above diagnoses are worsening    I discussed the diagnosis and the treatment options with Shagufta Olivares today. In Summary:  The various treatment options were outlined and discussed with Shagufta Olivares including:  Conservative care options: physical therapy, ice, medications, bracing, and activity modification. The indications for therapeutic injections. The indications for additional imaging/laboratory studies. The indications for (possible future) interventions. After considering the various options discussed, Shagufta Olivares elected to pursue a course of treatment that includes the followin. Medications:  No further recommendations for new medications. 2. PT:  Encouraged to continue with Home exercise program.    3. Further studies: No further studies. 4. Interventional:  We discussed pursuing a Right L4 and L5 TF epidural steroid injection to address the pain. Radiologic imaging and symptoms confirm the pain etiology. Risks, benefits and alternatives of interventional options were discussed. These include and are not limited to bleeding, infection, spinal headache, nerve injury and lack of pain relief. The patient verbalized understanding and would like to proceed. The patient will be scheduled accordingly. 5. Follow up:  4-6 weeks      Shagufta Olivares was instructed to call the office if her symptoms worsen or if new symptoms appear prior to the next scheduled visit. She is specifically instructed to contact the office between now & her scheduled appointment if she has concerns related to her condition or if she needs assistance in scheduling the above tests.  She is

## 2020-06-02 NOTE — LETTER
Please schedule the following with:     Date:   20 @ 9:30   Account: [de-identified]  Patient: Cristino Mcgowan    : 1950  Address:  [address]    Phone (H):  [phone] (home)      ----------------------------------------------------------------------------------------------  Diagnosis:     ICD-10-CM    1. Lumbar radiculopathy M54.16    2. Spondylosis without myelopathy or radiculopathy, lumbar region M47.816    3. DDD (degenerative disc disease), lumbar M51.36      Procedure: Transforaminal Epidural Steroid Injection     Levels: L4 and l5   Side: Right   CPT Codes 92021, 16881    ----------------------------------------------------------------------------------------------  Injection # 2  [email or web address]    Attending Physician       Jaelyn Laureano. Sugey Ball MD.  ----------------------------------------------------------------------------------------------  Injection Scheduled For:    At:    1st Insurance [facility]    Pre-Cert#    2nd Insurance [facility]   Pre-Cert#    Comments:     · Infection control  ? Tested positive for MRSA in past 12 months:  no  ? Tested positive for MSSA \"staph infection\" in past 12 months: no  ? Tested positive for VRE (Vancomycin Resistant Enterococci) in past 12 months:   no  ? Currently on any antibiotics for an infection: no  · Anticoagulants:  ? On a blood thinner:  yes , ASA  ?  Any history of bleeding disorder: no   · Advanced Liver disease: no   · Advanced Renal disease: no   · Glaucoma: no   · Diabetes: no      Sedation:         Yes  -----------------------------------------------------------------------------------------------  Allergies   Allergen Reactions    Iodides Hives and Itching    Nsaids      Previous stomach bleed      Sulfa Antibiotics Rash

## 2020-06-11 ENCOUNTER — OFFICE VISIT (OUTPATIENT)
Dept: PRIMARY CARE CLINIC | Age: 70
End: 2020-06-11

## 2020-06-11 ENCOUNTER — TELEPHONE (OUTPATIENT)
Dept: ORTHOPEDIC SURGERY | Age: 70
End: 2020-06-11

## 2020-06-11 NOTE — PROGRESS NOTES
Patient is having a/an epidural with Dr. Gisele Pace on 6/16/20 and was seen at clinic for pre op covid test. . Patient instructed to self quarantine until the procedure.

## 2020-06-12 LAB
SARS-COV-2: NOT DETECTED
SOURCE: NORMAL

## 2020-06-16 ENCOUNTER — HOSPITAL ENCOUNTER (OUTPATIENT)
Age: 70
Setting detail: OUTPATIENT SURGERY
Discharge: HOME | End: 2020-06-16
Attending: PHYSICAL MEDICINE & REHABILITATION | Admitting: PHYSICAL MEDICINE & REHABILITATION
Payer: MEDICARE

## 2020-06-16 VITALS
DIASTOLIC BLOOD PRESSURE: 61 MMHG | BODY MASS INDEX: 19.46 KG/M2 | TEMPERATURE: 97 F | RESPIRATION RATE: 16 BRPM | WEIGHT: 114 LBS | HEIGHT: 64 IN | HEART RATE: 60 BPM | OXYGEN SATURATION: 97 % | SYSTOLIC BLOOD PRESSURE: 169 MMHG

## 2020-06-16 PROCEDURE — 3600000002 HC SURGERY LEVEL 2 BASE: Performed by: PHYSICAL MEDICINE & REHABILITATION

## 2020-06-16 PROCEDURE — 2500000003 HC RX 250 WO HCPCS: Performed by: PHYSICAL MEDICINE & REHABILITATION

## 2020-06-16 PROCEDURE — 2709999900 HC NON-CHARGEABLE SUPPLY: Performed by: PHYSICAL MEDICINE & REHABILITATION

## 2020-06-16 PROCEDURE — 6360000002 HC RX W HCPCS: Performed by: PHYSICAL MEDICINE & REHABILITATION

## 2020-06-16 PROCEDURE — 6360000004 HC RX CONTRAST MEDICATION: Performed by: PHYSICAL MEDICINE & REHABILITATION

## 2020-06-16 PROCEDURE — 7100000011 HC PHASE II RECOVERY - ADDTL 15 MIN: Performed by: PHYSICAL MEDICINE & REHABILITATION

## 2020-06-16 PROCEDURE — 99152 MOD SED SAME PHYS/QHP 5/>YRS: CPT | Performed by: PHYSICAL MEDICINE & REHABILITATION

## 2020-06-16 PROCEDURE — 7100000010 HC PHASE II RECOVERY - FIRST 15 MIN: Performed by: PHYSICAL MEDICINE & REHABILITATION

## 2020-06-16 PROCEDURE — 2580000003 HC RX 258: Performed by: PHYSICAL MEDICINE & REHABILITATION

## 2020-06-16 PROCEDURE — 3600000012 HC SURGERY LEVEL 2 ADDTL 15MIN: Performed by: PHYSICAL MEDICINE & REHABILITATION

## 2020-06-16 RX ORDER — LIDOCAINE HYDROCHLORIDE 10 MG/ML
0.1 INJECTION, SOLUTION EPIDURAL; INFILTRATION; INTRACAUDAL; PERINEURAL ONCE
Status: COMPLETED | OUTPATIENT
Start: 2020-06-16 | End: 2020-06-16

## 2020-06-16 RX ORDER — SODIUM CHLORIDE, SODIUM LACTATE, POTASSIUM CHLORIDE, CALCIUM CHLORIDE 600; 310; 30; 20 MG/100ML; MG/100ML; MG/100ML; MG/100ML
INJECTION, SOLUTION INTRAVENOUS CONTINUOUS
Status: DISCONTINUED | OUTPATIENT
Start: 2020-06-16 | End: 2020-06-16 | Stop reason: HOSPADM

## 2020-06-16 RX ORDER — FENTANYL CITRATE 50 UG/ML
INJECTION, SOLUTION INTRAMUSCULAR; INTRAVENOUS
Status: DISCONTINUED
Start: 2020-06-16 | End: 2020-06-16 | Stop reason: HOSPADM

## 2020-06-16 RX ORDER — BUPIVACAINE HYDROCHLORIDE 5 MG/ML
INJECTION, SOLUTION EPIDURAL; INTRACAUDAL
Status: DISCONTINUED
Start: 2020-06-16 | End: 2020-06-16 | Stop reason: HOSPADM

## 2020-06-16 RX ORDER — MIDAZOLAM HYDROCHLORIDE 1 MG/ML
INJECTION INTRAMUSCULAR; INTRAVENOUS
Status: DISCONTINUED
Start: 2020-06-16 | End: 2020-06-16 | Stop reason: HOSPADM

## 2020-06-16 RX ORDER — LIDOCAINE HYDROCHLORIDE 10 MG/ML
INJECTION, SOLUTION EPIDURAL; INFILTRATION; INTRACAUDAL; PERINEURAL PRN
Status: DISCONTINUED | OUTPATIENT
Start: 2020-06-16 | End: 2020-06-16 | Stop reason: ALTCHOICE

## 2020-06-16 RX ORDER — FENTANYL CITRATE 50 UG/ML
INJECTION, SOLUTION INTRAMUSCULAR; INTRAVENOUS PRN
Status: DISCONTINUED | OUTPATIENT
Start: 2020-06-16 | End: 2020-06-16 | Stop reason: ALTCHOICE

## 2020-06-16 RX ORDER — MIDAZOLAM HYDROCHLORIDE 1 MG/ML
INJECTION INTRAMUSCULAR; INTRAVENOUS PRN
Status: DISCONTINUED | OUTPATIENT
Start: 2020-06-16 | End: 2020-06-16 | Stop reason: ALTCHOICE

## 2020-06-16 RX ORDER — BETAMETHASONE SODIUM PHOSPHATE AND BETAMETHASONE ACETATE 3; 3 MG/ML; MG/ML
INJECTION, SUSPENSION INTRA-ARTICULAR; INTRALESIONAL; INTRAMUSCULAR; SOFT TISSUE
Status: DISCONTINUED
Start: 2020-06-16 | End: 2020-06-16 | Stop reason: HOSPADM

## 2020-06-16 RX ADMIN — SODIUM CHLORIDE, POTASSIUM CHLORIDE, SODIUM LACTATE AND CALCIUM CHLORIDE: 600; 310; 30; 20 INJECTION, SOLUTION INTRAVENOUS at 09:07

## 2020-06-16 RX ADMIN — LIDOCAINE HYDROCHLORIDE 0.1 ML: 10 INJECTION, SOLUTION EPIDURAL; INFILTRATION; INTRACAUDAL; PERINEURAL at 09:07

## 2020-06-16 ASSESSMENT — PAIN - FUNCTIONAL ASSESSMENT
PAIN_FUNCTIONAL_ASSESSMENT: 0-10
PAIN_FUNCTIONAL_ASSESSMENT: PREVENTS OR INTERFERES WITH MANY ACTIVE NOT PASSIVE ACTIVITIES

## 2020-06-16 ASSESSMENT — PAIN DESCRIPTION - DESCRIPTORS: DESCRIPTORS: ACHING;PRESSURE

## 2020-06-16 ASSESSMENT — PAIN SCALES - GENERAL: PAINLEVEL_OUTOF10: 0

## 2020-06-16 NOTE — OP NOTE
Patient:  Alexandria Quiroz  YOB: 1950  Medical Record #:  [id number]   Place: [address], [place]  Date:  6/16/2020   Physician:  Elyssa Vila MD, FREDDY    Procedure: 1. Transforaminal Lumbar Epidural Steroid Injection -  right L4  CPT 49304          2. Transforaminal Lumbar Epidural Steroid Injection -  right L5  CPT 93587    Pre-Procedure Diagnosis: Lumbar radiculopathy      Post-Procedure Diagnosis: Same    Sedation: Local with 1% Lidocaine 3 ml and 1 mg of IV Versed and 25 mcg of IV Fentanyl    EBL: None    Complications: None    Procedure Summary:        The patient was brought to the procedure suite and placed in the prone position. The skin overlying the lumbar spine was prepped and draped in the usual sterile fashion. Using fluoroscopic guidance, the right L4 foramen was identified. Through anesthetized skin, a 22 gauge 3.5 inch curved tip spinal needle was advanced into the foramen. Isovue M 300 was instilled showing an epidurogram/nerve root outline pattern without evidence of vascular or intrathecal spread. Following which, 7.5 mg of Celestone mixed with 1 ml of 0.5% Marcaine was instilled. The needle was removed. Using fluoroscopic guidance, the right L5 foramen was identified. Through anesthetized skin, a 22 gauge 3.5 inch curved tip spinal needle was advanced into the foramen. Isovue M 300 was instilled showing an epidurogram/nerve root outline pattern without evidence of vascular or intrathecal spread. Following which, 7.5 mg of Celestone mixed with 1 ml of 0.5% Marcaine was instilled. The needle was removed and band-aids were applied. The patient was transferred to the post-operative area in stable condition.

## 2020-06-19 RX ORDER — ALPRAZOLAM 0.25 MG/1
0.25 TABLET ORAL NIGHTLY PRN
Qty: 15 TABLET | Refills: 0 | Status: SHIPPED | OUTPATIENT
Start: 2020-06-19 | End: 2020-11-09 | Stop reason: SDUPTHER

## 2020-06-29 ENCOUNTER — TELEPHONE (OUTPATIENT)
Dept: FAMILY MEDICINE CLINIC | Age: 70
End: 2020-06-29

## 2020-06-30 ENCOUNTER — OFFICE VISIT (OUTPATIENT)
Dept: FAMILY MEDICINE CLINIC | Age: 70
End: 2020-06-30
Payer: MEDICARE

## 2020-06-30 VITALS
OXYGEN SATURATION: 97 % | WEIGHT: 116.2 LBS | DIASTOLIC BLOOD PRESSURE: 78 MMHG | HEART RATE: 71 BPM | SYSTOLIC BLOOD PRESSURE: 140 MMHG | TEMPERATURE: 98.1 F | BODY MASS INDEX: 19.95 KG/M2

## 2020-06-30 PROCEDURE — 1090F PRES/ABSN URINE INCON ASSESS: CPT | Performed by: NURSE PRACTITIONER

## 2020-06-30 PROCEDURE — G8400 PT W/DXA NO RESULTS DOC: HCPCS | Performed by: NURSE PRACTITIONER

## 2020-06-30 PROCEDURE — 1123F ACP DISCUSS/DSCN MKR DOCD: CPT | Performed by: NURSE PRACTITIONER

## 2020-06-30 PROCEDURE — 3017F COLORECTAL CA SCREEN DOC REV: CPT | Performed by: NURSE PRACTITIONER

## 2020-06-30 PROCEDURE — 4040F PNEUMOC VAC/ADMIN/RCVD: CPT | Performed by: NURSE PRACTITIONER

## 2020-06-30 PROCEDURE — G8420 CALC BMI NORM PARAMETERS: HCPCS | Performed by: NURSE PRACTITIONER

## 2020-06-30 PROCEDURE — 93000 ELECTROCARDIOGRAM COMPLETE: CPT | Performed by: NURSE PRACTITIONER

## 2020-06-30 PROCEDURE — G8427 DOCREV CUR MEDS BY ELIG CLIN: HCPCS | Performed by: NURSE PRACTITIONER

## 2020-06-30 PROCEDURE — 4004F PT TOBACCO SCREEN RCVD TLK: CPT | Performed by: NURSE PRACTITIONER

## 2020-06-30 PROCEDURE — 99214 OFFICE O/P EST MOD 30 MIN: CPT | Performed by: NURSE PRACTITIONER

## 2020-06-30 ASSESSMENT — ENCOUNTER SYMPTOMS
EYE ITCHING: 0
NAUSEA: 0
VOMITING: 0
TROUBLE SWALLOWING: 0
DIARRHEA: 0
COUGH: 0
SINUS PAIN: 0
BACK PAIN: 0
EYE REDNESS: 0
RHINORRHEA: 0
SINUS PRESSURE: 0
STRIDOR: 0
BLOOD IN STOOL: 0
CONSTIPATION: 0
CHEST TIGHTNESS: 0
EYE DISCHARGE: 0
WHEEZING: 0
VOICE CHANGE: 0
ABDOMINAL PAIN: 0
SORE THROAT: 0
COLOR CHANGE: 0
CHOKING: 0
SHORTNESS OF BREATH: 0
PHOTOPHOBIA: 0
EYE PAIN: 0

## 2020-06-30 NOTE — PROGRESS NOTES
hematuria and urgency. Musculoskeletal: Negative for back pain, gait problem, joint swelling, neck pain and neck stiffness. Skin: Negative for color change, pallor, rash and wound. Allergic/Immunologic: Negative for environmental allergies and food allergies. Neurological: Negative for dizziness, tremors, syncope, speech difficulty, weakness, light-headedness, numbness and headaches. Hematological: Negative for adenopathy. Does not bruise/bleed easily. Psychiatric/Behavioral: Negative for agitation, behavioral problems, confusion, decreased concentration, dysphoric mood, hallucinations, self-injury, sleep disturbance and suicidal ideas. The patient is not nervous/anxious and is not hyperactive. Prior to Visit Medications    Medication Sig Taking? Authorizing Provider   apixaban (ELIQUIS DVT/PE STARTER PACK) 5 MG TABS tablet Take two tablets in the morning and two at night for seven days, then take one tablet twice daily. Yes Historical Provider, MD   ALPRAZolam Sabrina All) 0.25 MG tablet Take 1 tablet by mouth nightly as needed for Sleep or Anxiety for up to 60 days. Yes Aura Catherine MD   omeprazole (PRILOSEC) 20 MG delayed release capsule TAKE 1 CAPSULE BY MOUTH 2 TIMES DAILY (BEFORE MEALS) Yes JAYLYN Al CNP   VORTIoxetine (TRINTELLIX) 10 MG TABS tablet TAKE 1 TABLET BY MOUTH EVERY DAY Yes MD Philly Frias Boy INHUB 100-50 MCG/DOSE diskus inhaler  Yes Historical Provider, MD   potassium chloride (MICRO-K) 10 MEQ extended release capsule Take 10 mEq by mouth daily Yes Historical Provider, MD   ezetimibe (ZETIA) 10 MG tablet TAKE 1 TABLET BY MOUTH DAILY.  Yes JAYLYN Al CNP   metoprolol succinate (TOPROL XL) 25 MG extended release tablet Take 0.5 tablets by mouth daily Yes JAYLYN Al CNP   Lactobacillus (PROBIOTIC ACIDOPHILUS PO) Take 1 tablet by mouth daily  Yes Historical Provider, MD   dicyclomine (BENTYL) 10 MG capsule TAKE ONE CAPSULE BY MOUTH FOUR TIMES DAILY AS NEEDED Yes David Loza MD   fluticasone [facility]) 50 MCG/ACT nasal spray 2 sprays by Nasal route daily Yes JAYLYN Pop CNP   tiotropium (SPIRIVA HANDIHALER) 18 MCG inhalation capsule Inhale 1 capsule into the lungs daily Yes JAYLYN Pop - CNP   albuterol sulfate HFA (VENTOLIN HFA) 108 (90 Base) MCG/ACT inhaler Inhale 2 puffs into the lungs 2 times daily as needed for Wheezing or Shortness of Breath Yes David Loza MD   aspirin EC 81 MG EC tablet Take 1 tablet by mouth daily. Yes Smith To MD   fluticasone-vilanterol (BREO ELLIPTA) 100-25 MCG/INH AEPB inhaler Inhale 1 puff into the lungs daily  David Loza MD       Allergies   Allergen Reactions    Iodides Hives and Itching    Nsaids      Previous stomach bleed      Sulfa Antibiotics Rash       OBJECTIVE:      BP Readings from Last 2 Encounters:   06/30/20 (!) 140/78   06/16/20 (!) 169/61       Wt Readings from Last 3 Encounters:   06/30/20 116 lb 3.2 oz (52.7 kg)   06/16/20 114 lb (51.7 kg)   06/02/20 110 lb 0.2 oz (49.9 kg)       Physical Exam  Constitutional:       General: She is not in acute distress. Appearance: Normal appearance. She is ill-appearing. HENT:      Nose: Nose normal.      Mouth/Throat:      Mouth: Mucous membranes are moist.   Eyes:      Pupils: Pupils are equal, round, and reactive to light. Neck:      Musculoskeletal: Normal range of motion. Cardiovascular:      Rate and Rhythm: Rhythm irregular. Pulses: Normal pulses. Heart sounds: No murmur. No friction rub. No gallop. Comments: Pacemaker     Pulmonary:      Effort: Pulmonary effort is normal.   Musculoskeletal: Normal range of motion. Skin:     General: Skin is warm and dry. Capillary Refill: Capillary refill takes less than 2 seconds. Neurological:      General: No focal deficit present. Mental Status: She is alert and oriented to person, place, and time.    Psychiatric:         Mood and Affect:

## 2020-08-10 RX ORDER — DICYCLOMINE HYDROCHLORIDE 10 MG/1
CAPSULE ORAL
Qty: 100 CAPSULE | Refills: 3 | Status: SHIPPED | OUTPATIENT
Start: 2020-08-10 | End: 2021-04-05 | Stop reason: SDUPTHER

## 2020-09-01 ENCOUNTER — TELEPHONE (OUTPATIENT)
Dept: ORTHOPEDIC SURGERY | Age: 70
End: 2020-09-01

## 2020-09-01 NOTE — TELEPHONE ENCOUNTER
PATIENT CALLING STATES SHE IS IN A GREAT DEAL OF PAIN. CAN'T COME IN UNTIL NEXT WEEK DUE TO HER SCHEDULE. WAS WONDERING IF SHE COULD GET SOMETHING FOR PAIN.  CAN BE REACHED [phone]

## 2020-09-01 NOTE — TELEPHONE ENCOUNTER
S/W PATIENT discussed symptoms. Offered to schedule an OV for 9/2/2020, however patient states, \"it's ok, nothing is that terrible. I can definitely wait. \" Patient states she will keep her appointment for 9/9/2020. Patient voiced understanding of the conversation and will contact the office with further questions or concerns.

## 2020-09-08 RX ORDER — EZETIMIBE 10 MG/1
TABLET ORAL
Qty: 30 TABLET | Refills: 5 | Status: SHIPPED | OUTPATIENT
Start: 2020-09-08 | End: 2021-03-15

## 2020-09-15 ENCOUNTER — OFFICE VISIT (OUTPATIENT)
Dept: ORTHOPEDIC SURGERY | Age: 70
End: 2020-09-15
Payer: MEDICARE

## 2020-09-15 VITALS — RESPIRATION RATE: 14 BRPM | BODY MASS INDEX: 19.84 KG/M2 | HEIGHT: 64 IN | WEIGHT: 116.18 LBS | TEMPERATURE: 97.8 F

## 2020-09-15 PROCEDURE — 4004F PT TOBACCO SCREEN RCVD TLK: CPT | Performed by: PHYSICIAN ASSISTANT

## 2020-09-15 PROCEDURE — 99213 OFFICE O/P EST LOW 20 MIN: CPT | Performed by: PHYSICIAN ASSISTANT

## 2020-09-15 PROCEDURE — 1123F ACP DISCUSS/DSCN MKR DOCD: CPT | Performed by: PHYSICIAN ASSISTANT

## 2020-09-15 PROCEDURE — G8420 CALC BMI NORM PARAMETERS: HCPCS | Performed by: PHYSICIAN ASSISTANT

## 2020-09-15 PROCEDURE — 3017F COLORECTAL CA SCREEN DOC REV: CPT | Performed by: PHYSICIAN ASSISTANT

## 2020-09-15 PROCEDURE — G8427 DOCREV CUR MEDS BY ELIG CLIN: HCPCS | Performed by: PHYSICIAN ASSISTANT

## 2020-09-15 PROCEDURE — 1090F PRES/ABSN URINE INCON ASSESS: CPT | Performed by: PHYSICIAN ASSISTANT

## 2020-09-15 PROCEDURE — G8400 PT W/DXA NO RESULTS DOC: HCPCS | Performed by: PHYSICIAN ASSISTANT

## 2020-09-15 PROCEDURE — 4040F PNEUMOC VAC/ADMIN/RCVD: CPT | Performed by: PHYSICIAN ASSISTANT

## 2020-09-15 RX ORDER — TIZANIDINE 4 MG/1
2 TABLET ORAL 2 TIMES DAILY
Qty: 30 TABLET | Refills: 0 | Status: SHIPPED | OUTPATIENT
Start: 2020-09-15 | End: 2020-10-15

## 2020-09-15 NOTE — PROGRESS NOTES
Follow up: Vandana Venegas  1950  [id number]         Chief Complaint   Patient presents with    Lower Back Pain     F/U LSP lov 6/9/2020         HISTORY OF PRESENT ILLNESS:  Ms. Lili Jim is a 78 y.o. female returns for a follow up visit for multiple medical problems. Her current presenting problems are   1. Lumbar radiculopathy    2. Spondylosis without myelopathy or radiculopathy, lumbar region    3. DDD (degenerative disc disease), lumbar    4. Paresthesia of right lower extremity    . As per information/history obtained from the PADT(patient assessment and documentation tool) - She complains of pain in the lower back with radiation to the buttocks, upper leg Right and lower leg Right She rates the pain 4/10 and describes it as dull, aching, squeezing, numbness. Pain is made worse by: lifting, housework. She denies side effects from the current pain regimen. Patient reports that since the last follow up visit the physical functioning is unchanged, family/social relationships are unchanged, mood is unchanged and sleep patterns are unchanged, and that the overall functioning is unchanged. Patient denies neurological bowel or bladder. The patient presents today after a right L4 and L5 TF PRINCESS was completed on 6/16/2020. This is the first time she has followed up since her injection. The patient also had the same injection on 3/10/2020 and describes that she had relief for approximately 1 week but nothing significant. She continues to have lower back and radiating right leg pain. The patient describes that she has recently been placed on Eliquis. The pain continues to wake her up at night. There has been no new accidents or injuries involved with the patient's pain. Associated signs and symptoms:   Neurogenic bowel or bladder symptoms:  no   Perceived weakness:  yes   Difficulty walking:  yes            Past medical, surgical, social and family history reviewed with the patient.      Past CAPSULE BY MOUTH 2 TIMES DAILY (BEFORE MEALS), Disp: 180 capsule, Rfl: 3    VORTIoxetine (TRINTELLIX) 10 MG TABS tablet, TAKE 1 TABLET BY MOUTH EVERY DAY, Disp: 30 tablet, Rfl: 4    WIXELA INHUB 100-50 MCG/DOSE diskus inhaler, , Disp: , Rfl:     potassium chloride (MICRO-K) 10 MEQ extended release capsule, Take 10 mEq by mouth daily, Disp: , Rfl:     metoprolol succinate (TOPROL XL) 25 MG extended release tablet, Take 0.5 tablets by mouth daily, Disp: 30 tablet, Rfl: 5    Lactobacillus (PROBIOTIC ACIDOPHILUS PO), Take 1 tablet by mouth daily , Disp: , Rfl:     fluticasone (FLONASE) 50 MCG/ACT nasal spray, 2 sprays by Nasal route daily, Disp: 1 Bottle, Rfl: 5    tiotropium (SPIRIVA HANDIHALER) 18 MCG inhalation capsule, Inhale 1 capsule into the lungs daily, Disp: 30 capsule, Rfl: 5    albuterol sulfate HFA (VENTOLIN HFA) 108 (90 Base) MCG/ACT inhaler, Inhale 2 puffs into the lungs 2 times daily as needed for Wheezing or Shortness of Breath, Disp: 1 Inhaler, Rfl: 3    aspirin EC 81 MG EC tablet, Take 1 tablet by mouth daily. , Disp: 30 tablet, Rfl: 3    fluticasone-vilanterol (BREO ELLIPTA) 100-25 MCG/INH AEPB inhaler, Inhale 1 puff into the lungs daily, Disp: 1 each, Rfl: 5  Allergies: Iodides; Nsaids; and Sulfa antibiotics  Social History:    reports that she has been smoking cigarettes. She started smoking about 16 months ago. She has a 20.00 pack-year smoking history. She has never used smokeless tobacco. She reports that she does not drink alcohol or use drugs.   Family History:   Family History   Problem Relation Age of Onset    Heart Disease Father     High Blood Pressure Father    Sepideh Alber Cancer Sister         uterine       REVIEW OF SYSTEMS:   CONSTITUTIONAL: Denies unexplained weight loss, fevers, chills or fatigue  NEUROLOGICAL: Denies unsteady gait or progressive weakness  MUSCULOSKELETAL: Denies joint swelling or redness  GI: Denies nausea, vomiting, diarrhea   : Denies bowel or bladder issues space appears stable and unremarkable.         At L2-L3, there remains a stable mild diffuse disc bulge causing mild thecal    Legally authenticated by Sabrina Deep 2020-02-22 19:00:49                   Patient Name: Eduardo Cover    sac compression though no central canal or neural foraminal stenosis.  This    is unchanged from the study of 07/11/2017.         At L3-L4, there remains a mild diffuse disc bulge causing mild thecal sac    compression though no central canal stenosis.  There is stable mild    bilateral    neural foraminal stenosis at this level.  These findings are unchanged from    the prior study.         At L4-L5, there remains a stable mild-to-moderate diffuse disc bulge which    combines with mild bilateral ligamentum flavum hypertrophy to form mild    thecal sac compression though no significant central canal stenosis. However, there is resultant mild bilateral neural foraminal stenosis at this         level as well as mild asymmetric left lateral recess stenosis with the disc    mildly encroaching upon the left exiting L5 nerve root, similar to the prior         study of 07/11/2017.         At L5-S1, there remains a stable minimal diffuse disc bulge without thecal    sac compression, central canal stenosis, or neural foraminal stenosis.         SOFT TISSUES/RETROPERITONEUM: The visualized portions of the lung bases are    clear.  Mildly dilated bilateral extrarenal pelves are again noted. Pretty Kail    is    a somewhat nonspecific 2.2 cm cystic structure within the right adnexal    space.  The patient is status post cholecystectomy.  The remainder of the    visualized abdominopelvic soft tissue structures are grossly unremarkable.         IMPRESSION:    1. Chronic mild levoscoliosis of the lumbar spine without acute fracture or    subluxation.     2. Mild multilevel lumbar degenerative disc disease, as fully detailed    above,    similar to the prior study of 07/11/2017.  The most notable level is at    L4-L5, whereby a mild-to-moderate diffuse disc bulge combines with bilateral         ligamentum flavum hypertrophy to form bilateral neural foraminal stenosis as         well as asymmetric left lateral recess stenosis and mild encroachment of the         disc at this level upon the left exiting L5 nerve root. 3. Nonspecific 2.2 cm cystic structure within the right adnexal space. Recommend further characterization with a prompt follow-up pelvic    ultrasound. See below. Results for orders placed or performed in visit on 07/13/20   CBC with Differential   Result Value Ref Range    CBC Auto Dif NA     Leukocytes, Bld 6.7 4.5 - 11.0 K/uL    RBC 4.82 3.80 - 5.20 M/uL    Hemoglobin 14.7 11.7 - 15.7 g/dL    Hematocrit 43.5 35.0 - 47.0 %    MCV 90.4 80.0 - 100 fL    MCHC 30.6 26.0 - 34.0 pg    MCHC 33.8 31.0 - 36.0 g/dL    RDW 13.3 11.0 - 15.3 %    Platelets 054 940 - 516 K/uL    MPV 9.5 7.4 - 10.4 fL    Neutrophils Segmented 51.0 40.0 - 70.0 %    Lymphocytes Absolute 38.6 15.0 - 42.0 %    Monocytes Absolute 7.4 0.0 - 8.0 %    Eosinophils Absolute 2.2 0.0 - 5.0 %    Basophils % 0.8 0.0 - 1.0 %    Differential, manual NOT INDICATED     Absolute Neut # 3.4 1.8 - 7.7 10^3/uL    Absolute Lymph # 2.6 1.0 - 4.8 10^3/uL    Absolute Mono # 0.5 0.0 - 0.8 10^3/uL    Absolute Eos # 0.1 0.0 - 0.5 10^3/uL    Absolute Baso # 0.1 0.0 - 0.2 10^3/uL     Impression:       1. Lumbar radiculopathy    2. Spondylosis without myelopathy or radiculopathy, lumbar region    3. DDD (degenerative disc disease), lumbar    4. Paresthesia of right lower extremity        Plan:  Clinical Course: shows no change    I discussed the diagnosis and the treatment options with Jeremy Ogden today. In Summary:  The various treatment options were outlined and discussed with Jeremy Ogden including:  Conservative care options: physical therapy, ice, medications, bracing, and activity modification.  The indications for therapeutic injections. The indications for additional imaging/laboratory studies. The indications for (possible future) interventions. After considering the various options discussed, Milagro Acosta elected to pursue a course of treatment that includes the followin. Medications: The patient will be prescribed Zanaflex 2 mg 1 p.o. twice daily #30. The patient was advised to not drive or operate any heavy machinery at this time while taking the medication. 2. PT:  Encouraged to continue with Home exercise program.    3. Further studies: I will obtain an EMG to evaluate for paresthesias to rule out a nerve entrapment or a more proximal etiology. RLE. 4. Interventional:  After further imaging is obtained, interventional options will be reviewed and recommended. 5. Follow up:  2-3 weeks      Milagro Acosta was instructed to call the office if her symptoms worsen or if new symptoms appear prior to the next scheduled visit. She is specifically instructed to contact the office between now & her scheduled appointment if she has concerns related to her condition or if she needs assistance in scheduling the above tests. She is welcome to call for an appointment sooner if she has any additional concerns or questions. JUAN R Mix, PA-C  Board Certified by the [employer] on Certification of Physician Assistants  [id number]  Partner of [facility] ([facility])               This dictation was performed with a verbal recognition program [facility]) and it was checked for errors. It is possible that there are still dictated errors within this office note. If so, please bring any errors to my attention for an addendum. All efforts were made to ensure that this office note is accurate.

## 2020-09-24 ENCOUNTER — OFFICE VISIT (OUTPATIENT)
Dept: ORTHOPEDIC SURGERY | Age: 70
End: 2020-09-24
Payer: MEDICARE

## 2020-09-24 PROCEDURE — 95886 MUSC TEST DONE W/N TEST COMP: CPT | Performed by: PHYSICAL MEDICINE & REHABILITATION

## 2020-09-24 PROCEDURE — 95908 NRV CNDJ TST 3-4 STUDIES: CPT | Performed by: PHYSICAL MEDICINE & REHABILITATION

## 2020-09-24 NOTE — PROGRESS NOTES
[address] MEDICINE      Patient: Romario Bone Age: 78 Years 6 Months  Sex: Female Date: 9/24/2020  YOB: 1950 Ref. Phys.: Suzanne Wolfe  Notes:  RLE pain and numbness; r/o radiculopathy; on Eliquis; no DM, no etoh abuse      Sensory NCS      Nerve / Sites Peak PeakAmp Dist Martin    ms µV cm m/s   R SURAL   1. Calf 3.60 12.1 14 50.9       Motor NCS      Nerve / Sites Lat Amp Amp Dist Martin    ms mV % cm m/s   R COMM PERONEAL - EDB   1. Ankle 4.55 3.9 100 8    2. FibHead 11.00 3.9 100 29 45.0   3. Knee 12.65 4.3 110 10 60.6   R TIBIAL (KNEE) - AH   1. Ankle 4.05 3.1 100 8    2. Knee 11.15 2.8 92.5 42 59. 2       EMG Summary Table     Spontaneous MUAP Recruit. Ins. Act Fibs. PSW Fasics. H.F. Amp. Dur. Poly's. Pattern   R. GASTROCN (MED) N None None None None N N N N   R. VAST MEDIALIS N None None None None N N N N   R. TIB ANTERIOR N None None None None N N N N   R. PERON LONGUS N None None None None N N N N   R. GLUTEUS MAX N None None None None N N N N   R. GLUTEUS MED N None None None None N N N N   R. SOLEUS N None None None None N N N N       Summary: Nerve conduction studies and monopolar exam of the right upper extremity are normal, as recorded above. Posterior lumbar myotomes were not examined because the patient is anticoagulated. Impression: Normal examination. There is no electrodiagnostic evidence of a right lower      extremity mononeuropathy, plexopathy, or radiculopathy.             Hardy Peterson MD

## 2020-09-29 ENCOUNTER — TELEPHONE (OUTPATIENT)
Dept: ORTHOPEDIC SURGERY | Age: 70
End: 2020-09-29

## 2020-09-29 ENCOUNTER — OFFICE VISIT (OUTPATIENT)
Dept: ORTHOPEDIC SURGERY | Age: 70
End: 2020-09-29
Payer: MEDICARE

## 2020-09-29 VITALS — BODY MASS INDEX: 19.84 KG/M2 | RESPIRATION RATE: 14 BRPM | TEMPERATURE: 96.8 F | HEIGHT: 64 IN | WEIGHT: 116.18 LBS

## 2020-09-29 PROCEDURE — 99214 OFFICE O/P EST MOD 30 MIN: CPT | Performed by: STUDENT IN AN ORGANIZED HEALTH CARE EDUCATION/TRAINING PROGRAM

## 2020-09-29 NOTE — PROGRESS NOTES
Follow up: Janet Barcenas  1950  [id number]         Chief Complaint   Patient presents with    Leg Pain     TR EMG RLE         HISTORY OF PRESENT ILLNESS:  Ms. Moses Dumont is a 69 y.o. female returns for a follow up visit for multiple medical problems. Her current presenting problems are   1. Spondylosis without myelopathy or radiculopathy, lumbar region    2. DDD (degenerative disc disease), lumbar    3. Paresthesia of right lower extremity    . As per information/history obtained from the PADT(patient assessment and documentation tool) - She complains of pain in the lower back with radiation to the buttocks, upper leg Right and lower leg Right She rates the pain 4/10 and describes it as dull, aching, throbbing, numbness. Pain is made worse by: walking, sitting, lifting, mopping, vacuuming. She denies side effects from the current pain regimen. Patient reports that since the last follow up visit the physical functioning is unchanged, family/social relationships are unchanged, mood is unchanged and sleep patterns are unchanged, and that the overall functioning is unchanged. Patient denies neurological bowel or bladder. Ms Moses Dumont presents today regarding her right lower extremity EMG performed on 9/24/2020. She describes her symptoms to be unchanged when compared to her previous visit on 9/15/2020. She continues to have radicular symptoms into the entire right lower extremity. Alleviating factors include her muscle relaxer, lying down, and a hot bath. The patient describes being able to sit for 15 mintues, stand for 15 minutes and walk for 5 minutes with minimal to no symptoms. The patient does not present today with any aids for ambulation or braces for support. She denies any new injuries or trauma to her low back or right leg.      Associated signs and symptoms:   Neurogenic bowel or bladder symptoms:  no   Perceived weakness:  yes   Difficulty walking:  yes            Past medical, surgical, social and family history reviewed with the patient. No pertinent relevant history. Past Medical History:   Past Medical History:   Diagnosis Date    Arthritis     COPD (chronic obstructive pulmonary disease) ([address]     Depression     Hyperlipidemia     Irritable bowel syndrome (IBS)     Pacemaker       Past Surgical History:     Past Surgical History:   Procedure Laterality Date    CARPAL TUNNEL RELEASE Right 07/13/2017    CATARACT REMOVAL WITH IMPLANT Left 12/23/2016    Phacoemulsification/ Posterior Chamber Intraocular Lens Implantation with dr Zane Ferrara  2012    CHOLECYSTECTOMY, LAPAROSCOPIC  5/25/2011    COLONOSCOPY  7/28/2014    CORONARY ANGIOPLASTY      EYE SURGERY Right 03/09/2017    catarct removal with lens implant    HERNIA REPAIR  08/09/2016    open ventral incisional    HYSTERECTOMY      OTHER SURGICAL HISTORY  08/09/2016    OPEN VENTRAL INCISIONAL HERNIA REPAIR     PAC[facility]    PAIN MANAGEMENT PROCEDURE Right 3/10/2020    RIGHT LUMBAR FOUR AND LUMBAR FIVE TRANSFORAMINAL EPIDURAL STEROID INJECTION SITE CONFIRMED BY FLUOROSCOPY performed by Vini Vazquez MD at [address] [facility] Right 6/16/2020    RIGHT LUMBAR FOUR AND LUMBAR FIVE TRANSFORAMINAL EPIDURAL STEROID INJECTION SITE CONFIRMED BY FLUOROSCOPY performed by Vini Vazquez MD at [address] [place] [address] ENDOSCOPY       Current Medications:     Current Outpatient Medications:     tiZANidine (ZANAFLEX) 4 MG tablet, Take 0.5 tablets by mouth 2 times daily, Disp: 30 tablet, Rfl: 0    ezetimibe (ZETIA) 10 MG tablet, TAKE 1 TABLET BY MOUTH DAILY. , Disp: 30 tablet, Rfl: 5    dicyclomine (BENTYL) 10 MG capsule, TAKE ONE CAPSULE BY MOUTH FOUR TIMES DAILY AS NEEDED, Disp: 100 capsule, Rfl: 3    apixaban (ELIQUIS DVT/PE STARTER PACK) 5 MG TABS tablet, Take two tablets in the morning and two at night for seven days, then take one tablet twice daily., Disp: , Rfl:     omeprazole (PRILOSEC) 20 MG delayed release capsule, TAKE 1 CAPSULE BY MOUTH 2 TIMES DAILY (BEFORE MEALS), Disp: 180 capsule, Rfl: 3    VORTIoxetine (TRINTELLIX) 10 MG TABS tablet, TAKE 1 TABLET BY MOUTH EVERY DAY, Disp: 30 tablet, Rfl: 4    WIXELA INHUB 100-50 MCG/DOSE diskus inhaler, , Disp: , Rfl:     potassium chloride (MICRO-K) 10 MEQ extended release capsule, Take 10 mEq by mouth daily, Disp: , Rfl:     metoprolol succinate (TOPROL XL) 25 MG extended release tablet, Take 0.5 tablets by mouth daily, Disp: 30 tablet, Rfl: 5    Lactobacillus (PROBIOTIC ACIDOPHILUS PO), Take 1 tablet by mouth daily , Disp: , Rfl:     fluticasone (FLONASE) 50 MCG/ACT nasal spray, 2 sprays by Nasal route daily, Disp: 1 Bottle, Rfl: 5    tiotropium (SPIRIVA HANDIHALER) 18 MCG inhalation capsule, Inhale 1 capsule into the lungs daily, Disp: 30 capsule, Rfl: 5    albuterol sulfate HFA (VENTOLIN HFA) 108 (90 Base) MCG/ACT inhaler, Inhale 2 puffs into the lungs 2 times daily as needed for Wheezing or Shortness of Breath, Disp: 1 Inhaler, Rfl: 3    aspirin EC 81 MG EC tablet, Take 1 tablet by mouth daily. , Disp: 30 tablet, Rfl: 3    fluticasone-vilanterol (BREO ELLIPTA) 100-25 MCG/INH AEPB inhaler, Inhale 1 puff into the lungs daily, Disp: 1 each, Rfl: 5  Allergies: Iodides; Nsaids; and Sulfa antibiotics  Social History:    reports that she has been smoking cigarettes. She started smoking about 16 months ago. She has a 20.00 pack-year smoking history. She has never used smokeless tobacco. She reports that she does not drink alcohol or use drugs.   Family History:   Family History   Problem Relation Age of Onset    Heart Disease Father     High Blood Pressure Father    [facility] Cancer Sister         uterine       REVIEW OF SYSTEMS:   CONSTITUTIONAL: Denies unexplained weight loss, fevers, chills or fatigue  NEUROLOGICAL: Denies unsteady gait or progressive weakness  MUSCULOSKELETAL: Denies joint swelling or redness  GI: Denies nausea, vomiting, diarrhea   : Denies bowel or bladder issues       PHYSICAL EXAM:    Vitals: Temperature 96.8 °F (36 °C), resp. rate 14, height 5' 4.02\" (1.626 m), weight 116 lb 2.9 oz (52.7 kg), not currently breastfeeding. GENERAL EXAM:  · General Apparence: Patient is adequately groomed with no evidence of malnutrition. · Psychiatric: Orientation: The patient is oriented to time, place and person. The patient's mood and affect are appropriate   · Vascular: Examination reveals no swelling and palpation reveals no tenderness in upper or lower extremities. Good capillary refill. · The lymphatic examination of the neck, axillae and groin reveals all areas to be without enlargement or induration  · Sensation is intact without deficit in the upper and lower extremities to light touch and pinprick  · Coordination of the upper and lower extremities are normal.  · RIGHT UPPER EXTREMITY:  Inspection/examination of the right upper extremity does not show any tenderness, deformity or injury. Range of motion is unremarkable and pain-free. There is no gross instability. There are no rashes, ulcerations or lesions. Strength and tone are normal. No atrophy or abnormal movements are noted. · LEFT UPPER EXTREMITY: Inspection/examination of the left upper extremity does not show any tenderness, deformity or injury. Range of motion is unremarkable and pain-free. There is no gross instability. There are no rashes, ulcerations or lesions. Strength and tone are normal. No atrophy or abnormal movements are noted. LUMBAR/SACRAL EXAMINATION:  · Inspection: Local inspection shows no step-off or bruising. Lumbar alignment is normal. No instability is noted. · Palpation:   No evidence of tenderness at the midline. Lumbar paraspinal tenderness: Mild L4/5 and L5/S1 tenderness  Bursal tenderness No tenderness bilaterally  There is no paraspinal spasm.   · Range of Motion: Pain with extension and bilateral facet loading  · Strength:   Strength testing is 5/5 in all muscle groups tested. · Special Tests:   Straight leg raise and crossed SLR negative. Salvador's testing is negative bilaterally. FADIR's testing is negative bilaterally. Bowstring test negative. Slump test negative. · Skin: There are no rashes, ulcerations or lesions. · Reflexes: Reflexes are symmetrically 2+ at the patellar and ankle tendons. Clonus absent bilaterally at the feet. · Gait & station: normal, patient ambulates without assistance and no ataxia  · Additional Examinations:  · RIGHT LOWER EXTREMITY: Inspection/examination of the right lower extremity does not show any tenderness, deformity or injury. Range of motion is normal and pain-free. There is no gross instability. There are no rashes, ulcerations or lesions. Strength and tone are normal. No atrophy or abnormal movements are noted. · LEFT LOWER EXTREMITY:  Inspection/examination of the left lower extremity does not show any tenderness, deformity or injury. Range of motion is normal and pain-free. There is no gross instability. There are no rashes, ulcerations or lesions. Strength and tone are normal. No atrophy or abnormal movements are noted.       Diagnostic Testing:    CT Scan of the Lumbar Spine from 2/21/20:   FINDINGS:    BONES/ALIGNMENT: There is a stable mild chronic levoscoliosis of the lumbar    spine.  There is no acute fracture or subluxation.  The vertebral bodies are         normal in height and alignment.  The posterior elements are intact and    aligned.  No destructive osseous lesion is seen.  Mild multilevel Schmorl's    node deformities are evident.         DEGENERATIVE CHANGES: There is mild multilevel anterior endplate spondylosis         at the L3 through L5 levels.  Multilevel degenerative changes are as    follows:         At T12-L1, the disc space is stable and unremarkable.         At L1-L2, the disc space appears stable and unremarkable.         At L2-L3, there remains a stable mild diffuse disc bulge causing mild thecal    Legally authenticated by Pradip Zamora 2020-02-22 19:00:49                   Patient Name: Guevara Aguilar         sac compression though no central canal or neural foraminal stenosis.  This    is unchanged from the study of 07/11/2017.         At L3-L4, there remains a mild diffuse disc bulge causing mild thecal sac    compression though no central canal stenosis.  There is stable mild    bilateral    neural foraminal stenosis at this level.  These findings are unchanged from    the prior study.         At L4-L5, there remains a stable mild-to-moderate diffuse disc bulge which    combines with mild bilateral ligamentum flavum hypertrophy to form mild    thecal sac compression though no significant central canal stenosis. However, there is resultant mild bilateral neural foraminal stenosis at this         level as well as mild asymmetric left lateral recess stenosis with the disc    mildly encroaching upon the left exiting L5 nerve root, similar to the prior         study of 07/11/2017.         At L5-S1, there remains a stable minimal diffuse disc bulge without thecal    sac compression, central canal stenosis, or neural foraminal stenosis.         SOFT TISSUES/RETROPERITONEUM: The visualized portions of the lung bases are    clear.  Mildly dilated bilateral extrarenal pelves are again noted. Lesleigh Martin    is    a somewhat nonspecific 2.2 cm cystic structure within the right adnexal    space.  The patient is status post cholecystectomy.  The remainder of the    visualized abdominopelvic soft tissue structures are grossly unremarkable.         IMPRESSION:    1. Chronic mild levoscoliosis of the lumbar spine without acute fracture or    subluxation.     2. Mild multilevel lumbar degenerative disc disease, as fully detailed    above,    similar to the prior study of 07/11/2017. Ellwood Cooks most notable level is at    L4-L5, whereby a mild-to-moderate diffuse disc bulge combines with bilateral         ligamentum flavum hypertrophy to form bilateral neural foraminal stenosis as         well as asymmetric left lateral recess stenosis and mild encroachment of the         disc at this level upon the left exiting L5 nerve root. 3. Nonspecific 2.2 cm cystic structure within the right adnexal space. Recommend further characterization with a prompt follow-up pelvic    ultrasound. See below. EMG 9/24/2020  Impression:    Normal examination. There is no electrodiagnostic evidence of a right lower      extremity mononeuropathy, plexopathy, or radiculopathy. Results for orders placed or performed in visit on 07/13/20   CBC with Differential   Result Value Ref Range    CBC Auto Dif NA     Leukocytes, Bld 6.7 4.5 - 11.0 K/uL    RBC 4.82 3.80 - 5.20 M/uL    Hemoglobin 14.7 11.7 - 15.7 g/dL    Hematocrit 43.5 35.0 - 47.0 %    MCV 90.4 80.0 - 100 fL    MCHC 30.6 26.0 - 34.0 pg    MCHC 33.8 31.0 - 36.0 g/dL    RDW 13.3 11.0 - 15.3 %    Platelets 606 909 - 642 K/uL    MPV 9.5 7.4 - 10.4 fL    Neutrophils Segmented 51.0 40.0 - 70.0 %    Lymphocytes Absolute 38.6 15.0 - 42.0 %    Monocytes Absolute 7.4 0.0 - 8.0 %    Eosinophils Absolute 2.2 0.0 - 5.0 %    Basophils % 0.8 0.0 - 1.0 %    Differential, manual NOT INDICATED     Absolute Neut # 3.4 1.8 - 7.7 10^3/uL    Absolute Lymph # 2.6 1.0 - 4.8 10^3/uL    Absolute Mono # 0.5 0.0 - 0.8 10^3/uL    Absolute Eos # 0.1 0.0 - 0.5 10^3/uL    Absolute Baso # 0.1 0.0 - 0.2 10^3/uL     Impression:       1. Spondylosis without myelopathy or radiculopathy, lumbar region    2. DDD (degenerative disc disease), lumbar    3. Paresthesia of right lower extremity        Plan:  Clinical Course: shows no change    I discussed the diagnosis and the treatment options with Eladia Leon today.      In Summary:  The various treatment options were outlined and discussed with Eladia Leon including:  Conservative care options: physical therapy, ice, medications, bracing, and activity modification. The indications for therapeutic injections. The indications for additional imaging/laboratory studies. The indications for (possible future) interventions. After considering the various options discussed, Cassandra Mcdonald elected to pursue a course of treatment that includes the followin. Medications:  Continue anti-inflammatories with appropriate GI Precautions including to stop if develop dark tarry stools or GI upset and to take with food. 2. PT:  Encouraged to continue with Home exercise program.    3. Further studies: No further studies. 4. Interventional:  We discussed pursuing lumbar medial branch blocks for diagnostic purposes. Based on physical exam findings of increased pain with facet loading and radiologic imaging, we will pursue lumbar medial branch blocks at the Bilateral L3, L4, L5 DR levels. Risks, benefits and alternatives were discussed. These include but are not limited to bleeding, infection, increased pain, lack of pain relief and nerve injury. The patient verbalized understanding and would like to proceed. If there is significant pain relief and functional improvement, the patient may be a good candidate for Radiofrequency ablation. As such, I have confirmed the patient has met the general requirements including failure of conservative management including prescription strength analgesics, adjunctive medications were utilized , therapeutic exercise program, and no underlying addiction or behavioral disorders were identified to the ability of the provider. Symptoms are impacting their ADLs or iADLs such as walking and transferring and significant pain was noted in the HPI. Imaging studies as noted in the diagnostic imaging section of the consultation were reviewed and correlated with clinical findings. Fluoroscopy is utilized for interventional procedures.      For First Diagnostic MBB  The patient has moderate to severe pain with functional impairment for at least 3 months, predominant axial pain, not attributable to radiculopathy, physical exam findings consistent with facet joint as the presumed source of pain, absence of non-facet pathology, absence of a prior fusion at the proposed level and lack of improvement following 6 weeks of conservative management. Dual MBBs on 2 separate occasions to confirm the diagnosis will be undertaken. 5. Follow up:  1-2 weeks      Mercy Del Castillo was instructed to call the office if her symptoms worsen or if new symptoms appear prior to the next scheduled visit. She is specifically instructed to contact the office between now & her scheduled appointment if she has concerns related to her condition or if she needs assistance in scheduling the above tests. She is welcome to call for an appointment sooner if she has any additional concerns or questions. Jaiden Valdes ATC, am scribing for and in the presence of Ananda Carson PA-C.    09/29/20 9:34 AM Lizeth Silversmith, ATC    I, De Flight PAC,  personally performed the services described in this documentation as scribed by ADARSH Gomez in my presence and it is both accurate and complete. The physical examination was performed between the patient and Ananda Carson PA-C All counseling during the appointment was performed between the patient and provider. Shaka Ford PA-C  Board Certified by the [employer] on Certification of Physician Assistants  [address]  Partner of [facility] ([facility])             This dictation was performed with a verbal recognition program [facility]) and it was checked for errors. It is possible that there are still dictated errors within this office note. If so, please bring any errors to my attention for an addendum. All efforts were made to ensure that this office note is accurate.

## 2020-10-13 ENCOUNTER — HOSPITAL ENCOUNTER (OUTPATIENT)
Age: 70
Setting detail: OUTPATIENT SURGERY
Discharge: HOME | End: 2020-10-13
Attending: PHYSICAL MEDICINE & REHABILITATION | Admitting: PHYSICAL MEDICINE & REHABILITATION
Payer: MEDICARE

## 2020-10-13 VITALS
RESPIRATION RATE: 16 BRPM | SYSTOLIC BLOOD PRESSURE: 167 MMHG | OXYGEN SATURATION: 99 % | TEMPERATURE: 97.2 F | HEART RATE: 60 BPM | BODY MASS INDEX: 20.14 KG/M2 | DIASTOLIC BLOOD PRESSURE: 77 MMHG | HEIGHT: 64 IN | WEIGHT: 118 LBS

## 2020-10-13 PROCEDURE — 3600000002 HC SURGERY LEVEL 2 BASE: Performed by: PHYSICAL MEDICINE & REHABILITATION

## 2020-10-13 PROCEDURE — 6360000004 HC RX CONTRAST MEDICATION: Performed by: PHYSICAL MEDICINE & REHABILITATION

## 2020-10-13 PROCEDURE — 2709999900 HC NON-CHARGEABLE SUPPLY: Performed by: PHYSICAL MEDICINE & REHABILITATION

## 2020-10-13 PROCEDURE — 7100000010 HC PHASE II RECOVERY - FIRST 15 MIN: Performed by: PHYSICAL MEDICINE & REHABILITATION

## 2020-10-13 PROCEDURE — A9577 INJ MULTIHANCE: HCPCS | Performed by: PHYSICAL MEDICINE & REHABILITATION

## 2020-10-13 PROCEDURE — 2500000003 HC RX 250 WO HCPCS: Performed by: PHYSICAL MEDICINE & REHABILITATION

## 2020-10-13 RX ORDER — BUPIVACAINE HYDROCHLORIDE 5 MG/ML
INJECTION, SOLUTION EPIDURAL; INTRACAUDAL
Status: DISCONTINUED
Start: 2020-10-13 | End: 2020-10-13 | Stop reason: HOSPADM

## 2020-10-13 RX ORDER — LIDOCAINE HYDROCHLORIDE 10 MG/ML
INJECTION, SOLUTION EPIDURAL; INFILTRATION; INTRACAUDAL; PERINEURAL PRN
Status: DISCONTINUED | OUTPATIENT
Start: 2020-10-13 | End: 2020-10-13 | Stop reason: ALTCHOICE

## 2020-10-13 RX ORDER — BUPIVACAINE HYDROCHLORIDE 5 MG/ML
INJECTION, SOLUTION EPIDURAL; INTRACAUDAL PRN
Status: DISCONTINUED | OUTPATIENT
Start: 2020-10-13 | End: 2020-10-13 | Stop reason: ALTCHOICE

## 2020-10-13 ASSESSMENT — PAIN DESCRIPTION - DESCRIPTORS: DESCRIPTORS: ACHING;BURNING;SHARP;SHOOTING

## 2020-10-13 ASSESSMENT — PAIN - FUNCTIONAL ASSESSMENT
PAIN_FUNCTIONAL_ASSESSMENT: 0-10
PAIN_FUNCTIONAL_ASSESSMENT: PREVENTS OR INTERFERES SOME ACTIVE ACTIVITIES AND ADLS

## 2020-10-13 NOTE — H&P
HISTORY AND PHYSICAL/PRE-SEDATION ASSESSMENT    Patient:  Ino Ugalde   :  1950  Medical Record No.:  [id number]   Date:  10/13/2020  Physician:  Mike Love M.D. Facility: [facility]    HISTORY OF PRESENT ILLNESS:                 The patient is a 69 y.o. female whom presents with lower back pain. Review of the imaging and physical exam of the patient confirmed the pre-procedure diagnosis. After a thorough discussion of risks, benefits and alternatives informed consent was obtained.                 Past Medical History:   Past Medical History:   Diagnosis Date    Arthritis     COPD (chronic obstructive pulmonary disease) ([facility] [address] [facility]     Depression     Hyperlipidemia     Hypertension     Irritable bowel syndrome (IBS)     Pacemaker       Past Surgical History:     Past Surgical History:   Procedure Laterality Date    CARPAL TUNNEL RELEASE Right 2017    CATARACT REMOVAL WITH IMPLANT Left 2016    Phacoemulsification/ Posterior Chamber Intraocular Lens Implantation with dr Sari Thomas      CHOLECYSTECTOMY, LAPAROSCOPIC  2011    COLONOSCOPY  2014    CORONARY ANGIOPLASTY      EYE SURGERY Right 2017    catarct removal with lens implant    HERNIA REPAIR  2016    open ventral incisional    HYSTERECTOMY      OTHER SURGICAL HISTORY  2016    OPEN VENTRAL INCISIONAL HERNIA REPAIR     [facility]    PAIN MANAGEMENT PROCEDURE Right 3/10/2020    RIGHT LUMBAR FOUR AND LUMBAR FIVE TRANSFORAMINAL EPIDURAL STEROID INJECTION SITE CONFIRMED BY FLUOROSCOPY performed by Mike Love MD at [address] [facility] Right 2020    RIGHT LUMBAR FOUR AND LUMBAR FIVE TRANSFORAMINAL EPIDURAL STEROID INJECTION SITE CONFIRMED BY FLUOROSCOPY performed by Mike Love MD at [address] [place] [address] ENDOSCOPY       Current Medications:   Prior to Admission medications    Medication Sig Start Date End Date Taking? Authorizing Provider   apixaban (ELIQUIS) 5 MG TABS tablet Take 5 mg by mouth 2 times daily 8/10/20  Yes Historical Provider, MD   tiZANidine (ZANAFLEX) 4 MG tablet Take 0.5 tablets by mouth 2 times daily 9/15/20 10/15/20 Yes MIRANDA Whipple   ezetimibe (ZETIA) 10 MG tablet TAKE 1 TABLET BY MOUTH DAILY. 9/8/20  Yes JAYLYN Rincon CNP   dicyclomine (BENTYL) 10 MG capsule TAKE ONE CAPSULE BY MOUTH FOUR TIMES DAILY AS NEEDED 8/10/20  Yes JAYLYN Rincon CNP   omeprazole (PRILOSEC) 20 MG delayed release capsule TAKE 1 CAPSULE BY MOUTH 2 TIMES DAILY (BEFORE MEALS) 5/26/20  Yes JAYLYN Rincon CNP   VORTIoxetine (TRINTELLIX) 10 MG TABS tablet TAKE 1 TABLET BY MOUTH EVERY DAY 4/29/20  Yes Edmond Rice MD   metoprolol succinate (TOPROL XL) 25 MG extended release tablet Take 0.5 tablets by mouth daily 1/7/20  Yes JAYLYN Rincon CNP   Lactobacillus (PROBIOTIC ACIDOPHILUS PO) Take 1 tablet by mouth daily    Yes Historical Provider, MD   fluticasone Dorean Everts) 50 MCG/ACT nasal spray 2 sprays by Nasal route daily 1/8/19  Yes JAYLYN Rincon CNP   aspirin EC 81 MG EC tablet Take 1 tablet by mouth daily. 3/17/15  Yes Pebbles Cotton MD   apixaban Zilphia Sprain DVT/PE STARTER PACK) 5 MG TABS tablet Take two tablets in the morning and two at night for seven days, then take one tablet twice daily.  6/27/20   Historical Provider, MD Tino Dee 100-50 MCG/DOSE diskus inhaler  2/3/20   Historical Provider, MD   potassium chloride (MICRO-K) 10 MEQ extended release capsule Take 10 mEq by mouth daily    Historical Provider, MD   fluticasone-vilanterol (BREO ELLIPTA) 100-25 MCG/INH AEPB inhaler Inhale 1 puff into the lungs daily 10/10/19 1/7/20  Edmond Rice MD   tiotropium (Willy Hedges) 18 MCG inhalation capsule Inhale 1 capsule into the lungs daily 1/8/19   JAYLYN Rincon - CNP   albuterol sulfate HFA (VENTOLIN HFA) 108 (90 Base) MCG/ACT inhaler Inhale 2 puffs into the lungs 2 times daily as needed for Wheezing or Shortness of Breath 12/20/17   Aracely Sommers MD     Allergies: Iodides; Nsaids; and Sulfa antibiotics  Social History:    reports that she has been smoking cigarettes. She started smoking about 17 months ago. She has a 20.00 pack-year smoking history. She has never used smokeless tobacco. She reports that she does not drink alcohol or use drugs. Family History:   Family History   Problem Relation Age of Onset    Heart Disease Father     High Blood Pressure Father    Connye Sole Cancer Sister         uterine       Vitals: Blood pressure (!) 167/77, pulse 60, temperature 97.2 °F (36.2 °C), temperature source Temporal, resp. rate 16, height 5' 4\" (1.626 m), weight 118 lb (53.5 kg), SpO2 99 %, not currently breastfeeding. PHYSICAL EXAM:  HENT: Airway patent and reviewed  Cardiovascular: Normal rate, regular rhythm, normal heart sounds. Pulmonary/Chest: No wheezes. No rhonchi. No rales. Abdominal: Soft. Bowel sounds are normal. No distension. Extremities: Moves all extremities equally  Lumbar Spine: Painful range of motion, no midline tenderness       Diagnosis:Lumbar spondylosis without radiculopathy    Plan: Proceed with planned procedure    The patient was counseled at length about the risks of scott Covid-19 in the cristopher-operative and post-operative states including the recovery window of their procedure. The patient was made aware that scott Covid-19 after a surgical procedure may worsen their prognosis for recovering from the virus and lend to a higher morbidity and or mortality risk. The patient was given the options of postponing their procedure. All of the risks, benefits, and alternatives were discussed. The patient does wish to proceed with the procedure. ASA CLASS:         []   I. Normal, healthy adult           [x]   II.  Mild systemic disease            []   III.   Severe systemic disease      Mallampati: Mallampati Class II - (soft palate, fauces & uvula are visible)      Sedation plan:   [x]  Local              []  Minimal                  []  General anesthesia    Patient's condition acceptable for planned procedure/sedation. Post Procedure Plan   Return to same level of care   ______________________     The risks and benefits as well as alternatives to the procedure have been discussed with the patient and or family. The patient and or next of kin understands and agrees to proceed.     Teri Cesar M.D.

## 2020-10-20 ENCOUNTER — TELEPHONE (OUTPATIENT)
Dept: ORTHOPEDIC SURGERY | Age: 70
End: 2020-10-20

## 2020-10-20 ENCOUNTER — OFFICE VISIT (OUTPATIENT)
Dept: ORTHOPEDIC SURGERY | Age: 70
End: 2020-10-20
Payer: MEDICARE

## 2020-10-20 VITALS — WEIGHT: 118 LBS | HEIGHT: 64 IN | RESPIRATION RATE: 12 BRPM | BODY MASS INDEX: 20.14 KG/M2 | TEMPERATURE: 96.8 F

## 2020-10-20 PROCEDURE — 99214 OFFICE O/P EST MOD 30 MIN: CPT | Performed by: STUDENT IN AN ORGANIZED HEALTH CARE EDUCATION/TRAINING PROGRAM

## 2020-10-20 NOTE — TELEPHONE ENCOUNTER
Patient needs scheduled for an injection. Pre-Procedure sheet was given to the patient. x1 (SCS TRIAL W/ NEVRO)      Please call patient to schedule once able to do so. CT TSP ordered today. SCS Kimmy underwent today. Patient also on Eliquis.

## 2020-10-20 NOTE — PROGRESS NOTES
Follow up: Shyann 26  1950  [id number]      CHIEF COMPLAINT:    Chief Complaint   Patient presents with    Lower Back Pain     F/u MBB Bilateral L3, L4, L5 DR  #1  10/13         HISTORY OF PRESENT ILLNESS:  Ms. Juan M Astorga is a 69 y.o. female returns for a follow up visit for multiple medical problems. Her current presenting problems are   1. Thoracic stenosis    2. Lumbar radiculopathy    3. Spondylosis without myelopathy or radiculopathy, lumbar region    . As per information/history obtained from the PADT(patient assessment and documentation tool) - She complains of pain in the lower back with radiation to the lower leg Right She rates the pain 4/10 and describes it as sharp, dull, aching, burning, throbbing, numbness. Pain is made worse by: standing, sitting, lying down. She denies side effects from the current pain regimen. Patient reports that since the last follow up visit the physical functioning is worse, family/social relationships are worse, mood is worse and sleep patterns are worse, and that the overall functioning is worse. Patient denies neurological bowel or bladder. Ms. Juan M Astorga presents for follow-up of her ongoing low back and right leg pain. She recently underwent her first set of lumbar medial branch blocks on 10/13. She reports no relief following this procedure. She continues to complain of low back pain with radiation down the right leg to the calf. Pain is worse with lying, sitting and standing for prolonged periods. She has not been able to identify any alleviating measures. The patient has undergone 2 lumbar transforaminal epidural steroid injections at L4-L5 on 3/10/2020 and 6/16/2020. She describes she did not get any relief with these procedures. The patient and her daughter are interested in discussing other treatment options as no interventions have helped at this point.     This patient has also undergone a battery of psychological tests and the results are reported below:   ADAM / 66% Moderate anxiety   PHQ / 33% Minimal symptoms   PSEQ / 25 Low on self-efficacy beliefs   Oswestry / 40% Moderate Disability  Patient Psychological Assessment Results  ADAM   1: How often do you feel nervous, anxious or on edge?  Nearly every day   2: How often are you not able to stop worrying?  More than 4 days a week   3: How often do you find yourself worrying too much about many different things?  More than 4 days a week   4: How often do you have trouble relaxing?  More than 4 days a week   5: How often do you find yourself being so restless that it is hard to sit still?  More than 4 days a week   6: How often do you find yourself becoming easily annoyed or irritable?  More than 4 days a week   7: How often do you find yourself feeling afraid as if something awful might happen?  1 or 2 days a week  PHQ   1: How often do you find yourself feeling little interest or pleasure in doing things?  1 or 2 days a week   2: How often do you find yourself feeling down, depressed, or hopeless?  Not at all   3: How often do you find yourself having trouble sleeping or sleeping too much?  More than 4 days a week   4: How often do you find yourself feeling tired or having little energy?  1 or 2 days a week   5: How often do you find yourself having a poor appetite or overeating?  More than 4 days a week   6: How often do you find yourself feeling bad about yourself, or that you are a failure, or that you have let yourself or your family down?  1 or 2 days a week   7: How often do you have trouble concentrating on things, such as reading or watching television?  More than 4 days a week   8: How often do you find yourself either moving so slowly that other people have noticed, or being so fidgety or restless that you have been moving around a lot more than usual?   Not at all   9:  How often do you find yourself thinking that you would be better off dead or of hurting yourself in some way (please alert your doctor today if you feel that this is an issue for you)?  Not at all  Patient Psychological Assessment Results  PSEQ   1: I can enjoy things even though I have pain:   True and False   2: I can do most of the household chores despite my pain:   Fairly True   3: I can socialize with my friends or family members as often as I used to do, despite my pain:   Mostly True   4: I can cope with my pain in most situations:   True and False   5: I can do some form of productive work or household chores despite my pain:   True and False   6: I can still do many of the things I enjoy doing, such as hobbies or leisure activity, despite pain:   Mostly False   7: I can cope with my pain without medication:   Slightly False   8: I can still accomplish most of my goals in life despite my pain:   Mostly False   9: I can live a normal lifestyle, despite my pain:   Slightly False   10: I can gradually become more active over the course of the day despite my pain:   Mostly False  Oswestry   1: Part 1: What is you typical pain intensity?  The pain is moderate at the moment   2: Part 2: What about how you are able to care for yourself?  I can look after myself normally without causing extra pain   3: Part 3: How well do you lift objects?  Pain prevents me from lifting heavy weights, but I can manage light to medium weights if they are conveniently positioned.  4: Part 4: How well can you walk?  Pain prevents me from walking more than 100 yards.  5: Part 5: How well can you sit?  Pain prevents me from sitting more than one hour.  6: Part 6: How well can you stand?  Pain prevents me from standing for more than 1 hour.  7: Part 7: How well can you sleep?  My sleep is occasionally disturbed by pain.  8: Part 8: How is your social life affected?  Pain has restricted my social life and I do not go out as often.    9: Part 9: How is you ability to travel?  Pain is bad but I manage journeys over two hours.     Associated signs and symptoms:   Neurogenic bowel or bladder symptoms:  no   Perceived weakness:  yes   Difficulty walking:  yes              Past Medical History:   Past Medical History:   Diagnosis Date    Arthritis     COPD (chronic obstructive pulmonary disease) ([address]     Depression     Hyperlipidemia     Hypertension     Irritable bowel syndrome (IBS)     Pacemaker       Past Surgical History:     Past Surgical History:   Procedure Laterality Date    CARPAL TUNNEL RELEASE Right 07/13/2017    CATARACT REMOVAL WITH IMPLANT Left 12/23/2016    Phacoemulsification/ Posterior Chamber Intraocular Lens Implantation with dr Martínez Pederson  2012   [address], LAPAROSCOPIC  5/25/2011    COLONOSCOPY  7/28/2014    CORONARY ANGIOPLASTY      EYE SURGERY Right 03/09/2017    catarct removal with lens implant    HERNIA REPAIR  08/09/2016    open ventral incisional    HYSTERECTOMY      OTHER SURGICAL HISTORY  08/09/2016    OPEN VENTRAL INCISIONAL HERNIA REPAIR     PACEMAKER PLACEMENT  06/25/2019    [address]    PAIN MANAGEMENT PROCEDURE Right 3/10/2020    RIGHT LUMBAR FOUR AND LUMBAR FIVE TRANSFORAMINAL EPIDURAL STEROID INJECTION SITE CONFIRMED BY FLUOROSCOPY performed by Gerald Pacheco MD at [address] [place] [address] Right 6/16/2020    RIGHT LUMBAR FOUR AND LUMBAR FIVE TRANSFORAMINAL EPIDURAL STEROID INJECTION SITE CONFIRMED BY FLUOROSCOPY performed by Gerald Pacheco MD at [address] [place] [address] Bilateral 10/13/2020    BILATERAL LUMBAR THREE LUMBAR FOUR LUMBAR DORSAL RAMUS LUMBAR MEDIAL BRANCH BLOCKS performed by Gerald Pacheco MD at [address] [place] [address]       Current Medications:     Current Outpatient Medications:     apixaban (ELIQUIS) 5 MG TABS tablet, Take 5 mg by mouth 2 times daily, Disp: , Rfl:     ezetimibe (ZETIA) 10 MG tablet, TAKE 1 TABLET BY MOUTH DAILY. , Disp: 30 tablet, Rfl: 5    dicyclomine (BENTYL) 10 MG capsule, TAKE ONE CAPSULE BY MOUTH FOUR TIMES DAILY AS NEEDED, Disp: 100 capsule, Rfl: 3    apixaban (ELIQUIS DVT/PE STARTER PACK) 5 MG TABS tablet, Take two tablets in the morning and two at night for seven days, then take one tablet twice daily. , Disp: , Rfl:     omeprazole (PRILOSEC) 20 MG delayed release capsule, TAKE 1 CAPSULE BY MOUTH 2 TIMES DAILY (BEFORE MEALS), Disp: 180 capsule, Rfl: 3    VORTIoxetine (TRINTELLIX) 10 MG TABS tablet, TAKE 1 TABLET BY MOUTH EVERY DAY, Disp: 30 tablet, Rfl: 4    WIXELA INHUB 100-50 MCG/DOSE diskus inhaler, , Disp: , Rfl:     potassium chloride (MICRO-K) 10 MEQ extended release capsule, Take 10 mEq by mouth daily, Disp: , Rfl:     metoprolol succinate (TOPROL XL) 25 MG extended release tablet, Take 0.5 tablets by mouth daily, Disp: 30 tablet, Rfl: 5    Lactobacillus (PROBIOTIC ACIDOPHILUS PO), Take 1 tablet by mouth daily , Disp: , Rfl:     fluticasone (FLONASE) 50 MCG/ACT nasal spray, 2 sprays by Nasal route daily, Disp: 1 Bottle, Rfl: 5    tiotropium (SPIRIVA HANDIHALER) 18 MCG inhalation capsule, Inhale 1 capsule into the lungs daily, Disp: 30 capsule, Rfl: 5    albuterol sulfate HFA (VENTOLIN HFA) 108 (90 Base) MCG/ACT inhaler, Inhale 2 puffs into the lungs 2 times daily as needed for Wheezing or Shortness of Breath, Disp: 1 Inhaler, Rfl: 3    aspirin EC 81 MG EC tablet, Take 1 tablet by mouth daily. , Disp: 30 tablet, Rfl: 3    fluticasone-vilanterol (BREO ELLIPTA) 100-25 MCG/INH AEPB inhaler, Inhale 1 puff into the lungs daily, Disp: 1 each, Rfl: 5  Allergies: Iodides; Nsaids; and Sulfa antibiotics  Social History:    reports that she has been smoking cigarettes. She started smoking about 17 months ago. She has a 20.00 pack-year smoking history. She has never used smokeless tobacco. She reports that she does not drink alcohol or use drugs.   Family History:   Family History Problem Relation Age of Onset    Heart Disease Father     High Blood Pressure Father    Ardyth Moritz Cancer Sister         uterine       REVIEW OF SYSTEMS:   CONSTITUTIONAL: Denies unexplained weight loss, fevers, chills or fatigue  NEUROLOGICAL: Denies unsteady gait or progressive weakness  MUSCULOSKELETAL: Denies joint swelling or redness  GI: Denies nausea, vomiting, diarrhea   : Denies bowel or bladder issues       PHYSICAL EXAM:    Vitals: Temperature 96.8 °F (36 °C), resp. rate 12, height 5' 4\" (1.626 m), weight 118 lb (53.5 kg), not currently breastfeeding. GENERAL EXAM:  · General Apparence: Patient is adequately groomed with no evidence of malnutrition. · Psychiatric: Orientation: The patient is oriented to time, place and person. The patient's mood and affect are appropriate   · Vascular: Examination reveals no swelling and palpation reveals no tenderness in upper or lower extremities. Good capillary refill. · The lymphatic examination of the neck, axillae and groin reveals all areas to be without enlargement or induration  · Sensation is intact without deficit in the upper and lower extremities to light touch and pinprick  · Coordination of the upper and lower extremities are normal.  · RIGHT UPPER EXTREMITY:  Inspection/examination of the right upper extremity does not show any tenderness, deformity or injury. Range of motion is unremarkable and pain-free. There is no gross instability. There are no rashes, ulcerations or lesions. Strength and tone are normal. No atrophy or abnormal movements are noted. · LEFT UPPER EXTREMITY: Inspection/examination of the left upper extremity does not show any tenderness, deformity or injury. Range of motion is unremarkable and pain-free. There is no gross instability. There are no rashes, ulcerations or lesions. Strength and tone are normal. No atrophy or abnormal movements are noted.     LUMBAR/SACRAL EXAMINATION:  · Inspection: Local inspection shows no step-off or bruising. Lumbar alignment is normal. No instability is noted. · Palpation:   No evidence of tenderness at the midline. Lumbar paraspinal tenderness: Mild L4/5 and L5/S1 tenderness  Bursal tenderness No tenderness bilaterally  There is no paraspinal spasm. · Range of Motion: limited by 50% in all planes due to pain  · Strength:   Strength testing is 5/5 in all muscle groups tested. · Special Tests:   Straight leg raise and crossed SLR negative. Salvador's testing is negative bilaterally. FADIR's testing is negative bilaterally. · Skin: There are no rashes, ulcerations or lesions. · Reflexes: Reflexes are symmetrically 2+ at the patellar and ankle tendons. Clonus absent bilaterally at the feet. · Gait & station: antalgic on the right  · Additional Examinations:  · RIGHT LOWER EXTREMITY: Inspection/examination of the right lower extremity does not show any tenderness, deformity or injury. Range of motion is normal and pain-free. There is no gross instability. There are no rashes, ulcerations or lesions. Strength and tone are normal. No atrophy or abnormal movements are noted. · LEFT LOWER EXTREMITY:  Inspection/examination of the left lower extremity does not show any tenderness, deformity or injury. Range of motion is normal and pain-free. There is no gross instability. There are no rashes, ulcerations or lesions. Strength and tone are normal. No atrophy or abnormal movements are noted.       Diagnostic Testing:    CT Scan of the Lumbar Spine from 2/21/20:   FINDINGS:    BONES/ALIGNMENT: There is a stable mild chronic levoscoliosis of the lumbar    spine.  There is no acute fracture or subluxation.  The vertebral bodies are         normal in height and alignment.  The posterior elements are intact and    aligned.  No destructive osseous lesion is seen.  Mild multilevel Schmorl's    node deformities are evident.         DEGENERATIVE CHANGES: There is mild multilevel anterior endplate spondylosis         at fracture or    subluxation. 2. Mild multilevel lumbar degenerative disc disease, as fully detailed    above,    similar to the prior study of 07/11/2017. Lyndia Nolberto most notable level is at    L4-L5, whereby a mild-to-moderate diffuse disc bulge combines with bilateral         ligamentum flavum hypertrophy to form bilateral neural foraminal stenosis as         well as asymmetric left lateral recess stenosis and mild encroachment of the         disc at this level upon the left exiting L5 nerve root. 3. Nonspecific 2.2 cm cystic structure within the right adnexal space. Recommend further characterization with a prompt follow-up pelvic    ultrasound. See below. Results for orders placed or performed in visit on 07/13/20   CBC with Differential   Result Value Ref Range    CBC Auto Dif NA     Leukocytes, Bld 6.7 4.5 - 11.0 K/uL    RBC 4.82 3.80 - 5.20 M/uL    Hemoglobin 14.7 11.7 - 15.7 g/dL    Hematocrit 43.5 35.0 - 47.0 %    MCV 90.4 80.0 - 100 fL    MCHC 30.6 26.0 - 34.0 pg    MCHC 33.8 31.0 - 36.0 g/dL    RDW 13.3 11.0 - 15.3 %    Platelets 149 633 - 021 K/uL    MPV 9.5 7.4 - 10.4 fL    Neutrophils Segmented 51.0 40.0 - 70.0 %    Lymphocytes Absolute 38.6 15.0 - 42.0 %    Monocytes Absolute 7.4 0.0 - 8.0 %    Eosinophils Absolute 2.2 0.0 - 5.0 %    Basophils % 0.8 0.0 - 1.0 %    Differential, manual NOT INDICATED     Absolute Neut # 3.4 1.8 - 7.7 10^3/uL    Absolute Lymph # 2.6 1.0 - 4.8 10^3/uL    Absolute Mono # 0.5 0.0 - 0.8 10^3/uL    Absolute Eos # 0.1 0.0 - 0.5 10^3/uL    Absolute Baso # 0.1 0.0 - 0.2 10^3/uL     Impression:       1. Thoracic stenosis    2. Lumbar radiculopathy    3. Spondylosis without myelopathy or radiculopathy, lumbar region        Plan:  Clinical Course: Above diagnoses are worsening    I discussed the diagnosis and the treatment options with Cristi Mckenzie today.      In Summary:  The various treatment options were outlined and discussed with Cristi Mckenzie including:  Conservative care options: physical therapy, ice, medications, bracing, and activity modification. The indications for therapeutic injections. The indications for additional imaging/laboratory studies. The indications for (possible future) interventions. After considering the various options discussed, Holly Diane elected to pursue a course of treatment that includes the followin. Medications:  Continue anti-inflammatories with appropriate GI Precautions including to stop if develop dark tarry stools or GI upset and to take with food. 2. PT:  Encouraged to continue with HEP. 3. Further studies:  Obtain a CT thoracic spine without contrast for preprocedural planning. Psychological assessment was completed today in the office. 4. Interventional:  We discussed pursuing a spinal cord stimulation trial to address the pain due to failed conservative management with lumbar epidural steroid injections and lumbar medial branch blocks. Risks, benefits and alternatives of interventional options were discussed. These include and are not limited to bleeding, infection, nerve injury and lack of pain relief. The patient verbalized understanding and would like to proceed. 5. Follow up:  2-3 weeks      Holly Diane was instructed to call the office if her symptoms worsen or if new symptoms appear prior to the next scheduled visit. She is specifically instructed to contact the office between now & her scheduled appointment if she has concerns related to her condition or if she needs assistance in scheduling the above tests. She is welcome to call for an appointment sooner if she has any additional concerns or questions.        Marietta Skinner PA-C   Board Certified by the [employer] on Certification of Physician Assistants  [address] [facility] ([facility])             This dictation was performed with a verbal recognition program (DRAGON) and it was checked for errors. It is possible that there are still dictated errors within this office note. If so, please bring any errors to my attention for an addendum. All efforts were made to ensure that this office note is accurate.

## 2020-10-20 NOTE — LETTER
Please schedule the following with:     Date:   20 @     Account: [de-identified]  Patient: Quoc Marks    : 1950  [place] [address]    Phone (H):  [phone] (home)      ----------------------------------------------------------------------------------------------  Diagnosis:     ICD-10-CM    1. Lumbar radiculopathy  M54.16    2. Spondylosis without myelopathy or radiculopathy, lumbar region  M47.816    3. DDD (degenerative disc disease), lumbar  M51.36    4. Paresthesia of right lower extremity  R20.2      Procedure: Spinal Cord Stimulator Trial with Nevro     Levels: Lumbar   Side: Midline   CPT Codes [id number], [id number], 29175    ----------------------------------------------------------------------------------------------  Injection #   [facility]    Attending Physician       Shireen Shook.  Malou Grace MD.  ----------------------------------------------------------------------------------------------  Injection Scheduled For:    At:    1st Insurance [facility]    Pre-Cert#    2nd Insurance [facility]   Pre-Cert#    Comments:  COVID TEST Needed: yes    · Infection control  · Tested positive for MRSA in past 12 months:  no  · Tested positive for MSSA \"staph infection\" in past 12 months: no  · Tested positive for VRE (Vancomycin Resistant Enterococci) in past 12 months:   no  · Currently on any antibiotics for an infection: no  · Anticoagulants:  · On a blood thinner:  yes Judy Plasencia [facility] [phone]   · Any history of bleeding disorder: no   · Advanced Liver disease: no   · Advanced Renal disease: no   · Glaucoma: no   · Diabetes: no     Sedation:  Yes  -----------------------------------------------------------------------------------------------  Allergies   Allergen Reactions    Iodides Hives and Itching    Nsaids      Previous stomach bleed      Sulfa Antibiotics Rash

## 2020-10-20 NOTE — TELEPHONE ENCOUNTER
[facility]  Patient will be called for final consult once authorization has been obtained

## 2020-10-21 ENCOUNTER — TELEPHONE (OUTPATIENT)
Dept: ORTHOPEDIC SURGERY | Age: 70
End: 2020-10-21

## 2020-10-21 NOTE — TELEPHONE ENCOUNTER
[facility] INSTRUCTING PATIENT TO CALL BACK TO SCHEDULE AN OFFICE APPT: SCS FINAL CONSULT.    NOTIFIED OF Colton Herring

## 2020-10-21 NOTE — TELEPHONE ENCOUNTER
Auth: NPR  Date: 11/04/2020  Reference # None  Spoke with: None  Type of SX: Outpatient SCS Trial  Location: [address] [place] [address] [id number]   SX area: Back  Insurance: Medicare A&B

## 2020-10-26 ENCOUNTER — TELEPHONE (OUTPATIENT)
Dept: FAMILY MEDICINE CLINIC | Age: 70
End: 2020-10-26

## 2020-11-03 ENCOUNTER — OFFICE VISIT (OUTPATIENT)
Dept: ORTHOPEDIC SURGERY | Age: 70
End: 2020-11-03
Payer: MEDICARE

## 2020-11-03 VITALS — RESPIRATION RATE: 14 BRPM | BODY MASS INDEX: 20.14 KG/M2 | TEMPERATURE: 97 F | HEIGHT: 64 IN | WEIGHT: 117.95 LBS

## 2020-11-03 PROCEDURE — 4004F PT TOBACCO SCREEN RCVD TLK: CPT | Performed by: PHYSICAL MEDICINE & REHABILITATION

## 2020-11-03 PROCEDURE — G8427 DOCREV CUR MEDS BY ELIG CLIN: HCPCS | Performed by: PHYSICAL MEDICINE & REHABILITATION

## 2020-11-03 PROCEDURE — 1123F ACP DISCUSS/DSCN MKR DOCD: CPT | Performed by: PHYSICAL MEDICINE & REHABILITATION

## 2020-11-03 PROCEDURE — 3017F COLORECTAL CA SCREEN DOC REV: CPT | Performed by: PHYSICAL MEDICINE & REHABILITATION

## 2020-11-03 PROCEDURE — G8420 CALC BMI NORM PARAMETERS: HCPCS | Performed by: PHYSICAL MEDICINE & REHABILITATION

## 2020-11-03 PROCEDURE — 4040F PNEUMOC VAC/ADMIN/RCVD: CPT | Performed by: PHYSICAL MEDICINE & REHABILITATION

## 2020-11-03 PROCEDURE — 1090F PRES/ABSN URINE INCON ASSESS: CPT | Performed by: PHYSICAL MEDICINE & REHABILITATION

## 2020-11-03 PROCEDURE — G8400 PT W/DXA NO RESULTS DOC: HCPCS | Performed by: PHYSICAL MEDICINE & REHABILITATION

## 2020-11-03 PROCEDURE — 99214 OFFICE O/P EST MOD 30 MIN: CPT | Performed by: PHYSICAL MEDICINE & REHABILITATION

## 2020-11-03 PROCEDURE — G8484 FLU IMMUNIZE NO ADMIN: HCPCS | Performed by: PHYSICAL MEDICINE & REHABILITATION

## 2020-11-03 NOTE — LETTER
Please schedule the following with:     Date:   20 @ 9:15   Account: [de-identified]  Patient: Michael Penn    : 1950  [place]:  [place] [address]    Phone (H):  [phone] (home)      ----------------------------------------------------------------------------------------------  Diagnosis:     ICD-10-CM    1. Sacroiliac joint dysfunction of right side  M53.3    2. Spondylosis without myelopathy or radiculopathy, lumbar region  M47.816    3. DDD (degenerative disc disease), lumbar  M51.36          Levels:Right Sacroiliac joint injection  CPT Codes 20296    ----------------------------------------------------------------------------------------------  Injection # 1   [email or web address]    Attending Physician       Komal Warner. Aydee Trejo MD.  ----------------------------------------------------------------------------------------------  Injection Scheduled For:    At:    1st Insurance [facility]    Pre-Cert#    2nd Insurance [facility]   Pre-Cert#    Comments or Special instructions:  COVID TEST: no    · Infection control  ? Tested positive for MRSA in past 12 months:  no  ? Tested positive for MSSA \"staph infection\" in past 12 months: no  ? Tested positive for VRE (Vancomycin Resistant Enterococci) in past 12 months:   no  ? Currently on any antibiotics for an infection: no  · Anticoagulants:  ? On a blood thinner:  yes , ASA and Eliquis  ?  Any history of bleeding disorder: no   · Advanced Liver disease: no   · Advanced Renal disease: no   · Glaucoma: no   · Diabetes: no      Sedation:         No  -----------------------------------------------------------------------------------------------  Allergies   Allergen Reactions    Iodides Hives and Itching    Nsaids      Previous stomach bleed      Sulfa Antibiotics Rash

## 2020-11-03 NOTE — PROGRESS NOTES
Follow up: Daniel Hamlin  1950  [id number]         Chief Complaint   Patient presents with    Lower Back Pain     10/13/2020: LMBB #1 B/L L3, L4, L5          HISTORY OF PRESENT ILLNESS:  Ms. Erika Christensen is a 69 y.o. female returns for a follow up visit for multiple medical problems. Her current presenting problems are   1. Sacroiliac joint dysfunction of right side    2. Spondylosis without myelopathy or radiculopathy, lumbar region    3. DDD (degenerative disc disease), lumbar    . As per information/history obtained from the PADT(patient assessment and documentation tool) - She complains of pain in the lower back with radiation to the hips Right She rates the pain 6/10 and describes it as dull. Pain is made worse by: walking. She denies side effects from the current pain regimen. Patient reports that since the last follow up visit the physical functioning is unchanged, family/social relationships are unchanged, mood is unchanged and sleep patterns are unchanged, and that the overall functioning is unchanged currently, but worsening since February 2020. Patient denies neurological bowel or bladder. She underwent lumbar medial branch blocks with little relief. Associated signs and symptoms:   Neurogenic bowel or bladder symptoms:  no   Perceived weakness:  no   Difficulty walking:  yes            Past medical, surgical, social and family history reviewed with the patient.  No pertinent relevant history  Past Medical History:   Past Medical History:   Diagnosis Date    Arthritis     COPD (chronic obstructive pulmonary disease) ([facility])     Depression     Hyperlipidemia     Hypertension     Irritable bowel syndrome (IBS)     Pacemaker       Past Surgical History:     Past Surgical History:   Procedure Laterality Date    CARPAL TUNNEL RELEASE Right 07/13/2017    CATARACT REMOVAL WITH IMPLANT Left 12/23/2016    Phacoemulsification/ Posterior Chamber Intraocular Lens Implantation with dr Ronny Herrera [facility]  2012    CHOLECYSTECTOMY, LAPAROSCOPIC  5/25/2011    COLONOSCOPY  7/28/2014    CORONARY ANGIOPLASTY      EYE SURGERY Right 03/09/2017    catarct removal with lens implant    HERNIA REPAIR  08/09/2016    open ventral incisional    HYSTERECTOMY      OTHER SURGICAL HISTORY  08/09/2016    OPEN VENTRAL INCISIONAL HERNIA REPAIR     PACEMAKER PLACEMENT  06/25/2019    [address]    PAIN MANAGEMENT PROCEDURE Right 3/10/2020    RIGHT LUMBAR FOUR AND LUMBAR FIVE TRANSFORAMINAL EPIDURAL STEROID INJECTION SITE CONFIRMED BY FLUOROSCOPY performed by Hank Zapata MD at [address] [facility] Right 6/16/2020    RIGHT LUMBAR FOUR AND LUMBAR FIVE TRANSFORAMINAL EPIDURAL STEROID INJECTION SITE CONFIRMED BY FLUOROSCOPY performed by Hank Zapata MD at [address] [facility] Bilateral 10/13/2020    BILATERAL LUMBAR THREE LUMBAR FOUR LUMBAR DORSAL RAMUS LUMBAR MEDIAL BRANCH BLOCKS performed by Hank Zapata MD at [address] [place] [address],[facility] ENDOSCOPY       Current Medications:     Current Outpatient Medications:     apixaban (ELIQUIS) 5 MG TABS tablet, Take 5 mg by mouth 2 times daily, Disp: , Rfl:     ezetimibe (ZETIA) 10 MG tablet, TAKE 1 TABLET BY MOUTH DAILY. , Disp: 30 tablet, Rfl: 5    dicyclomine (BENTYL) 10 MG capsule, TAKE ONE CAPSULE BY MOUTH FOUR TIMES DAILY AS NEEDED, Disp: 100 capsule, Rfl: 3    apixaban (ELIQUIS DVT/PE STARTER PACK) 5 MG TABS tablet, Take two tablets in the morning and two at night for seven days, then take one tablet twice daily. , Disp: , Rfl:     omeprazole (PRILOSEC) 20 MG delayed release capsule, TAKE 1 CAPSULE BY MOUTH 2 TIMES DAILY (BEFORE MEALS), Disp: 180 capsule, Rfl: 3    VORTIoxetine (TRINTELLIX) 10 MG TABS tablet, TAKE 1 TABLET BY MOUTH EVERY DAY, Disp: 30 tablet, Rfl: 4    WIXELA INHUB 100-50 MCG/DOSE diskus inhaler, , Disp: , Rfl:     potassium chloride (MICRO-K) 10 MEQ extended release capsule, Take 10 mEq by mouth daily, Disp: , Rfl:     metoprolol succinate (TOPROL XL) 25 MG extended release tablet, Take 0.5 tablets by mouth daily, Disp: 30 tablet, Rfl: 5    Lactobacillus (PROBIOTIC ACIDOPHILUS PO), Take 1 tablet by mouth daily , Disp: , Rfl:     fluticasone (FLONASE) 50 MCG/ACT nasal spray, 2 sprays by Nasal route daily, Disp: 1 Bottle, Rfl: 5    tiotropium (SPIRIVA HANDIHALER) 18 MCG inhalation capsule, Inhale 1 capsule into the lungs daily, Disp: 30 capsule, Rfl: 5    albuterol sulfate HFA (VENTOLIN HFA) 108 (90 Base) MCG/ACT inhaler, Inhale 2 puffs into the lungs 2 times daily as needed for Wheezing or Shortness of Breath, Disp: 1 Inhaler, Rfl: 3    aspirin EC 81 MG EC tablet, Take 1 tablet by mouth daily. , Disp: 30 tablet, Rfl: 3    fluticasone-vilanterol (BREO ELLIPTA) 100-25 MCG/INH AEPB inhaler, Inhale 1 puff into the lungs daily, Disp: 1 each, Rfl: 5  Allergies: Iodides; Nsaids; and Sulfa antibiotics  Social History:    reports that she has been smoking cigarettes. She started smoking about 17 months ago. She has a 20.00 pack-year smoking history. She has never used smokeless tobacco. She reports that she does not drink alcohol or use drugs. Family History:   Family History   Problem Relation Age of Onset    Heart Disease Father     High Blood Pressure Father    Nathan Loser Cancer Sister         uterine       REVIEW OF SYSTEMS:   CONSTITUTIONAL: Denies unexplained weight loss, fevers, chills or fatigue  NEUROLOGICAL: Denies unsteady gait or progressive weakness  MUSCULOSKELETAL: Denies joint swelling or redness  GI: Denies nausea, vomiting, diarrhea   : Denies bowel or bladder issues       PHYSICAL EXAM:    Vitals: Temperature 97 °F (36.1 °C), resp. rate 14, height 5' 4.02\" (1.626 m), weight 117 lb 15.1 oz (53.5 kg), not currently breastfeeding. GENERAL EXAM:  · General Apparence: Patient is adequately groomed with no evidence of malnutrition. · Psychiatric: Orientation:  The patient is oriented to time, place and person. The patient's mood and affect are appropriate   · Vascular: Examination reveals no swelling and palpation reveals no tenderness in upper or lower extremities. Good capillary refill. · The lymphatic examination of the neck, axillae and groin reveals all areas to be without enlargement or induration  · Sensation is intact without deficit in the upper and lower extremities to light touch and pinprick  · Coordination of the upper and lower extremities are normal.  · Additional Examinations:  · RIGHT UPPER EXTREMITY:  Inspection/examination of the right upper extremity does not show any tenderness, deformity or injury. Range of motion is unremarkable and pain-free. There is no gross instability. There are no rashes, ulcerations or lesions. Strength and tone are normal. No atrophy or abnormal movements are noted. · LEFT UPPER EXTREMITY: Inspection/examination of the left upper extremity does not show any tenderness, deformity or injury. Range of motion is unremarkable and pain-free. There is no gross instability. There are no rashes, ulcerations or lesions. Strength and tone are normal. No atrophy or abnormal movements are noted. LUMBAR/SACRAL EXAMINATION:  · Inspection: Local inspection shows no step-off or bruising. Lumbar alignment is normal. No instability is noted. · Palpation:   No evidence of tenderness at the midline. Lumbar paraspinal tenderness: Mild L4/5 and L5/S1 tenderness  Bursal tenderness No tenderness bilaterally  There is no paraspinal spasm. · Range of Motion: limited by 25% in all planes due to pain  · Strength:   Strength testing is 5/5 in all muscle groups tested. · Special Tests:   Straight leg raise and crossed SLR negative. Salvador's testing is + on right. .   · Skin: There are no rashes, ulcerations or lesions. · Reflexes: Reflexes are symmetrically 2+ at the patellar and ankle tendons. Clonus absent bilaterally at the feet.   · Gait & station: antalgic on the right and no ataxia  · Additional Examinations:  · RIGHT LOWER EXTREMITY: Inspection/examination of the right lower extremity does not show any tenderness, deformity or injury. Range of motion is normal and pain-free. There is no gross instability. There are no rashes, ulcerations or lesions. Strength and tone are normal. No atrophy or abnormal movements are noted. · LEFT LOWER EXTREMITY:  Inspection/examination of the left lower extremity does not show any tenderness, deformity or injury. Range of motion is normal and pain-free. There is no gross instability. There are no rashes, ulcerations or lesions. Strength and tone are normal. No atrophy or abnormal movements are noted. Diagnostic Testing:    CT Lumbar -  1. Chronic mild levoscoliosis of the lumbar spine without acute fracture or    subluxation. 2. Mild multilevel lumbar degenerative disc disease, as fully detailed    above,    similar to the prior study of 07/11/2017. Chiki Alfonso most notable level is at    L4-L5, whereby a mild-to-moderate diffuse disc bulge combines with bilateral         ligamentum flavum hypertrophy to form bilateral neural foraminal stenosis as         well as asymmetric left lateral recess stenosis and mild encroachment of the         disc at this level upon the left exiting L5 nerve root. 3. Nonspecific 2.2 cm cystic structure within the right adnexal space. Recommend further characterization with a prompt follow-up pelvic    ultrasound. See below.         Results for orders placed or performed in visit on 07/13/20   CBC with Differential   Result Value Ref Range    CBC Auto Dif NA     Leukocytes, Bld 6.7 4.5 - 11.0 K/uL    RBC 4.82 3.80 - 5.20 M/uL    Hemoglobin 14.7 11.7 - 15.7 g/dL    Hematocrit 43.5 35.0 - 47.0 %    MCV 90.4 80.0 - 100 fL    MCHC 30.6 26.0 - 34.0 pg    MCHC 33.8 31.0 - 36.0 g/dL    RDW 13.3 11.0 - 15.3 %    Platelets 434 658 - 290 K/uL    MPV 9.5 7.4 - 10.4 fL    Neutrophils Segmented 51.0 40.0 - 70.0 %    Lymphocytes Absolute 38.6 15.0 - 42.0 %    Monocytes Absolute 7.4 0.0 - 8.0 %    Eosinophils Absolute 2.2 0.0 - 5.0 %    Basophils % 0.8 0.0 - 1.0 %    Differential, manual NOT INDICATED     Absolute Neut # 3.4 1.8 - 7.7 10^3/uL    Absolute Lymph # 2.6 1.0 - 4.8 10^3/uL    Absolute Mono # 0.5 0.0 - 0.8 10^3/uL    Absolute Eos # 0.1 0.0 - 0.5 10^3/uL    Absolute Baso # 0.1 0.0 - 0.2 10^3/uL     Impression:       1. Sacroiliac joint dysfunction of right side    2. Spondylosis without myelopathy or radiculopathy, lumbar region    3. DDD (degenerative disc disease), lumbar        Plan:  Clinical Course: Above diagnoses are worsening    I discussed the diagnosis and the treatment options with Caesar Oquendo today. In Summary:  The various treatment options were outlined and discussed with Caesar Oquendo including:  Conservative care options: physical therapy, ice, medications, bracing, and activity modification. The indications for therapeutic injections. The indications for additional imaging/laboratory studies. The indications for (possible future) interventions. After considering the various options discussed, Caesar Oquendo elected to pursue a course of treatment that includes the followin. Medications:  No further recommendations for new medications. 2. PT:  Encouraged to continue with Home exercise program.    3. Further studies: She will talk with her cardiologist about coming off [facility] for SCS trial. She has a large DVT in the left arm. 4. Interventional:  We discussed pursuing a right Sacroiliac joint injection as SI joint pathology is suspected as the etiology of the chronic low back/hip pain. There has been failure of non-invasive and conservative treatment including physical therapy/home exercise program, rest, NSAIDs or acetaminophen. Risks include but are not limited to bleeding, infection, nerve injury, increase in pain and lack of pain relief.  The patient verbalized understanding and would like to proceed. 5. Follow up:  2-3 weeks      Cristi Mckenzie was instructed to call the office if her symptoms worsen or if new symptoms appear prior to the next scheduled visit. She is specifically instructed to contact the office between now & her scheduled appointment if she has concerns related to her condition or if she needs assistance in scheduling the above tests. She is welcome to call for an appointment sooner if she has any additional concerns or questions. Cady Tejada. Temi Castillo MD, FREDDY, [facility]  Board Certified in [address] [place] [address] Certified and Fellowship Trained in [facility] ([facility])             This dictation was performed with a verbal recognition program [facility]) and it was checked for errors. It is possible that there are still dictated errors within this office note. If so, please bring any errors to my attention for an addendum. All efforts were made to ensure that this office note is accurate.

## 2020-11-06 ENCOUNTER — TELEPHONE (OUTPATIENT)
Dept: ORTHOPEDIC SURGERY | Age: 70
End: 2020-11-06

## 2020-11-06 NOTE — TELEPHONE ENCOUNTER
Auth: NPR  Date: 11/17/2020  Reference # None  Spoke with: None  Type of SX: Outpatient  Location: [address] [facility]  CPT [address]24, [id number], [facility]   SX area: [place]  Insurance: Medicare A&B

## 2020-11-09 RX ORDER — ALPRAZOLAM 0.25 MG/1
0.25 TABLET ORAL NIGHTLY PRN
Qty: 15 TABLET | Refills: 0 | Status: SHIPPED | OUTPATIENT
Start: 2020-11-09 | End: 2021-04-15 | Stop reason: SDUPTHER

## 2020-11-12 ENCOUNTER — TELEPHONE (OUTPATIENT)
Dept: ORTHOPEDIC SURGERY | Age: 70
End: 2020-11-12

## 2020-11-12 NOTE — TELEPHONE ENCOUNTER
Do they have a timeline for this when the carotid arteries will be re-checked? The patient will need to be switched to [employer].

## 2020-11-12 NOTE — TELEPHONE ENCOUNTER
Patient sends Synference message below. Please advise as patient will not be able to come off Eliquis for SCS trial.         \"I saw the cardiologist and they would prefer I wait until after getting my carotids rechecked before stopping the Eliquis for any procedures for my back. They also requested any devices implanted be made by [employer] since that is the brand of pacemaker I have. \"

## 2020-11-13 NOTE — TELEPHONE ENCOUNTER
Noted. Patient has f/u to si joint injection on 12/1/20. Will discuss further information for eliquis hold and cartoid artery check for scs.  Will change to [place] at time of scheduling

## 2020-11-17 ENCOUNTER — HOSPITAL ENCOUNTER (OUTPATIENT)
Age: 70
Setting detail: OUTPATIENT SURGERY
Discharge: HOME | End: 2020-11-17
Attending: PHYSICAL MEDICINE & REHABILITATION | Admitting: PHYSICAL MEDICINE & REHABILITATION
Payer: MEDICARE

## 2020-11-17 VITALS
WEIGHT: 117 LBS | TEMPERATURE: 97.3 F | BODY MASS INDEX: 19.97 KG/M2 | OXYGEN SATURATION: 99 % | HEART RATE: 60 BPM | DIASTOLIC BLOOD PRESSURE: 76 MMHG | HEIGHT: 64 IN | SYSTOLIC BLOOD PRESSURE: 158 MMHG | RESPIRATION RATE: 18 BRPM

## 2020-11-17 PROCEDURE — 7100000010 HC PHASE II RECOVERY - FIRST 15 MIN: Performed by: PHYSICAL MEDICINE & REHABILITATION

## 2020-11-17 PROCEDURE — 2709999900 HC NON-CHARGEABLE SUPPLY: Performed by: PHYSICAL MEDICINE & REHABILITATION

## 2020-11-17 PROCEDURE — 2500000003 HC RX 250 WO HCPCS: Performed by: PHYSICAL MEDICINE & REHABILITATION

## 2020-11-17 PROCEDURE — 3600000002 HC SURGERY LEVEL 2 BASE: Performed by: PHYSICAL MEDICINE & REHABILITATION

## 2020-11-17 PROCEDURE — 6360000002 HC RX W HCPCS: Performed by: PHYSICAL MEDICINE & REHABILITATION

## 2020-11-17 PROCEDURE — 6360000004 HC RX CONTRAST MEDICATION: Performed by: PHYSICAL MEDICINE & REHABILITATION

## 2020-11-17 RX ORDER — BUPIVACAINE HYDROCHLORIDE 5 MG/ML
INJECTION, SOLUTION EPIDURAL; INTRACAUDAL
Status: DISCONTINUED
Start: 2020-11-17 | End: 2020-11-17 | Stop reason: HOSPADM

## 2020-11-17 RX ORDER — METHYLPREDNISOLONE ACETATE 80 MG/ML
INJECTION, SUSPENSION INTRA-ARTICULAR; INTRALESIONAL; INTRAMUSCULAR; SOFT TISSUE
Status: DISCONTINUED
Start: 2020-11-17 | End: 2020-11-17 | Stop reason: HOSPADM

## 2020-11-17 RX ORDER — LIDOCAINE HYDROCHLORIDE 10 MG/ML
INJECTION, SOLUTION EPIDURAL; INFILTRATION; INTRACAUDAL; PERINEURAL PRN
Status: DISCONTINUED | OUTPATIENT
Start: 2020-11-17 | End: 2020-11-17 | Stop reason: ALTCHOICE

## 2020-11-17 ASSESSMENT — PAIN DESCRIPTION - DESCRIPTORS: DESCRIPTORS: ACHING

## 2020-11-17 NOTE — PROGRESS NOTES
Discharge  instructions reviewed. Pt and family verbalize understanding with no further questions. VSS. Pt discharged via [address] to car. Assessment unchanged.

## 2020-11-17 NOTE — H&P
HISTORY AND PHYSICAL/PRE-SEDATION ASSESSMENT    Patient:  Tono Sheehan   :  1950  Medical Record No.:  [id number]   Date:  2020  Physician:  David Burleson M.D. Facility: [facility]    HISTORY OF PRESENT ILLNESS:                 The patient is a 69 y.o. female whom presents with lower back and gluteal pain. Review of the imaging and physical exam of the patient confirmed the pre-procedure diagnosis. After a thorough discussion of risks, benefits and alternatives informed consent was obtained.                 Past Medical History:   Past Medical History:   Diagnosis Date    Arthritis     COPD (chronic obstructive pulmonary disease) ([facility] [address]     Depression     Hyperlipidemia     Hypertension     Irritable bowel syndrome (IBS)     Pacemaker       Past Surgical History:     Past Surgical History:   Procedure Laterality Date    CARPAL TUNNEL RELEASE Right 2017    CATARACT REMOVAL WITH IMPLANT Left 2016    Phacoemulsification/ Posterior Chamber Intraocular Lens Implantation with dr Carolee Kim      CHOLECYSTECTOMY, LAPAROSCOPIC  2011    COLONOSCOPY  2014    CORONARY ANGIOPLASTY      EYE SURGERY Right 2017    catarct removal with lens implant    HERNIA REPAIR  2016    open ventral incisional    HYSTERECTOMY      OTHER SURGICAL HISTORY  2016    OPEN VENTRAL INCISIONAL HERNIA REPAIR     PACEMAKER PLACEMENT  2019    [address]    PAIN MANAGEMENT PROCEDURE Right 3/10/2020    RIGHT LUMBAR FOUR AND LUMBAR FIVE TRANSFORAMINAL EPIDURAL STEROID INJECTION SITE CONFIRMED BY FLUOROSCOPY performed by David Burleson MD at [address] [facility] Right 2020    RIGHT LUMBAR FOUR AND LUMBAR FIVE TRANSFORAMINAL EPIDURAL STEROID INJECTION SITE CONFIRMED BY FLUOROSCOPY performed by David Burleson MD at [address] Bilateral 10/13/2020    BILATERAL LUMBAR THREE LUMBAR FOUR LUMBAR DORSAL RAMUS LUMBAR MEDIAL BRANCH BLOCKS performed by Ramses Nicholas MD at [address]       Current Medications:   Prior to Admission medications    Medication Sig Start Date End Date Taking? Authorizing Provider   VORTIoxetine (TRINTELLIX) 10 MG TABS tablet TAKE 1 TABLET BY MOUTH EVERY DAY 11/9/20  Yes Nataly Tapia MD   ALPRAZolam Ailyn Dela Cruzis) 0.25 MG tablet Take 1 tablet by mouth nightly as needed for Sleep or Anxiety for up to 60 days. 11/9/20 1/8/21 Yes Nataly Tapia MD   apixaban (ELIQUIS) 5 MG TABS tablet Take 5 mg by mouth 2 times daily 8/10/20  Yes Historical Provider, MD   ezetimibe (ZETIA) 10 MG tablet TAKE 1 TABLET BY MOUTH DAILY.  9/8/20  Yes JAYLYN Doshi CNP   dicyclomine (BENTYL) 10 MG capsule TAKE ONE CAPSULE BY MOUTH FOUR TIMES DAILY AS NEEDED 8/10/20  Yes JAYLYN Doshi CNP   omeprazole (PRILOSEC) 20 MG delayed release capsule TAKE 1 CAPSULE BY MOUTH 2 TIMES DAILY (BEFORE MEALS) 5/26/20  Yes JAYLYN Doshi CNP   Kellie Randell 100-50 MCG/DOSE diskus inhaler  2/3/20  Yes Historical Provider, MD   potassium chloride (MICRO-K) 10 MEQ extended release capsule Take 10 mEq by mouth daily   Yes Historical Provider, MD   metoprolol succinate (TOPROL XL) 25 MG extended release tablet Take 0.5 tablets by mouth daily 1/7/20  Yes JAYLYN Doshi CNP   Lactobacillus (PROBIOTIC ACIDOPHILUS PO) Take 1 tablet by mouth daily    Yes Historical Provider, MD   fluticasone [facility]) 50 MCG/ACT nasal spray 2 sprays by Nasal route daily 1/8/19  Yes JAYLYN Doshi CNP   tiotropium (Leelee Sinks) 18 MCG inhalation capsule Inhale 1 capsule into the lungs daily 1/8/19  Yes JAYLYN Doshi CNP   albuterol sulfate HFA (VENTOLIN HFA) 108 (90 Base) MCG/ACT inhaler Inhale 2 puffs into the lungs 2 times daily as needed for Wheezing or Shortness of Breath 12/20/17  Yes Nataly Tapia MD   aspirin EC 81 MG EC tablet Take 1 tablet by mouth daily. 3/17/15  Yes Shorty Ram MD   apixaban Kan Cava DVT/PE STARTER PACK) 5 MG TABS tablet Take two tablets in the morning and two at night for seven days, then take one tablet twice daily. 6/27/20   Historical Provider, MD   fluticasone-vilanterol Caden Nova ELLIPTA) 100-25 MCG/INH AEPB inhaler Inhale 1 puff into the lungs daily 10/10/19 1/7/20  Bhupinder Rodas MD     Allergies: Iodides; Nsaids; and Sulfa antibiotics  Social History:    reports that she has been smoking cigarettes. She started smoking about 18 months ago. She has a 20.00 pack-year smoking history. She has never used smokeless tobacco. She reports that she does not drink alcohol or use drugs. Family History:   Family History   Problem Relation Age of Onset    Heart Disease Father     High Blood Pressure Father    [facility] Cancer Sister         uterine       Vitals: Blood pressure (!) 147/92, pulse 78, temperature 97.3 °F (36.3 °C), temperature source Temporal, resp. rate 16, height 5' 4\" (1.626 m), weight 117 lb (53.1 kg), SpO2 97 %, not currently breastfeeding. PHYSICAL EXAM:  HENT: Airway patent and reviewed  Cardiovascular: Normal rate, regular rhythm, normal heart sounds. Pulmonary/Chest: No wheezes. No rhonchi. No rales. Abdominal: Soft. Bowel sounds are normal. No distension. Extremities: Moves all extremities equally  Lumbar Spine: Painful range of motion, no midline tenderness       Diagnosis:Right SI Joint Dysfunction    Plan: Proceed with planned procedure    The patient was counseled at length about the risks of scott Covid-19 in the cristopher-operative and post-operative states including the recovery window of their procedure. The patient was made aware that scott Covid-19 after a surgical procedure may worsen their prognosis for recovering from the virus and lend to a higher morbidity and or mortality risk. The patient was given the options of postponing their procedure.  All of the risks, benefits, and alternatives were discussed. The patient does wish to proceed with the procedure. ASA CLASS:         []   I. Normal, healthy adult           [x]   II.  Mild systemic disease            []   III. Severe systemic disease      Mallampati: Mallampati Class II - (soft palate, fauces & uvula are visible)      Sedation plan:   [x]  Local              []  Minimal                  []  General anesthesia    Patient's condition acceptable for planned procedure/sedation. Post Procedure Plan   Return to same level of care   ______________________     The risks and benefits as well as alternatives to the procedure have been discussed with the patient and or family. The patient and or next of kin understands and agrees to proceed.     Tiffanie Truong M.D.

## 2020-11-17 NOTE — OP NOTE
Patient:  Lee Frank  YOB: 1950  Medical Record #:  [id number]   Place:  [address], [place]  Date:  11/17/2020   Physician:  Hailee Altman MD, FREDDY    Procedure:   Right Sacro-iliac Joint Injection with Fluoroscopy    CPT 13601    Pre-Procedure Diagnosis: Right SI joint dysfunction and pain    Post-Procedure Diagnosis: Same    Sedation: Local with 1% Lidocaine 3 ml and no IV sedation    EBL: None    Complications: None    Procedure Summary:    The patient was seen in the office for complaints of lower back and gluteal pain. Review of the imaging and physical exam of the patient confirmed the pre-procedure diagnosis. After a thorough discussion of risks, benefits and alternatives informed consent was obtained. The patient was brought to the procedure suite and placed in the prone position. The skin overlying the sacrum was prepped and draped in the usual sterile fashion. Using rotational fluoroscopic guidance, the distal 1/3 of the right sacro-iliac joint space was identified. Through anesthetized skin a 22 gauge 3.5 inch curved tip spinal needle was advanced into the articular space. Isovue M300 was instilled showing an articular pattern. 2 ml of a solution mixed with 80 mg of depomedrol and 0.5% Marcaine was instilled. The needle was removed and a band-aid applied. The patient was transferred to the post-operative area in stable condition.

## 2020-11-24 ENCOUNTER — TELEPHONE (OUTPATIENT)
Dept: ORTHOPEDIC SURGERY | Age: 70
End: 2020-11-24

## 2020-11-24 NOTE — TELEPHONE ENCOUNTER
S/w patient. She is requesting MRI LSP due to back pain worsening. Patient does have a pacemaker but states she is having an MRI of the carotid performed at [facility] 11/30/20 and will have a representative from her pacemaker company present for scan on 11/30/20. She wishes to call [facility] and attempt to schedule scan same day due to [facility] issue. CT LSP and MRI LSP both ordered, so that if MRI cannot be completed patient can undergo CT. All questions answered.

## 2020-11-24 NOTE — TELEPHONE ENCOUNTER
PATIENT IS IN EXCRUCIATING PAIN AND WANTS TO KNOW IF AN MRI CAN BE ORDERED FOR HER TO HAVE DONE. PLEASE CALL HER [phone] TO ADVISE.

## 2020-12-01 ENCOUNTER — VIRTUAL VISIT (OUTPATIENT)
Dept: ORTHOPEDIC SURGERY | Age: 70
End: 2020-12-01
Payer: MEDICARE

## 2020-12-01 PROCEDURE — G8427 DOCREV CUR MEDS BY ELIG CLIN: HCPCS | Performed by: PHYSICAL MEDICINE & REHABILITATION

## 2020-12-01 PROCEDURE — G8400 PT W/DXA NO RESULTS DOC: HCPCS | Performed by: PHYSICAL MEDICINE & REHABILITATION

## 2020-12-01 PROCEDURE — 99214 OFFICE O/P EST MOD 30 MIN: CPT | Performed by: PHYSICAL MEDICINE & REHABILITATION

## 2020-12-01 PROCEDURE — 3017F COLORECTAL CA SCREEN DOC REV: CPT | Performed by: PHYSICAL MEDICINE & REHABILITATION

## 2020-12-01 PROCEDURE — 1123F ACP DISCUSS/DSCN MKR DOCD: CPT | Performed by: PHYSICAL MEDICINE & REHABILITATION

## 2020-12-01 PROCEDURE — 1090F PRES/ABSN URINE INCON ASSESS: CPT | Performed by: PHYSICAL MEDICINE & REHABILITATION

## 2020-12-01 PROCEDURE — 4040F PNEUMOC VAC/ADMIN/RCVD: CPT | Performed by: PHYSICAL MEDICINE & REHABILITATION

## 2020-12-01 NOTE — PROGRESS NOTES
Follow up: [facility] visit (Audio/visual connection present)    Shu Wong  1950  [id number]         Chief Complaint   Patient presents with    Lower Back Pain     11/17/2020: R SI JOINT INJ         HISTORY OF PRESENT ILLNESS:  Ms. Estrellita Lorenzo is a 69 y.o. female returns for a follow up visit for multiple medical problems. Her current presenting problems are   1. Pelvic pain    2. Lumbar radiculopathy    3. Paresthesia of right lower extremity    . Ms. Estrellita Lorenzo is a 69 y.o. female returns for a follow up visit for multiple medical problems. Her current presenting problems are No diagnosis found. .    As per information/history obtained from the PADT(patient assessment and documentation tool) - She complains of pain in the lower back with radiation to the lower leg Right She rates the pain 9/10 and describes it as throbbing. Pain is made worse by: movement. She denies side effects from the current pain regimen. Patient reports that since the last follow up visit the physical functioning is worse, family/social relationships are worse, mood is worse and sleep patterns are worse, and that the overall functioning is worse. Patient denies neurological bowel or bladder. She presents today after undergoing a right SI joint injection. She reports that her symptoms improved for about 3 to 4 days but returned. She has pain radiating into her right ankle. She reports it feels as if water is dripping on her right leg she is accompanied today with her daughter on this virtual visit. She has completed an epidural steroid injection, lumbar medial branch blocks, right SI joint injection without any relief. She has had a recent MRI of the lumbar spine undertaken. This showed 2 levels of mild disc bulges. She also underwent an EMG on September 24 which was unremarkable.         Associated signs and symptoms:   Neurogenic bowel or bladder symptoms:  no   Perceived weakness:  no   Difficulty walking:  no Past medical, surgical, social and family history reviewed with the patient.  No pertinent relevant history  Past Medical History:   Past Medical History:   Diagnosis Date    Arthritis     COPD (chronic obstructive pulmonary disease) ([address]     Depression     Hyperlipidemia     Hypertension     Irritable bowel syndrome (IBS)     Pacemaker       Past Surgical History:     Past Surgical History:   Procedure Laterality Date    CARPAL TUNNEL RELEASE Right 07/13/2017    CATARACT REMOVAL WITH IMPLANT Left 12/23/2016    Phacoemulsification/ Posterior Chamber Intraocular Lens Implantation with dr Pace Cheese  2012    CHOLECYSTECTOMY, LAPAROSCOPIC  5/25/2011    COLONOSCOPY  7/28/2014    CORONARY ANGIOPLASTY      EYE SURGERY Right 03/09/2017    catarct removal with lens implant    HERNIA REPAIR  08/09/2016    open ventral incisional    HYSTERECTOMY      OTHER SURGICAL HISTORY  08/09/2016    OPEN VENTRAL INCISIONAL HERNIA REPAIR     PACEMAKER PLACEMENT  06/25/2019    [address]    PAIN MANAGEMENT PROCEDURE Right 3/10/2020    RIGHT LUMBAR FOUR AND LUMBAR FIVE TRANSFORAMINAL EPIDURAL STEROID INJECTION SITE CONFIRMED BY FLUOROSCOPY performed by Hitesh Tate MD at [facility] Right 6/16/2020    RIGHT LUMBAR FOUR AND LUMBAR FIVE TRANSFORAMINAL EPIDURAL STEROID INJECTION SITE CONFIRMED BY FLUOROSCOPY performed by Hitesh Tate MD at [facility] MANAGEMENT PROCEDURE Bilateral 10/13/2020    BILATERAL LUMBAR THREE LUMBAR FOUR LUMBAR DORSAL RAMUS LUMBAR MEDIAL BRANCH BLOCKS performed by Hitesh Tate MD at [facility] Right 11/17/2020    RIGHT SACROILIAC JOINT INJECTION SITE CONFIRMED BY FLUOROSCOPY performed by Hitesh Tate MD at [address] ENDOSCOPY       Current Medications:     Current Outpatient Medications:     VORTIoxetine (TRINTELLIX) 10 MG TABS tablet, TAKE 1 TABLET BY MOUTH EVERY DAY, Disp: 30 tablet, Rfl: 5    ALPRAZolam (XANAX) 0.25 MG tablet, Take 1 tablet by mouth nightly as needed for Sleep or Anxiety for up to 60 days. , Disp: 15 tablet, Rfl: 0    apixaban (ELIQUIS) 5 MG TABS tablet, Take 5 mg by mouth 2 times daily, Disp: , Rfl:     ezetimibe (ZETIA) 10 MG tablet, TAKE 1 TABLET BY MOUTH DAILY. , Disp: 30 tablet, Rfl: 5    dicyclomine (BENTYL) 10 MG capsule, TAKE ONE CAPSULE BY MOUTH FOUR TIMES DAILY AS NEEDED, Disp: 100 capsule, Rfl: 3    omeprazole (PRILOSEC) 20 MG delayed release capsule, TAKE 1 CAPSULE BY MOUTH 2 TIMES DAILY (BEFORE MEALS), Disp: 180 capsule, Rfl: 3    WIXELA INHUB 100-50 MCG/DOSE diskus inhaler, , Disp: , Rfl:     potassium chloride (MICRO-K) 10 MEQ extended release capsule, Take 10 mEq by mouth daily, Disp: , Rfl:     metoprolol succinate (TOPROL XL) 25 MG extended release tablet, Take 0.5 tablets by mouth daily, Disp: 30 tablet, Rfl: 5    Lactobacillus (PROBIOTIC ACIDOPHILUS PO), Take 1 tablet by mouth daily , Disp: , Rfl:     fluticasone (FLONASE) 50 MCG/ACT nasal spray, 2 sprays by Nasal route daily, Disp: 1 Bottle, Rfl: 5    tiotropium (SPIRIVA HANDIHALER) 18 MCG inhalation capsule, Inhale 1 capsule into the lungs daily, Disp: 30 capsule, Rfl: 5    albuterol sulfate HFA (VENTOLIN HFA) 108 (90 Base) MCG/ACT inhaler, Inhale 2 puffs into the lungs 2 times daily as needed for Wheezing or Shortness of Breath, Disp: 1 Inhaler, Rfl: 3    aspirin EC 81 MG EC tablet, Take 1 tablet by mouth daily. , Disp: 30 tablet, Rfl: 3    fluticasone-vilanterol (BREO ELLIPTA) 100-25 MCG/INH AEPB inhaler, Inhale 1 puff into the lungs daily, Disp: 1 each, Rfl: 5  Allergies: Iodides; Nsaids; and Sulfa antibiotics  Social History:    reports that she has been smoking cigarettes. She started smoking about 18 months ago. She has a 20.00 pack-year smoking history.  She has never used smokeless tobacco. She reports that she does not drink alcohol or use drugs. Family History:   Family History   Problem Relation Age of Onset    Heart Disease Father     High Blood Pressure Father    Lucian Alexander Cancer Sister         uterine       REVIEW OF SYSTEMS:   CONSTITUTIONAL: Denies unexplained weight loss, fevers, chills or fatigue  NEUROLOGICAL: Denies unsteady gait or progressive weakness  MUSCULOSKELETAL: Denies joint swelling or redness  GI: Denies nausea, vomiting, diarrhea   : Denies bowel or bladder issues       PHYSICAL EXAM:  Limitations present due to Telehealth  Vitals:   Patient-Reported Vitals 12/1/2020   Patient-Reported Weight 53.1KG   Patient-Reported Height 1.626M   Patient-Reported Pulse 72   Patient-Reported Temperature 98.2        GENERAL EXAM:  · General Apparence: Patient is adequately groomed with no evidence of malnutrition. · Psychiatric: Orientation: The patient is oriented to time, place and person. The patient's mood and affect are appropriate    Sensation is intact without deficit in the  lower extremities to light touch   · Coordination of the lower extremities are normal.    LUMBAR/SACRAL EXAMINATION:  · Inspection: Local inspection shows no step-off or bruising. Lumbar alignment is normal.   · Palpation by patient:   No evidence of tenderness at the midline. Lumbar paraspinal tenderness Mild L4/5 and L5/S1 tenderness  Bursal tenderness No tenderness bilaterally  There is no paraspinal spasm. · Range of Motion: limited by 25% in all planes due to pain  · Strength:   Strength testing is at least 4/5 in all muscle groups tested based on visual exam  · Special Tests:   Straight leg raise and crossed SLR negative. · Skin: There are no rashes, ulcerations or lesions. · Gait & station: normal, patient ambulates without assistance and no ataxia  · Additional Examinations:  · RIGHT LOWER EXTREMITY: Inspection/examination of the right lower extremity does not show any tenderness, deformity or injury. Range of motion is unremarkable.   · LEFT LOWER EXTREMITY:  Inspection/examination of the left lower extremity does not show any tenderness, deformity or injury. Range of motion is unremarkable. Diagnostic Testing:    MR Lumbar spine shows    1. Mild levoconvex scoliosis.              2. Multilevel mild degenerative disc disease as detailed above. No evidence of high-grade    spinal canal or neuroforaminal stenosis.             Results for orders placed or performed in visit on 10/21/20   CBC Auto Differential   Result Value Ref Range    WBC 6.0 4.5 - 10.3 x1000    RBC 4.75 3.72 - 5.31 x1000000    Hemoglobin 14.4 11.9 - 15.9 g/dL    Hematocrit 44.7 35.7 - 46.7 %    MCV 94.1 82.9 - 99.9 fl    MCH 30.3 28.1 - 33.6 pg    MCHC 32.2 (L) 32.8 - 34.7 g/dL    Erythrocyte Distribution Width RBC Ratio 13.1 11.6 - 14.8 %    Platelets 583 903 - 931 x1000    Neutrophils/100 leukocytes 56.9 40 - 70 %    Lymphocytes % 32.6 15 - 45 %    Monocytes % 7.3 0 - 12 %    Eosinophils % 2.2 0 - 5 %    Basophils % 0.8 0 - 1 %    Immature Granulocytes % 0.2 0.0 - 0.5 %    GRANULOCYTE ABSOLUTE COUNT 3.4 1.8 - 7.2 x(10)3/uL    Lymphocytes Absolute 2.0 1.1 - 2.7 x(10)3/uL   Comprehensive Metabolic Panel   Result Value Ref Range    Sodium 140 136 - 146 mmol/L    Potassium 3.6 3.4 - 5.0 mmol/L    Chloride 100 99 - 111 mmol/L    CO2 32 21 - 33 mmol/L    BUN 11 5 - 19 mg/dL    CREATININE 1.0 0.6 - 1.3 mg/dL    POC Glucose 101 (H) 74 - 99 mg/dL    Calcium 9.4 8.4 - 10.2 mg/dL    Total Protein 6.9 6.3 - 8.0 g/dL    Alb 3.8 3.6 - 5.0 g/dL    Globulin 3.1 2.0 - 3.5 g/dL    Albumin/Globulin Ratio 1.2 1.0 - 2.5    Total Bilirubin 0.4 0 - 1.1 mg/dL    AST 22 12 - 36 U/L    ALT 19 12 - 78 U/L    Alkaline Phosphatase 70 46 - 116 U/L    Anion Gap 12.0 8 - 16 mmol/L    Gfr Calculated 55    APTT   Result Value Ref Range    aPTT 31.9 23.0 - 32.0 SECS. Protime   Result Value Ref Range    Coag Tissue Factor Induced Blood Time Pt. Coag 10.5 9.4 - 12.2 SECS.     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 Difficult to obtain MRI. 4. Interventional:  After further imaging is obtained, interventional options will be reviewed and recommended. 5. Follow up:  1-2 weeks      Susan Birmingham was instructed to call the office if her symptoms worsen or if new symptoms appear prior to the next scheduled visit. She is specifically instructed to contact the office between now & her scheduled appointment if she has concerns related to her condition or if she needs assistance in scheduling the above tests. She is welcome to call for an appointment sooner if she has any additional concerns or questions. Anni Jackson. Shawn Curiel MD, FREDDY, [facility]  Board Certified in [address] [place] [address] Certified and Fellowship Trained in [facility] ([facility])             This dictation was performed with a verbal recognition program [facility]) and it was checked for errors. It is possible that there are still dictated errors within this office note. If so, please bring any errors to my attention for an addendum. All efforts were made to ensure that this office note is accurate. Patient has verbally accepted the following: We confirm that, for purposes of billing, this is a virtual visit with the provider for which we will submit a claim for reimbursement with the insurance company. The patient accepts responsibility for any co-pays, coinsurance amounts or other amounts not covered by the insurance company.      Patient location: Home  Physician location: [facility] Office  Time spent: not billed by time  Present on the call: myself and patient    Pursuant to the emergency declaration under the [facility] [place] [employer] [facility] [place], [address] waiver authority and the [employer] and [employer] General Act, this Virtual  Visit was conducted, with patient's consent, to reduce the patient's risk of exposure to COVID-19 and provide continuity of care for an established patient. Services were provided through a video synchronous discussion virtually to substitute for in-person clinic visit. We have confirmed that, for purposes of billing, this is a virtual visit with for which we will submit a claim for reimbursement with your insurance company. The patient has accepted responsibility for any copays, coinsurance amounts or other amounts not covered by their insurance company. This visit was completed virtually using doxy. me.

## 2020-12-15 ENCOUNTER — VIRTUAL VISIT (OUTPATIENT)
Dept: ORTHOPEDIC SURGERY | Age: 70
End: 2020-12-15
Payer: MEDICARE

## 2020-12-15 PROCEDURE — G8400 PT W/DXA NO RESULTS DOC: HCPCS | Performed by: PHYSICAL MEDICINE & REHABILITATION

## 2020-12-15 PROCEDURE — 4040F PNEUMOC VAC/ADMIN/RCVD: CPT | Performed by: PHYSICAL MEDICINE & REHABILITATION

## 2020-12-15 PROCEDURE — 1123F ACP DISCUSS/DSCN MKR DOCD: CPT | Performed by: PHYSICAL MEDICINE & REHABILITATION

## 2020-12-15 PROCEDURE — 1090F PRES/ABSN URINE INCON ASSESS: CPT | Performed by: PHYSICAL MEDICINE & REHABILITATION

## 2020-12-15 PROCEDURE — 99214 OFFICE O/P EST MOD 30 MIN: CPT | Performed by: PHYSICAL MEDICINE & REHABILITATION

## 2020-12-15 PROCEDURE — 3017F COLORECTAL CA SCREEN DOC REV: CPT | Performed by: PHYSICAL MEDICINE & REHABILITATION

## 2020-12-15 PROCEDURE — G8428 CUR MEDS NOT DOCUMENT: HCPCS | Performed by: PHYSICAL MEDICINE & REHABILITATION

## 2020-12-15 NOTE — LETTER
Please schedule the following with:     Date:   20 @ 845   Account: [de-identified]  Patient: Sarah Subramanian    : 1950  Address:  [address]    Phone (G):  [phone] (home)      ----------------------------------------------------------------------------------------------  Diagnosis:     ICD-10-CM    1. Spondylosis without myelopathy or radiculopathy, lumbar region  M47.816    2. Primary localized osteoarthritis of right hip  M16.11    3. Primary localized osteoarthritis of right knee  M17.11          Levels:Right hip injection  CPT Codes 12918, 72026/26  ----------------------------------------------------------------------------------------------  Injection # 1   [email or web address] [employer]    Attending Physician       Fredy Stoddard.  Fito Singh MD.  ----------------------------------------------------------------------------------------------  Injection Scheduled For:    At:    1st Insurance [facility]    Pre-Cert#  2nd Insurance [facility]   Pre-Cert#    Comments or Special instructions:  COVID TEST: no    · Infection control  · Tested positive for MRSA in past 12 months:  no  · Tested positive for MSSA \"staph infection\" in past 12 months: no  · Tested positive for VRE (Vancomycin Resistant Enterococci) in past 12 months:   no  · Currently on any antibiotics for an infection: no  · Anticoagulants:  · On a blood thinner:  yes - ok to stay on eliquis  · Any history of bleeding disorder: no   · Advanced Liver disease: no   · Advanced Renal disease: no   · Glaucoma: no   · Diabetes: no     Sedation:  No  -----------------------------------------------------------------------------------------------  Allergies   Allergen Reactions    Iodides Hives and Itching    Nsaids      Previous stomach bleed      Sulfa Antibiotics Rash

## 2020-12-16 ENCOUNTER — TELEPHONE (OUTPATIENT)
Dept: ORTHOPEDIC SURGERY | Age: 70
End: 2020-12-16

## 2020-12-16 NOTE — PROGRESS NOTES
Follow up: [facility] visit (Audio/visual connection present)    Isaak Laughlin  1950  [id number]         Chief Complaint   Patient presents with    Back Problem         HISTORY OF PRESENT ILLNESS:  Ms. Darrell Lake is a 69 y.o. female returns for a follow up visit for multiple medical problems. Her current presenting problems are   1. Spondylosis without myelopathy or radiculopathy, lumbar region    2. Primary localized osteoarthritis of right hip    3. Primary localized osteoarthritis of right knee    . As per information/history obtained from the PADT(patient assessment and documentation tool) - She complains of pain in the buttocks with radiation to the hips Right She rates the pain 2-5/10 and describes it as throbbing. Pain is made worse by: movement. She denies side effects from the current pain regimen. Patient reports that since the last follow up visit the physical functioning is unchanged, family/social relationships are unchanged, mood is unchanged and sleep patterns are unchanged, and that the overall functioning is unchanged. Patient denies neurological bowel or bladder. She returns after undergoing a CT of the pelvis which showed mild OA in the hips. She had an ultrasound in March showing a cyst by her hip bone. Associated signs and symptoms:   Neurogenic bowel or bladder symptoms:  no   Perceived weakness:  no   Difficulty walking:  yes            Past medical, surgical, social and family history reviewed with the patient.  No pertinent relevant history  Past Medical History:   Past Medical History:   Diagnosis Date    Arthritis     COPD (chronic obstructive pulmonary disease) ([address]     Depression     Hyperlipidemia     Hypertension     Irritable bowel syndrome (IBS)     Pacemaker       Past Surgical History:     Past Surgical History:   Procedure Laterality Date    CARPAL TUNNEL RELEASE Right 07/13/2017    CATARACT REMOVAL WITH IMPLANT Left 12/23/2016   omeprazole (PRILOSEC) 20 MG delayed release capsule, TAKE 1 CAPSULE BY MOUTH 2 TIMES DAILY (BEFORE MEALS), Disp: 180 capsule, Rfl: 3    WIXELA INHUB 100-50 MCG/DOSE diskus inhaler, , Disp: , Rfl:     potassium chloride (MICRO-K) 10 MEQ extended release capsule, Take 10 mEq by mouth daily, Disp: , Rfl:     metoprolol succinate (TOPROL XL) 25 MG extended release tablet, Take 0.5 tablets by mouth daily, Disp: 30 tablet, Rfl: 5    fluticasone-vilanterol (BREO ELLIPTA) 100-25 MCG/INH AEPB inhaler, Inhale 1 puff into the lungs daily, Disp: 1 each, Rfl: 5    Lactobacillus (PROBIOTIC ACIDOPHILUS PO), Take 1 tablet by mouth daily , Disp: , Rfl:     fluticasone (FLONASE) 50 MCG/ACT nasal spray, 2 sprays by Nasal route daily, Disp: 1 Bottle, Rfl: 5    tiotropium (SPIRIVA HANDIHALER) 18 MCG inhalation capsule, Inhale 1 capsule into the lungs daily, Disp: 30 capsule, Rfl: 5    albuterol sulfate HFA (VENTOLIN HFA) 108 (90 Base) MCG/ACT inhaler, Inhale 2 puffs into the lungs 2 times daily as needed for Wheezing or Shortness of Breath, Disp: 1 Inhaler, Rfl: 3    aspirin EC 81 MG EC tablet, Take 1 tablet by mouth daily. , Disp: 30 tablet, Rfl: 3  Allergies: Iodides, Nsaids, and Sulfa antibiotics  Social History:    reports that she has been smoking cigarettes. She started smoking about 19 months ago. She has a 20.00 pack-year smoking history. She has never used smokeless tobacco. She reports that she does not drink alcohol or use drugs.   Family History:   Family History   Problem Relation Age of Onset    Heart Disease Father     High Blood Pressure Father    Deisy Her Cancer Sister         uterine       REVIEW OF SYSTEMS:   CONSTITUTIONAL: Denies unexplained weight loss, fevers, chills or fatigue  NEUROLOGICAL: Denies unsteady gait or progressive weakness  MUSCULOSKELETAL: Denies joint swelling or redness  GI: Denies nausea, vomiting, diarrhea   : Denies bowel or bladder issues PHYSICAL EXAM:  Limitations present due to Telehealth  Vitals:  Height - 5'4\" Weight - 117 lbs, Temp- 98.0F    GENERAL EXAM:  · General Apparence: Patient is adequately groomed with no evidence of malnutrition. · Psychiatric: Orientation: The patient is oriented to time, place and person. The patient's mood and affect are appropriate    Sensation is intact without deficit in the upper and lower extremities to light touch   · Coordination of the upper and lower extremities are normal.    LUMBAR/SACRAL EXAMINATION:  · Inspection:  Lumbar alignment is normal.   · Range of Motion: limited by 25% in all planes due to pain  · Strength:   Strength testing is at least 4/5 in all muscle groups tested based on visual exam  · Special Tests:   Straight leg raise and crossed SLR negative. · Skin: There are no rashes, ulcerations or lesions. · Gait & station: antalgic on the right and no ataxia  · Additional Examinations:  · RIGHT LOWER EXTREMITY: Inspection/examination of the right lower extremity does not show any tenderness, deformity or injury. Range of motion is unremarkable. There is no gross instability. There are no rashes, ulcerations or lesions. Strength and tone are normal. No atrophy or abnormal movements are noted. · LEFT LOWER EXTREMITY:  Inspection/examination of the left lower extremity does not show any tenderness, deformity or injury. Range of motion is unremarkable. There is no gross instability. There are no rashes, ulcerations or lesions. Strength and tone are normal. No atrophy or abnormal movements are noted.         Diagnostic Testing:    CT pelvis shows mild OA in hips  Results for orders placed or performed in visit on 10/21/20   CBC Auto Differential   Result Value Ref Range    WBC 6.0 4.5 - 10.3 x1000    RBC 4.75 3.72 - 5.31 x1000000    Hemoglobin 14.4 11.9 - 15.9 g/dL    Hematocrit 44.7 35.7 - 46.7 %    MCV 94.1 82.9 - 99.9 fl    MCH 30.3 28.1 - 33.6 pg    MCHC 32.2 (L) 32.8 - 34.7 g/dL Erythrocyte Distribution Width RBC Ratio 13.1 11.6 - 14.8 %    Platelets 749 977 - 591 x1000    Neutrophils/100 leukocytes 56.9 40 - 70 %    Lymphocytes % 32.6 15 - 45 %    Monocytes % 7.3 0 - 12 %    Eosinophils % 2.2 0 - 5 %    Basophils % 0.8 0 - 1 %    Immature Granulocytes % 0.2 0.0 - 0.5 %    GRANULOCYTE ABSOLUTE COUNT 3.4 1.8 - 7.2 x(10)3/uL    Lymphocytes Absolute 2.0 1.1 - 2.7 x(10)3/uL   Comprehensive Metabolic Panel   Result Value Ref Range    Sodium 140 136 - 146 mmol/L    Potassium 3.6 3.4 - 5.0 mmol/L    Chloride 100 99 - 111 mmol/L    CO2 32 21 - 33 mmol/L    BUN 11 5 - 19 mg/dL    CREATININE 1.0 0.6 - 1.3 mg/dL    POC Glucose 101 (H) 74 - 99 mg/dL    Calcium 9.4 8.4 - 10.2 mg/dL    Total Protein 6.9 6.3 - 8.0 g/dL    Alb 3.8 3.6 - 5.0 g/dL    Globulin 3.1 2.0 - 3.5 g/dL    Albumin/Globulin Ratio 1.2 1.0 - 2.5    Total Bilirubin 0.4 0 - 1.1 mg/dL    AST 22 12 - 36 U/L    ALT 19 12 - 78 U/L    Alkaline Phosphatase 70 46 - 116 U/L    Anion Gap 12.0 8 - 16 mmol/L    Gfr Calculated 55    APTT   Result Value Ref Range    aPTT 31.9 23.0 - 32.0 SECS. Protime   Result Value Ref Range    Coag Tissue Factor Induced Blood Time Pt. Coag 10.5 9.4 - 12.2 SECS.     INR 1.02 (L) 2.00 - 4.00   Troponin I High Sensitivity   Result Value Ref Range    Troponin, High Sensitivity 8.6 0.0 - 51.4 pg/mL   Urinalysis   Result Value Ref Range    Clarity, UA CLEAR CLEAR    Color, UA YELLOW YELLOW    Specific Gravity, UA <=1.005 1.005 - 1.030    pH, UA 7.0 (H) -6.0    Glucose, Ur NEGATIVE NEGATIVE    Bilirubin Urine NEGATIVE NEGATIVE    Ketones, Urine NEGATIVE NEGATIVE    Erythrocytes, Urine NEGATIVE NEGATIVE    Protein, Fluid NEGATIVE NEGATIVE mg/dL    Urobilinogen, Urine 0.2 0.2 - 1.0 mg/dL    Nitrite Ser/Plas SCNC Pt Qn NEGATIVE NEGATIVE    Leukocyte Esterase, Urine NEGATIVE NEGATIVE    LEUKOCYTES, UA 0-5 NEGATIVE    Erythrocytes, Urine NEGATIVE NEGATIVE    Epithelial Cells, Wet Prep RARE NEGATIVE Bacteria, UA NEGATIVE NEGATIVE    Amorphous, UA NEGATIVE NEGATIVE    MUCUS, URINE NEGATIVE NEGATIVE    Casts NEGATIVE NEGATIVE    Yeast, Urine NEGATIVE NEGATIVE    Trichomonas, UA NEGATIVE NEGATIVE    Crystals, UA NEGATIVE NEGATIVE   COVID-19   Result Value Ref Range    SARS-CoV-2, JOHN NEGATIVE NEGATIVE    Internal QC VALID      Impression:       1. Spondylosis without myelopathy or radiculopathy, lumbar region    2. Primary localized osteoarthritis of right hip    3. Primary localized osteoarthritis of right knee        Plan:  Clinical Course: Worsening right hip and knee pain    I discussed the diagnosis and the treatment options with Amanda Higginbotham today. In Summary:  The various treatment options were outlined and discussed with Amanda Higginbotham including:  Conservative care options: physical therapy, ice, medications, bracing, and activity modification. The indications for therapeutic injections. The indications for additional imaging/laboratory studies. The indications for (possible future) interventions. After considering the various options discussed, Amanda Higginbotham elected to pursue a course of treatment that includes the followin. Medications:  I will prescribe Meloxicam 15 mg daily prn to help with inflammation and reduce pain. CV, GI and Nephro risks were reviewed. 2. PT:  Encouraged to continue with Home exercise program.    3. Further studies: COVID-19 PCR testing      4. Interventional:  Right hip injection with fluoroscopy for diagnostic and therapeutic injections. Risks, benefits and alternatives were discussed. She would like to proceed. Will do a right knee injection intra-articularly in the office next week.      5. Follow up:  2-3 weeks Alexander Lund was instructed to call the office if her symptoms worsen or if new symptoms appear prior to the next scheduled visit. She is specifically instructed to contact the office between now & her scheduled appointment if she has concerns related to her condition or if she needs assistance in scheduling the above tests. She is welcome to call for an appointment sooner if she has any additional concerns or questions. Alexandre Alexander MD, FREDDY, [facility]  Board Certified in [address] [place] [address] and Fellowship Trained in [facility])             This dictation was performed with a verbal recognition program [facility]) and it was checked for errors. It is possible that there are still dictated errors within this office note. If so, please bring any errors to my attention for an addendum. All efforts were made to ensure that this office note is accurate. Patient has verbally accepted the following: We confirm that, for purposes of billing, this is a virtual visit with the provider for which we will submit a claim for reimbursement with the insurance company. The patient accepts responsibility for any co-pays, coinsurance amounts or other amounts not covered by the insurance company. Patient location: home   Physician location: home  Time spent: not billed by time  Present on the call: myself and patient and her daughter    Pursuant to the emergency declaration under the [facility][note 3][facility], [address]5 waiver authority and the [employer] Act, this Virtual  Visit was conducted, with patient's consent, to reduce the patient's risk of exposure to COVID-19 and provide continuity of care for an established patient. Services were provided through a video synchronous discussion virtually to substitute for in-person clinic visit. We have confirmed that, for purposes of billing, this is a virtual visit with for which we will submit a claim for reimbursement with your insurance company. The patient has accepted responsibility for any copays, coinsurance amounts or other amounts not covered by their insurance company. This visit was completed virtually using doxy. me.

## 2020-12-16 NOTE — TELEPHONE ENCOUNTER
Auth: NPR  Date: 12/29/2020  Reference # None  Spoke with: [employer]One  Type of SX: Outpatient PRINCESS  Location: [facility]  CPT 42217, [id number], [id number]   SX area: Rt hip  Insurance: Medicare A&B

## 2020-12-22 ENCOUNTER — OFFICE VISIT (OUTPATIENT)
Dept: ORTHOPEDIC SURGERY | Age: 70
End: 2020-12-22
Payer: MEDICARE

## 2020-12-22 VITALS — RESPIRATION RATE: 14 BRPM | TEMPERATURE: 97 F | BODY MASS INDEX: 19.99 KG/M2 | WEIGHT: 117.06 LBS | HEIGHT: 64 IN

## 2020-12-22 PROCEDURE — 20610 DRAIN/INJ JOINT/BURSA W/O US: CPT | Performed by: PHYSICAL MEDICINE & REHABILITATION

## 2020-12-22 RX ORDER — TRIAMCINOLONE ACETONIDE 40 MG/ML
80 INJECTION, SUSPENSION INTRA-ARTICULAR; INTRAMUSCULAR ONCE
Status: COMPLETED | OUTPATIENT
Start: 2020-12-22 | End: 2020-12-22

## 2020-12-22 RX ADMIN — TRIAMCINOLONE ACETONIDE 80 MG: 40 INJECTION, SUSPENSION INTRA-ARTICULAR; INTRAMUSCULAR at 13:05

## 2020-12-22 NOTE — PROGRESS NOTES
Verbal Informed consent was obtained risks benefits alternatives were discussed. She presents today for right knee injection to be done intra-articularly at the bedside. The skin over the right knee was prepped with ChloraPrep x3. A mixture of 80 mg of Kenalog and 3 mils 1% lidocaine was drawn up and instilled with a 22-gauge needle through the inferior lateral approach. The needle was removed and a Band-Aid was applied.

## 2020-12-22 NOTE — PROGRESS NOTES
12/22/20 12:59 PM         NDC: 5694-0068-65   -   LIDOCAINE 1%    Lot Number: [id number]       Comments: R KNEE

## 2020-12-29 ENCOUNTER — HOSPITAL ENCOUNTER (OUTPATIENT)
Age: 70
Setting detail: OUTPATIENT SURGERY
Discharge: HOME | End: 2020-12-29
Attending: PHYSICAL MEDICINE & REHABILITATION | Admitting: PHYSICAL MEDICINE & REHABILITATION
Payer: MEDICARE

## 2020-12-29 VITALS
BODY MASS INDEX: 19.97 KG/M2 | OXYGEN SATURATION: 97 % | TEMPERATURE: 98 F | HEIGHT: 64 IN | WEIGHT: 117 LBS | SYSTOLIC BLOOD PRESSURE: 118 MMHG | HEART RATE: 64 BPM | DIASTOLIC BLOOD PRESSURE: 61 MMHG | RESPIRATION RATE: 17 BRPM

## 2020-12-29 PROCEDURE — 2500000003 HC RX 250 WO HCPCS: Performed by: PHYSICAL MEDICINE & REHABILITATION

## 2020-12-29 PROCEDURE — 6360000002 HC RX W HCPCS: Performed by: PHYSICAL MEDICINE & REHABILITATION

## 2020-12-29 PROCEDURE — A9577 INJ MULTIHANCE: HCPCS | Performed by: PHYSICAL MEDICINE & REHABILITATION

## 2020-12-29 PROCEDURE — 3600000002 HC SURGERY LEVEL 2 BASE: Performed by: PHYSICAL MEDICINE & REHABILITATION

## 2020-12-29 PROCEDURE — 2709999900 HC NON-CHARGEABLE SUPPLY: Performed by: PHYSICAL MEDICINE & REHABILITATION

## 2020-12-29 PROCEDURE — 7100000010 HC PHASE II RECOVERY - FIRST 15 MIN: Performed by: PHYSICAL MEDICINE & REHABILITATION

## 2020-12-29 PROCEDURE — 6360000004 HC RX CONTRAST MEDICATION: Performed by: PHYSICAL MEDICINE & REHABILITATION

## 2020-12-29 RX ORDER — LIDOCAINE HYDROCHLORIDE 10 MG/ML
INJECTION, SOLUTION EPIDURAL; INFILTRATION; INTRACAUDAL; PERINEURAL PRN
Status: DISCONTINUED | OUTPATIENT
Start: 2020-12-29 | End: 2020-12-29 | Stop reason: ALTCHOICE

## 2020-12-29 ASSESSMENT — PAIN DESCRIPTION - LOCATION: LOCATION: HIP

## 2020-12-29 ASSESSMENT — PAIN DESCRIPTION - PAIN TYPE: TYPE: CHRONIC PAIN

## 2020-12-29 ASSESSMENT — PAIN DESCRIPTION - DESCRIPTORS
DESCRIPTORS: ACHING
DESCRIPTORS: ACHING;THROBBING

## 2020-12-29 ASSESSMENT — PAIN DESCRIPTION - ORIENTATION: ORIENTATION: RIGHT

## 2020-12-29 ASSESSMENT — PAIN SCALES - GENERAL: PAINLEVEL_OUTOF10: 3

## 2020-12-29 NOTE — H&P
HISTORY AND PHYSICAL/PRE-SEDATION ASSESSMENT    Patient:  Kala Bass   :  1950  Medical Record No.:  [id number]   Date:  2020  Physician:  Ayanna Pack M.D. Facility: [facility]    HISTORY OF PRESENT ILLNESS:                 The patient is a 69 y.o. female whom presents with right hip pain. Review of the imaging and physical exam of the patient confirmed the pre-procedure diagnosis. After a thorough discussion of risks, benefits and alternatives informed consent was obtained.                 Past Medical History:   Past Medical History:   Diagnosis Date    Arthritis     COPD (chronic obstructive pulmonary disease) ([address]     Depression     Hyperlipidemia     Hypertension     Irritable bowel syndrome (IBS)     Pacemaker       Past Surgical History:     Past Surgical History:   Procedure Laterality Date    CARPAL TUNNEL RELEASE Right 2017    CATARACT REMOVAL WITH IMPLANT Left 2016    Phacoemulsification/ Posterior Chamber Intraocular Lens Implantation with dr Santiago Gross      CHOLECYSTECTOMY, LAPAROSCOPIC  2011    COLONOSCOPY  2014    CORONARY ANGIOPLASTY      EYE SURGERY Right 2017    catarct removal with lens implant    HERNIA REPAIR  2016    open ventral incisional    HYSTERECTOMY      OTHER SURGICAL HISTORY  2016    OPEN VENTRAL INCISIONAL HERNIA REPAIR     PAC[facility]AKER PLACEMENT  2019    [facility]    PAIN MANAGEMENT PROCEDURE Right 3/10/2020    RIGHT LUMBAR FOUR AND LUMBAR FIVE TRANSFORAMINAL EPIDURAL STEROID INJECTION SITE CONFIRMED BY FLUOROSCOPY performed by Ayanna Pack MD at [address] [place] [address] Right 2020    RIGHT LUMBAR FOUR AND LUMBAR FIVE TRANSFORAMINAL EPIDURAL STEROID INJECTION SITE CONFIRMED BY FLUOROSCOPY performed by Ayanna Pack MD at [address] [facility] Bilateral 10/13/2020    BILATERAL LUMBAR THREE LUMBAR FOUR LUMBAR DORSAL RAMUS LUMBAR MEDIAL BRANCH BLOCKS performed by David Burleson MD at [address] Right 11/17/2020    RIGHT SACROILIAC JOINT INJECTION SITE CONFIRMED BY FLUOROSCOPY performed by David Burleson MD at [address] [facility] ENDOSCOPY       Current Medications:   Prior to Admission medications    Medication Sig Start Date End Date Taking? Authorizing Provider   VORTIoxetine (TRINTELLIX) 10 MG TABS tablet TAKE 1 TABLET BY MOUTH EVERY DAY 11/9/20  Yes Lilliam Atkins MD   ALPRAZolam Evaline Beams) 0.25 MG tablet Take 1 tablet by mouth nightly as needed for Sleep or Anxiety for up to 60 days. 11/9/20 1/8/21 Yes Lilliam Atkins MD   apixaban (ELIQUIS) 5 MG TABS tablet Take 5 mg by mouth 2 times daily 8/10/20  Yes Historical Provider, MD   ezetimibe (ZETIA) 10 MG tablet TAKE 1 TABLET BY MOUTH DAILY.  9/8/20  Yes JAYLYN Oakes CNP   dicyclomine (BENTYL) 10 MG capsule TAKE ONE CAPSULE BY MOUTH FOUR TIMES DAILY AS NEEDED 8/10/20  Yes JAYLYN Oakes CNP   omeprazole (PRILOSEC) 20 MG delayed release capsule TAKE 1 CAPSULE BY MOUTH 2 TIMES DAILY (BEFORE MEALS) 5/26/20  Yes JAYLYN Oakes CNP   potassium chloride (MICRO-K) 10 MEQ extended release capsule Take 10 mEq by mouth daily   Yes Historical Provider, MD   metoprolol succinate (TOPROL XL) 25 MG extended release tablet Take 0.5 tablets by mouth daily 1/7/20  Yes JAYLYN Oakes CNP   Lactobacillus (PROBIOTIC ACIDOPHILUS PO) Take 1 tablet by mouth daily    Yes Historical Provider, MD   fluticasone [facility]) 50 MCG/ACT nasal spray 2 sprays by Nasal route daily 1/8/19  Yes JAYLYN Oakes CNP   tiotropium (Romel Meeta) 18 MCG inhalation capsule Inhale 1 capsule into the lungs daily 1/8/19  Yes JAYLYN Oakes CNP   albuterol sulfate HFA (VENTOLIN HFA) 108 (90 Base) MCG/ACT inhaler Inhale 2 puffs into the lungs 2 times daily as needed for Wheezing or Shortness of Breath 12/20/17 Yes Aracely Sommers MD   aspirin EC 81 MG EC tablet Take 1 tablet by mouth daily. 3/17/15  Yes Akshat Ponce MD   fluticasone-vilanterol Tonya Lety ELLIPTA) 100-25 MCG/INH AEPB inhaler Inhale 1 puff into the lungs daily 10/10/19 1/7/20  Aracely Sommers MD     Allergies: Iodides, Nsaids, and Sulfa antibiotics  Social History:    reports that she has been smoking cigarettes. She started smoking about 19 months ago. She has a 20.00 pack-year smoking history. She has never used smokeless tobacco. She reports that she does not drink alcohol or use drugs. Family History:   Family History   Problem Relation Age of Onset    Heart Disease Father     High Blood Pressure Father    Connye Sole Cancer Sister         uterine       Vitals: Blood pressure (!) 109/54, pulse 53, temperature 98.3 °F (36.8 °C), temperature source Temporal, resp. rate 12, height 5' 4\" (1.626 m), weight 117 lb (53.1 kg), SpO2 96 %, not currently breastfeeding. PHYSICAL EXAM:  HENT: Airway patent and reviewed  Cardiovascular: Normal rate, regular rhythm, normal heart sounds. Pulmonary/Chest: No wheezes. No rhonchi. No rales. Abdominal: Soft. Bowel sounds are normal. No distension. Extremities: Moves all extremities equally  Lumbar Spine: Painful range of motion, no midline tenderness       Diagnosis:Primary localized osteoarthritis of the right hip    Plan: Proceed with planned procedure    The patient was counseled at length about the risks of scott Covid-19 in the cristopher-operative and post-operative states including the recovery window of their procedure. The patient was made aware that scott Covid-19 after a surgical procedure may worsen their prognosis for recovering from the virus and lend to a higher morbidity and or mortality risk. The patient was given the options of postponing their procedure. All of the risks, benefits, and alternatives were discussed. The patient does wish to proceed with the procedure.       ASA CLASS:         [] I. Normal, healthy adult           [x]   II.  Mild systemic disease            []   III. Severe systemic disease      Mallampati: Mallampati Class II - (soft palate, fauces & uvula are visible)      Sedation plan:   [x]  Local              []  Minimal                  []  General anesthesia    Patient's condition acceptable for planned procedure/sedation. Post Procedure Plan   Return to same level of care   ______________________     The risks and benefits as well as alternatives to the procedure have been discussed with the patient and or family. The patient and or next of kin understands and agrees to proceed.     Roldan Alcazar M.D.

## 2020-12-29 NOTE — OP NOTE
Patient:  Maurisio Still  YOB: 1950  Medical Record #:  [id number]   Place:  [address], [place]  Date:  12/29/2020   Physician:  Coni Trejo MD, FREDDY    Procedure:  Right Hip Joint Injection with fluoroscopy    CPT 83984 AND [id number]    Pre-Procedure Diagnosis: Primary osteoarthritis of the right  Hip Joint    Post-Procedure Diagnosis: Same    Sedation: Local with 1% Lidocaine 3 ml and no IV sedation    EBL: None    Complications: None    Procedure Summary:    The patient was seen in the office for complaints of right hip pain. Review of the imaging and physical exam of the patient confirmed the pre-procedure diagnosis. After a thorough discussion of risks, benefits and alternatives informed consent was obtained. The patient was brought to the procedure suite and placed in the supine position. The skin overlying the right hip joint was prepped and draped in the usual sterile fashion. Fluoroscopy was utilized to identify the head and neck of the femur. Through anesthetized skin a 22 gauge spinal needle was advanced into the articular space. Prohance was instilled showing an arthrogram.  A 10 ml solution of 80 mg of depo-medrol mixed with 0.5% Marcaine was instilled. The needle was removed and a band-aid applied. The patient was transferred to the post-operative suite in stable condition.

## 2021-01-12 ENCOUNTER — OFFICE VISIT (OUTPATIENT)
Dept: ORTHOPEDIC SURGERY | Age: 71
End: 2021-01-12
Payer: MEDICARE

## 2021-01-12 VITALS — TEMPERATURE: 97.5 F | HEIGHT: 64 IN | BODY MASS INDEX: 19.97 KG/M2 | WEIGHT: 117 LBS

## 2021-01-12 DIAGNOSIS — R10.2 PELVIC PAIN: ICD-10-CM

## 2021-01-12 DIAGNOSIS — M16.11 PRIMARY LOCALIZED OSTEOARTHRITIS OF RIGHT HIP: Primary | ICD-10-CM

## 2021-01-12 DIAGNOSIS — M47.816 SPONDYLOSIS WITHOUT MYELOPATHY OR RADICULOPATHY, LUMBAR REGION: ICD-10-CM

## 2021-01-12 PROCEDURE — 3017F COLORECTAL CA SCREEN DOC REV: CPT | Performed by: STUDENT IN AN ORGANIZED HEALTH CARE EDUCATION/TRAINING PROGRAM

## 2021-01-12 PROCEDURE — 1123F ACP DISCUSS/DSCN MKR DOCD: CPT | Performed by: STUDENT IN AN ORGANIZED HEALTH CARE EDUCATION/TRAINING PROGRAM

## 2021-01-12 PROCEDURE — 4004F PT TOBACCO SCREEN RCVD TLK: CPT | Performed by: STUDENT IN AN ORGANIZED HEALTH CARE EDUCATION/TRAINING PROGRAM

## 2021-01-12 PROCEDURE — G8420 CALC BMI NORM PARAMETERS: HCPCS | Performed by: STUDENT IN AN ORGANIZED HEALTH CARE EDUCATION/TRAINING PROGRAM

## 2021-01-12 PROCEDURE — G8427 DOCREV CUR MEDS BY ELIG CLIN: HCPCS | Performed by: STUDENT IN AN ORGANIZED HEALTH CARE EDUCATION/TRAINING PROGRAM

## 2021-01-12 PROCEDURE — G8400 PT W/DXA NO RESULTS DOC: HCPCS | Performed by: STUDENT IN AN ORGANIZED HEALTH CARE EDUCATION/TRAINING PROGRAM

## 2021-01-12 PROCEDURE — 4040F PNEUMOC VAC/ADMIN/RCVD: CPT | Performed by: STUDENT IN AN ORGANIZED HEALTH CARE EDUCATION/TRAINING PROGRAM

## 2021-01-12 PROCEDURE — 1090F PRES/ABSN URINE INCON ASSESS: CPT | Performed by: STUDENT IN AN ORGANIZED HEALTH CARE EDUCATION/TRAINING PROGRAM

## 2021-01-12 PROCEDURE — 99213 OFFICE O/P EST LOW 20 MIN: CPT | Performed by: STUDENT IN AN ORGANIZED HEALTH CARE EDUCATION/TRAINING PROGRAM

## 2021-01-12 PROCEDURE — G8484 FLU IMMUNIZE NO ADMIN: HCPCS | Performed by: STUDENT IN AN ORGANIZED HEALTH CARE EDUCATION/TRAINING PROGRAM

## 2021-01-12 NOTE — PROGRESS NOTES
Follow up: Shyann Milian  1950  [id number]      CHIEF COMPLAINT:    Chief Complaint   Patient presents with    Back Pain     F/U LSP    Hip Pain     F/U Right Hip injection under fluoro         HISTORY OF PRESENT ILLNESS:  Ms. Radha Luther is a 69 y.o. female returns for a follow up visit for multiple medical problems. Her current presenting problems are   1. Primary localized osteoarthritis of right hip    2. Spondylosis without myelopathy or radiculopathy, lumbar region    3. Pelvic pain    . As per information/history obtained from the PADT(patient assessment and documentation tool) - She complains of pain in the hips Right with radiation to the hips Right She rates the pain 4/10 and describes it as dull, aching, throbbing, numbness. Pain is made worse by: walking, standing, sitting. She denies side effects from the current pain regimen. Patient reports that since the last follow up visit the physical functioning is unchanged, family/social relationships are unchanged, mood is unchanged and sleep patterns are unchanged, and that the overall functioning is worse. Patient denies neurological bowel or bladder. Ms. Radha Luther presents today following completion of a right hip joint injection with fluoroscopy on 12-. The patient states she had 50% relief for 3 to 4 days following this procedure. The pain did return after doing some mopping. She states the pain is worse in the morning. Walking, sitting and standing aggravate her pain. The patient has not seen a hip specialist.  She is able to sit stand and walk for 5 minutes without a considerable amount of pain. She is complaining of unsteady gait due to the pain in her right hip.     Associated signs and symptoms:   Neurogenic bowel or bladder symptoms:  no   Perceived weakness:  no   Difficulty walking:  yes              Past Medical History:   Past Medical History:   Diagnosis Date    Arthritis     [facility]     Depression     Hyperlipidemia     Hypertension     Irritable bowel syndrome (IBS)     Pacemaker       Past Surgical History:     Past Surgical History:   Procedure Laterality Date    CARPAL TUNNEL RELEASE Right 07/13/2017    CATARACT REMOVAL WITH IMPLANT Left 12/23/2016    Phacoemulsification/ Posterior Chamber Intraocular Lens Implantation with dr Mikala Pandya  2012    CHOLECYSTECTOMY, LAPAROSCOPIC  5/25/2011    COLONOSCOPY  7/28/2014    CORONARY ANGIOPLASTY      EYE SURGERY Right 03/09/2017    catarct removal with lens implant    HERNIA REPAIR  08/09/2016    open ventral incisional    HYSTERECTOMY      OTHER SURGICAL HISTORY  08/09/2016    OPEN VENTRAL INCISIONAL HERNIA REPAIR     PACEMAKER PLACEMENT  06/25/2019    [address]    PAIN MANAGEMENT PROCEDURE Right 3/10/2020    RIGHT LUMBAR FOUR AND LUMBAR FIVE TRANSFORAMINAL EPIDURAL STEROID INJECTION SITE CONFIRMED BY FLUOROSCOPY performed by Maranda White MD at [address] [facility] Right 6/16/2020    RIGHT LUMBAR FOUR AND LUMBAR FIVE TRANSFORAMINAL EPIDURAL STEROID INJECTION SITE CONFIRMED BY FLUOROSCOPY performed by Maranda White MD at [address] [place] [address] [place] MANAGEMENT PROCEDURE Bilateral 10/13/2020    BILATERAL LUMBAR THREE LUMBAR FOUR LUMBAR DORSAL RAMUS LUMBAR MEDIAL BRANCH BLOCKS performed by Maranda White MD at [address] [facility] Right 11/17/2020    RIGHT SACROILIAC JOINT INJECTION SITE CONFIRMED BY FLUOROSCOPY performed by Maranda White MD at [address] [facility] Right 12/29/2020    RIGHT HIP INJECTION SITE CONFIRMED BY FLUOROSCOPY performed by Maranda White MD at [address] [place] [address] ENDOSCOPY       Current Medications:     Current Outpatient Medications:     VORTIoxetine (TRINTELLIX) 10 MG TABS tablet, TAKE 1 TABLET BY MOUTH EVERY DAY, Disp: 30 tablet, Rfl: 5    apixaban (ELIQUIS) 5 MG TABS gait or progressive weakness  MUSCULOSKELETAL: Denies joint swelling or redness  GI: Denies nausea, vomiting, diarrhea   : Denies bowel or bladder issues       PHYSICAL EXAM:    Vitals: Temperature 97.5 °F (36.4 °C), height 5' 4\" (1.626 m), weight 117 lb (53.1 kg), not currently breastfeeding. GENERAL EXAM:  · General Apparence: Patient is adequately groomed with no evidence of malnutrition. · Psychiatric: Orientation: The patient is oriented to time, place and person. The patient's mood and affect are appropriate   · Vascular: Examination reveals no swelling and palpation reveals no tenderness in upper or lower extremities. Good capillary refill. · The lymphatic examination of the neck, axillae and groin reveals all areas to be without enlargement or induration  · Sensation is intact without deficit in the upper and lower extremities to light touch and pinprick  · Coordination of the upper and lower extremities are normal.  · RIGHT UPPER EXTREMITY:  Inspection/examination of the right upper extremity does not show any tenderness, deformity or injury. Range of motion is unremarkable and pain-free. There is no gross instability. There are no rashes, ulcerations or lesions. Strength and tone are normal. No atrophy or abnormal movements are noted. · LEFT UPPER EXTREMITY: Inspection/examination of the left upper extremity does not show any tenderness, deformity or injury. Range of motion is unremarkable and pain-free. There is no gross instability. There are no rashes, ulcerations or lesions. Strength and tone are normal. No atrophy or abnormal movements are noted. LUMBAR/SACRAL EXAMINATION:  · Inspection: Local inspection shows no step-off or bruising. Lumbar alignment is normal. No instability is noted. · Palpation:   No evidence of tenderness at the midline.   Lumbar paraspinal tenderness: Mild L4/5 and L5/S1 tenderness  Bursal tenderness No tenderness bilaterally  There is no paraspinal spasm.  · Range of Motion: limited by 25% in all planes due to pain. Pain with passive internal rotation of the right hip. · Strength:   Strength testing is 5/5 in all muscle groups tested. · Special Tests:   Straight leg raise and crossed SLR negative. Salvador's testing is negative bilaterally. FADIR's testing is negative bilaterally. · Skin: There are no rashes, ulcerations or lesions. · Reflexes: Reflexes are symmetrically 1+ at the patellar and ankle tendons. Clonus absent bilaterally at the feet. · Gait & station: antalgic on the right  · Additional Examinations:  · RIGHT LOWER EXTREMITY: Inspection/examination of the right lower extremity does not show any tenderness, deformity or injury. Range of motion is normal and pain-free. There is no gross instability. There are no rashes, ulcerations or lesions. Strength and tone are normal. No atrophy or abnormal movements are noted. · LEFT LOWER EXTREMITY:  Inspection/examination of the left lower extremity does not show any tenderness, deformity or injury. Range of motion is normal and pain-free. There is no gross instability. There are no rashes, ulcerations or lesions. Strength and tone are normal. No atrophy or abnormal movements are noted.       Diagnostic Testing:    No new diagnostics  Results for orders placed or performed in visit on 10/21/20   CBC Auto Differential   Result Value Ref Range    WBC 6.0 4.5 - 10.3 x1000    RBC 4.75 3.72 - 5.31 x1000000    Hemoglobin 14.4 11.9 - 15.9 g/dL    Hematocrit 44.7 35.7 - 46.7 %    MCV 94.1 82.9 - 99.9 fl    MCH 30.3 28.1 - 33.6 pg    MCHC 32.2 (L) 32.8 - 34.7 g/dL    Erythrocyte Distribution Width RBC Ratio 13.1 11.6 - 14.8 %    Platelets 289 375 - 009 x1000    Neutrophils/100 leukocytes 56.9 40 - 70 %    Lymphocytes % 32.6 15 - 45 %    Monocytes % 7.3 0 - 12 %    Eosinophils % 2.2 0 - 5 %    Basophils % 0.8 0 - 1 %    Immature Granulocytes % 0.2 0.0 - 0.5 %    GRANULOCYTE ABSOLUTE COUNT 3.4 1.8 - 7.2 localized osteoarthritis of right hip    2. Spondylosis without myelopathy or radiculopathy, lumbar region    3. Pelvic pain        Plan:  Clinical Course: Above diagnoses are worsening    I discussed the diagnosis and the treatment options with Kilpatrick Punch today. In Summary:  The various treatment options were outlined and discussed with Finesse Rowley including:  Conservative care options: physical therapy, ice, medications, bracing, and activity modification. The indications for therapeutic injections. The indications for additional imaging/laboratory studies. The indications for (possible future) interventions. After considering the various options discussed, Finesse Rowley elected to pursue a course of treatment that includes the followin. Medications:  No further recommendations for new medications. 2. PT:  Encouraged to continue with HEP. 3. Further studies:  I will refer the patient to Dr. Marek Lauren for further work-up and evaluation of her right hip. She did have pain relief with a right intra-articular hip injection which indicates the pain is coming from the hip and not the low back. 4. Interventional:  No further intervention at this time. 80% relief of right hip pain for 3 to 5 days following right intra-articular hip injection. 5. Follow up:  As needed      Finesse Rowley was instructed to call the office if her symptoms worsen or if new symptoms appear prior to the next scheduled visit. She is specifically instructed to contact the office between now & her scheduled appointment if she has concerns related to her condition or if she needs assistance in scheduling the above tests. She is welcome to call for an appointment sooner if she has any additional concerns or questions.        Liza Gates PA-C   Board Certified by the [employer] on Certification of Physician Assistants  Franche[address] Segal 57  Partner of [facility])             This dictation was performed with a verbal recognition program (DRAGON) and it was checked for errors. It is possible that there are still dictated errors within this office note. If so, please bring any errors to my attention for an addendum. All efforts were made to ensure that this office note is accurate.

## 2021-02-16 ASSESSMENT — PATIENT HEALTH QUESTIONNAIRE - PHQ9
SUM OF ALL RESPONSES TO PHQ QUESTIONS 1-9: 2
SUM OF ALL RESPONSES TO PHQ QUESTIONS 1-9: 2
1. LITTLE INTEREST OR PLEASURE IN DOING THINGS: 1
2. FEELING DOWN, DEPRESSED OR HOPELESS: 1
SUM OF ALL RESPONSES TO PHQ9 QUESTIONS 1 & 2: 2
SUM OF ALL RESPONSES TO PHQ QUESTIONS 1-9: 2
SUM OF ALL RESPONSES TO PHQ QUESTIONS 1-9: 2

## 2021-02-16 ASSESSMENT — LIFESTYLE VARIABLES
HOW OFTEN DO YOU HAVE A DRINK CONTAINING ALCOHOL: 0
HOW OFTEN DO YOU HAVE A DRINK CONTAINING ALCOHOL: 0
AUDIT TOTAL SCORE: INCOMPLETE
HOW OFTEN DO YOU HAVE A DRINK CONTAINING ALCOHOL: NEVER
AUDIT-C TOTAL SCORE: INCOMPLETE

## 2021-02-23 ENCOUNTER — OFFICE VISIT (OUTPATIENT)
Dept: FAMILY MEDICINE CLINIC | Age: 71
End: 2021-02-23
Payer: MEDICARE

## 2021-02-23 VITALS
HEART RATE: 67 BPM | SYSTOLIC BLOOD PRESSURE: 110 MMHG | DIASTOLIC BLOOD PRESSURE: 70 MMHG | BODY MASS INDEX: 19.69 KG/M2 | OXYGEN SATURATION: 98 % | WEIGHT: 118.2 LBS | HEIGHT: 65 IN

## 2021-02-23 DIAGNOSIS — Z00.00 ROUTINE GENERAL MEDICAL EXAMINATION AT A HEALTH CARE FACILITY: Primary | ICD-10-CM

## 2021-02-23 PROCEDURE — G0439 PPPS, SUBSEQ VISIT: HCPCS | Performed by: FAMILY MEDICINE

## 2021-02-23 PROCEDURE — 4040F PNEUMOC VAC/ADMIN/RCVD: CPT | Performed by: FAMILY MEDICINE

## 2021-02-23 PROCEDURE — 3017F COLORECTAL CA SCREEN DOC REV: CPT | Performed by: FAMILY MEDICINE

## 2021-02-23 PROCEDURE — G8484 FLU IMMUNIZE NO ADMIN: HCPCS | Performed by: FAMILY MEDICINE

## 2021-02-23 PROCEDURE — 1123F ACP DISCUSS/DSCN MKR DOCD: CPT | Performed by: FAMILY MEDICINE

## 2021-02-23 ASSESSMENT — LIFESTYLE VARIABLES
AUDIT-C TOTAL SCORE: INCOMPLETE
HOW OFTEN DO YOU HAVE A DRINK CONTAINING ALCOHOL: NEVER
AUDIT TOTAL SCORE: INCOMPLETE

## 2021-02-23 NOTE — PATIENT INSTRUCTIONS
Personalized Preventive Plan for Meryl Davidson - 2/23/2021  Medicare offers a range of preventive health benefits. Some of the tests and screenings are paid in full while other may be subject to a deductible, co-insurance, and/or copay. Some of these benefits include a comprehensive review of your medical history including lifestyle, illnesses that may run in your family, and various assessments and screenings as appropriate. After reviewing your medical record and screening and assessments performed today your provider may have ordered immunizations, labs, imaging, and/or referrals for you. A list of these orders (if applicable) as well as your Preventive Care list are included within your After Visit Summary for your review. Other Preventive Recommendations:    · A preventive eye exam performed by an eye specialist is recommended every 1-2 years to screen for glaucoma; cataracts, macular degeneration, and other eye disorders. · A preventive dental visit is recommended every 6 months. · Try to get at least 150 minutes of exercise per week or 10,000 steps per day on a pedometer . · Order or download the FREE \"Exercise & Physical Activity: Your Everyday Guide\" from [employer]. Call 8-716.895.2718 or search [employer] online. · You need 3212-3564 mg of calcium and 0206-4370 IU of vitamin D per day. It is possible to meet your calcium requirement with diet alone, but a vitamin D supplement is usually necessary to meet this goal.  · When exposed to the sun, use a sunscreen that protects against both UVA and UVB radiation with an SPF of 30 or greater. Reapply every 2 to 3 hours or after sweating, drying off with a towel, or swimming. · Always wear a seat belt when traveling in a car. Always wear a helmet when riding a bicycle or motorcycle. Learning About Medical Power of   What is a medical power of ?      A medical power of , also called a durable power of  for health care, is one type of the legal forms called advance directives. It lets you name the person you want to make treatment decisions for you if you can't speak or decide for yourself. The person you choose is called your health care agent. This person is also called a health care proxy or health care surrogate. A medical power of  may be called something else in your state. How do you choose a health care agent? Choose your health care agent carefully. This person may or may not be a family member. Talk to the person before you make your final decision. Make sure he or she is comfortable with this responsibility. It's a good idea to choose someone who:  Is at least 25years old. Knows you well and understands what makes life meaningful for you. Understands your Orthodoxy and moral values. Will do what you want, not what he or she wants. Will be able to make difficult choices at a stressful time. Will be able to refuse or stop treatment, if that is what you would want, even if you could die. Will be firm and confident with health professionals if needed. Will ask questions to get needed information. Lives near you or agrees to travel to you if needed. Your family may help you make medical decisions while you can still be part of that process. But it's important to choose one person to be your health care agent in case you aren't able to make decisions for yourself. If you don't fill out the legal form and name a health care agent, the decisions your family can make may be limited. A health care agent may be called something else in your state. Who will make decisions for you if you don't have a health care agent? If you don't have a health care agent or a living will, you may not get the care you want. Decisions may be made by family members who disagree about your medical care.  Or decisions may be made by a medical professional who doesn't know you well. In some cases, a  makes the decisions. When you name a health care agent, it is very clear who has the power to make health decisions for you. How do you name a health care agent? You name your health care agent on a legal form. This form is usually called a medical power of . Ask your hospital, state bar association, or office on aging where to find these forms. You must sign the form to make it legal. Some states require you to get the form notarized. This means that a person called a  watches you sign the form and then he or she signs the form. Some states also require that two or more witnesses sign the form. Be sure to tell your family members and doctors who your health care agent is. Where can you learn more? Go to [email or web address] and sign in to your QuickProNotes account. Enter [id number] in the [employer] box to learn more about \"Learning About [address]. \"     If you do not have an account, please click on the \"Sign Up Now\" link. Current as of: December 9, 2019               Content Version: 12.6  © 4637-9822 [employer]. Care instructions adapted under license by [facility] [employer] [facility] ([facility]). If you have questions about a medical condition or this instruction, always ask your healthcare professional. [place] [address] any warranty or liability for your use of this information.     ·

## 2021-02-23 NOTE — PROGRESS NOTES
Medicare Annual Wellness Visit  Name: Aamir Freeman Date: 2021   MRN: [id number] Sex: Female   Age: 69 y.o. Ethnicity: Non-/Non    : 1950 Race: Tania Taylor is here for Medicare AWV    Screenings for behavioral, psychosocial and functional/safety risks, and cognitive dysfunction are all negative except as indicated below. These results, as well as other patient data from the [address] form, are documented in Flowsheets linked to this Encounter. Allergies   Allergen Reactions    Iodides Hives and Itching    Nsaids      Previous stomach bleed      Sulfa Antibiotics Rash       Prior to Visit Medications    Medication Sig Taking? Authorizing Provider   VORTIoxetine (TRINTELLIX) 10 MG TABS tablet TAKE 1 TABLET BY MOUTH EVERY DAY  Ronald Garcia MD   apixaban (ELIQUIS) 5 MG TABS tablet Take 5 mg by mouth 2 times daily  Historical Provider, MD   ezetimibe (ZETIA) 10 MG tablet TAKE 1 TABLET BY MOUTH DAILY.   JAYLYN cMneill CNP   dicyclomine (BENTYL) 10 MG capsule TAKE ONE CAPSULE BY MOUTH FOUR TIMES DAILY AS NEEDED  JAYLYN Mcneill CNP   omeprazole (PRILOSEC) 20 MG delayed release capsule TAKE 1 CAPSULE BY MOUTH 2 TIMES DAILY (BEFORE MEALS)  JAYLYN Mcneill CNP   potassium chloride (MICRO-K) 10 MEQ extended release capsule Take 10 mEq by mouth daily  Historical Provider, MD   metoprolol succinate (TOPROL XL) 25 MG extended release tablet Take 0.5 tablets by mouth daily  JAYLYN Mcneill CNP   fluticasone-vilanterol (BREO ELLIPTA) 100-25 MCG/INH AEPB inhaler Inhale 1 puff into the lungs daily  Ronald Garcia MD   Lactobacillus (PROBIOTIC ACIDOPHILUS PO) Take 1 tablet by mouth daily   Historical Provider, MD   fluticasone (FLONASE) 50 MCG/ACT nasal spray 2 sprays by Nasal route daily  JAYLYN Mcneill CNP   tiotropium (Ulus Cables) 18 MCG inhalation capsule Inhale 1 capsule into the lungs daily  JAYLYN Mcneill CNP INJECTION SITE CONFIRMED BY FLUOROSCOPY performed by Callie Herr MD at [address] [facility] [address] ENDOSCOPY         Family History   Problem Relation Age of Onset    Heart Disease Father     High Blood Pressure Father     Cancer Sister         uterine       CareTeam (Including outside providers/suppliers regularly involved in providing care):   Patient Care Team:  Estee Walker MD as PCP - General  Estee Walker MD as PCP - [facility] Empaneled Provider  Bessy Corea MD as Consulting Physician (Physical Medicine and Rehab)  Ruben Lambert PA-C (Physician Assistant)  Braden Ramirez RN as Nurse Navigator    Wt Readings from Last 3 Encounters:   02/23/21 118 lb 3.2 oz (53.6 kg)   01/12/21 117 lb (53.1 kg)   12/29/20 117 lb (53.1 kg)     Vitals:    02/23/21 0841   BP: 110/70   Pulse: 67   SpO2: 98%   Weight: 118 lb 3.2 oz (53.6 kg)   Height: 5' 5\" (1.651 m)     Body mass index is 19.67 kg/m². Based upon direct observation of the patient, evaluation of cognition reveals recent and remote memory intact. Patient's complete Health Risk Assessment and screening values have been reviewed and are found in Flowsheets. The following problems were reviewed today and where indicated follow up appointments were made and/or referrals ordered. Positive Risk Factor Screenings with Interventions:         Substance History:  Social History     Tobacco History     Smoking Status  Current Every Day Smoker Smoking Start Date  5/10/2019 Smoking Frequency  0.5 packs/day for 40 years (20 pk yrs) Smoking Tobacco Type  Cigarettes    Smokeless Tobacco Use  Never Used          Alcohol History     Alcohol Use Status  No          Drug Use     Drug Use Status  No          Sexual Activity     Sexually Active  Yes Partners  Male               Alcohol Screening:       A score of 8 or more is associated with harmful or hazardous drinking.  A score of 13 or more in women, and 15 or more in men, is likely to indicate alcohol dependence. Substance Abuse Interventions:  · Tobacco abuse:  tobacco cessation tips and resources provided    General Health and ACP:  General  In general, how would you say your health is?: Fair  In the past 7 days, have you experienced any of the following? New or Increased Pain, New or Increased Fatigue, Loneliness, Social Isolation, Stress or Anger?: (!) New or Increased Fatigue, Social Isolation, Stress  Do you get the social and emotional support that you need?: Yes  Do you have a Living Will?: Yes  Advance Directives     Power of  Living Will ACP-Advance Directive ACP-Power of     Not on File Not on File Not on File Not on File      General Health Risk Interventions:  · No Living Will: ACP documents already completed- patient asked to provide copy to the office    Health Habits/Nutrition:  Health Habits/Nutrition  Do you exercise for at least 20 minutes 2-3 times per week?: (!) No  Have you lost any weight without trying in the past 3 months?: No  Do you eat only one meal per day?: No  Have you seen the dentist within the past year?: N/A - wear dentures  Body mass index: 19.67  Health Habits/Nutrition Interventions:  · Inadequate physical activity:  educational materials provided to promote increased physical activity    Hearing/Vision:  No exam data present  Hearing/Vision  Do you or your family notice any trouble with your hearing that hasn't been managed with hearing aids?: (!) Yes  Do you have difficulty driving, watching TV, or doing any of your daily activities because of your eyesight?: No  Have you had an eye exam within the past year?: Yes  Hearing/Vision Interventions:  · Hearing concerns:  patient declines any further evaluation/treatment for hearing issues     ADL:  ADLs  In the past 7 days, did you need help from others to perform any of the following everyday activities?  Eating, dressing, grooming, bathing, toileting, or walking/balance?: None  In the past 7 days, did you need help from others to take care of any of the following? Laundry, housekeeping, banking/finances, shopping, telephone use, food preparation, transportation, or taking medications?: (!) Banking/Finances, Shopping, Transportation, Taking Medications  ADL Interventions:  · Patient declines any further evaluation/treatment for this issue  · daughter helps    Personalized Preventive Plan   Current Health Maintenance Status  Immunization History   Administered Date(s) Administered    Influenza Virus Vaccine 10/01/2014, 09/08/2015    Influenza, High Dose (Fluzone 65 yrs and older) 11/29/2016, 10/29/2018    Influenza, Triv, inactivated, subunit, adjuvanted, IM (Fluad 65 yrs and older) 10/10/2019    Pneumococcal Conjugate 13-valent ([id number]) 11/29/2016    Pneumococcal Polysaccharide ([id number]) 10/29/2018        Health Maintenance   Topic Date Due    COVID-19 Vaccine (1 of 2) 09/28/1965    DTaP/Tdap/Td vaccine (1 - Tdap) 09/28/1968    Shingles Vaccine (1 of 2) 09/28/1999    Flu vaccine (1) 09/01/2020    Annual Wellness Visit (AWV)  02/25/2021    Breast cancer screen  10/29/2022    Colon cancer screen colonoscopy  07/28/2024    Lipid screen  05/16/2025    DEXA (modify frequency per FRAX score)  Completed    Pneumococcal 65+ years Vaccine  Completed    Hepatitis C screen  Completed    Hepatitis A vaccine  Aged Out    Hepatitis B vaccine  Aged Out    Hib vaccine  Aged Out    Meningococcal (ACWY) vaccine  Aged Out     Recommendations for Familio Due: see orders and patient instructions/AVS.  . Recommended screening schedule for the next 5-10 years is provided to the patient in written form: see Patient Instructions/AVS.    Darcy THOMAS Sa, PHILLIPN, [id number]/63/[id number], performed the documented evaluation under the direct supervision of the attending physician. This encounter was performed under Rosa vu MDs, direct supervision, 2/23/2021.

## 2021-02-25 ENCOUNTER — IMMUNIZATION (OUTPATIENT)
Dept: PRIMARY CARE CLINIC | Age: 71
End: 2021-02-25
Payer: MEDICARE

## 2021-02-25 PROCEDURE — 0001A COVID-19, PFIZER VACCINE 30MCG/0.3ML DOSE: CPT | Performed by: FAMILY MEDICINE

## 2021-02-25 PROCEDURE — 91300 COVID-19, PFIZER VACCINE 30MCG/0.3ML DOSE: CPT | Performed by: FAMILY MEDICINE

## 2021-03-09 ENCOUNTER — TELEPHONE (OUTPATIENT)
Dept: FAMILY MEDICINE CLINIC | Age: 71
End: 2021-03-09

## 2021-03-09 NOTE — TELEPHONE ENCOUNTER
SUMAN. Patient called with trouble finding words, stated she was driving and got a weird feeling, right sided facial numbness, right sided weakness, disoriented w/ headache. I advised patient to go to ed, I called ed and spoke w/ a nurse to let them know that patient would be coming.

## 2021-03-15 RX ORDER — EZETIMIBE 10 MG/1
TABLET ORAL
Qty: 30 TABLET | Refills: 11 | Status: SHIPPED | OUTPATIENT
Start: 2021-03-15 | End: 2021-07-21 | Stop reason: SDUPTHER

## 2021-03-17 ENCOUNTER — TELEPHONE (OUTPATIENT)
Dept: FAMILY MEDICINE CLINIC | Age: 71
End: 2021-03-17

## 2021-03-17 ENCOUNTER — OFFICE VISIT (OUTPATIENT)
Dept: FAMILY MEDICINE CLINIC | Age: 71
End: 2021-03-17
Payer: MEDICARE

## 2021-03-17 VITALS
TEMPERATURE: 97.5 F | OXYGEN SATURATION: 95 % | DIASTOLIC BLOOD PRESSURE: 64 MMHG | BODY MASS INDEX: 18.9 KG/M2 | SYSTOLIC BLOOD PRESSURE: 128 MMHG | HEART RATE: 74 BPM | WEIGHT: 113.6 LBS

## 2021-03-17 DIAGNOSIS — J01.10 ACUTE NON-RECURRENT FRONTAL SINUSITIS: ICD-10-CM

## 2021-03-17 DIAGNOSIS — Z98.890 S/P CAROTID ENDARTERECTOMY: ICD-10-CM

## 2021-03-17 DIAGNOSIS — J01.90 ACUTE BACTERIAL SINUSITIS: ICD-10-CM

## 2021-03-17 DIAGNOSIS — I65.22 CAROTID STENOSIS, LEFT: Primary | ICD-10-CM

## 2021-03-17 DIAGNOSIS — B96.89 ACUTE BACTERIAL SINUSITIS: ICD-10-CM

## 2021-03-17 DIAGNOSIS — R05.9 COUGH: ICD-10-CM

## 2021-03-17 PROCEDURE — G8400 PT W/DXA NO RESULTS DOC: HCPCS | Performed by: NURSE PRACTITIONER

## 2021-03-17 PROCEDURE — 1123F ACP DISCUSS/DSCN MKR DOCD: CPT | Performed by: NURSE PRACTITIONER

## 2021-03-17 PROCEDURE — G8420 CALC BMI NORM PARAMETERS: HCPCS | Performed by: NURSE PRACTITIONER

## 2021-03-17 PROCEDURE — 1111F DSCHRG MED/CURRENT MED MERGE: CPT | Performed by: NURSE PRACTITIONER

## 2021-03-17 PROCEDURE — G8484 FLU IMMUNIZE NO ADMIN: HCPCS | Performed by: NURSE PRACTITIONER

## 2021-03-17 PROCEDURE — 99214 OFFICE O/P EST MOD 30 MIN: CPT | Performed by: NURSE PRACTITIONER

## 2021-03-17 PROCEDURE — G8427 DOCREV CUR MEDS BY ELIG CLIN: HCPCS | Performed by: NURSE PRACTITIONER

## 2021-03-17 PROCEDURE — 3017F COLORECTAL CA SCREEN DOC REV: CPT | Performed by: NURSE PRACTITIONER

## 2021-03-17 PROCEDURE — 4004F PT TOBACCO SCREEN RCVD TLK: CPT | Performed by: NURSE PRACTITIONER

## 2021-03-17 PROCEDURE — 1090F PRES/ABSN URINE INCON ASSESS: CPT | Performed by: NURSE PRACTITIONER

## 2021-03-17 PROCEDURE — 4040F PNEUMOC VAC/ADMIN/RCVD: CPT | Performed by: NURSE PRACTITIONER

## 2021-03-17 RX ORDER — ATORVASTATIN CALCIUM 80 MG/1
80 TABLET, FILM COATED ORAL NIGHTLY
COMMUNITY
Start: 2021-03-13 | End: 2021-10-04 | Stop reason: SDUPTHER

## 2021-03-17 RX ORDER — BENZONATATE 100 MG/1
100 CAPSULE ORAL 3 TIMES DAILY PRN
Qty: 42 CAPSULE | Refills: 0 | Status: SHIPPED | OUTPATIENT
Start: 2021-03-17 | End: 2021-03-31

## 2021-03-17 RX ORDER — AMOXICILLIN AND CLAVULANATE POTASSIUM 875; 125 MG/1; MG/1
1 TABLET, FILM COATED ORAL EVERY 12 HOURS
Qty: 20 TABLET | Refills: 0 | Status: SHIPPED | OUTPATIENT
Start: 2021-03-17 | End: 2021-03-27

## 2021-03-17 NOTE — PROGRESS NOTES
Post-Discharge Transitional Care Management Services or Hospital Follow Up      Nunu Batres   YOB: 1950    Date of Office Visit:  3/17/2021  Date of Hospital Admission: 3/9/21  Date of Hospital Discharge: 3/13/21    Surgery on 3/12/21    Care management risk score Rising risk (score 2-5) and Complex Care (Scores >=6): 2     Non face to face  following discharge, date last encounter closed (first attempt may have been earlier): 3/17/2021  9:26 AM 3/17/2021  9:26 AM    Call initiated 2 business days of discharge: Yes    She went to [facility]  For possible TIA. She was transferred to [facility] and had a left carotid endarterectomy. She had an Echo while in [facility] and her EF was 60-65%. Upper extremity kirit duplex negative. Head CT negative. Head CTA negative. CTA of carotids show    High-grade stenosis of the proximal left cervical internal carotid   artery estimated at 75 to 80%.       2.  Mild to moderate stenosis of the origin of the left vertebral artery.         Suspected Left carotid artery flaking off and causing TIA. She has a follow up on 3/31/21 with Dr. Jose Burden. She got the first vaccine for covid and felt sick then this hospital episode started. She supposed get her second vaccine tomorrow but educated to hold off due to her acute infection and recent surgery. Acute Issues:    Cough: She is having a cough that started when she got home. She started having Post nasal drip ion the hospital. Denies fever, chills, and diarrhea. She is coughing up clear secretions. She did throw up yesterday. Her throat is sore from the surgery. She is having sinus pressure. Her ears feel full.        Patient Active Problem List   Diagnosis    Abdominal colic    COPD (chronic obstructive pulmonary disease) ([address]    Hyperlipidemia    Depression    Carotid stenosis, left    REYNOSO (dyspnea on exertion)    Anxiety    Incarcerated ventral hernia    Carpal tunnel syndrome of right wrist  Cervical myelopathy (HCC)    B12 deficiency    Recurrent major depressive disorder in partial remission [facility]    Pacemaker    S/P carotid endarterectomy       Allergies   Allergen Reactions    Iodides Hives and Itching    Nsaids      Previous stomach bleed      Sulfa Antibiotics Rash       Medications listed as ordered at the time of discharge from hospital.. Mario Greenfield She was added on atorvastatin and told to resume Eliquis the day she got home. Medications marked \"taking\" at this time  Outpatient Medications Marked as Taking for the 3/17/21 encounter (Office Visit) with JAYLYN Chance CNP   Medication Sig Dispense Refill    atorvastatin (LIPITOR) 80 MG tablet Take 80 mg by mouth nightly      benzonatate (TESSALON) 100 MG capsule Take 1 capsule by mouth 3 times daily as needed for Cough 42 capsule 0    amoxicillin-clavulanate (AUGMENTIN) 875-125 MG per tablet Take 1 tablet by mouth every 12 hours for 10 days 20 tablet 0    ezetimibe (ZETIA) 10 MG tablet TAKE 1 TABLET BY MOUTH DAILY.  30 tablet 11    VORTIoxetine (TRINTELLIX) 10 MG TABS tablet TAKE 1 TABLET BY MOUTH EVERY DAY 30 tablet 5    apixaban (ELIQUIS) 5 MG TABS tablet Take 5 mg by mouth 2 times daily      dicyclomine (BENTYL) 10 MG capsule TAKE ONE CAPSULE BY MOUTH FOUR TIMES DAILY AS NEEDED 100 capsule 3    omeprazole (PRILOSEC) 20 MG delayed release capsule TAKE 1 CAPSULE BY MOUTH 2 TIMES DAILY (BEFORE MEALS) 180 capsule 3    potassium chloride (MICRO-K) 10 MEQ extended release capsule Take 10 mEq by mouth daily      metoprolol succinate (TOPROL XL) 25 MG extended release tablet Take 0.5 tablets by mouth daily 30 tablet 5    Lactobacillus (PROBIOTIC ACIDOPHILUS PO) Take 1 tablet by mouth daily       fluticasone (FLONASE) 50 MCG/ACT nasal spray 2 sprays by Nasal route daily 1 Bottle 5    tiotropium (SPIRIVA HANDIHALER) 18 MCG inhalation capsule Inhale 1 capsule into the lungs daily 30 capsule 5    albuterol sulfate HFA (VENTOLIN HFA) 108 (90 Base) MCG/ACT inhaler Inhale 2 puffs into the lungs 2 times daily as needed for Wheezing or Shortness of Breath 1 Inhaler 3    aspirin EC 81 MG EC tablet Take 1 tablet by mouth daily. 30 tablet 3        Medications patient taking as of now reconciled against medications ordered at time of hospital discharge: Yes    Chief Complaint   Patient presents with    Follow-Up from Hospital    Cough     sinus congestion        History of Present illness - Follow up of Hospital diagnosis(es): see above    Inpatient course: Discharge summary reviewed- see chart. Currently doing well postop incision looks great well approximated slightly swollen but does not appear infected. Vitals:    03/17/21 1520   BP: 128/64   Pulse: 74   Temp: 97.5 °F (36.4 °C)   SpO2: 95%   Weight: 113 lb 9.6 oz (51.5 kg)     Body mass index is 18.9 kg/m². Wt Readings from Last 3 Encounters:   03/17/21 113 lb 9.6 oz (51.5 kg)   02/23/21 118 lb 3.2 oz (53.6 kg)   01/12/21 117 lb (53.1 kg)     BP Readings from Last 3 Encounters:   03/17/21 128/64   02/23/21 110/70   12/29/20 118/61         Physical Exam:  General Appearance: alert and oriented to person, place and time, well-developed and well-nourished, in no acute distress  Skin: Incision on left side of neck closed with Dermabond. Slightly red and swollen but does not appear infected. Eyes: pupils equal, round, and reactive to light, extraocular eye movements intact, conjunctivae normal  ENT: Unable to assess tympanic membrane due to bilateral cerumen impaction, external ear and ear canal normal bilaterally, oropharynx clear and moist with normal mucous membranes  Neck: neck supple and non tender without mass, no thyromegaly or thyroid nodules, no cervical lymphadenopathy and incision of left neck well approximated but slightly red and swollen.   Pulmonary/Chest: clear to auscultation bilaterally- no wheezes, rales or rhonchi, normal air movement, no respiratory distress  Cardiovascular: normal rate, normal S1 and S2, no gallops, intact distal pulses  Neurologic: gait and coordination normal and speech normal    Assessment/Plan:  1. Carotid stenosis, left    - atorvastatin (LIPITOR) 80 MG tablet; Take 80 mg by mouth nightly  - VT DISCHARGE MEDS RECONCILED W/ CURRENT OUTPATIENT MED LIST    2. Acute non-recurrent frontal sinusitis    - amoxicillin-clavulanate (AUGMENTIN) 875-125 MG per tablet; Take 1 tablet by mouth every 12 hours for 10 days  Dispense: 20 tablet; Refill: 0    3. Cough    - benzonatate (TESSALON) 100 MG capsule; Take 1 capsule by mouth 3 times daily as needed for Cough  Dispense: 42 capsule; Refill: 0  - amoxicillin-clavulanate (AUGMENTIN) 875-125 MG per tablet; Take 1 tablet by mouth every 12 hours for 10 days  Dispense: 20 tablet; Refill: 0    4. Acute bacterial sinusitis    - amoxicillin-clavulanate (AUGMENTIN) 875-125 MG per tablet; Take 1 tablet by mouth every 12 hours for 10 days  Dispense: 20 tablet; Refill: 0    5. S/P carotid endarterectomy    - atorvastatin (LIPITOR) 80 MG tablet; Take 80 mg by mouth nightly  - VT DISCHARGE MEDS RECONCILED W/ CURRENT OUTPATIENT MED LIST        We will recheck labs and cholesterol at her 6-month visit. Reviewed labs done in hospital.  Educated to hold off on second Covid vaccine dose that she is scheduled for tomorrow due to acute upper respiratory infection and recent emergency surgery.   She verbalized understanding will reschedule for a month or 2 months out    Medical Decision Making: moderate complexity

## 2021-04-04 ENCOUNTER — APPOINTMENT (OUTPATIENT)
Dept: GENERAL RADIOLOGY | Age: 71
End: 2021-04-04
Payer: MEDICARE

## 2021-04-04 ENCOUNTER — HOSPITAL ENCOUNTER (EMERGENCY)
Age: 71
Discharge: HOME | End: 2021-04-04
Attending: EMERGENCY MEDICINE
Payer: MEDICARE

## 2021-04-04 VITALS
BODY MASS INDEX: 19.12 KG/M2 | DIASTOLIC BLOOD PRESSURE: 64 MMHG | TEMPERATURE: 97.9 F | OXYGEN SATURATION: 96 % | WEIGHT: 112 LBS | HEIGHT: 64 IN | RESPIRATION RATE: 16 BRPM | HEART RATE: 70 BPM | SYSTOLIC BLOOD PRESSURE: 123 MMHG

## 2021-04-04 DIAGNOSIS — M25.561 ACUTE PAIN OF RIGHT KNEE: Primary | ICD-10-CM

## 2021-04-04 DIAGNOSIS — Z86.718 HISTORY OF DEEP VEIN THROMBOSIS: ICD-10-CM

## 2021-04-04 DIAGNOSIS — M25.461 KNEE EFFUSION, RIGHT: ICD-10-CM

## 2021-04-04 DIAGNOSIS — Z87.39 HISTORY OF ARTHRITIS: ICD-10-CM

## 2021-04-04 LAB
A/G RATIO: 1.6 (ref 1.1–2.2)
ALBUMIN SERPL-MCNC: 3.9 G/DL (ref 3.4–5)
ALP BLD-CCNC: 66 U/L (ref 40–129)
ALT SERPL-CCNC: 12 U/L (ref 10–40)
ANION GAP SERPL CALCULATED.3IONS-SCNC: 7 MMOL/L (ref 3–16)
AST SERPL-CCNC: 20 U/L (ref 15–37)
BASOPHILS ABSOLUTE: 0.2 K/UL (ref 0–0.2)
BASOPHILS RELATIVE PERCENT: 2 %
BILIRUB SERPL-MCNC: 0.4 MG/DL (ref 0–1)
BUN BLDV-MCNC: 13 MG/DL (ref 7–20)
CALCIUM SERPL-MCNC: 9.3 MG/DL (ref 8.3–10.6)
CHLORIDE BLD-SCNC: 102 MMOL/L (ref 99–110)
CO2: 29 MMOL/L (ref 21–32)
CREAT SERPL-MCNC: 0.7 MG/DL (ref 0.6–1.2)
D DIMER: 200 NG/ML DDU (ref 0–229)
EOSINOPHILS ABSOLUTE: 0.3 K/UL (ref 0–0.6)
EOSINOPHILS RELATIVE PERCENT: 3.3 %
GFR AFRICAN AMERICAN: >60
GFR NON-AFRICAN AMERICAN: >60
GLOBULIN: 2.5 G/DL
GLUCOSE BLD-MCNC: 93 MG/DL (ref 70–99)
HCT VFR BLD CALC: 36.9 % (ref 36–48)
HEMOGLOBIN: 12.3 G/DL (ref 12–16)
LYMPHOCYTES ABSOLUTE: 2.3 K/UL (ref 1–5.1)
LYMPHOCYTES RELATIVE PERCENT: 27.2 %
MCH RBC QN AUTO: 31.1 PG (ref 26–34)
MCHC RBC AUTO-ENTMCNC: 33.2 G/DL (ref 31–36)
MCV RBC AUTO: 93.4 FL (ref 80–100)
MONOCYTES ABSOLUTE: 0.7 K/UL (ref 0–1.3)
MONOCYTES RELATIVE PERCENT: 7.9 %
NEUTROPHILS ABSOLUTE: 5 K/UL (ref 1.7–7.7)
NEUTROPHILS RELATIVE PERCENT: 59.6 %
PDW BLD-RTO: 13.5 % (ref 12.4–15.4)
PLATELET # BLD: 201 K/UL (ref 135–450)
PMV BLD AUTO: 9.9 FL (ref 5–10.5)
POTASSIUM REFLEX MAGNESIUM: 3.8 MMOL/L (ref 3.5–5.1)
RBC # BLD: 3.95 M/UL (ref 4–5.2)
SODIUM BLD-SCNC: 138 MMOL/L (ref 136–145)
TOTAL PROTEIN: 6.4 G/DL (ref 6.4–8.2)
WBC # BLD: 8.3 K/UL (ref 4–11)

## 2021-04-04 PROCEDURE — 6370000000 HC RX 637 (ALT 250 FOR IP): Performed by: EMERGENCY MEDICINE

## 2021-04-04 PROCEDURE — 99285 EMERGENCY DEPT VISIT HI MDM: CPT

## 2021-04-04 PROCEDURE — 73562 X-RAY EXAM OF KNEE 3: CPT

## 2021-04-04 PROCEDURE — 80053 COMPREHEN METABOLIC PANEL: CPT

## 2021-04-04 PROCEDURE — 85379 FIBRIN DEGRADATION QUANT: CPT

## 2021-04-04 PROCEDURE — 85025 COMPLETE CBC W/AUTO DIFF WBC: CPT

## 2021-04-04 RX ADMIN — DICLOFENAC 2 G: 10 GEL TOPICAL at 11:35

## 2021-04-04 ASSESSMENT — PAIN DESCRIPTION - LOCATION: LOCATION: LEG

## 2021-04-04 ASSESSMENT — PAIN DESCRIPTION - PAIN TYPE: TYPE: ACUTE PAIN

## 2021-04-04 ASSESSMENT — PAIN DESCRIPTION - ORIENTATION: ORIENTATION: RIGHT

## 2021-04-04 ASSESSMENT — PAIN SCALES - GENERAL: PAINLEVEL_OUTOF10: 3

## 2021-04-05 ENCOUNTER — OFFICE VISIT (OUTPATIENT)
Dept: FAMILY MEDICINE CLINIC | Age: 71
End: 2021-04-05
Payer: MEDICARE

## 2021-04-05 VITALS
SYSTOLIC BLOOD PRESSURE: 104 MMHG | TEMPERATURE: 97.7 F | DIASTOLIC BLOOD PRESSURE: 66 MMHG | OXYGEN SATURATION: 96 % | HEART RATE: 66 BPM | HEIGHT: 64 IN | BODY MASS INDEX: 19.91 KG/M2 | WEIGHT: 116.6 LBS

## 2021-04-05 DIAGNOSIS — F33.41 RECURRENT MAJOR DEPRESSIVE DISORDER IN PARTIAL REMISSION (HCC): ICD-10-CM

## 2021-04-05 DIAGNOSIS — I49.5 SICK SINUS SYNDROME (HCC): ICD-10-CM

## 2021-04-05 DIAGNOSIS — J44.9 CHRONIC OBSTRUCTIVE PULMONARY DISEASE, UNSPECIFIED COPD TYPE (HCC): ICD-10-CM

## 2021-04-05 DIAGNOSIS — R10.83 ABDOMINAL COLIC: ICD-10-CM

## 2021-04-05 DIAGNOSIS — M25.551 RIGHT HIP PAIN: Primary | ICD-10-CM

## 2021-04-05 DIAGNOSIS — Z98.890 S/P CAROTID ENDARTERECTOMY: ICD-10-CM

## 2021-04-05 DIAGNOSIS — G95.9 CERVICAL MYELOPATHY (HCC): ICD-10-CM

## 2021-04-05 DIAGNOSIS — E78.5 HYPERLIPIDEMIA, UNSPECIFIED HYPERLIPIDEMIA TYPE: ICD-10-CM

## 2021-04-05 PROCEDURE — 99214 OFFICE O/P EST MOD 30 MIN: CPT | Performed by: FAMILY MEDICINE

## 2021-04-05 PROCEDURE — G8427 DOCREV CUR MEDS BY ELIG CLIN: HCPCS | Performed by: FAMILY MEDICINE

## 2021-04-05 PROCEDURE — 4040F PNEUMOC VAC/ADMIN/RCVD: CPT | Performed by: FAMILY MEDICINE

## 2021-04-05 PROCEDURE — G8420 CALC BMI NORM PARAMETERS: HCPCS | Performed by: FAMILY MEDICINE

## 2021-04-05 PROCEDURE — 3023F SPIROM DOC REV: CPT | Performed by: FAMILY MEDICINE

## 2021-04-05 PROCEDURE — G8400 PT W/DXA NO RESULTS DOC: HCPCS | Performed by: FAMILY MEDICINE

## 2021-04-05 PROCEDURE — 1090F PRES/ABSN URINE INCON ASSESS: CPT | Performed by: FAMILY MEDICINE

## 2021-04-05 PROCEDURE — 3017F COLORECTAL CA SCREEN DOC REV: CPT | Performed by: FAMILY MEDICINE

## 2021-04-05 PROCEDURE — 4004F PT TOBACCO SCREEN RCVD TLK: CPT | Performed by: FAMILY MEDICINE

## 2021-04-05 PROCEDURE — 1123F ACP DISCUSS/DSCN MKR DOCD: CPT | Performed by: FAMILY MEDICINE

## 2021-04-05 PROCEDURE — G8926 SPIRO NO PERF OR DOC: HCPCS | Performed by: FAMILY MEDICINE

## 2021-04-05 RX ORDER — DICYCLOMINE HYDROCHLORIDE 10 MG/1
10 CAPSULE ORAL
Qty: 120 CAPSULE | Refills: 3 | Status: SHIPPED | OUTPATIENT
Start: 2021-04-05 | End: 2021-07-21 | Stop reason: SDUPTHER

## 2021-04-05 RX ORDER — TIOTROPIUM BROMIDE 18 UG/1
18 CAPSULE ORAL; RESPIRATORY (INHALATION) DAILY
Qty: 30 CAPSULE | Refills: 5 | Status: SHIPPED | OUTPATIENT
Start: 2021-04-05 | End: 2021-07-21 | Stop reason: SDUPTHER

## 2021-04-05 ASSESSMENT — ENCOUNTER SYMPTOMS: SHORTNESS OF BREATH: 0

## 2021-04-05 NOTE — PROGRESS NOTES
Chief Complaint   Patient presents with    Hyperlipidemia       HPI:  Priya Cho is a 69 y.o. (: 1950) here today   for   Hyperlipidemia  This is a chronic problem. The current episode started more than 1 year ago. Pertinent negatives include no chest pain or shortness of breath. Current antihyperlipidemic treatment includes statins. There are no compliance problems. Risk factors for coronary artery disease include post-menopausal and dyslipidemia. Patient was seen at [facility] ER yesterday for left knee pain and redness. Patient is scheduled for ultrasound to rule out DVT. Knee pain began last week. Leg and hip pain not new. Had been referred to hip specialist in the past, but was unable to go due to conflicts. Has been to back specialist and inj in the back, but did not help. Prior hip injection w/ relief initially. Hx of sick sinus syndrome. Pacer had been placed. Doing well. Mood has been fairly good lately. trintellix working fairly well.  magda well. Stressors and frustration at home noted. Rarely using xanax. Had left CEA on 3/12. Chol checked around that time. Next carotid u/s next mo or so. Patient's medications, allergies, past medical, surgical, social and family histories were reviewed and updated as appropriate. ROS:  Review of Systems   Constitutional: Negative for fever. Respiratory: Negative for shortness of breath. Cardiovascular: Negative for chest pain. Musculoskeletal: Positive for arthralgias.            LDL Calculated (mg/dL)   Date Value   2015 95       Past Medical History:   Diagnosis Date    Arthritis     COPD (chronic obstructive pulmonary disease) [facility]     Depression     Hyperlipidemia     Hypertension     Irritable bowel syndrome (IBS)     Pacemaker        Family History   Problem Relation Age of Onset    Heart Disease Father     High Blood Pressure Father     Cancer Sister         uterine       Social History Socioeconomic History    Marital status:      Spouse name: Not on file    Number of children: Not on file    Years of education: Not on file    Highest education level: Not on file   Occupational History    Not on file   Social Needs    Financial resource strain: Not on file    Food insecurity     Worry: Not on file     Inability: Not on file    Transportation needs     Medical: Not on file     Non-medical: Not on file   Tobacco Use    Smoking status: Current Every Day Smoker     Packs/day: 1.00     Years: 40.00     Pack years: 40.00     Types: Cigarettes     Start date: 5/10/2019    Smokeless tobacco: Never Used   Substance and Sexual Activity    Alcohol use: No     Alcohol/week: 0.0 standard drinks    Drug use: No    Sexual activity: Yes     Partners: Male   Lifestyle    Physical activity     Days per week: Not on file     Minutes per session: Not on file    Stress: Not on file   Relationships    Social connections     Talks on phone: Not on file     Gets together: Not on file     Attends Adventism service: Not on file     Active member of club or organization: Not on file     Attends meetings of clubs or organizations: Not on file     Relationship status: Not on file    Intimate partner violence     Fear of current or ex partner: Not on file     Emotionally abused: Not on file     Physically abused: Not on file     Forced sexual activity: Not on file   Other Topics Concern    Not on file   Social History Narrative    Not on file       Prior to Visit Medications    Medication Sig Taking?  Authorizing Provider   tiotropium (SPIRIVA HANDIHALER) 18 MCG inhalation capsule Inhale 1 capsule into the lungs daily Yes Saad Lawrence MD   dicyclomine (BENTYL) 10 MG capsule Take 1 capsule by mouth 4 times daily (before meals and nightly) Yes Saad Lawrence MD   atorvastatin (LIPITOR) 80 MG tablet Take 80 mg by mouth nightly Yes Historical Provider, MD   ezetimibe (ZETIA) 10 MG tablet TAKE 1 TABLET BY MOUTH DAILY. Yes Jayjay Wheaton, APRN - CNP   VORTIoxetine (TRINTELLIX) 10 MG TABS tablet TAKE 1 TABLET BY MOUTH EVERY DAY Yes Trey Antunez MD   apixaban (ELIQUIS) 5 MG TABS tablet Take 5 mg by mouth 2 times daily Yes Historical Provider, MD   omeprazole (PRILOSEC) 20 MG delayed release capsule TAKE 1 CAPSULE BY MOUTH 2 TIMES DAILY (BEFORE MEALS) Yes Jayjay Wheaton, APRN - CNP   potassium chloride (MICRO-K) 10 MEQ extended release capsule Take 10 mEq by mouth daily Yes Historical Provider, MD   metoprolol succinate (TOPROL XL) 25 MG extended release tablet Take 0.5 tablets by mouth daily Yes Jayjay Wheaton, APRN - CNP   Lactobacillus (PROBIOTIC ACIDOPHILUS PO) Take 1 tablet by mouth daily  Yes Historical Provider, MD   fluticasone (FLONASE) 50 MCG/ACT nasal spray 2 sprays by Nasal route daily Yes Jayjay Wheaton, APRN - CNP   albuterol sulfate HFA (VENTOLIN HFA) 108 (90 Base) MCG/ACT inhaler Inhale 2 puffs into the lungs 2 times daily as needed for Wheezing or Shortness of Breath Yes Trey Antunez MD   aspirin EC 81 MG EC tablet Take 1 tablet by mouth daily. Yes Luna Ornelas MD       Allergies   Allergen Reactions    Iodides Hives and Itching    Nsaids      Previous stomach bleed      Sulfa Antibiotics Rash       OBJECTIVE:    /66   Pulse 66   Temp 97.7 °F (36.5 °C)   Ht 5' 4\" (1.626 m)   Wt 116 lb 9.6 oz (52.9 kg)   SpO2 96%   BMI 20.01 kg/m²     BP Readings from Last 2 Encounters:   04/05/21 104/66   04/04/21 123/64       Wt Readings from Last 3 Encounters:   04/05/21 116 lb 9.6 oz (52.9 kg)   04/04/21 112 lb (50.8 kg)   03/17/21 113 lb 9.6 oz (51.5 kg)       Physical Exam  Constitutional:       Appearance: She is well-developed. HENT:      Head: Normocephalic and atraumatic. Right Ear: Hearing normal.      Left Ear: Hearing normal.   Eyes:      Conjunctiva/sclera: Conjunctivae normal.   Neck:      Trachea: No tracheal deviation.    Cardiovascular:      Rate and Rhythm: Normal rate and regular rhythm. Pulmonary:      Effort: Pulmonary effort is normal.      Breath sounds: Normal breath sounds. Abdominal:      Palpations: Abdomen is soft. Tenderness: There is no abdominal tenderness. Skin:     General: Skin is warm and dry. Neurological:      Mental Status: She is alert and oriented to person, place, and time. Psychiatric:         Mood and Affect: Mood normal.         Behavior: Behavior normal.           ASSESSMENT/PLAN:    1. Chronic obstructive pulmonary disease, unspecified COPD type ([facility]  Fair control. Cont meds. - tiotropium (SPIRIVA HANDIHALER) 18 MCG inhalation capsule; Inhale 1 capsule into the lungs daily  Dispense: 30 capsule; Refill: 5    2. Abdominal colic  Refill meds. Alternates b/t constipation and diarrhea. - dicyclomine (BENTYL) 10 MG capsule; Take 1 capsule by mouth 4 times daily (before meals and nightly)  Dispense: 120 capsule; Refill: 3    3. Cervical myelopathy (HCC)  Near baseline. 4. Recurrent major depressive disorder in partial remission ([facility]  The current medical regimen is effective;  continue present plan and medications. 5. Sick sinus syndrome ([facility]  F/u w/ cardio as sched. No new issues. 6. Right hip pain  Prior inj in the back and hip. Had been referred, but did not f/u. Arrange referral.    - Audra Armas MD, Orthopedic Surgery (General), [facility]    7. Hyperlipidemia, unspecified hyperlipidemia type  Had labs done at [facility] last mo. Cont meds. 8. S/P carotid endarterectomy  Doing fair. F/u w/ vascular specialist as sched.

## 2021-04-15 DIAGNOSIS — F41.9 ANXIETY: ICD-10-CM

## 2021-04-15 RX ORDER — ALPRAZOLAM 0.25 MG/1
0.25 TABLET ORAL NIGHTLY PRN
Qty: 15 TABLET | Refills: 0 | Status: SHIPPED | OUTPATIENT
Start: 2021-04-15 | End: 2022-04-19 | Stop reason: SDUPTHER

## 2021-05-03 ENCOUNTER — IMMUNIZATION (OUTPATIENT)
Dept: PRIMARY CARE CLINIC | Age: 71
End: 2021-05-03
Payer: MEDICARE

## 2021-05-03 PROCEDURE — 0002A COVID-19, PFIZER VACCINE 30MCG/0.3ML DOSE: CPT | Performed by: FAMILY MEDICINE

## 2021-05-03 PROCEDURE — 91300 COVID-19, PFIZER VACCINE 30MCG/0.3ML DOSE: CPT | Performed by: FAMILY MEDICINE

## 2021-05-06 ENCOUNTER — OFFICE VISIT (OUTPATIENT)
Dept: ORTHOPEDIC SURGERY | Age: 71
End: 2021-05-06
Payer: MEDICARE

## 2021-05-06 VITALS — WEIGHT: 116 LBS | HEIGHT: 64 IN | BODY MASS INDEX: 19.81 KG/M2

## 2021-05-06 DIAGNOSIS — M25.559 HIP PAIN: ICD-10-CM

## 2021-05-06 DIAGNOSIS — M47.27 LUMBOSACRAL SPONDYLOSIS WITH RADICULOPATHY: Primary | ICD-10-CM

## 2021-05-06 DIAGNOSIS — M54.31 RIGHT SIDED SCIATICA: ICD-10-CM

## 2021-05-06 PROCEDURE — 99203 OFFICE O/P NEW LOW 30 MIN: CPT | Performed by: ORTHOPAEDIC SURGERY

## 2021-05-06 PROCEDURE — 4040F PNEUMOC VAC/ADMIN/RCVD: CPT | Performed by: ORTHOPAEDIC SURGERY

## 2021-05-06 PROCEDURE — G8420 CALC BMI NORM PARAMETERS: HCPCS | Performed by: ORTHOPAEDIC SURGERY

## 2021-05-06 PROCEDURE — 1123F ACP DISCUSS/DSCN MKR DOCD: CPT | Performed by: ORTHOPAEDIC SURGERY

## 2021-05-06 PROCEDURE — 3017F COLORECTAL CA SCREEN DOC REV: CPT | Performed by: ORTHOPAEDIC SURGERY

## 2021-05-06 PROCEDURE — G8428 CUR MEDS NOT DOCUMENT: HCPCS | Performed by: ORTHOPAEDIC SURGERY

## 2021-05-06 PROCEDURE — 1090F PRES/ABSN URINE INCON ASSESS: CPT | Performed by: ORTHOPAEDIC SURGERY

## 2021-05-06 PROCEDURE — G8400 PT W/DXA NO RESULTS DOC: HCPCS | Performed by: ORTHOPAEDIC SURGERY

## 2021-05-06 PROCEDURE — 4004F PT TOBACCO SCREEN RCVD TLK: CPT | Performed by: ORTHOPAEDIC SURGERY

## 2021-05-06 NOTE — PROGRESS NOTES
NEW PATIENT VISIT       HISTORY OF PRESENT ILLNESS    Fang Goetz is a 69 y.o. female who presents for evaluation of her right hip. She been given sacroiliac injections for an extended period of time and she says they just have not worked. She has had MRIs of her back and x-rays of her hip and was told she may have arthritis and was referred here. She grades her pain 4-6 over 10 and states that the pain goes from her posterior hip down her leg into her lower leg. She complains some numbness and tingling at times. She denies bowel or bladder difficulties. She has a pacemaker now and cannot have MRIs. ROS    Well-documented patient history form dated 5/6/2021  All other ROS negative except for above.     Past Surgical history    Past Surgical History:   Procedure Laterality Date    CARPAL TUNNEL RELEASE Right 07/13/2017    CATARACT REMOVAL WITH IMPLANT Left 12/23/2016    Phacoemulsification/ Posterior Chamber Intraocular Lens Implantation with dr Quinn Pepper  2012    CHOLECYSTECTOMY, LAPAROSCOPIC  5/25/2011    COLONOSCOPY  7/28/2014    CORONARY ANGIOPLASTY      EYE SURGERY Right 03/09/2017    catarct removal with lens implant    HERNIA REPAIR  08/09/2016    open ventral incisional    HYSTERECTOMY      OTHER SURGICAL HISTORY  08/09/2016    OPEN VENTRAL INCISIONAL HERNIA REPAIR     PACEMAKER PLACEMENT  06/25/2019    [facility]    PAIN MANAGEMENT PROCEDURE Right 3/10/2020    RIGHT LUMBAR FOUR AND LUMBAR FIVE TRANSFORAMINAL EPIDURAL STEROID INJECTION SITE CONFIRMED BY FLUOROSCOPY performed by Wilman Fay MD at [address] Right 6/16/2020    RIGHT LUMBAR FOUR AND LUMBAR FIVE TRANSFORAMINAL EPIDURAL STEROID INJECTION SITE CONFIRMED BY FLUOROSCOPY performed by Wilman Fay MD at [address] PAIN MANAGEMENT PROCEDURE Bilateral 10/13/2020    BILATERAL LUMBAR THREE LUMBAR FOUR LUMBAR DORSAL RAMUS LUMBAR MEDIAL BRANCH BLOCKS performed by Mira Slaughter Saloni Allen MD at [address] [facility] Right 11/17/2020    RIGHT SACROILIAC JOINT INJECTION SITE CONFIRMED BY FLUOROSCOPY performed by Khanh Cunningham MD at [address] [facility] Right 12/29/2020    RIGHT HIP INJECTION SITE CONFIRMED BY FLUOROSCOPY performed by Khanh Cunningham MD at [address] [facility] [address] ENDOSCOPY         PAST MEDICAL    Past Medical History:   Diagnosis Date    Arthritis     COPD (chronic obstructive pulmonary disease) ([address]     Depression     Hyperlipidemia     Hypertension     Irritable bowel syndrome (IBS)     Pacemaker        Allergies    Allergies   Allergen Reactions    Iodides Hives and Itching    Nsaids      Previous stomach bleed      Sulfa Antibiotics Rash       Meds    Current Outpatient Medications   Medication Sig Dispense Refill    ALPRAZolam (XANAX) 0.25 MG tablet Take 1 tablet by mouth nightly as needed for Sleep or Anxiety for up to 60 days. 15 tablet 0    tiotropium (SPIRIVA HANDIHALER) 18 MCG inhalation capsule Inhale 1 capsule into the lungs daily 30 capsule 5    dicyclomine (BENTYL) 10 MG capsule Take 1 capsule by mouth 4 times daily (before meals and nightly) 120 capsule 3    ezetimibe (ZETIA) 10 MG tablet TAKE 1 TABLET BY MOUTH DAILY.  30 tablet 11    VORTIoxetine (TRINTELLIX) 10 MG TABS tablet TAKE 1 TABLET BY MOUTH EVERY DAY 30 tablet 5    apixaban (ELIQUIS) 5 MG TABS tablet Take 5 mg by mouth 2 times daily      omeprazole (PRILOSEC) 20 MG delayed release capsule TAKE 1 CAPSULE BY MOUTH 2 TIMES DAILY (BEFORE MEALS) 180 capsule 3    potassium chloride (MICRO-K) 10 MEQ extended release capsule Take 10 mEq by mouth daily      metoprolol succinate (TOPROL XL) 25 MG extended release tablet Take 0.5 tablets by mouth daily 30 tablet 5    Lactobacillus (PROBIOTIC ACIDOPHILUS PO) Take 1 tablet by mouth daily       fluticasone (FLONASE) 50 MCG/ACT nasal spray 2 sprays by Nasal route daily 1 Bottle 5    albuterol sulfate HFA (VENTOLIN HFA) 108 (90 Base) MCG/ACT inhaler Inhale 2 puffs into the lungs 2 times daily as needed for Wheezing or Shortness of Breath 1 Inhaler 3    aspirin EC 81 MG EC tablet Take 1 tablet by mouth daily. 30 tablet 3     No current facility-administered medications for this visit.         Social    Social History     Socioeconomic History    Marital status:      Spouse name: Not on file    Number of children: Not on file    Years of education: Not on file    Highest education level: Not on file   Occupational History    Not on file   Social Needs    Financial resource strain: Not on file    Food insecurity     Worry: Not on file     Inability: Not on file    Transportation needs     Medical: Not on file     Non-medical: Not on file   Tobacco Use    Smoking status: Current Every Day Smoker     Packs/day: 1.00     Years: 40.00     Pack years: 40.00     Types: Cigarettes     Start date: 5/10/2019    Smokeless tobacco: Never Used   Substance and Sexual Activity    Alcohol use: No     Alcohol/week: 0.0 standard drinks    Drug use: No    Sexual activity: Yes     Partners: Male   Lifestyle    Physical activity     Days per week: Not on file     Minutes per session: Not on file    Stress: Not on file   Relationships    Social connections     Talks on phone: Not on file     Gets together: Not on file     Attends Muslim service: Not on file     Active member of club or organization: Not on file     Attends meetings of clubs or organizations: Not on file     Relationship status: Not on file    Intimate partner violence     Fear of current or ex partner: Not on file     Emotionally abused: Not on file     Physically abused: Not on file     Forced sexual activity: Not on file   Other Topics Concern    Not on file   Social History Narrative    Not on file       Family HISTORY    Family History   Problem Relation Age of Onset    Heart Disease Father     High Blood Pressure Father     Cancer Sister         uterine       PHYSICAL EXAM    Vital Signs:  Ht 5' 4\" (1.626 m)   Wt 116 lb (52.6 kg)   BMI 19.91 kg/m²   General Appearance:  Normal body habitus. Alert and oriented to person, place, and time, although she has some mild cognitive issues apparently. Affect:  Normal.   Gait: Slightly antalgic. No limp. Good balance and coordination. Able to heel and toe walk without apparent weakness. Head and Neck:  Normal alignment. No masses. Full range of motion. Nontender. Normal strength and tone. Good stability. Spine:  Normal gross sagittal and coronal contours. Unable to bend forward at the waist with fingertips to the mid-shin. No instability. Normal strength and tone. Tenderness:  No point tenderness on back, but she does have right sciatic notch tenderness. No tenderness over the greater trochanters. Skin:  No rashes, sores, abrasions, or erythema on the back or upper and lower extremities. No surgical scars. Tension Signs:  Negative straight leg raise test. Negative femoral nerve stretch test.   Joints:  Gentle bilateral hip, knee and ankle range of motion is normal and painless. No instability. Her hips have full active and passive range of motion without pain, guarding or cogwheeling. Motor:  5/5 motor strength in the bilateral lower extremities. Sensation:  Sensation intact to light touch bilateral lower extremities. Reflexes:  Patellar tendon and Achilles tendon reflexes are 2+ and symmetrical. No pathological reflexes. No clonus. Vascular:  2+ dorsalis pedis pulse bilaterally. No pitting edema. IMAGING STUDIES    X-rays 2 views right hip demonstrate mild arthritic change    IMPRESSION    Right sciatica secondary to lumbar spondylosis    PLAN    1. Conservative care options including physical therapy, NSAIDs, bracing, chiropractic care, and activity modification were discussed.    2.  The indications for therapeutic injections such as epidural steroid injections were discussed. 3.  The indications for additional imaging studies were discussed.    4.  After considering the various options discussed, the patient elected to pursue a course that includes referral to Dr. Kristie Wong for consideration of other injections into her spine since I do not feel she is a great surgical candidate at this time

## 2021-05-07 DIAGNOSIS — F33.41 RECURRENT MAJOR DEPRESSIVE DISORDER IN PARTIAL REMISSION (HCC): ICD-10-CM

## 2021-05-13 ENCOUNTER — TELEPHONE (OUTPATIENT)
Dept: FAMILY MEDICINE CLINIC | Age: 71
End: 2021-05-13

## 2021-06-10 RX ORDER — OMEPRAZOLE 20 MG/1
20 CAPSULE, DELAYED RELEASE ORAL
Qty: 180 CAPSULE | Refills: 3 | Status: SHIPPED | OUTPATIENT
Start: 2021-06-10 | End: 2021-07-21 | Stop reason: SDUPTHER

## 2021-06-17 DIAGNOSIS — F33.41 RECURRENT MAJOR DEPRESSIVE DISORDER IN PARTIAL REMISSION (HCC): ICD-10-CM

## 2021-07-21 ENCOUNTER — VIRTUAL VISIT (OUTPATIENT)
Dept: FAMILY MEDICINE CLINIC | Age: 71
End: 2021-07-21
Payer: MEDICARE

## 2021-07-21 DIAGNOSIS — R10.83 ABDOMINAL COLIC: ICD-10-CM

## 2021-07-21 DIAGNOSIS — J44.9 CHRONIC OBSTRUCTIVE PULMONARY DISEASE, UNSPECIFIED COPD TYPE (HCC): ICD-10-CM

## 2021-07-21 DIAGNOSIS — F33.41 RECURRENT MAJOR DEPRESSIVE DISORDER IN PARTIAL REMISSION (HCC): ICD-10-CM

## 2021-07-21 DIAGNOSIS — J44.1 CHRONIC OBSTRUCTIVE PULMONARY DISEASE WITH ACUTE EXACERBATION (HCC): ICD-10-CM

## 2021-07-21 PROCEDURE — 99441 PR PHYS/QHP TELEPHONE EVALUATION 5-10 MIN: CPT | Performed by: NURSE PRACTITIONER

## 2021-07-21 RX ORDER — OMEPRAZOLE 20 MG/1
20 CAPSULE, DELAYED RELEASE ORAL
Qty: 180 CAPSULE | Refills: 3 | Status: SHIPPED | OUTPATIENT
Start: 2021-07-21 | End: 2021-07-26 | Stop reason: SDUPTHER

## 2021-07-21 RX ORDER — DICYCLOMINE HYDROCHLORIDE 10 MG/1
10 CAPSULE ORAL
Qty: 120 CAPSULE | Refills: 3 | Status: SHIPPED | OUTPATIENT
Start: 2021-07-21 | End: 2021-07-26 | Stop reason: SDUPTHER

## 2021-07-21 RX ORDER — TIOTROPIUM BROMIDE 18 UG/1
18 CAPSULE ORAL; RESPIRATORY (INHALATION) DAILY
Qty: 90 CAPSULE | Refills: 3 | Status: SHIPPED | OUTPATIENT
Start: 2021-07-21 | End: 2021-07-26 | Stop reason: SDUPTHER

## 2021-07-21 RX ORDER — AMOXICILLIN AND CLAVULANATE POTASSIUM 875; 125 MG/1; MG/1
1 TABLET, FILM COATED ORAL 2 TIMES DAILY
Qty: 20 TABLET | Refills: 0 | Status: SHIPPED | OUTPATIENT
Start: 2021-07-21 | End: 2021-07-31

## 2021-07-21 RX ORDER — ALBUTEROL SULFATE 90 UG/1
2 AEROSOL, METERED RESPIRATORY (INHALATION) 2 TIMES DAILY PRN
Qty: 3 INHALER | Refills: 3 | Status: SHIPPED | OUTPATIENT
Start: 2021-07-21 | End: 2021-07-26 | Stop reason: SDUPTHER

## 2021-07-21 RX ORDER — EZETIMIBE 10 MG/1
TABLET ORAL
Qty: 90 TABLET | Refills: 3 | Status: SHIPPED | OUTPATIENT
Start: 2021-07-21 | End: 2021-07-26 | Stop reason: SDUPTHER

## 2021-07-21 RX ORDER — METOPROLOL SUCCINATE 25 MG/1
12.5 TABLET, EXTENDED RELEASE ORAL DAILY
Qty: 45 TABLET | Refills: 3 | Status: SHIPPED | OUTPATIENT
Start: 2021-07-21 | End: 2021-07-26 | Stop reason: SDUPTHER

## 2021-07-21 RX ORDER — METHYLPREDNISOLONE 4 MG/1
TABLET ORAL
Qty: 1 KIT | Refills: 0 | Status: SHIPPED | OUTPATIENT
Start: 2021-07-21 | End: 2021-07-27

## 2021-07-21 RX ORDER — POTASSIUM CHLORIDE 750 MG/1
10 CAPSULE, EXTENDED RELEASE ORAL DAILY
Qty: 180 CAPSULE | Refills: 3 | Status: SHIPPED | OUTPATIENT
Start: 2021-07-21 | End: 2021-07-26 | Stop reason: SDUPTHER

## 2021-07-21 ASSESSMENT — ENCOUNTER SYMPTOMS
SWOLLEN GLANDS: 0
COUGH: 1
SHORTNESS OF BREATH: 1
HOARSE VOICE: 1
SORE THROAT: 0
SINUS PRESSURE: 1

## 2021-07-21 NOTE — PROGRESS NOTES
Marisol Gill is a 69 y.o. female evaluated via telephone on 7/21/2021. Consent:  She and/or health care decision maker is aware that that she may receive a bill for this telephone service, depending on her insurance coverage, and has provided verbal consent to proceed: Yes    She is here for a virtual visit and needs refills on her chronic medication. We will send refills to [facility]. Documentation:  I communicated with the patient and/or health care decision maker about  Sinus congestion. Details of this discussion including any medical advice provided:     Sinusitis  This is a new problem. The current episode started in the past 7 days. The problem has been gradually worsening since onset. Associated symptoms include congestion, coughing, a hoarse voice, shortness of breath and sinus pressure. Pertinent negatives include no chills, diaphoresis, ear pain, headaches, neck pain, sneezing, sore throat or swollen glands. Past treatments include oral decongestants, sitting up and spray decongestants. The treatment provided mild relief. She had sweating episode where it smelled sweet. Will consider blood work to check for diabetes in near future. If not improving may need chest XRAY. Recommended saltwater gargles, warm fluids with honey and a humidifier at night. Flonase, Zyrtec, NSAIDS as needed. Follow up if symptoms worsen or do not improve. I affirm this is a Patient Initiated Episode with an Established Patient who has not had a related appointment within my department in the past 7 days or scheduled within the next 24 hours. Total Time: minutes: 5-10 minutes    Note: not billable if this call serves to triage the patient into an appointment for the relevant concern    This document was prepared by a combination of typing and transcription through a voice recognition software.

## 2021-07-26 DIAGNOSIS — J44.1 CHRONIC OBSTRUCTIVE PULMONARY DISEASE WITH ACUTE EXACERBATION (HCC): ICD-10-CM

## 2021-07-26 DIAGNOSIS — J44.9 CHRONIC OBSTRUCTIVE PULMONARY DISEASE, UNSPECIFIED COPD TYPE (HCC): ICD-10-CM

## 2021-07-26 DIAGNOSIS — R10.83 ABDOMINAL COLIC: ICD-10-CM

## 2021-07-26 DIAGNOSIS — F33.41 RECURRENT MAJOR DEPRESSIVE DISORDER IN PARTIAL REMISSION (HCC): ICD-10-CM

## 2021-07-26 RX ORDER — DICYCLOMINE HYDROCHLORIDE 10 MG/1
10 CAPSULE ORAL
Qty: 120 CAPSULE | Refills: 3 | Status: SHIPPED | OUTPATIENT
Start: 2021-07-26 | End: 2022-08-19 | Stop reason: SDUPTHER

## 2021-07-26 RX ORDER — TIOTROPIUM BROMIDE 18 UG/1
18 CAPSULE ORAL; RESPIRATORY (INHALATION) DAILY
Qty: 90 CAPSULE | Refills: 3 | Status: SHIPPED | OUTPATIENT
Start: 2021-07-26 | End: 2021-09-09 | Stop reason: ALTCHOICE

## 2021-07-26 RX ORDER — EZETIMIBE 10 MG/1
TABLET ORAL
Qty: 90 TABLET | Refills: 3 | Status: SHIPPED | OUTPATIENT
Start: 2021-07-26 | End: 2022-08-19 | Stop reason: SDUPTHER

## 2021-07-26 RX ORDER — POTASSIUM CHLORIDE 750 MG/1
10 CAPSULE, EXTENDED RELEASE ORAL DAILY
Qty: 180 CAPSULE | Refills: 3 | Status: SHIPPED | OUTPATIENT
Start: 2021-07-26 | End: 2022-08-23 | Stop reason: SDUPTHER

## 2021-07-26 RX ORDER — OMEPRAZOLE 20 MG/1
20 CAPSULE, DELAYED RELEASE ORAL
Qty: 180 CAPSULE | Refills: 3 | Status: SHIPPED | OUTPATIENT
Start: 2021-07-26 | End: 2022-08-23 | Stop reason: SDUPTHER

## 2021-07-26 RX ORDER — METOPROLOL SUCCINATE 25 MG/1
12.5 TABLET, EXTENDED RELEASE ORAL DAILY
Qty: 45 TABLET | Refills: 3 | Status: SHIPPED | OUTPATIENT
Start: 2021-07-26 | End: 2021-11-17 | Stop reason: SDUPTHER

## 2021-07-26 RX ORDER — ALBUTEROL SULFATE 90 UG/1
2 AEROSOL, METERED RESPIRATORY (INHALATION) 2 TIMES DAILY PRN
Qty: 3 INHALER | Refills: 3 | Status: SHIPPED | OUTPATIENT
Start: 2021-07-26 | End: 2022-08-19 | Stop reason: SDUPTHER

## 2021-07-26 NOTE — TELEPHONE ENCOUNTER
Patient is asking for all her medications to be sent to [address] mail order.

## 2021-08-06 ENCOUNTER — VIRTUAL VISIT (OUTPATIENT)
Dept: FAMILY MEDICINE CLINIC | Age: 71
End: 2021-08-06
Payer: MEDICARE

## 2021-08-06 DIAGNOSIS — R05.9 COUGH: Primary | ICD-10-CM

## 2021-08-06 PROCEDURE — 99441 PR PHYS/QHP TELEPHONE EVALUATION 5-10 MIN: CPT | Performed by: NURSE PRACTITIONER

## 2021-08-06 RX ORDER — PREDNISONE 10 MG/1
10 TABLET ORAL 2 TIMES DAILY
Qty: 10 TABLET | Refills: 0 | Status: SHIPPED | OUTPATIENT
Start: 2021-08-06 | End: 2021-08-11

## 2021-08-06 RX ORDER — AZITHROMYCIN 250 MG/1
250 TABLET, FILM COATED ORAL SEE ADMIN INSTRUCTIONS
Qty: 6 TABLET | Refills: 0 | Status: SHIPPED | OUTPATIENT
Start: 2021-08-06 | End: 2021-08-11

## 2021-08-06 ASSESSMENT — ENCOUNTER SYMPTOMS
HEMOPTYSIS: 0
SORE THROAT: 0
COUGH: 1
WHEEZING: 1
HEARTBURN: 0
SHORTNESS OF BREATH: 1
RHINORRHEA: 1

## 2021-08-06 NOTE — PROGRESS NOTES
No Wiley is a 69 y.o. female evaluated via telephone on 8/6/2021. Consent:  She and/or health care decision maker is aware that that she may receive a bill for this telephone service, depending on her insurance coverage, and has provided verbal consent to proceed: Yes    Documentation:    I communicated with the patient and/or health care decision maker about   Cough, sob, congestion for 3 weeks. Details of this discussion including any medical advice provided:     Cough  This is a recurrent problem. The current episode started 1 to 4 weeks ago. The problem has been waxing and waning. The problem occurs constantly. The cough is productive of purulent sputum. Associated symptoms include chills, a fever, headaches, nasal congestion, postnasal drip, rhinorrhea, shortness of breath and wheezing. Pertinent negatives include no chest pain, ear congestion, ear pain, heartburn, hemoptysis, myalgias, rash, sore throat, sweats or weight loss. Associated symptoms comments: Speak in full sentences  . Nothing aggravates the symptoms. She has tried oral steroids (augmentin ) for the symptoms. The treatment provided mild relief. She will get a chest XRAY in her car. Will r/o pneumonia. 1. Cough    - XR CHEST 1 VIEW; Future  - azithromycin (ZITHROMAX) 250 MG tablet; Take 1 tablet by mouth See Admin Instructions for 5 days 500mg on day 1 followed by 250mg on days 2 - 5  Dispense: 6 tablet; Refill: 0  - predniSONE (DELTASONE) 10 MG tablet; Take 1 tablet by mouth 2 times daily for 5 days  Dispense: 10 tablet; Refill: 0    She has been on Augmentin and medrol dose pack that did not clear her cough up. Follow up if symptoms do not improve or worsen. If the patient becomes short of breath go straight to the ER or call 911.     I affirm this is a Patient Initiated Episode with an Established Patient who has not had a related appointment within my department in the past 7 days or scheduled within the next 24 hours. Total Time: minutes: 5-10 minutes    Note: not billable if this call serves to triage the patient into an appointment for the relevant concern    This document was prepared by a combination of typing and transcription through a voice recognition software.

## 2021-08-11 ENCOUNTER — TELEPHONE (OUTPATIENT)
Dept: FAMILY MEDICINE CLINIC | Age: 71
End: 2021-08-11

## 2021-08-11 NOTE — TELEPHONE ENCOUNTER
Pt calling states her cough and congestion is about the same. She was seen on 8/6 and finished with meds given. Her daughter says she sounds more congested.  Please advise

## 2021-08-12 ENCOUNTER — TELEPHONE (OUTPATIENT)
Dept: FAMILY MEDICINE CLINIC | Age: 71
End: 2021-08-12

## 2021-08-12 ENCOUNTER — OFFICE VISIT (OUTPATIENT)
Dept: FAMILY MEDICINE CLINIC | Age: 71
End: 2021-08-12
Payer: MEDICARE

## 2021-08-12 VITALS
HEIGHT: 64 IN | SYSTOLIC BLOOD PRESSURE: 126 MMHG | BODY MASS INDEX: 19.39 KG/M2 | DIASTOLIC BLOOD PRESSURE: 70 MMHG | HEART RATE: 62 BPM | WEIGHT: 113.6 LBS | OXYGEN SATURATION: 97 %

## 2021-08-12 DIAGNOSIS — J44.9 CHRONIC OBSTRUCTIVE PULMONARY DISEASE, UNSPECIFIED COPD TYPE (HCC): ICD-10-CM

## 2021-08-12 DIAGNOSIS — R59.1 LYMPHADENOPATHY: Primary | ICD-10-CM

## 2021-08-12 PROCEDURE — 1090F PRES/ABSN URINE INCON ASSESS: CPT | Performed by: NURSE PRACTITIONER

## 2021-08-12 PROCEDURE — 3017F COLORECTAL CA SCREEN DOC REV: CPT | Performed by: NURSE PRACTITIONER

## 2021-08-12 PROCEDURE — 1123F ACP DISCUSS/DSCN MKR DOCD: CPT | Performed by: NURSE PRACTITIONER

## 2021-08-12 PROCEDURE — G8420 CALC BMI NORM PARAMETERS: HCPCS | Performed by: NURSE PRACTITIONER

## 2021-08-12 PROCEDURE — G8427 DOCREV CUR MEDS BY ELIG CLIN: HCPCS | Performed by: NURSE PRACTITIONER

## 2021-08-12 PROCEDURE — 4004F PT TOBACCO SCREEN RCVD TLK: CPT | Performed by: NURSE PRACTITIONER

## 2021-08-12 PROCEDURE — 3023F SPIROM DOC REV: CPT | Performed by: NURSE PRACTITIONER

## 2021-08-12 PROCEDURE — G8926 SPIRO NO PERF OR DOC: HCPCS | Performed by: NURSE PRACTITIONER

## 2021-08-12 PROCEDURE — 99213 OFFICE O/P EST LOW 20 MIN: CPT | Performed by: NURSE PRACTITIONER

## 2021-08-12 PROCEDURE — 4040F PNEUMOC VAC/ADMIN/RCVD: CPT | Performed by: NURSE PRACTITIONER

## 2021-08-12 PROCEDURE — G8400 PT W/DXA NO RESULTS DOC: HCPCS | Performed by: NURSE PRACTITIONER

## 2021-08-12 RX ORDER — FLUTICASONE FUROATE, UMECLIDINIUM BROMIDE AND VILANTEROL TRIFENATATE 100; 62.5; 25 UG/1; UG/1; UG/1
1 POWDER RESPIRATORY (INHALATION) DAILY
Qty: 1 EACH | Refills: 0 | COMMUNITY
Start: 2021-08-12 | End: 2021-08-30 | Stop reason: SDUPTHER

## 2021-08-12 ASSESSMENT — ENCOUNTER SYMPTOMS
GASTROINTESTINAL NEGATIVE: 1
EYES NEGATIVE: 1

## 2021-08-12 NOTE — TELEPHONE ENCOUNTER
Albuterol Sulfate Inhalation Solution 2.5mg/3ml 83% is what she's been using of her husbands.    Uses

## 2021-08-12 NOTE — PATIENT INSTRUCTIONS
Patient Education        Stopping Smoking: Care Instructions  Your Care Instructions     Cigarette smokers crave the nicotine in cigarettes. Giving it up is much harder than simply changing a habit. Your body has to stop craving the nicotine. It is hard to quit, but you can do it. There are many tools that people use to quit smoking. You may find that combining tools works best for you. There are several steps to quitting. First you get ready to quit. Then you get support to help you. After that, you learn new skills and behaviors to become a nonsmoker. For many people, a necessary step is getting and using medicine. Your doctor will help you set up the plan that best meets your needs. You may want to attend a smoking cessation program to help you quit smoking. When you choose a program, look for one that has proven success. Ask your doctor for ideas. You will greatly increase your chances of success if you take medicine as well as get counseling or join a cessation program.  Some of the changes you feel when you first quit tobacco are uncomfortable. Your body will miss the nicotine at first, and you may feel short-tempered and grumpy. You may have trouble sleeping or concentrating. Medicine can help you deal with these symptoms. You may struggle with changing your smoking habits and rituals. The last step is the tricky one: Be prepared for the smoking urge to continue for a time. This is a lot to deal with, but keep at it. You will feel better. Follow-up care is a key part of your treatment and safety. Be sure to make and go to all appointments, and call your doctor if you are having problems. It's also a good idea to know your test results and keep a list of the medicines you take. How can you care for yourself at home? · Ask your family, friends, and coworkers for support. You have a better chance of quitting if you have help and support.   · Join a support group, such as Nicotine Anonymous, for people who are trying to quit smoking. · Consider signing up for a smoking cessation program, such as the American Lung Association's Freedom from Smoking program.  · Get text messaging support. Go to the website at www.smokefree. gov to sign up for the [facility] program.  · Set a quit date. Pick your date carefully so that it is not right in the middle of a big deadline or stressful time. Once you quit, do not even take a puff. Get rid of all ashtrays and lighters after your last cigarette. Clean your house and your clothes so that they do not smell of smoke. · Learn how to be a nonsmoker. Think about ways you can avoid those things that make you reach for a cigarette. ? Avoid situations that put you at greatest risk for smoking. For some people, it is hard to have a drink with friends without smoking. For others, they might skip a coffee break with coworkers who smoke. ? Change your daily routine. Take a different route to work or eat a meal in a different place. · Cut down on stress. Calm yourself or release tension by doing an activity you enjoy, such as reading a book, taking a hot bath, or gardening. · Talk to your doctor or pharmacist about nicotine replacement therapy, which replaces the nicotine in your body. You still get nicotine but you do not use tobacco. Nicotine replacement products help you slowly reduce the amount of nicotine you need. These products come in several forms, many of them available over-the-counter:  ? Nicotine patches  ? Nicotine gum and lozenges  ? Nicotine inhaler  · Ask your doctor about bupropion (Wellbutrin) or varenicline (Chantix), which are prescription medicines. They do not contain nicotine. They help you by reducing withdrawal symptoms, such as stress and anxiety. · Some people find hypnosis, acupuncture, and massage helpful for ending the smoking habit. · Eat a healthy diet and get regular exercise.  Having healthy habits will help your body move past its craving for nicotine. · Be prepared to keep trying. Most people are not successful the first few times they try to quit. Do not get mad at yourself if you smoke again. Make a list of things you learned and think about when you want to try again, such as next week, next month, or next year. Where can you learn more? Go to https://chpepiceweb.CardLab. org and sign in to your [employer] account. Enter P069 in the [facility] box to learn more about \"Stopping Smoking: Care Instructions. \"     If you do not have an account, please click on the \"Sign Up Now\" link. Current as of: February 11, 2021               Content Version: 12.9  © 2006-2021 [employer]. Care instructions adapted under license by [facility] ([facility]). If you have questions about a medical condition or this instruction, always ask your healthcare professional. [place] [address] any warranty or liability for your use of this information. Patient Education        Swollen Lymph Nodes: Care Instructions  Your Care Instructions     Lymph nodes are small, bean-shaped glands throughout the body. They help your body fight germs and infections. Lymph nodes often swell when there is a problem such as an injury, infection, or tumor. · The nodes in your neck, under your chin, or behind your ears may swell when you have a cold or sore throat. · An injury or infection in a leg or foot can make the nodes in your groin swell. · Sometimes medicine can make lymph nodes swell, but this is rare. Treatment depends on what caused your nodes to swell. Usually the nodes return to normal size without a problem. Follow-up care is a key part of your treatment and safety. Be sure to make and go to all appointments, and call your doctor if you are having problems. It's also a good idea to know your test results and keep a list of the medicines you take. How can you care for yourself at home?   · Take your medicines exactly as prescribed. Call your doctor if you think you are having a problem with your medicine. · Avoid irritation. ? Do not squeeze or pick at the lump. ? Do not stick a needle in it. · Prevent infection. Do not squeeze, drain, or puncture a painful lump. Doing this can irritate or inflame the lump, push any existing infection deeper into the skin, or cause severe bleeding. · Get extra rest. Slow down just a little from your usual routine. · Drink plenty of fluids. If you have kidney, heart, or liver disease and have to limit fluids, talk with your doctor before you increase the amount of fluids you drink. · Take an over-the-counter pain medicine, such as acetaminophen (Tylenol), ibuprofen (Advil, Motrin), or naproxen (Aleve). Read and follow all instructions on the label. · Do not take two or more pain medicines at the same time unless the doctor told you to. Many pain medicines have acetaminophen, which is Tylenol. Too much acetaminophen (Tylenol) can be harmful. When should you call for help? Call your doctor now or seek immediate medical care if:    · You have worse symptoms of infection, such as:  ? Increased pain, swelling, warmth, or redness. ? Red streaks leading from the area. ? Pus draining from the area. ? A fever. Watch closely for changes in your health, and be sure to contact your doctor if:    · Your lymph nodes do not get smaller or do not return to normal.     · You do not get better as expected. Where can you learn more? Go to https://MicroPort [email or web address]Activism.com. org and sign in to your [facility] account. Enter IRed in the [facility] box to learn more about \"Swollen Lymph Nodes: Care Instructions. \"     If you do not have an account, please click on the \"Sign Up Now\" link. Current as of: September 23, 2020               Content Version: 12.9  © 8456-9084 Healthwise, Incorporated. Care instructions adapted under license by [facility] ([facility]).  If you have questions about a medical condition or this instruction, always ask your healthcare professional. [address] any warranty or liability for your use of this information. Patient Education        Breathing Techniques for COPD: Care Instructions  Your Care Instructions     Breathing is hard when you have chronic obstructive pulmonary disease (COPD). You may take quick, short breaths. Breathing this way makes it harder to get air into your lungs. Learning new ways to control your breathing may help. You may feel better and be able to do more. You can try three basic ways to control your breathing. They are pursed-lip breathing, diaphragmatic breathing, and breathing while bending. Use these methods when you are more short of breath than normal. Practice them often so you can do them well. Follow-up care is a key part of your treatment and safety. Be sure to make and go to all appointments, and call your doctor if you are having problems. It's also a good idea to know your test results and keep a list of the medicines you take. How can you care for yourself at home? · Pursed-lip breathing helps you breathe more air out so that your next breath can be deeper. For this type of breathing, you breathe in through your nose and out through your mouth while almost closing your lips. Breathe in for about 2 seconds, and breathe out for 4 to 6 seconds. Pursed-lip breathing decreases shortness of breath and improves your ability to exercise. · Diaphragmatic breathing helps your lungs expand so that they take in more air. ? Lie on your back, or prop yourself up on several pillows. ? Put one hand on your belly and the other on your chest. When you breathe in, push your belly out as far as possible. You should feel the hand on your belly move out, while the hand on your chest does not move. ? When you breathe out, you should feel the hand on your belly move in.  When you can do this type of breathing well while lying down, learn to do it while sitting or standing. Many people with COPD find this breathing method helpful. ? Practice diaphragmatic breathing for 20 minutes, 2 or 3 times a day. · Breathing while bending forward at the waist may make breathing easier. It can reduce shortness of breath while you exercise or rest. It helps the diaphragm move more easily. The diaphragm is a large muscle that separates your lungs from your belly. It helps draw air into your lungs as you breathe. When should you call for help? Call your doctor now or seek immediate medical care if:    · Your breathing methods do not help.     · Your shortness of breath gets worse.     · You cough up blood.     · You have swelling in your belly and legs.     · You have severe chest pain. Watch closely for changes in your health, and be sure to contact your doctor if you have any problems. Where can you learn more? Go to https://Fifth Generation [email or web address] [email or web address] [email or web address] org and sign in to your [employer] account. Enter S022 in the [employer] box to learn more about \"Breathing Techniques for COPD: Care Instructions. \"     If you do not have an account, please click on the \"Sign Up Now\" link. Current as of: October 26, 2020               Content Version: 12.9  © 2006-2021 Healthwise, [employer]. Care instructions adapted under license by [facility] [employer] [facility] ([facility]). If you have questions about a medical condition or this instruction, always ask your healthcare professional. [place] [address] any warranty or liability for your use of this information.

## 2021-08-12 NOTE — PROGRESS NOTES
Feroz Leavitt (:  1950) is a 69 y.o. female,Established patient, here for evaluation of the following chief complaint(s):  Cough and Other (Patient states she has an odor to her body.)         ASSESSMENT/PLAN:  1. Lymphadenopathy  -     XR NECK SOFT TISSUE; Future  With the erythema of her posterior oropharynx and the lymphadenopathy, I'm concerned about a malignant process vs a reactive lymph node. Will get a soft tissue of the neck. I don't feel at this time another round of antibiotics would be appropriate. Will followed based on the xray findings. 2. COPD Gave her samples of Trelegy MDI, I puff daily. Advised she can continue with the nebulizers and will order that for her. She will RTC in 3 weeks to see if the Trelegy helped with her symptoms. Also we discussed smoking cessation. She agrees with plan. Subjective   SUBJECTIVE/OBJECTIVE:  HPI   3  week history of a cough. Previously was productive  Was productive of a yellow mucous and had some other URI symptoms, but the cough is no longer productive and no longer having URI symptoms. Was given steroids in the past and antibiotics previously for the same. She also had  \"swollen glands in her neck\" but they have improved. She is still concerned about the cough, she describes a baseline SOB, due to her COPD and her cough,  Nothing seems to make the cough come on, but it does interrupt her ADLs at times. She is a long term smoker with no plans of stopping, she understand this is the cause of her COPD. She has not had a fever. She has been using her inhaler at home as directed, and has used her  albuterol in his nebulizer and it seems to have helped the cough. Denies chest pain or fever. Review of Systems   Constitutional: Negative. HENT: Negative. Eyes: Negative. Respiratory:        See HPI   Cardiovascular: Negative. Gastrointestinal: Negative. Genitourinary: Negative. Musculoskeletal: Negative. Skin: Negative. Objective   Physical Exam  Vitals and nursing note reviewed. Constitutional:       General: She is not in acute distress. Appearance: Normal appearance. She is normal weight. She is not ill-appearing. HENT:      Head: Normocephalic and atraumatic. Right Ear: Tympanic membrane, ear canal and external ear normal.      Left Ear: Tympanic membrane, ear canal and external ear normal.      Nose: Nose normal. No congestion or rhinorrhea. Mouth/Throat:      Mouth: Mucous membranes are moist.      Pharynx: Oropharynx is clear. Posterior oropharyngeal erythema present. No oropharyngeal exudate. Neck:      Vascular: No carotid bruit. Comments: Left side anterior cervical lymphadenopathy, non tender. No other lymphadenopathy. Cardiovascular:      Rate and Rhythm: Normal rate and regular rhythm. Pulses: Normal pulses. Heart sounds: Normal heart sounds. No murmur heard. Pulmonary:      Effort: Pulmonary effort is normal. No respiratory distress. Breath sounds: No wheezing or rhonchi. Comments: Breath sounds distant, but otherwise clear  Abdominal:      General: Abdomen is flat. Bowel sounds are normal. There is no distension. Palpations: Abdomen is soft. Tenderness: There is no abdominal tenderness. Hernia: No hernia is present. Musculoskeletal:         General: Normal range of motion. Cervical back: Normal range of motion and neck supple. No tenderness. Skin:     General: Skin is warm and dry. Coloration: Skin is not jaundiced. Findings: No rash. Neurological:      General: No focal deficit present. Mental Status: She is alert and oriented to person, place, and time. Mental status is at baseline. Psychiatric:         Mood and Affect: Mood normal.         Behavior: Behavior normal.                  An electronic signature was used to authenticate this note.     --JAYLYN Coreas - CNP

## 2021-08-13 DIAGNOSIS — R59.1 LYMPHADENOPATHY: Primary | ICD-10-CM

## 2021-08-13 RX ORDER — ALBUTEROL SULFATE 2.5 MG/3ML
2.5 SOLUTION RESPIRATORY (INHALATION) EVERY 6 HOURS PRN
Qty: 120 EACH | Refills: 3 | Status: SHIPPED | OUTPATIENT
Start: 2021-08-13 | End: 2022-02-10 | Stop reason: ALTCHOICE

## 2021-08-13 NOTE — RESULT ENCOUNTER NOTE
Recommend CBC with differential and CMP. Note from the nurse practitioner reviewed from yesterday.   Given lymphadenopathy and no significant findings on the x-ray, would recommend CT of the neck soft tissues with contrast for further evaluation

## 2021-08-13 NOTE — TELEPHONE ENCOUNTER
Pt needs an order for this to  so her  doesn't run out of his. She saw [employer] yesterday and explained this. Thanks.

## 2021-08-16 LAB
A/G RATIO: 1.5 G/DL (ref 1–2.5)
ALBUMIN SERPL-MCNC: 3.7 G/DL (ref 3.5–5)
ALP BLD-CCNC: 66 U/L (ref 38–126)
ALT SERPL-CCNC: 19 U/L (ref 0–34)
ANION GAP SERPL CALCULATED.3IONS-SCNC: 6 MMOL/L (ref 8–16)
AST SERPL-CCNC: 37 U/L (ref 14–36)
BASOPHILS RELATIVE PERCENT: 0.4 % (ref 0–1)
BILIRUB SERPL-MCNC: 0.5 MG/DL (ref 0.2–1.3)
BUN BLDV-MCNC: 13 MG/DL (ref 7–17)
CALCIUM SERPL-MCNC: 9.2 MG/DL (ref 8.6–10.3)
CHLORIDE BLD-SCNC: 104 MMOL/L (ref 98–107)
CO2: 32 MMOL/L (ref 22–30)
CREAT SERPL-MCNC: 0.8 MG/DL (ref 0.52–1.04)
EOSINOPHILS RELATIVE PERCENT: 3.3 % (ref 0–5)
ERYTHROCYTE DISTRIBUTION WIDTH RBC RATIO: 13.7 % (ref 11.6–14.8)
GFR CALCULATED: 71
GLOBULIN: 2.6 G/DL (ref 2–3.5)
GLUCOSE BLD-MCNC: 100 MG/DL (ref 74–100)
GRANULOCYTE ABSOLUTE COUNT: 3.7 X(10)3/UL (ref 1.8–7.2)
HCT VFR BLD CALC: 38.3 % (ref 35.7–46.7)
HEMOGLOBIN: 12.5 G/DL (ref 11.9–15.9)
IMMATURE GRANULOCYTES %: 0.1 % (ref 0–0.5)
LYMPHOCYTES ABSOLUTE: 2.5 X(10)3/UL (ref 1.1–2.7)
LYMPHOCYTES RELATIVE PERCENT: 35 % (ref 15–45)
MCH RBC QN AUTO: 30.3 PG (ref 28.1–33.6)
MCHC RBC AUTO-ENTMCNC: 32.6 G/DL (ref 32.8–34.7)
MCV RBC AUTO: 92.7 FL (ref 82.9–99.9)
MONOCYTES RELATIVE PERCENT: 9 % (ref 0–12)
NEUTROPHILS/100 LEUKOCYTES: 52.2 % (ref 40–70)
PLATELETS: 173 X1000 (ref 175–420)
POTASSIUM SERPL-SCNC: 4 MMOL/L (ref 3.4–5.1)
RBC # BLD: 4.13 X1000000 (ref 3.72–5.31)
SODIUM BLD-SCNC: 138 MMOL/L (ref 137–145)
TOTAL PROTEIN: 6.3 G/DL (ref 6.3–8.2)
WBC # BLD: 7 X1000 (ref 4.5–10.3)

## 2021-08-16 NOTE — RESULT ENCOUNTER NOTE
Minimal elevation in AST.   Otherwise CMP and CBC essentially normal.  Can discuss further at upcoming appointment

## 2021-08-16 NOTE — RESULT ENCOUNTER NOTE
No significant lymphadenopathy noted. Submandibular gland is noted at the site of the marker and appears normal.  There is a prominent venous structure on the right side of the neck. This is of uncertain clinical significance. No further imaging planned at this time. Can discuss further at upcoming appointment.   If there are additional concerns, we could consider a vascular specialist referral

## 2021-09-09 ENCOUNTER — OFFICE VISIT (OUTPATIENT)
Dept: FAMILY MEDICINE CLINIC | Age: 71
End: 2021-09-09
Payer: MEDICARE

## 2021-09-09 VITALS
HEART RATE: 66 BPM | WEIGHT: 112.2 LBS | OXYGEN SATURATION: 96 % | DIASTOLIC BLOOD PRESSURE: 62 MMHG | SYSTOLIC BLOOD PRESSURE: 110 MMHG | HEIGHT: 64 IN | BODY MASS INDEX: 19.15 KG/M2

## 2021-09-09 DIAGNOSIS — M25.561 CHRONIC PAIN OF RIGHT KNEE: ICD-10-CM

## 2021-09-09 DIAGNOSIS — G89.29 CHRONIC PAIN OF RIGHT KNEE: ICD-10-CM

## 2021-09-09 DIAGNOSIS — G89.29 HIP PAIN, CHRONIC, RIGHT: ICD-10-CM

## 2021-09-09 DIAGNOSIS — M25.551 HIP PAIN, CHRONIC, RIGHT: ICD-10-CM

## 2021-09-09 DIAGNOSIS — J44.9 CHRONIC OBSTRUCTIVE PULMONARY DISEASE, UNSPECIFIED COPD TYPE (HCC): Primary | ICD-10-CM

## 2021-09-09 DIAGNOSIS — R06.83 SNORING: ICD-10-CM

## 2021-09-09 DIAGNOSIS — Z98.890 S/P CAROTID ENDARTERECTOMY: ICD-10-CM

## 2021-09-09 PROCEDURE — 4040F PNEUMOC VAC/ADMIN/RCVD: CPT | Performed by: FAMILY MEDICINE

## 2021-09-09 PROCEDURE — 1123F ACP DISCUSS/DSCN MKR DOCD: CPT | Performed by: FAMILY MEDICINE

## 2021-09-09 PROCEDURE — 1090F PRES/ABSN URINE INCON ASSESS: CPT | Performed by: FAMILY MEDICINE

## 2021-09-09 PROCEDURE — G8427 DOCREV CUR MEDS BY ELIG CLIN: HCPCS | Performed by: FAMILY MEDICINE

## 2021-09-09 PROCEDURE — G8926 SPIRO NO PERF OR DOC: HCPCS | Performed by: FAMILY MEDICINE

## 2021-09-09 PROCEDURE — 3023F SPIROM DOC REV: CPT | Performed by: FAMILY MEDICINE

## 2021-09-09 PROCEDURE — G8420 CALC BMI NORM PARAMETERS: HCPCS | Performed by: FAMILY MEDICINE

## 2021-09-09 PROCEDURE — 99214 OFFICE O/P EST MOD 30 MIN: CPT | Performed by: FAMILY MEDICINE

## 2021-09-09 PROCEDURE — G8400 PT W/DXA NO RESULTS DOC: HCPCS | Performed by: FAMILY MEDICINE

## 2021-09-09 PROCEDURE — 3017F COLORECTAL CA SCREEN DOC REV: CPT | Performed by: FAMILY MEDICINE

## 2021-09-09 PROCEDURE — 4004F PT TOBACCO SCREEN RCVD TLK: CPT | Performed by: FAMILY MEDICINE

## 2021-09-09 ASSESSMENT — ENCOUNTER SYMPTOMS
COUGH: 0
BACK PAIN: 1

## 2021-09-09 NOTE — PATIENT INSTRUCTIONS
Patient Education        Stopping Smoking: Care Instructions  Your Care Instructions     Cigarette smokers crave the nicotine in cigarettes. Giving it up is much harder than simply changing a habit. Your body has to stop craving the nicotine. It is hard to quit, but you can do it. There are many tools that people use to quit smoking. You may find that combining tools works best for you. There are several steps to quitting. First you get ready to quit. Then you get support to help you. After that, you learn new skills and behaviors to become a nonsmoker. For many people, a necessary step is getting and using medicine. Your doctor will help you set up the plan that best meets your needs. You may want to attend a smoking cessation program to help you quit smoking. When you choose a program, look for one that has proven success. Ask your doctor for ideas. You will greatly increase your chances of success if you take medicine as well as get counseling or join a cessation program.  Some of the changes you feel when you first quit tobacco are uncomfortable. Your body will miss the nicotine at first, and you may feel short-tempered and grumpy. You may have trouble sleeping or concentrating. Medicine can help you deal with these symptoms. You may struggle with changing your smoking habits and rituals. The last step is the tricky one: Be prepared for the smoking urge to continue for a time. This is a lot to deal with, but keep at it. You will feel better. Follow-up care is a key part of your treatment and safety. Be sure to make and go to all appointments, and call your doctor if you are having problems. It's also a good idea to know your test results and keep a list of the medicines you take. How can you care for yourself at home? · Ask your family, friends, and coworkers for support. You have a better chance of quitting if you have help and support.   · Join a support group, such as Nicotine Anonymous, for people who are trying to quit smoking. · Consider signing up for a smoking cessation program, such as the American Lung Association's Freedom from Smoking program.  · Get text messaging support. Go to the website at www.smokefree. gov to sign up for the [facility] program.  · Set a quit date. Pick your date carefully so that it is not right in the middle of a big deadline or stressful time. Once you quit, do not even take a puff. Get rid of all ashtrays and lighters after your last cigarette. Clean your house and your clothes so that they do not smell of smoke. · Learn how to be a nonsmoker. Think about ways you can avoid those things that make you reach for a cigarette. ? Avoid situations that put you at greatest risk for smoking. For some people, it is hard to have a drink with friends without smoking. For others, they might skip a coffee break with coworkers who smoke. ? Change your daily routine. Take a different route to work or eat a meal in a different place. · Cut down on stress. Calm yourself or release tension by doing an activity you enjoy, such as reading a book, taking a hot bath, or gardening. · Talk to your doctor or pharmacist about nicotine replacement therapy, which replaces the nicotine in your body. You still get nicotine but you do not use tobacco. Nicotine replacement products help you slowly reduce the amount of nicotine you need. These products come in several forms, many of them available over-the-counter:  ? Nicotine patches  ? Nicotine gum and lozenges  ? Nicotine inhaler  · Ask your doctor about bupropion (Wellbutrin) or varenicline (Chantix), which are prescription medicines. They do not contain nicotine. They help you by reducing withdrawal symptoms, such as stress and anxiety. · Some people find hypnosis, acupuncture, and massage helpful for ending the smoking habit. · Eat a healthy diet and get regular exercise.  Having healthy habits will help your body move past its craving for nicotine. · Be prepared to keep trying. Most people are not successful the first few times they try to quit. Do not get mad at yourself if you smoke again. Make a list of things you learned and think about when you want to try again, such as next week, next month, or next year. Where can you learn more? Go to https://Zapcoderpejohnsonewgabrielle.OM Latam. org and sign in to your [employer] account. Enter M774 in the Quizens box to learn more about \"Stopping Smoking: Care Instructions. \"     If you do not have an account, please click on the \"Sign Up Now\" link. Current as of: February 11, 2021               Content Version: 12.9  © 2006-2021 Healthwise, Incorporated. Care instructions adapted under license by [facility] [place] ([facility]). If you have questions about a medical condition or this instruction, always ask your healthcare professional. [address] any warranty or liability for your use of this information.

## 2021-09-09 NOTE — PROGRESS NOTES
Chief Complaint   Patient presents with    Follow-up     Patient here for 3 week follow up.  Hip Pain    Knee Pain    Snoring       HPI:  Stan Mcmahan is a 69 y.o. (: 1950) here today   for 3 week follow up on coughing. Had been taking trelegy. Has helped cough and breathing. Was given samples. magda well. Has rx pending. Seemed to take some time for sxs to improve. Overall better. Recent ct for concern re lymphadenopathy. CT neg for adenopathy. Labs ok. ? Prominent venous structure on the right. Hx of left carotid endarterectomy. Patient is having right hip and knee pain. Pain in right hip and knee. Seen by ortho approx 3-4 mo ago. Ortho thought more back related. Had been seeing pain mgmt at [facility]. Had injections in back and PT. No relief. Now w/ pain in knee as well. Patient has been snoring at night. Unsure if stops breathing. Relatively new. Loud. Denies sig daytime sleepiness. Still taking flonase. Wonders about breathe right strips. Does have some nose itching and stuffiness. Has not been using zyrtec. HPI    Patient's medications, allergies, past medical, surgical, social and family histories were reviewed and updated as appropriate. ROS:  Review of Systems   Constitutional: Negative for fever. Respiratory: Negative for cough. Musculoskeletal: Positive for arthralgias and back pain.            LDL Calculated (mg/dL)   Date Value   2015 95       Past Medical History:   Diagnosis Date    Arthritis     COPD (chronic obstructive pulmonary disease) ([facility] [address]     Depression     Hyperlipidemia     Hypertension     Irritable bowel syndrome (IBS)     Pacemaker        Family History   Problem Relation Age of Onset    Heart Disease Father     High Blood Pressure Father     Cancer Sister         uterine       Social History     Socioeconomic History    Marital status:      Spouse name: Not on file    Number of children: Not on file    Years of education: Not on file    Highest education level: Not on file   Occupational History    Not on file   Tobacco Use    Smoking status: Current Every Day Smoker     Packs/day: 1.00     Years: 40.00     Pack years: 40.00     Types: Cigarettes     Start date: 5/10/2019    Smokeless tobacco: Never Used   Substance and Sexual Activity    Alcohol use: No     Alcohol/week: 0.0 standard drinks    Drug use: No    Sexual activity: Yes     Partners: Male   Other Topics Concern    Not on file   Social History Narrative    Not on file     Social Determinants of Health     Financial Resource Strain:     Difficulty of Paying Living Expenses:    Food Insecurity:     Worried About Running Out of Food in the Last Year:     [address] in the Last Year:    Transportation Needs:     Lack of Transportation (Medical):  Lack of Transportation (Non-Medical):    Physical Activity:     Days of Exercise per Week:     Minutes of Exercise per Session:    Stress:     Feeling of Stress :    Social Connections:     Frequency of Communication with Friends and Family:     Frequency of Social Gatherings with Friends and Family:     Attends Mandaeism Services:     Active Member of Clubs or Organizations:     Attends Club or Organization Meetings:     Marital Status:    Intimate Partner Violence:     Fear of Current or Ex-Partner:     Emotionally Abused:     Physically Abused:     Sexually Abused:        Prior to Visit Medications    Medication Sig Taking?  Authorizing Provider   fluticasone-umeclidin-vilant (TRELEGY ELLIPTA) 100-62.5-25 MCG/INH AEPB Inhale 1 puff into the lungs daily Yes Yue Soliman MD   albuterol (PROVENTIL) (2.5 MG/3ML) 0.083% nebulizer solution Take 3 mLs by nebulization every 6 hours as needed for Wheezing Yes Yue Soliman MD   albuterol (PROVENTIL) (5 MG/ML) 0.5% nebulizer solution Take 1 mL by nebulization 4 times daily as needed for Wheezing Yes Eveline Prince, APRN - CNP albuterol sulfate HFA (VENTOLIN HFA) 108 (90 Base) MCG/ACT inhaler Inhale 2 puffs into the lungs 2 times daily as needed for Wheezing or Shortness of Breath Yes JAYLYN Roth CNP   metoprolol succinate (TOPROL XL) 25 MG extended release tablet Take 0.5 tablets by mouth daily Yes JAYLYN Roth CNP   potassium chloride (MICRO-K) 10 MEQ extended release capsule Take 1 capsule by mouth daily Yes JAYLYN Roth CNP   apixaban (ELIQUIS) 5 MG TABS tablet Take 1 tablet by mouth 2 times daily Yes JAYLYN Roth CNP   ezetimibe (ZETIA) 10 MG tablet TAKE 1 TABLET BY MOUTH DAILY. Yes JAYLYN Roth CNP   dicyclomine (BENTYL) 10 MG capsule Take 1 capsule by mouth 4 times daily (before meals and nightly) Yes JAYLYN Roth CNP   omeprazole (PRILOSEC) 20 MG delayed release capsule Take 1 capsule by mouth 2 times daily (before meals) Yes JAYLYN Roth CNP   VORTIoxetine (TRINTELLIX) 10 MG TABS tablet TAKE 1 TABLET BY MOUTH EVERY DAY Yes JAYLYN Roth CNP   Lactobacillus (PROBIOTIC ACIDOPHILUS PO) Take 1 tablet by mouth daily  Yes Historical Provider, MD   fluticasone (FLONASE) 50 MCG/ACT nasal spray 2 sprays by Nasal route daily Yes JAYLYN Roth CNP   aspirin EC 81 MG EC tablet Take 1 tablet by mouth daily. Yes Yani Christianson MD       Allergies   Allergen Reactions    Iodides Hives and Itching    Nsaids      Previous stomach bleed      Sulfa Antibiotics Rash       OBJECTIVE:    /62   Pulse 66   Ht 5' 4\" (1.626 m)   Wt 112 lb 3.2 oz (50.9 kg)   SpO2 96%   BMI 19.26 kg/m²     BP Readings from Last 2 Encounters:   09/09/21 110/62   08/12/21 126/70       Wt Readings from Last 3 Encounters:   09/09/21 112 lb 3.2 oz (50.9 kg)   08/12/21 113 lb 9.6 oz (51.5 kg)   05/06/21 116 lb (52.6 kg)       Physical Exam  Constitutional:       Appearance: She is well-developed. HENT:      Head: Normocephalic and atraumatic.       Right Ear: Hearing normal.      Left

## 2021-09-14 ENCOUNTER — TELEPHONE (OUTPATIENT)
Dept: FAMILY MEDICINE CLINIC | Age: 71
End: 2021-09-14

## 2021-09-14 NOTE — TELEPHONE ENCOUNTER
Patient called and is still having back and leg pain. Patient is asking if something can be called in to help her with the pain. She has an appointment with Dr. Suyapa Biswas with [facility] on 9/23/21. Patient uses [facility].

## 2021-09-15 RX ORDER — METHYLPREDNISOLONE 4 MG/1
TABLET ORAL
Qty: 1 KIT | Refills: 0 | Status: SHIPPED | OUTPATIENT
Start: 2021-09-15 | End: 2021-09-21

## 2021-10-04 DIAGNOSIS — I65.22 CAROTID STENOSIS, LEFT: ICD-10-CM

## 2021-10-04 DIAGNOSIS — Z98.890 S/P CAROTID ENDARTERECTOMY: ICD-10-CM

## 2021-10-04 RX ORDER — ATORVASTATIN CALCIUM 80 MG/1
80 TABLET, FILM COATED ORAL NIGHTLY
Qty: 90 TABLET | Refills: 0 | Status: SHIPPED | OUTPATIENT
Start: 2021-10-04 | End: 2022-03-15 | Stop reason: SDUPTHER

## 2021-10-04 NOTE — TELEPHONE ENCOUNTER
----- Message from [facility] ([facility]) sent at 10/4/2021  9:42 AM EDT -----  Subject: Message to Provider    QUESTIONS  Information for Provider? pt is calling to get a refill on script for the   Lipotor 80 daily and would like a 90 day supply to be sent to [facility]   ---------------------------------------------------------------------------  --------------  [address]  What is the best way for the office to contact you? OK to leave message on   voicemail  Preferred Call Back Phone Number? [phone]  ---------------------------------------------------------------------------  --------------  SCRIPT ANSWERS  Relationship to Patient?  Self

## 2021-10-04 NOTE — TELEPHONE ENCOUNTER
----- Message from [facility] ([facility]) sent at 10/4/2021  9:42 AM EDT -----  Subject: Message to Provider    QUESTIONS  Information for Provider? pt is calling to get a refill on script for the   Lipotor 80 daily and would like a 90 day supply to be sent to [facility]   ---------------------------------------------------------------------------  --------------  [address]  What is the best way for the office to contact you? OK to leave message on   voicemail  Preferred Call Back Phone Number? [phone]  ---------------------------------------------------------------------------  --------------  SCRIPT ANSWERS  Relationship to Patient?  Self

## 2021-10-05 ENCOUNTER — NURSE ONLY (OUTPATIENT)
Dept: FAMILY MEDICINE CLINIC | Age: 71
End: 2021-10-05
Payer: MEDICARE

## 2021-10-05 DIAGNOSIS — Z23 NEED FOR IMMUNIZATION AGAINST INFLUENZA: Primary | ICD-10-CM

## 2021-10-05 PROCEDURE — G0008 ADMIN INFLUENZA VIRUS VAC: HCPCS | Performed by: FAMILY MEDICINE

## 2021-10-05 PROCEDURE — 90694 VACC AIIV4 NO PRSRV 0.5ML IM: CPT | Performed by: FAMILY MEDICINE

## 2021-10-13 ENCOUNTER — OFFICE VISIT (OUTPATIENT)
Dept: ORTHOPEDIC SURGERY | Age: 71
End: 2021-10-13
Payer: MEDICARE

## 2021-10-13 VITALS — HEIGHT: 64 IN | WEIGHT: 112 LBS | BODY MASS INDEX: 19.12 KG/M2

## 2021-10-13 DIAGNOSIS — M17.11 PRIMARY LOCALIZED OSTEOARTHRITIS OF RIGHT KNEE: Primary | ICD-10-CM

## 2021-10-13 DIAGNOSIS — M70.61 GREATER TROCHANTERIC BURSITIS OF RIGHT HIP: ICD-10-CM

## 2021-10-13 DIAGNOSIS — G89.29 CHRONIC PAIN OF RIGHT KNEE: ICD-10-CM

## 2021-10-13 DIAGNOSIS — M25.561 CHRONIC PAIN OF RIGHT KNEE: ICD-10-CM

## 2021-10-13 PROCEDURE — 3017F COLORECTAL CA SCREEN DOC REV: CPT | Performed by: ORTHOPAEDIC SURGERY

## 2021-10-13 PROCEDURE — 1090F PRES/ABSN URINE INCON ASSESS: CPT | Performed by: ORTHOPAEDIC SURGERY

## 2021-10-13 PROCEDURE — G8400 PT W/DXA NO RESULTS DOC: HCPCS | Performed by: ORTHOPAEDIC SURGERY

## 2021-10-13 PROCEDURE — 1123F ACP DISCUSS/DSCN MKR DOCD: CPT | Performed by: ORTHOPAEDIC SURGERY

## 2021-10-13 PROCEDURE — 20610 DRAIN/INJ JOINT/BURSA W/O US: CPT | Performed by: ORTHOPAEDIC SURGERY

## 2021-10-13 PROCEDURE — 99213 OFFICE O/P EST LOW 20 MIN: CPT | Performed by: ORTHOPAEDIC SURGERY

## 2021-10-13 PROCEDURE — G8427 DOCREV CUR MEDS BY ELIG CLIN: HCPCS | Performed by: ORTHOPAEDIC SURGERY

## 2021-10-13 PROCEDURE — G8484 FLU IMMUNIZE NO ADMIN: HCPCS | Performed by: ORTHOPAEDIC SURGERY

## 2021-10-13 PROCEDURE — 4004F PT TOBACCO SCREEN RCVD TLK: CPT | Performed by: ORTHOPAEDIC SURGERY

## 2021-10-13 PROCEDURE — 4040F PNEUMOC VAC/ADMIN/RCVD: CPT | Performed by: ORTHOPAEDIC SURGERY

## 2021-10-13 PROCEDURE — G8420 CALC BMI NORM PARAMETERS: HCPCS | Performed by: ORTHOPAEDIC SURGERY

## 2021-10-13 RX ORDER — METHYLPREDNISOLONE ACETATE 40 MG/ML
40 INJECTION, SUSPENSION INTRA-ARTICULAR; INTRALESIONAL; INTRAMUSCULAR; SOFT TISSUE ONCE
Status: COMPLETED | OUTPATIENT
Start: 2021-10-13 | End: 2021-10-13

## 2021-10-13 RX ORDER — BUPIVACAINE HYDROCHLORIDE 2.5 MG/ML
12 INJECTION, SOLUTION INFILTRATION; PERINEURAL ONCE
Status: COMPLETED | OUTPATIENT
Start: 2021-10-13 | End: 2021-10-13

## 2021-10-13 RX ADMIN — METHYLPREDNISOLONE ACETATE 40 MG: 40 INJECTION, SUSPENSION INTRA-ARTICULAR; INTRALESIONAL; INTRAMUSCULAR; SOFT TISSUE at 12:05

## 2021-10-13 RX ADMIN — BUPIVACAINE HYDROCHLORIDE 30 MG: 2.5 INJECTION, SOLUTION INFILTRATION; PERINEURAL at 12:05

## 2021-10-13 NOTE — PROGRESS NOTES
dicyclomine (BENTYL) 10 MG capsule Take 1 capsule by mouth 4 times daily (before meals and nightly) 120 capsule 3    omeprazole (PRILOSEC) 20 MG delayed release capsule Take 1 capsule by mouth 2 times daily (before meals) 180 capsule 3    VORTIoxetine (TRINTELLIX) 10 MG TABS tablet TAKE 1 TABLET BY MOUTH EVERY DAY 90 tablet 3    Lactobacillus (PROBIOTIC ACIDOPHILUS PO) Take 1 tablet by mouth daily       fluticasone (FLONASE) 50 MCG/ACT nasal spray 2 sprays by Nasal route daily 1 Bottle 5    aspirin EC 81 MG EC tablet Take 1 tablet by mouth daily. 30 tablet 3     No current facility-administered medications for this visit. Social    Social History     Socioeconomic History    Marital status:      Spouse name: Not on file    Number of children: Not on file    Years of education: Not on file    Highest education level: Not on file   Occupational History    Not on file   Tobacco Use    Smoking status: Current Every Day Smoker     Packs/day: 1.00     Years: 40.00     Pack years: 40.00     Types: Cigarettes     Start date: 5/10/2019    Smokeless tobacco: Never Used   Substance and Sexual Activity    Alcohol use: No     Alcohol/week: 0.0 standard drinks    Drug use: No    Sexual activity: Yes     Partners: Male   Other Topics Concern    Not on file   Social History Narrative    Not on file     Social Determinants of Health     Financial Resource Strain:     Difficulty of Paying Living Expenses:    Food Insecurity:     Worried About Running Out of Food in the Last Year:     [address] in the Last Year:    Transportation Needs:     Lack of Transportation (Medical):      Lack of Transportation (Non-Medical):    Physical Activity:     Days of Exercise per Week:     Minutes of Exercise per Session:    Stress:     Feeling of Stress :    Social Connections:     Frequency of Communication with Friends and Family:     Frequency of Social Gatherings with Friends and Family:     Attends Caodaism Services:     Active Member of Clubs or Organizations:     Attends Club or Organization Meetings:     Marital Status:    Intimate Partner Violence:     Fear of Current or Ex-Partner:     Emotionally Abused:     Physically Abused:     Sexually Abused:        Family HISTORY    Family History   Problem Relation Age of Onset    Heart Disease Father     High Blood Pressure Father     Cancer Sister         uterine       PHYSICAL EXAM    Vital Signs:  Ht 5' 4\" (1.626 m)   Wt 112 lb (50.8 kg)   BMI 19.22 kg/m²   General Appearance:  Normal body habitus. Alert and oriented to person, place, and time. Affect:  Normal.   Gait:  Normal. Good balance and coordination. Skin:  Intact. Sensation:  Intact. Strength:  Intact. Reflexes:  Intact. Pulses:  Intact. Knee Exam:    Effusion: Negative    Range of Motion Right Left   Extension 0 0   Flexion 110 110     Provocative Test Right Left    Positive Negative Positive Negative   Anterior drawer [] [] [] []   Lachman [] [] [] []   Posterior drawer [] [] [] []   Varus testing [] [x] [] [x]   Valgus testing [x] [] [] [x]   Joint line tenderness [x] [] [] [x]     Additional Exam Comments: Her neurocirculatory lymphatic exam appears unremarkable but she does have pain over the greater trochanter to direct palpation with good hip range of motion. On her knee exam she has synovial thickening, and some discomfort most in the medial compartment of her knee with some crepitus with range of motion. She has no gross instability. IMAGING STUDIES    I reviewed x-rays that she had taken in the distant past of her right knee and hip which appear unremarkable to acute or chronic pathology with exception of moderate arthritis in the knee and an unremarkable hip    IMPRESSION    Right hip pain secondary to greater trochanteric bursitis  Right knee pain secondary to osteoarthritis    PLAN      1.   Conservative care options including physical therapy, NSAIDs, bracing, and activity modification were discussed. 2.  The indications for therapeutic injections were discussed. 3.  The indications for additional imaging studies were discussed. 4.  After considering the various options discussed, the patient elected to pursue a course that includes cortisone injection right knee and observation. She can use Voltaren gel in the hip and knee and I told her if the hip pain persist she may benefit from an injection there although the knee injection with the systemic absorption of the steroid may give her relief there also. Recommendation is for a cortisone injection into the right knee. After informed consent was received from the patient, the right knee was injected with 1 mL(40mg)Depo-Medrol and 4 mL of 0.25% Marcaine  in the syringe from an anterolateral joint line approach, using a 25-gauge needle, under sterile Betadine prep, using ethyl chloride as a topical refrigerant, for a diagnosis of osteoarthritis. The patient appeared to tolerate it well. The patient should return here periodically as needed. Encounter Diagnoses   Name Primary?     Chronic pain of right knee     Primary localized osteoarthritis of right knee Yes    Greater trochanteric bursitis of right hip         Orders Placed This Encounter   Procedures    NC ARTHROCENTESIS ASPIR&/INJ MAJOR JT/BURSA W/O US

## 2021-11-17 RX ORDER — METOPROLOL SUCCINATE 25 MG/1
25 TABLET, EXTENDED RELEASE ORAL DAILY
Qty: 30 TABLET | Refills: 0 | Status: SHIPPED | OUTPATIENT
Start: 2021-11-17 | End: 2022-08-23 | Stop reason: SDUPTHER

## 2021-11-17 NOTE — TELEPHONE ENCOUNTER
[facility] called. [facility] and she won't be able to get it for about a week. She's been out and she cannot get her HR down. Will you be willing to call in 30days for her. Cardio bumped her up to 25mg daily. This should reflect the new dosing. Osiel Gaona.

## 2022-01-03 ENCOUNTER — OFFICE VISIT (OUTPATIENT)
Dept: FAMILY MEDICINE CLINIC | Age: 72
End: 2022-01-03
Payer: MEDICARE

## 2022-01-03 VITALS
WEIGHT: 108.8 LBS | DIASTOLIC BLOOD PRESSURE: 84 MMHG | OXYGEN SATURATION: 97 % | BODY MASS INDEX: 18.57 KG/M2 | HEART RATE: 78 BPM | SYSTOLIC BLOOD PRESSURE: 120 MMHG | HEIGHT: 64 IN

## 2022-01-03 DIAGNOSIS — Z87.891 PERSONAL HISTORY OF TOBACCO USE: ICD-10-CM

## 2022-01-03 DIAGNOSIS — R25.2 LEG CRAMPS: ICD-10-CM

## 2022-01-03 DIAGNOSIS — F33.41 RECURRENT MAJOR DEPRESSIVE DISORDER IN PARTIAL REMISSION (HCC): ICD-10-CM

## 2022-01-03 DIAGNOSIS — I49.5 SICK SINUS SYNDROME (HCC): ICD-10-CM

## 2022-01-03 DIAGNOSIS — I83.811 VARICOSE VEINS OF RIGHT LOWER EXTREMITY WITH PAIN: Primary | ICD-10-CM

## 2022-01-03 DIAGNOSIS — E78.5 HYPERLIPIDEMIA, UNSPECIFIED HYPERLIPIDEMIA TYPE: ICD-10-CM

## 2022-01-03 DIAGNOSIS — G95.9 CERVICAL MYELOPATHY (HCC): ICD-10-CM

## 2022-01-03 DIAGNOSIS — J44.9 CHRONIC OBSTRUCTIVE PULMONARY DISEASE, UNSPECIFIED COPD TYPE (HCC): ICD-10-CM

## 2022-01-03 LAB
A/G RATIO: 2 (ref 1.1–2.2)
ALBUMIN SERPL-MCNC: 4.3 G/DL (ref 3.4–5)
ALP BLD-CCNC: 82 U/L (ref 40–129)
ALT SERPL-CCNC: 22 U/L (ref 10–40)
ANION GAP SERPL CALCULATED.3IONS-SCNC: 15 MMOL/L (ref 3–16)
AST SERPL-CCNC: 32 U/L (ref 15–37)
BILIRUB SERPL-MCNC: 0.5 MG/DL (ref 0–1)
BUN BLDV-MCNC: 12 MG/DL (ref 7–20)
CALCIUM SERPL-MCNC: 9.9 MG/DL (ref 8.3–10.6)
CHLORIDE BLD-SCNC: 108 MMOL/L (ref 99–110)
CHOLESTEROL, TOTAL: 126 MG/DL (ref 0–199)
CO2: 27 MMOL/L (ref 21–32)
CREAT SERPL-MCNC: 1.2 MG/DL (ref 0.6–1.2)
GFR AFRICAN AMERICAN: 53
GFR NON-AFRICAN AMERICAN: 44
GLUCOSE BLD-MCNC: 105 MG/DL (ref 70–99)
HDLC SERPL-MCNC: 58 MG/DL (ref 40–60)
LDL CHOLESTEROL CALCULATED: 54 MG/DL
MAGNESIUM: 2.2 MG/DL (ref 1.8–2.4)
POTASSIUM SERPL-SCNC: 5.4 MMOL/L (ref 3.5–5.1)
SODIUM BLD-SCNC: 150 MMOL/L (ref 136–145)
TOTAL PROTEIN: 6.5 G/DL (ref 6.4–8.2)
TRIGL SERPL-MCNC: 72 MG/DL (ref 0–150)
VLDLC SERPL CALC-MCNC: 14 MG/DL

## 2022-01-03 PROCEDURE — 4040F PNEUMOC VAC/ADMIN/RCVD: CPT | Performed by: FAMILY MEDICINE

## 2022-01-03 PROCEDURE — G0296 VISIT TO DETERM LDCT ELIG: HCPCS | Performed by: FAMILY MEDICINE

## 2022-01-03 PROCEDURE — 4004F PT TOBACCO SCREEN RCVD TLK: CPT | Performed by: FAMILY MEDICINE

## 2022-01-03 PROCEDURE — 3023F SPIROM DOC REV: CPT | Performed by: FAMILY MEDICINE

## 2022-01-03 PROCEDURE — 1090F PRES/ABSN URINE INCON ASSESS: CPT | Performed by: FAMILY MEDICINE

## 2022-01-03 PROCEDURE — G8484 FLU IMMUNIZE NO ADMIN: HCPCS | Performed by: FAMILY MEDICINE

## 2022-01-03 PROCEDURE — G8427 DOCREV CUR MEDS BY ELIG CLIN: HCPCS | Performed by: FAMILY MEDICINE

## 2022-01-03 PROCEDURE — 36415 COLL VENOUS BLD VENIPUNCTURE: CPT | Performed by: FAMILY MEDICINE

## 2022-01-03 PROCEDURE — 3017F COLORECTAL CA SCREEN DOC REV: CPT | Performed by: FAMILY MEDICINE

## 2022-01-03 PROCEDURE — G8400 PT W/DXA NO RESULTS DOC: HCPCS | Performed by: FAMILY MEDICINE

## 2022-01-03 PROCEDURE — 1123F ACP DISCUSS/DSCN MKR DOCD: CPT | Performed by: FAMILY MEDICINE

## 2022-01-03 PROCEDURE — G8420 CALC BMI NORM PARAMETERS: HCPCS | Performed by: FAMILY MEDICINE

## 2022-01-03 PROCEDURE — 99214 OFFICE O/P EST MOD 30 MIN: CPT | Performed by: FAMILY MEDICINE

## 2022-01-03 ASSESSMENT — ENCOUNTER SYMPTOMS: SHORTNESS OF BREATH: 1

## 2022-01-03 NOTE — PROGRESS NOTES
Chief Complaint   Patient presents with    Varicose Veins     burning and painful       HPI:  Krysta Cassidy is a 66 y.o. (: 1950) here today   for varicose veins in her right leg that burn and are becoming more painful. HPI  Ongoing hx of varicose vein. Sig worse in the right. Will get sig swelling and pain, zachary after standing. Burning, ache, pain noted. Worse over past mo. Has had some leg cramps in right leg and foot as well. Leg occas feels heavy, worse w/ pain. Sig pain in legs when goes to bed at night. Pain zachary worse w/ standing. Mild relief w/ elevation. Mood has been fairly well controlled on current meds. Copd near baseline. Inc sob when lying on right side. Hx of SSS. Plans on cardio f/u later this mo    Neck sxs near baseline    Patient's medications, allergies, past medical, surgical, social and family histories were reviewed and updated as appropriate. ROS:  Review of Systems   Constitutional: Negative for fever. Respiratory: Positive for shortness of breath. Musculoskeletal: Positive for arthralgias and myalgias. Prior to Visit Medications    Medication Sig Taking?  Authorizing Provider   metoprolol succinate (TOPROL XL) 25 MG extended release tablet Take 1 tablet by mouth daily Yes Conchita Cardenas MD   atorvastatin (LIPITOR) 80 MG tablet Take 1 tablet by mouth nightly Yes JAYLYN De La Cruz CNP   fluticasone-umeclidin-vilant (TRELEGY ELLIPTA) 100-62.5-25 MCG/INH AEPB Inhale 1 puff into the lungs daily Yes Conchita Cardenas MD   albuterol (PROVENTIL) (2.5 MG/3ML) 0.083% nebulizer solution Take 3 mLs by nebulization every 6 hours as needed for Wheezing Yes Conchita Cardenas MD   albuterol sulfate HFA (VENTOLIN HFA) 108 (90 Base) MCG/ACT inhaler Inhale 2 puffs into the lungs 2 times daily as needed for Wheezing or Shortness of Breath Yes JAYLYN De La Cruz CNP   potassium chloride (MICRO-K) 10 MEQ extended release capsule Take 1 capsule by mouth daily Yes JAYLYN Diaz CNP   apixaban (ELIQUIS) 5 MG TABS tablet Take 1 tablet by mouth 2 times daily Yes JAYLYN Diaz CNP   ezetimibe (ZETIA) 10 MG tablet TAKE 1 TABLET BY MOUTH DAILY. Yes JAYLYN Diaz CNP   dicyclomine (BENTYL) 10 MG capsule Take 1 capsule by mouth 4 times daily (before meals and nightly) Yes JAYLYN Diaz CNP   omeprazole (PRILOSEC) 20 MG delayed release capsule Take 1 capsule by mouth 2 times daily (before meals) Yes JAYLYN Diaz CNP   VORTIoxetine (TRINTELLIX) 10 MG TABS tablet TAKE 1 TABLET BY MOUTH EVERY DAY Yes JAYLYN Diaz CNP   Lactobacillus (PROBIOTIC ACIDOPHILUS PO) Take 1 tablet by mouth daily  Yes Betzy Bloom MD   fluticasone (FLONASE) 50 MCG/ACT nasal spray 2 sprays by Nasal route daily Yes JAYLYN Diaz CNP   aspirin EC 81 MG EC tablet Take 1 tablet by mouth daily. Yes Zaid Hurst MD   albuterol (PROVENTIL) (5 MG/ML) 0.5% nebulizer solution Take 1 mL by nebulization 4 times daily as needed for Wheezing  JAYLYN Weeks CNP       Allergies   Allergen Reactions    Iodides Hives and Itching    Nsaids      Previous stomach bleed      Sulfa Antibiotics Rash       OBJECTIVE:    /84   Pulse 78   Ht 5' 4\" (1.626 m)   Wt 108 lb 12.8 oz (49.4 kg)   SpO2 97%   BMI 18.68 kg/m²     BP Readings from Last 2 Encounters:   01/03/22 120/84   09/09/21 110/62       Wt Readings from Last 3 Encounters:   01/03/22 108 lb 12.8 oz (49.4 kg)   10/13/21 112 lb (50.8 kg)   09/09/21 112 lb 3.2 oz (50.9 kg)       Physical Exam  Constitutional:       Appearance: She is well-developed. HENT:      Head: Normocephalic and atraumatic. Right Ear: Hearing normal.      Left Ear: Hearing normal.   Eyes:      Conjunctiva/sclera: Conjunctivae normal.   Neck:      Trachea: No tracheal deviation. Cardiovascular:      Rate and Rhythm: Normal rate and regular rhythm.    Pulmonary:      Effort: Pulmonary effort is normal. Breath sounds: Decreased breath sounds present. Abdominal:      Palpations: Abdomen is soft. Tenderness: There is no abdominal tenderness. Musculoskeletal:        Legs:    Skin:     General: Skin is warm and dry. Neurological:      Mental Status: She is alert and oriented to person, place, and time. Psychiatric:         Mood and Affect: Mood normal.         Behavior: Behavior normal.           ASSESSMENT/PLAN:     1. Varicose veins of right lower extremity with pain  Will attempt to arrange sooner f/u w/ vascular. Sig pain noted. On eliquis. Consider trial of compression stockings for symptomatic relief. 2. Chronic obstructive pulmonary disease, unspecified COPD type ([facility]  The current medical regimen is effective;  continue present plan and medications. Near baseline.    - HI VISIT TO DISCUSS LUNG CA SCREEN W LDCT  - CT Lung Screen (Annual); Future    3. Recurrent major depressive disorder in partial remission ([facility]  The current medical regimen is effective;  continue present plan and medications. 4. Hyperlipidemia, unspecified hyperlipidemia type  Rpt labs  - Lipid Panel    5. Leg cramps  See above. Add labs. - Comprehensive Metabolic Panel  - MAGNESIUM    6. Cervical myelopathy (HCC)  Near baseline. 7. Sick sinus syndrome ([facility]  F/u w/ cardio as sched. 8. Personal history of tobacco use    - HI VISIT TO DISCUSS LUNG CA SCREEN W LDCT  - CT Lung Screen (Annual); Future            Low Dose CT (LDCT) Lung Screening criteria met:     Age 55-77(Medicare) or 50-80 ([facility])   Pack year smoking >30 (Medicare) or >20 ([facility])   Still smoking or less than 15 year since quit   No sign or symptoms of lung cancer   > 11 months since last LDCT     Risks and benefits of lung cancer screening with LDCT scans discussed:    Significance of positive screen - False-positive LDCT results often occur. 95% of all positive results do not lead to a diagnosis of cancer.  Usually further imaging can resolve most false-positive results; however, some patients may require invasive procedures. Over diagnosis risk - 10% to 12% of screen-detected lung cancer cases are over diagnosed--that is, the cancer would not have been detected in the patient's lifetime without the screening. Need for follow up screens annually to continue lung cancer screening effectiveness     Risks associated with radiation from annual LDCT- Radiation exposure is about the same as for a mammogram, which is about 1/3 of the annual background radiation exposure from everyday life. Starting screening at age 54 is not likely to increase cancer risk from radiation exposure. Patients with comorbidities resulting in life expectancy of < 10 years, or that would preclude treatment of an abnormality identified on CT, should not be screened due to lack of benefit.     To obtain maximal benefit from this screening, smoking cessation and long-term abstinence from smoking is critical

## 2022-01-03 NOTE — PATIENT INSTRUCTIONS
Patient Education        Stopping Smoking: Care Instructions  Your Care Instructions     Cigarette smokers crave the nicotine in cigarettes. Giving it up is much harder than simply changing a habit. Your body has to stop craving the nicotine. It is hard to quit, but you can do it. There are many tools that people use to quit smoking. You may find that combining tools works best for you. There are several steps to quitting. First you get ready to quit. Then you get support to help you. After that, you learn new skills and behaviors to become a nonsmoker. For many people, a necessary step is getting and using medicine. Your doctor will help you set up the plan that best meets your needs. You may want to attend a smoking cessation program to help you quit smoking. When you choose a program, look for one that has proven success. Ask your doctor for ideas. You will greatly increase your chances of success if you take medicine as well as get counseling or join a cessation program.  Some of the changes you feel when you first quit tobacco are uncomfortable. Your body will miss the nicotine at first, and you may feel short-tempered and grumpy. You may have trouble sleeping or concentrating. Medicine can help you deal with these symptoms. You may struggle with changing your smoking habits and rituals. The last step is the tricky one: Be prepared for the smoking urge to continue for a time. This is a lot to deal with, but keep at it. You will feel better. Follow-up care is a key part of your treatment and safety. Be sure to make and go to all appointments, and call your doctor if you are having problems. It's also a good idea to know your test results and keep a list of the medicines you take. How can you care for yourself at home? · Ask your family, friends, and coworkers for support. You have a better chance of quitting if you have help and support.   · Join a support group, such as Nicotine Anonymous, for people who are trying to quit smoking. · Consider signing up for a smoking cessation program, such as the American Lung Association's Freedom from Smoking program.  · Get text messaging support. Go to the website at www.smokefree. gov to sign up for the [facility] program.  · Set a quit date. Pick your date carefully so that it is not right in the middle of a big deadline or stressful time. Once you quit, do not even take a puff. Get rid of all ashtrays and lighters after your last cigarette. Clean your house and your clothes so that they do not smell of smoke. · Learn how to be a nonsmoker. Think about ways you can avoid those things that make you reach for a cigarette. ? Avoid situations that put you at greatest risk for smoking. For some people, it is hard to have a drink with friends without smoking. For others, they might skip a coffee break with coworkers who smoke. ? Change your daily routine. Take a different route to work or eat a meal in a different place. · Cut down on stress. Calm yourself or release tension by doing an activity you enjoy, such as reading a book, taking a hot bath, or gardening. · Talk to your doctor or pharmacist about nicotine replacement therapy, which replaces the nicotine in your body. You still get nicotine but you do not use tobacco. Nicotine replacement products help you slowly reduce the amount of nicotine you need. These products come in several forms, many of them available over-the-counter:  ? Nicotine patches  ? Nicotine gum and lozenges  ? Nicotine inhaler  · Ask your doctor about bupropion (Wellbutrin) or varenicline (Chantix), which are prescription medicines. They do not contain nicotine. They help you by reducing withdrawal symptoms, such as stress and anxiety. · Some people find hypnosis, acupuncture, and massage helpful for ending the smoking habit. · Eat a healthy diet and get regular exercise.  Having healthy habits will help your body move past its craving for nicotine. · Be prepared to keep trying. Most people are not successful the first few times they try to quit. Do not get mad at yourself if you smoke again. Make a list of things you learned and think about when you want to try again, such as next week, next month, or next year. Where can you learn more? Go to https://PromiseUPpepicewUjogo.CrowdTunes. org and sign in to your [employer] account. Enter B461 in the [facility] box to learn more about \"Stopping Smoking: Care Instructions. \"     If you do not have an account, please click on the \"Sign Up Now\" link. Current as of: February 11, 2021               Content Version: 13.1  © 2006-2021 [employer]. Care instructions adapted under license by [facility] ([facility]). If you have questions about a medical condition or this instruction, always ask your healthcare professional. [place] [address] any warranty or liability for your use of this information. What is lung cancer screening? Lung cancer screening is a way in which doctors check the lungs for early signs of cancer in people who have no symptoms of lung cancer. A low-dose CT scan uses much less radiation than a normal CT scan and shows a more detailed image of the lungs than a standard X-ray. The goal of lung cancer screening is to find cancer early, before it has a chance to grow, spread, or cause problems. One large study found that smokers who were screened with low-dose CT scans were less likely to die of lung cancer than those who were screened with standard X-ray. Below is a summary of the things you need to know regarding screening for lung cancer with low-dose computed tomography (LDCT). This is a screening program that involves routine annual screening with LDCT studies of the lung. The LDCTs are done using low-dose radiation that is not thought to increase your cancer risk.   If you have other serious medical conditions (other cancers, congestive heart failure) that limit your life expectancy to less than 10 years, you should not undergo lung cancer screening with LDCT. The chance is 20%-60% that the LDCT result will show abnormalities. This would require additional testing which could include repeat imaging or even invasive procedures. Most (about 95%) of \"abnormal\" LDCT results are false in the sense that no lung cancer is ultimately found. Additionally, some (about 10%) of the cancers found would not affect your life expectancy, even if undetected and untreated. If you are still smoking, the single most important thing that you can do to reduce your risk of dying of lung cancer is to quit. For this screening to be covered by Medicare and most other insurers, strict criteria must be met. If you do not meet these criteria, but still wish to undergo LDCT testing, you will be required to sign a waiver indicating your willingness to pay for the scan.

## 2022-01-04 ENCOUNTER — TELEPHONE (OUTPATIENT)
Dept: ORTHOPEDIC SURGERY | Age: 72
End: 2022-01-04

## 2022-01-04 DIAGNOSIS — E87.5 INCREASED POTASSIUM IN THE BLOOD: ICD-10-CM

## 2022-01-04 DIAGNOSIS — M17.11 PRIMARY LOCALIZED OSTEOARTHRITIS OF RIGHT KNEE: ICD-10-CM

## 2022-01-04 DIAGNOSIS — G89.29 CHRONIC PAIN OF RIGHT KNEE: Primary | ICD-10-CM

## 2022-01-04 DIAGNOSIS — E87.0 SODIUM BLOOD INCREASED: Primary | ICD-10-CM

## 2022-01-04 DIAGNOSIS — M25.561 CHRONIC PAIN OF RIGHT KNEE: Primary | ICD-10-CM

## 2022-01-04 RX ORDER — PREDNISONE 1 MG/1
TABLET ORAL
Qty: 55 TABLET | Refills: 0 | Status: SHIPPED | OUTPATIENT
Start: 2022-01-04 | End: 2022-02-08 | Stop reason: ALTCHOICE

## 2022-01-04 NOTE — RESULT ENCOUNTER NOTE
Lipids excellent. Mild inc in sodium and potassium. Renal fxn a little worse as well. Rec inc water intake. Rpt bmp locally in 1 week.   Magnesium normal.

## 2022-01-06 ENCOUNTER — TELEPHONE (OUTPATIENT)
Dept: ORTHOPEDIC SURGERY | Age: 72
End: 2022-01-06

## 2022-01-06 NOTE — TELEPHONE ENCOUNTER
DID LEAVE MESSAGE MRI/RT KNEE, IS APPR FOR SWETHA MYLES. DID INSTRUCT PATIENT TO [address] F/U APPT TO GO OVER RESULTS.

## 2022-01-11 ENCOUNTER — NURSE ONLY (OUTPATIENT)
Dept: FAMILY MEDICINE CLINIC | Age: 72
End: 2022-01-11
Payer: MEDICARE

## 2022-01-11 DIAGNOSIS — E87.0 SODIUM BLOOD INCREASED: ICD-10-CM

## 2022-01-11 DIAGNOSIS — E87.5 INCREASED POTASSIUM IN THE BLOOD: ICD-10-CM

## 2022-01-11 LAB
ANION GAP SERPL CALCULATED.3IONS-SCNC: 11 MMOL/L (ref 3–16)
BUN BLDV-MCNC: 15 MG/DL (ref 7–20)
CALCIUM SERPL-MCNC: 9.4 MG/DL (ref 8.3–10.6)
CHLORIDE BLD-SCNC: 101 MMOL/L (ref 99–110)
CO2: 32 MMOL/L (ref 21–32)
CREAT SERPL-MCNC: 0.9 MG/DL (ref 0.6–1.2)
GFR AFRICAN AMERICAN: >60
GFR NON-AFRICAN AMERICAN: >60
GLUCOSE BLD-MCNC: 85 MG/DL (ref 70–99)
POTASSIUM SERPL-SCNC: 3.5 MMOL/L (ref 3.5–5.1)
SODIUM BLD-SCNC: 144 MMOL/L (ref 136–145)

## 2022-01-11 PROCEDURE — 36415 COLL VENOUS BLD VENIPUNCTURE: CPT | Performed by: FAMILY MEDICINE

## 2022-01-12 ENCOUNTER — HOSPITAL ENCOUNTER (OUTPATIENT)
Dept: MRI IMAGING | Age: 72
Discharge: HOME | End: 2022-01-12
Payer: MEDICARE

## 2022-01-12 DIAGNOSIS — M25.561 CHRONIC PAIN OF RIGHT KNEE: ICD-10-CM

## 2022-01-12 DIAGNOSIS — M17.11 PRIMARY LOCALIZED OSTEOARTHRITIS OF RIGHT KNEE: ICD-10-CM

## 2022-01-12 DIAGNOSIS — G89.29 CHRONIC PAIN OF RIGHT KNEE: ICD-10-CM

## 2022-01-12 PROCEDURE — G1010 CDSM STANSON: HCPCS

## 2022-01-14 ENCOUNTER — HOSPITAL ENCOUNTER (OUTPATIENT)
Dept: CT IMAGING | Age: 72
Discharge: HOME | End: 2022-01-14
Payer: MEDICARE

## 2022-01-14 DIAGNOSIS — Z87.891 PERSONAL HISTORY OF TOBACCO USE: ICD-10-CM

## 2022-01-14 DIAGNOSIS — J44.9 CHRONIC OBSTRUCTIVE PULMONARY DISEASE, UNSPECIFIED COPD TYPE (HCC): ICD-10-CM

## 2022-01-14 PROCEDURE — 71271 CT THORAX LUNG CANCER SCR C-: CPT

## 2022-01-18 ENCOUNTER — TELEPHONE (OUTPATIENT)
Dept: ORTHOPEDIC SURGERY | Age: 72
End: 2022-01-18

## 2022-01-18 NOTE — TELEPHONE ENCOUNTER
Test Results     Type of Test: MRI RIGHT KNEE  Date of Test: 1/12/22  Location of Test: [facility]  Patient Contact Number: [phone]

## 2022-01-26 ENCOUNTER — OFFICE VISIT (OUTPATIENT)
Dept: ORTHOPEDIC SURGERY | Age: 72
End: 2022-01-26
Payer: MEDICARE

## 2022-01-26 VITALS — WEIGHT: 108 LBS | BODY MASS INDEX: 18.44 KG/M2 | HEIGHT: 64 IN

## 2022-01-26 DIAGNOSIS — M23.203 DEGENERATIVE TEAR OF MEDIAL MENISCUS OF RIGHT KNEE: Primary | ICD-10-CM

## 2022-01-26 PROCEDURE — G8400 PT W/DXA NO RESULTS DOC: HCPCS | Performed by: ORTHOPAEDIC SURGERY

## 2022-01-26 PROCEDURE — 1123F ACP DISCUSS/DSCN MKR DOCD: CPT | Performed by: ORTHOPAEDIC SURGERY

## 2022-01-26 PROCEDURE — G8427 DOCREV CUR MEDS BY ELIG CLIN: HCPCS | Performed by: ORTHOPAEDIC SURGERY

## 2022-01-26 PROCEDURE — 4004F PT TOBACCO SCREEN RCVD TLK: CPT | Performed by: ORTHOPAEDIC SURGERY

## 2022-01-26 PROCEDURE — 1090F PRES/ABSN URINE INCON ASSESS: CPT | Performed by: ORTHOPAEDIC SURGERY

## 2022-01-26 PROCEDURE — 99212 OFFICE O/P EST SF 10 MIN: CPT | Performed by: ORTHOPAEDIC SURGERY

## 2022-01-26 PROCEDURE — G8420 CALC BMI NORM PARAMETERS: HCPCS | Performed by: ORTHOPAEDIC SURGERY

## 2022-01-26 PROCEDURE — 3017F COLORECTAL CA SCREEN DOC REV: CPT | Performed by: ORTHOPAEDIC SURGERY

## 2022-01-26 PROCEDURE — 4040F PNEUMOC VAC/ADMIN/RCVD: CPT | Performed by: ORTHOPAEDIC SURGERY

## 2022-01-26 PROCEDURE — G8484 FLU IMMUNIZE NO ADMIN: HCPCS | Performed by: ORTHOPAEDIC SURGERY

## 2022-01-26 NOTE — PROGRESS NOTES
Returns today for evaluation of her right knee. She has had an MRI and is here for review. I reviewed the MRI of the right knee which demonstrates a complex tear of the posterior half of the medial meniscus. It is consistent with her symptoms. I did review her x-rays of the knee which demonstrated grade 2 osteoarthritis with good maintenance of joint space, greater than 50%. Because of the nature of her pain and x-rays and testing, and with her failure to improve with conservative care, I recommend microscopic surgery/arthroscopic intervention to the right knee for a partial medial meniscectomy. After an evaluation of this patient and a review of her radiographic testing, it would be my opinion that the symptoms, for which I am treating her are mechanical in origin. Although she has concomitant osteoarthritic changes, her joint space has greater than 50% of the articular surfaces left within the knee compartment. Additionally she is failed to improve over time with a physician directed physical therapy program, consideration of bracing, medications, and injections. It is with those observations both objective and subjective that I would recommend proceeding with arthroscopic intervention to her need to address the meniscal pathology. The patient was counseled at length about the risks of scott Covid-19 during their perioperative period and any recovery window from their procedure. The patient was made aware that scott Covid-19  may worsen their prognosis for recovering from their procedure  and lend to a higher morbidity and/or mortality risk. All material risks, benefits, and reasonable alternatives including postponing the procedure were discussed. The patient does wish to proceed with the procedure at this time.       INFORMED CONSENT NOTE        We discussed the risks, benefits, and alternatives to the proposed procedure, as well as the necessity of other members of the healthcare team participating in the procedure. All questions were answered and the patient elected to proceed with the proposed procedure and signed the informed consent form.

## 2022-01-28 ENCOUNTER — TELEPHONE (OUTPATIENT)
Dept: ORTHOPEDIC SURGERY | Age: 72
End: 2022-01-28

## 2022-01-28 NOTE — TELEPHONE ENCOUNTER
Auth: NPR  Date: 02/14/22  Reference # None  Spoke with: None  Type of SX: Outpatient  Location: [facility]  CPT: [id number]   DX: M23.203  SX area: Rt knee  Insurance: Medicare A&B

## 2022-02-08 ENCOUNTER — ANESTHESIA EVENT (OUTPATIENT)
Dept: OPERATING ROOM | Age: 72
End: 2022-02-08
Payer: MEDICARE

## 2022-02-08 ENCOUNTER — OFFICE VISIT (OUTPATIENT)
Dept: FAMILY MEDICINE CLINIC | Age: 72
End: 2022-02-08
Payer: MEDICARE

## 2022-02-08 VITALS
WEIGHT: 112.6 LBS | BODY MASS INDEX: 19.22 KG/M2 | SYSTOLIC BLOOD PRESSURE: 122 MMHG | HEART RATE: 68 BPM | DIASTOLIC BLOOD PRESSURE: 80 MMHG | OXYGEN SATURATION: 94 % | HEIGHT: 64 IN

## 2022-02-08 DIAGNOSIS — J44.9 CHRONIC OBSTRUCTIVE PULMONARY DISEASE, UNSPECIFIED COPD TYPE (HCC): ICD-10-CM

## 2022-02-08 DIAGNOSIS — Z01.818 PREOP EXAMINATION: ICD-10-CM

## 2022-02-08 DIAGNOSIS — S83.241S TEAR OF MEDIAL MENISCUS OF RIGHT KNEE, CURRENT, UNSPECIFIED TEAR TYPE, SEQUELA: ICD-10-CM

## 2022-02-08 DIAGNOSIS — Z98.890 S/P CAROTID ENDARTERECTOMY: ICD-10-CM

## 2022-02-08 DIAGNOSIS — E78.5 HYPERLIPIDEMIA, UNSPECIFIED HYPERLIPIDEMIA TYPE: Primary | ICD-10-CM

## 2022-02-08 DIAGNOSIS — I47.29 NSVT (NONSUSTAINED VENTRICULAR TACHYCARDIA): ICD-10-CM

## 2022-02-08 DIAGNOSIS — M25.561 CHRONIC PAIN OF RIGHT KNEE: ICD-10-CM

## 2022-02-08 DIAGNOSIS — G89.29 CHRONIC PAIN OF RIGHT KNEE: ICD-10-CM

## 2022-02-08 DIAGNOSIS — I49.5 SICK SINUS SYNDROME (HCC): ICD-10-CM

## 2022-02-08 PROCEDURE — 36415 COLL VENOUS BLD VENIPUNCTURE: CPT | Performed by: FAMILY MEDICINE

## 2022-02-08 PROCEDURE — G8484 FLU IMMUNIZE NO ADMIN: HCPCS | Performed by: FAMILY MEDICINE

## 2022-02-08 PROCEDURE — 1123F ACP DISCUSS/DSCN MKR DOCD: CPT | Performed by: FAMILY MEDICINE

## 2022-02-08 PROCEDURE — 99214 OFFICE O/P EST MOD 30 MIN: CPT | Performed by: FAMILY MEDICINE

## 2022-02-08 PROCEDURE — 3017F COLORECTAL CA SCREEN DOC REV: CPT | Performed by: FAMILY MEDICINE

## 2022-02-08 PROCEDURE — 4040F PNEUMOC VAC/ADMIN/RCVD: CPT | Performed by: FAMILY MEDICINE

## 2022-02-08 PROCEDURE — 4004F PT TOBACCO SCREEN RCVD TLK: CPT | Performed by: FAMILY MEDICINE

## 2022-02-08 PROCEDURE — 3023F SPIROM DOC REV: CPT | Performed by: FAMILY MEDICINE

## 2022-02-08 PROCEDURE — G8420 CALC BMI NORM PARAMETERS: HCPCS | Performed by: FAMILY MEDICINE

## 2022-02-08 PROCEDURE — 1090F PRES/ABSN URINE INCON ASSESS: CPT | Performed by: FAMILY MEDICINE

## 2022-02-08 PROCEDURE — G8400 PT W/DXA NO RESULTS DOC: HCPCS | Performed by: FAMILY MEDICINE

## 2022-02-08 PROCEDURE — G8427 DOCREV CUR MEDS BY ELIG CLIN: HCPCS | Performed by: FAMILY MEDICINE

## 2022-02-08 ASSESSMENT — ENCOUNTER SYMPTOMS: SHORTNESS OF BREATH: 0

## 2022-02-08 NOTE — PATIENT INSTRUCTIONS
are trying to quit smoking. · Consider signing up for a smoking cessation program, such as the American Lung Association's Freedom from Smoking program.  · Get text messaging support. Go to the website at www.smokefree. gov to sign up for the [facility] program.  · Set a quit date. Pick your date carefully so that it is not right in the middle of a big deadline or stressful time. Once you quit, do not even take a puff. Get rid of all ashtrays and lighters after your last cigarette. Clean your house and your clothes so that they do not smell of smoke. · Learn how to be a nonsmoker. Think about ways you can avoid those things that make you reach for a cigarette. ? Avoid situations that put you at greatest risk for smoking. For some people, it is hard to have a drink with friends without smoking. For others, they might skip a coffee break with coworkers who smoke. ? Change your daily routine. Take a different route to work or eat a meal in a different place. · Cut down on stress. Calm yourself or release tension by doing an activity you enjoy, such as reading a book, taking a hot bath, or gardening. · Talk to your doctor or pharmacist about nicotine replacement therapy, which replaces the nicotine in your body. You still get nicotine but you do not use tobacco. Nicotine replacement products help you slowly reduce the amount of nicotine you need. These products come in several forms, many of them available over-the-counter:  ? Nicotine patches  ? Nicotine gum and lozenges  ? Nicotine inhaler  · Ask your doctor about bupropion (Wellbutrin) or varenicline (Chantix), which are prescription medicines. They do not contain nicotine. They help you by reducing withdrawal symptoms, such as stress and anxiety. · Some people find hypnosis, acupuncture, and massage helpful for ending the smoking habit. · Eat a healthy diet and get regular exercise.  Having healthy habits will help your body move past its craving for nicotine. · Be prepared to keep trying. Most people are not successful the first few times they try to quit. Do not get mad at yourself if you smoke again. Make a list of things you learned and think about when you want to try again, such as next week, next month, or next year. Where can you learn more? Go to https://Datappraisepesharad.[employer]. org and sign in to your [employer] account. Enter R047 in the [email or web address] box to learn more about \"Stopping Smoking: Care Instructions. \"     If you do not have an account, please click on the \"Sign Up Now\" link. Current as of: February 11, 2021               Content Version: 13.1  © 2006-2021 [employer]. Care instructions adapted under license by [facility] [address] If you have questions about a medical condition or this instruction, always ask your healthcare professional. [place] [address] any warranty or liability for your use of this information.

## 2022-02-09 ENCOUNTER — HOSPITAL ENCOUNTER (OUTPATIENT)
Age: 72
Discharge: HOME | End: 2022-02-09
Payer: MEDICARE

## 2022-02-09 LAB
ACANTHOCYTES: ABNORMAL
BASOPHILS ABSOLUTE: 0 K/UL (ref 0–0.2)
BASOPHILS RELATIVE PERCENT: 0.6 %
EOSINOPHILS ABSOLUTE: 0.1 K/UL (ref 0–0.6)
EOSINOPHILS RELATIVE PERCENT: 1.7 %
HCT VFR BLD CALC: 41.6 % (ref 36–48)
HEMATOLOGY PATH CONSULT: NO
HEMOGLOBIN: 13.7 G/DL (ref 12–16)
LYMPHOCYTES ABSOLUTE: 1.7 K/UL (ref 1–5.1)
LYMPHOCYTES RELATIVE PERCENT: 30 %
MCH RBC QN AUTO: 30.2 PG (ref 26–34)
MCHC RBC AUTO-ENTMCNC: 33 G/DL (ref 31–36)
MCV RBC AUTO: 91.6 FL (ref 80–100)
MONOCYTES ABSOLUTE: 0.5 K/UL (ref 0–1.3)
MONOCYTES RELATIVE PERCENT: 8.5 %
NEUTROPHILS ABSOLUTE: 3.4 K/UL (ref 1.7–7.7)
NEUTROPHILS RELATIVE PERCENT: 59.2 %
PDW BLD-RTO: 13.5 % (ref 12.4–15.4)
PLATELET # BLD: 194 K/UL (ref 135–450)
PLATELET SLIDE REVIEW: ADEQUATE
PMV BLD AUTO: 10.4 FL (ref 5–10.5)
RBC # BLD: 4.55 M/UL (ref 4–5.2)
SLIDE REVIEW: ABNORMAL
WBC # BLD: 5.8 K/UL (ref 4–11)

## 2022-02-09 PROCEDURE — U0005 INFEC AGEN DETEC AMPLI PROBE: HCPCS

## 2022-02-09 PROCEDURE — U0003 INFECTIOUS AGENT DETECTION BY NUCLEIC ACID (DNA OR RNA); SEVERE ACUTE RESPIRATORY SYNDROME CORONAVIRUS 2 (SARS-COV-2) (CORONAVIRUS DISEASE [COVID-19]), AMPLIFIED PROBE TECHNIQUE, MAKING USE OF HIGH THROUGHPUT TECHNOLOGIES AS DESCRIBED BY CMS-2020-01-R: HCPCS

## 2022-02-10 LAB — SARS-COV-2: NOT DETECTED

## 2022-02-10 NOTE — PROGRESS NOTES
Preoperative Screening for Elective Surgery/Invasive Procedures While COVID-19 present in the community     Have you had any of the following symptoms?no  o Fever, chills  o Cough  o Shortness of breath  o Muscle aches/pain  o Diarrhea  o Abdominal pain, nausea, vomiting  o Loss or decrease in taste and / or smell   Risk of Exposure  o Have you recently been hospitalized for COVID-19 or flu-like illness, if so when?no  o Recently diagnosed with COVID-19, if so when?no  o Recently tested for COVID-19, if so when?yes 2/9/22 for surgery  o Have you been in close contact with a person or family member who currently has or recently had COVID-19? If yes, when and in what context?no  o Do you live with anybody who in the last 14 days has had fever, chills, shortness of breath, muscle aches, flu-like illness?no  o Do you have any close contacts or family members who are currently in the hospital for COVID-19 or flu-like illness? If yes, assess recent close contact with this person. no    Indicate if the patient has a positive screen by answering yes to one or more of the above questions. Patients who test positive or screen positive prior to surgery or on the day of surgery should be evaluated in conjunction with the surgeon/proceduralist/anesthesiologist to determine the urgency of the procedure.

## 2022-02-10 NOTE — PROGRESS NOTES
PRE OP INSTRUCTION SHEET   1. Do not eat or drink anything after 12 midnight  prior to surgery. This includes no water, chewing gum or mints. 2. Take the following pills will a small sip of water (see MAR)                                        3. Aspirin, Ibuprofen, Advil, Naproxen, Vitamin E, fish oil and other Anti-inflammatory products should be stopped for 5 days before surgery or as directed by your physician. 4. Check with your Doctor regarding stopping Plavix, Coumadin, Lovenox, Fragmin or other blood thinners   5. Do not smoke, and do not drink any alcoholic beverages 24 hours prior to surgery. This includes NA Beer. 6. You may brush your teeth and gargle the morning of surgery. DO NOT SWALLOW WATER   7. You MUST make arrangements for a responsible adult to take you home after your surgery. You will not be allowed to leave alone or drive yourself home. It is strongly suggested someone stay with you the first 24 hrs. Your surgery will be cancelled if you do not have a ride home. 8. A parent/legal guardian must accompany a child scheduled for surgery and plan to stay at the hospital until the child is discharged. Please do not bring other children with you. 9. Please wear simple, loose fitting clothing to the hospital.  Gaby Smith not bring valuables (money, credit cards, checkbooks, etc.) Do not wear any makeup (including no eye makeup) or nail polish on your fingers or toes. 10. DO NOT wear any jewelry or piercings on day of surgery. All body piercing jewelry must be removed. 11. If you have dentures,glasses, or contacts they will be removed before going to the OR; we will provide you a container. 12. Please see your family doctor/and cardiologist for a history & physical and/or concerning medications. Bring any test results/reports from your physician's office. Have history and labs faxed to [phone].  Remember to bring Blood Bank bracelet on the day of surgery. 14. If you have a Living Will and Durable Power of  for Healthcare, please bring in a copy. 13. Notify your Surgeon if you develop any illness between now and surgery  time, cough, cold, fever, sore throat, nausea, vomiting, etc.  Please notify your surgeon if you experience dizziness, shortness of breath or blurred vision between now & the time of your surgery   16. DO NOT shave your operative site 96 hours prior to surgery. For face & neck surgery, men may use an electric razor 48 hours prior to surgery. 17. Shower with _x__Antibacterial soap (x_chlorhexidine for total joint  Pt's) shower two times before surgery.(the morning of and the night before. 18. To provide excellent care visitors will be limited to one in the room at any given time.   Please call pre admission testing if you any further questions [phone] or [phone]

## 2022-02-14 ENCOUNTER — ANESTHESIA (OUTPATIENT)
Dept: OPERATING ROOM | Age: 72
End: 2022-02-14
Payer: MEDICARE

## 2022-02-14 ENCOUNTER — HOSPITAL ENCOUNTER (OUTPATIENT)
Age: 72
Setting detail: OUTPATIENT SURGERY
Discharge: HOME | End: 2022-02-14
Attending: ORTHOPAEDIC SURGERY | Admitting: ORTHOPAEDIC SURGERY
Payer: MEDICARE

## 2022-02-14 VITALS
RESPIRATION RATE: 16 BRPM | BODY MASS INDEX: 19.12 KG/M2 | HEIGHT: 64 IN | HEART RATE: 66 BPM | TEMPERATURE: 97.4 F | OXYGEN SATURATION: 96 % | SYSTOLIC BLOOD PRESSURE: 177 MMHG | DIASTOLIC BLOOD PRESSURE: 74 MMHG | WEIGHT: 112 LBS

## 2022-02-14 VITALS — SYSTOLIC BLOOD PRESSURE: 113 MMHG | OXYGEN SATURATION: 99 % | DIASTOLIC BLOOD PRESSURE: 58 MMHG

## 2022-02-14 DIAGNOSIS — G89.29 CHRONIC PAIN OF RIGHT KNEE: Primary | ICD-10-CM

## 2022-02-14 DIAGNOSIS — M23.203 DEGENERATIVE TEAR OF MEDIAL MENISCUS OF RIGHT KNEE: ICD-10-CM

## 2022-02-14 DIAGNOSIS — M25.561 CHRONIC PAIN OF RIGHT KNEE: Primary | ICD-10-CM

## 2022-02-14 PROCEDURE — 2709999900 HC NON-CHARGEABLE SUPPLY: Performed by: ORTHOPAEDIC SURGERY

## 2022-02-14 PROCEDURE — 6360000002 HC RX W HCPCS: Performed by: ANESTHESIOLOGY

## 2022-02-14 PROCEDURE — 3600000014 HC SURGERY LEVEL 4 ADDTL 15MIN: Performed by: ORTHOPAEDIC SURGERY

## 2022-02-14 PROCEDURE — 6360000002 HC RX W HCPCS: Performed by: ORTHOPAEDIC SURGERY

## 2022-02-14 PROCEDURE — 6360000002 HC RX W HCPCS: Performed by: NURSE ANESTHETIST, CERTIFIED REGISTERED

## 2022-02-14 PROCEDURE — 7100000011 HC PHASE II RECOVERY - ADDTL 15 MIN: Performed by: ORTHOPAEDIC SURGERY

## 2022-02-14 PROCEDURE — A4217 STERILE WATER/SALINE, 500 ML: HCPCS | Performed by: ORTHOPAEDIC SURGERY

## 2022-02-14 PROCEDURE — 2500000003 HC RX 250 WO HCPCS: Performed by: NURSE ANESTHETIST, CERTIFIED REGISTERED

## 2022-02-14 PROCEDURE — 2580000003 HC RX 258: Performed by: ORTHOPAEDIC SURGERY

## 2022-02-14 PROCEDURE — 2580000003 HC RX 258: Performed by: NURSE ANESTHETIST, CERTIFIED REGISTERED

## 2022-02-14 PROCEDURE — 3600000004 HC SURGERY LEVEL 4 BASE: Performed by: ORTHOPAEDIC SURGERY

## 2022-02-14 PROCEDURE — 7100000001 HC PACU RECOVERY - ADDTL 15 MIN: Performed by: ORTHOPAEDIC SURGERY

## 2022-02-14 PROCEDURE — 3700000001 HC ADD 15 MINUTES (ANESTHESIA): Performed by: ORTHOPAEDIC SURGERY

## 2022-02-14 PROCEDURE — 7100000000 HC PACU RECOVERY - FIRST 15 MIN: Performed by: ORTHOPAEDIC SURGERY

## 2022-02-14 PROCEDURE — 2500000003 HC RX 250 WO HCPCS: Performed by: ORTHOPAEDIC SURGERY

## 2022-02-14 PROCEDURE — 3700000000 HC ANESTHESIA ATTENDED CARE: Performed by: ORTHOPAEDIC SURGERY

## 2022-02-14 PROCEDURE — 7100000010 HC PHASE II RECOVERY - FIRST 15 MIN: Performed by: ORTHOPAEDIC SURGERY

## 2022-02-14 RX ORDER — SODIUM CHLORIDE 0.9 % (FLUSH) 0.9 %
10 SYRINGE (ML) INJECTION EVERY 12 HOURS SCHEDULED
Status: DISCONTINUED | OUTPATIENT
Start: 2022-02-14 | End: 2022-02-14 | Stop reason: HOSPADM

## 2022-02-14 RX ORDER — VANCOMYCIN HYDROCHLORIDE 1 G/20ML
INJECTION, POWDER, LYOPHILIZED, FOR SOLUTION INTRAVENOUS
Status: DISCONTINUED
Start: 2022-02-14 | End: 2022-02-14 | Stop reason: HOSPADM

## 2022-02-14 RX ORDER — SODIUM CHLORIDE 0.9 % (FLUSH) 0.9 %
10 SYRINGE (ML) INJECTION PRN
Status: DISCONTINUED | OUTPATIENT
Start: 2022-02-14 | End: 2022-02-14 | Stop reason: HOSPADM

## 2022-02-14 RX ORDER — ONDANSETRON 2 MG/ML
INJECTION INTRAMUSCULAR; INTRAVENOUS PRN
Status: DISCONTINUED | OUTPATIENT
Start: 2022-02-14 | End: 2022-02-14 | Stop reason: SDUPTHER

## 2022-02-14 RX ORDER — HYDRALAZINE HYDROCHLORIDE 20 MG/ML
5 INJECTION INTRAMUSCULAR; INTRAVENOUS EVERY 10 MIN PRN
Status: DISCONTINUED | OUTPATIENT
Start: 2022-02-14 | End: 2022-02-14 | Stop reason: HOSPADM

## 2022-02-14 RX ORDER — ONDANSETRON 2 MG/ML
4 INJECTION INTRAMUSCULAR; INTRAVENOUS
Status: DISCONTINUED | OUTPATIENT
Start: 2022-02-14 | End: 2022-02-14 | Stop reason: HOSPADM

## 2022-02-14 RX ORDER — BUPIVACAINE HYDROCHLORIDE 2.5 MG/ML
INJECTION, SOLUTION INFILTRATION; PERINEURAL PRN
Status: DISCONTINUED | OUTPATIENT
Start: 2022-02-14 | End: 2022-02-14 | Stop reason: ALTCHOICE

## 2022-02-14 RX ORDER — LIDOCAINE HYDROCHLORIDE 10 MG/ML
1 INJECTION, SOLUTION EPIDURAL; INFILTRATION; INTRACAUDAL; PERINEURAL
Status: DISCONTINUED | OUTPATIENT
Start: 2022-02-14 | End: 2022-02-14 | Stop reason: HOSPADM

## 2022-02-14 RX ORDER — FENTANYL CITRATE 50 UG/ML
25 INJECTION, SOLUTION INTRAMUSCULAR; INTRAVENOUS EVERY 5 MIN PRN
Status: DISCONTINUED | OUTPATIENT
Start: 2022-02-14 | End: 2022-02-14 | Stop reason: HOSPADM

## 2022-02-14 RX ORDER — OXYCODONE HYDROCHLORIDE AND ACETAMINOPHEN 5; 325 MG/1; MG/1
1 TABLET ORAL EVERY 6 HOURS PRN
Qty: 28 TABLET | Refills: 0 | Status: SHIPPED | OUTPATIENT
Start: 2022-02-14 | End: 2022-02-21

## 2022-02-14 RX ORDER — DIPHENHYDRAMINE HYDROCHLORIDE 50 MG/ML
INJECTION INTRAMUSCULAR; INTRAVENOUS
Status: DISCONTINUED
Start: 2022-02-14 | End: 2022-02-14 | Stop reason: HOSPADM

## 2022-02-14 RX ORDER — PROPOFOL 10 MG/ML
INJECTION, EMULSION INTRAVENOUS PRN
Status: DISCONTINUED | OUTPATIENT
Start: 2022-02-14 | End: 2022-02-14 | Stop reason: SDUPTHER

## 2022-02-14 RX ORDER — LIDOCAINE HYDROCHLORIDE 20 MG/ML
INJECTION, SOLUTION INFILTRATION; PERINEURAL PRN
Status: DISCONTINUED | OUTPATIENT
Start: 2022-02-14 | End: 2022-02-14 | Stop reason: SDUPTHER

## 2022-02-14 RX ORDER — MEPERIDINE HYDROCHLORIDE 25 MG/ML
12.5 INJECTION INTRAMUSCULAR; INTRAVENOUS; SUBCUTANEOUS EVERY 5 MIN PRN
Status: DISCONTINUED | OUTPATIENT
Start: 2022-02-14 | End: 2022-02-14 | Stop reason: HOSPADM

## 2022-02-14 RX ORDER — DIPHENHYDRAMINE HYDROCHLORIDE 50 MG/ML
12.5 INJECTION INTRAMUSCULAR; INTRAVENOUS ONCE
Status: COMPLETED | OUTPATIENT
Start: 2022-02-14 | End: 2022-02-14

## 2022-02-14 RX ORDER — MAGNESIUM HYDROXIDE 1200 MG/15ML
LIQUID ORAL CONTINUOUS PRN
Status: COMPLETED | OUTPATIENT
Start: 2022-02-14 | End: 2022-02-14

## 2022-02-14 RX ORDER — OXYCODONE HYDROCHLORIDE AND ACETAMINOPHEN 5; 325 MG/1; MG/1
2 TABLET ORAL PRN
Status: DISCONTINUED | OUTPATIENT
Start: 2022-02-14 | End: 2022-02-14 | Stop reason: HOSPADM

## 2022-02-14 RX ORDER — SODIUM CHLORIDE, SODIUM LACTATE, POTASSIUM CHLORIDE, CALCIUM CHLORIDE 600; 310; 30; 20 MG/100ML; MG/100ML; MG/100ML; MG/100ML
INJECTION, SOLUTION INTRAVENOUS CONTINUOUS
Status: DISCONTINUED | OUTPATIENT
Start: 2022-02-14 | End: 2022-02-14 | Stop reason: HOSPADM

## 2022-02-14 RX ORDER — SODIUM CHLORIDE 9 MG/ML
25 INJECTION, SOLUTION INTRAVENOUS PRN
Status: DISCONTINUED | OUTPATIENT
Start: 2022-02-14 | End: 2022-02-14 | Stop reason: HOSPADM

## 2022-02-14 RX ORDER — SODIUM CHLORIDE, SODIUM LACTATE, POTASSIUM CHLORIDE, CALCIUM CHLORIDE 600; 310; 30; 20 MG/100ML; MG/100ML; MG/100ML; MG/100ML
INJECTION, SOLUTION INTRAVENOUS CONTINUOUS PRN
Status: DISCONTINUED | OUTPATIENT
Start: 2022-02-14 | End: 2022-02-14 | Stop reason: SDUPTHER

## 2022-02-14 RX ORDER — PROMETHAZINE HYDROCHLORIDE 25 MG/ML
6.25 INJECTION, SOLUTION INTRAMUSCULAR; INTRAVENOUS
Status: DISCONTINUED | OUTPATIENT
Start: 2022-02-14 | End: 2022-02-14 | Stop reason: HOSPADM

## 2022-02-14 RX ORDER — OXYCODONE HYDROCHLORIDE AND ACETAMINOPHEN 5; 325 MG/1; MG/1
1 TABLET ORAL PRN
Status: DISCONTINUED | OUTPATIENT
Start: 2022-02-14 | End: 2022-02-14 | Stop reason: HOSPADM

## 2022-02-14 RX ADMIN — DIPHENHYDRAMINE HYDROCHLORIDE 6.25 MG: 50 INJECTION, SOLUTION INTRAMUSCULAR; INTRAVENOUS at 11:46

## 2022-02-14 RX ADMIN — ONDANSETRON HYDROCHLORIDE 4 MG: 2 INJECTION, SOLUTION INTRAMUSCULAR; INTRAVENOUS at 10:47

## 2022-02-14 RX ADMIN — SODIUM CHLORIDE, POTASSIUM CHLORIDE, SODIUM LACTATE AND CALCIUM CHLORIDE: 600; 310; 30; 20 INJECTION, SOLUTION INTRAVENOUS at 10:38

## 2022-02-14 RX ADMIN — LIDOCAINE HYDROCHLORIDE 20 MG: 20 INJECTION, SOLUTION INFILTRATION; PERINEURAL at 10:43

## 2022-02-14 RX ADMIN — HYDROMORPHONE HYDROCHLORIDE 0.25 MG: 1 INJECTION, SOLUTION INTRAMUSCULAR; INTRAVENOUS; SUBCUTANEOUS at 11:26

## 2022-02-14 RX ADMIN — PROPOFOL 100 MG: 10 INJECTION, EMULSION INTRAVENOUS at 10:43

## 2022-02-14 RX ADMIN — VANCOMYCIN HYDROCHLORIDE 1000 MG: 1 INJECTION, POWDER, LYOPHILIZED, FOR SOLUTION INTRAVENOUS at 10:32

## 2022-02-14 ASSESSMENT — PULMONARY FUNCTION TESTS
PIF_VALUE: 7
PIF_VALUE: 2
PIF_VALUE: 6
PIF_VALUE: 1
PIF_VALUE: 0
PIF_VALUE: 7
PIF_VALUE: 8
PIF_VALUE: 3
PIF_VALUE: 7
PIF_VALUE: 0
PIF_VALUE: 7
PIF_VALUE: 0
PIF_VALUE: 1
PIF_VALUE: 1
PIF_VALUE: 4
PIF_VALUE: 1
PIF_VALUE: 11
PIF_VALUE: 7
PIF_VALUE: 2
PIF_VALUE: 6
PIF_VALUE: 1
PIF_VALUE: 6
PIF_VALUE: 1
PIF_VALUE: 0
PIF_VALUE: 24
PIF_VALUE: 1
PIF_VALUE: 7
PIF_VALUE: 0
PIF_VALUE: 15
PIF_VALUE: 7
PIF_VALUE: 1
PIF_VALUE: 2
PIF_VALUE: 2
PIF_VALUE: 7
PIF_VALUE: 7

## 2022-02-14 ASSESSMENT — PAIN - FUNCTIONAL ASSESSMENT
PAIN_FUNCTIONAL_ASSESSMENT: 0-10
PAIN_FUNCTIONAL_ASSESSMENT: 0-10

## 2022-02-14 ASSESSMENT — PAIN DESCRIPTION - PAIN TYPE: TYPE: SURGICAL PAIN

## 2022-02-14 ASSESSMENT — PAIN DESCRIPTION - DESCRIPTORS: DESCRIPTORS: CONSTANT;DISCOMFORT

## 2022-02-14 ASSESSMENT — PAIN SCALES - GENERAL: PAINLEVEL_OUTOF10: 5

## 2022-02-14 ASSESSMENT — PAIN DESCRIPTION - ORIENTATION: ORIENTATION: RIGHT

## 2022-02-14 ASSESSMENT — PAIN DESCRIPTION - LOCATION: LOCATION: KNEE

## 2022-02-14 ASSESSMENT — LIFESTYLE VARIABLES: SMOKING_STATUS: 1

## 2022-02-14 ASSESSMENT — ENCOUNTER SYMPTOMS: SHORTNESS OF BREATH: 1

## 2022-02-14 NOTE — ANESTHESIA POSTPROCEDURE EVALUATION
Department of Anesthesiology  Postprocedure Note    Patient: Kitty Jovel  MRN: [id number]  YOB: 1950  Date of evaluation: 2/14/2022    Procedure Summary     Date: 02/14/22 Room / Location: [place] [address] / [facility]    Anesthesia Start: 8199 Anesthesia Stop: 1112    Procedure: RIGHT KNEE VIDEO ARTHROSCOPY, MEDIAL MENISECTOMY (Right Knee) Diagnosis: (RIGHT KNEE MEDIAL MENISCUS TEAR)    Surgeons: Samina Cunningham MD Responsible Provider: Tyson Domínguez MD    Anesthesia Type: general ASA Status: 3        Anesthesia Type: general    Lissa Phase I: Lissa Score: 9    Lissa Phase II: Lsisa Score: 10    Last vitals: Reviewed and per EMR flowsheets.      Anesthesia Post Evaluation   Anesthetic Problems: no   Cardiovascular System Stable: yes  Respiratory Function: Airway Patent yes  ETT no  Ventilator no  Level of consciousness: awake, alert and oriented  Post-op pain: adequate analgesia  Hydration Adequate: yes  Nausea/Vomiting:no  Other Issues:     Sulma Bautista MD

## 2022-02-14 NOTE — PROGRESS NOTES
Pt awakened on her own. Removed LMA. Pt following directions. Voices no c/o any other than being cold. Placed warm blankets over pt. Will continue to monitor.

## 2022-02-14 NOTE — ANESTHESIA PRE PROCEDURE
Department of Anesthesiology  Preprocedure Note       Name:  Ayanna Sheikh   Age:  66 y.o.  :  1950                                          MRN:  [id number]         Date:  2022      Surgeon:  Maki Baer MD    Procedure: RIGHT KNEE VIDEO ARTHROSCOPY, MEDIAL MENISECTOMY    EKG:  Electronic atrial pacemaker 60  -Left atrial enlargement.    -Poor R-wave progression -nonspecific -consider old anterior infarct. ECHO:  3/10/2021  Left ventricle: The cavity size is normal. Wall thickness is normal. Systolic function was normal. The calculated ejection fraction was in the range of 60% to 65%. Wall motion was normal; there were no regional wall motion abnormalities. Medications prior to admission:    metoprolol succinate (TOPROL XL) 25 MG extended release Take 50 mg by mouth daily    atorvastatin (LIPITOR) 80 MG tablet Take 1 tablet by mouth nightly   fluticasone-umeclidin-vilant (TRELEGY ELLIPTA) Inhale 1 puff into the lungs daily   albuterol (PROVENTIL) (5 MG/ML) 0.5% nebulizer solution Take 1 mL by nebulization 4 times daily as needed for Wheezing   albuterol sulfate HFA (VENTOLIN HFA)  inhaler Inhale 2 puffs 2 times daily as needed for Wheezing or Shortness of Breath   potassium chloride (MICRO-K) 10 MEQ extended release  Take 1 capsule by mouth daily   apixaban (ELIQUIS) 5 MG LD:  2/10 Take 1 tablet by mouth 2 times daily   ezetimibe (ZETIA) 10 MG tablet TAKE 1 TABLET BY MOUTH DAILY. dicyclomine (BENTYL) 10 MG capsule Take 1 capsule by mouth 4 times daily (before meals and nightly)   omeprazole (PRILOSEC) 20 MG delayed release capsule Take 1 capsule by mouth 2 times daily (before meals)   VORTIoxetine (TRINTELLIX) 10 MG TABS tablet TAKE 1 TABLET BY MOUTH EVERY DAY   Lactobacillus (PROBIOTIC ACIDOPHILUS PO) Take 1 tablet by mouth daily    fluticasone (FLONASE) 50 MCG/ACT nasal spray 2 sprays by Nasal route daily   aspirin EC 81 MG EC LD:  Take 1 tablet by mouth daily.      Allergies:     Iodides Hives and Itching    Nsaids      Previous stomach bleed    Sulfa Antibiotics Rash       Problem List:      Abdominal colic    COPD (chronic obstructive pulmonary disease) (HCC)    Hyperlipidemia    Depression    Carotid stenosis, left    REYNOSO (dyspnea on exertion)    Anxiety    Incarcerated ventral hernia    Carpal tunnel syndrome of right wrist    Cervical myelopathy (HCC)    B12 deficiency    Recurrent major depressive disorder in partial remission ([facility] [address]    Pacemaker    S/P carotid endarterectomy    Sick sinus syndrome (HCC)    Hip pain    Right sided sciatica    Lumbosacral spondylosis with radiculopathy    Greater trochanteric bursitis of right hip    Degenerative tear of medial meniscus of right knee    Chronic pain of right knee     Past Medical History:      Arthritis     COPD [facility]chronic obstructive pulmonary disease) ([facility])     Depression     Hyperlipidemia     Hypertension     Irritable bowel syndrome (IBS)     Pacemaker        Past Surgical History:     CAROTID ENDARTERECTOMY Left 03/12/2021    CARPAL TUNNEL RELEASE Right 07/13/2017    CATARACT REMOVAL WITH IMPLANT Left 12/23/2016    Phacoemulsification/ Posterior Chamber Intraocular Lens Implantation with dr Guillermo Pearson  2012    CHOLECYSTECTOMY, LAPAROSCOPIC  05/25/2011    COLONOSCOPY  07/28/2014    CORONARY ANGIOPLASTY      EYE SURGERY Right 03/09/2017    catarct removal with lens implant    HERNIA REPAIR  08/09/2016    open ventral incisional    HYSTERECTOMY      OTHER SURGICAL HISTORY  08/09/2016    OPEN VENTRAL INCISIONAL HERNIA REPAIR     PACEMAKER PLACEMENT  06/25/2019    [address]    PAIN MANAGEMENT PROCEDURE Right 03/10/2020    RIGHT LUMBAR FOUR AND LUMBAR FIVE TRANSFORAMINAL EPIDURAL STEROID INJECTION SITE CONFIRMED BY FLUOROSCOPY performed by Tristen Park MD at [facility] Right 06/16/2020    RIGHT LUMBAR FOUR AND LUMBAR FIVE TRANSFORAMINAL EPIDURAL STEROID INJECTION SITE CONFIRMED BY FLUOROSCOPY performed by Joe Kong MD at [facility] Bilateral 10/13/2020    BILATERAL LUMBAR THREE LUMBAR FOUR LUMBAR DORSAL RAMUS LUMBAR MEDIAL BRANCH BLOCKS performed by Joe Kong MD at [facility] Right 11/17/2020    RIGHT SACROILIAC JOINT INJECTION SITE CONFIRMED BY FLUOROSCOPY performed by Joe Kong MD at [facility] Right 12/29/2020    RIGHT HIP INJECTION SITE CONFIRMED BY FLUOROSCOPY performed by Joe Kong MD at [address] ENDOSCOPY       Social History:     Smoking status: Current Every Day Smoker     Packs/day: 1.00     Years: 40.00     Pack years: 40.00     Types: Cigarettes     Start date: 5/10/2019    Smokeless tobacco: Never Used   Substance Use Topics    Alcohol use: No     Alcohol/week: 0.0 standard drinks                                Ready to quit: Not Answered  Counseling given: Not Answered    Vital Signs (Current):    Height: 5' 4\" (1.626 m) (02/10/22) Weight: 112 lb (50.8 kg) (02/10/22)   BMI: 19.3           BP Readings from Last 3 Encounters:   02/08/22 122/80   01/03/22 120/84   09/09/21 110/62     NPO Status: Time of last liquid consumption: 2030                        Time of last solid consumption: 1800                        Date of last liquid consumption: 02/13/22                        Date of last solid food consumption: 02/13/22    BMI:   Wt Readings from Last 3 Encounters:   02/10/22 112 lb (50.8 kg)   02/08/22 112 lb 9.6 oz (51.1 kg)   01/26/22 108 lb (49 kg)     Body mass index is 19.22 kg/m².     CBC:    WBC 5.8 02/08/2022    HGB 13.7 02/08/2022    HCT 41.6 02/08/2022     02/08/2022     CMP:     01/11/2022    K 3.5 01/11/2022     01/11/2022    CO2 32 01/11/2022    BUN 15 01/11/2022    CREATININE 0.9 01/11/2022    GLUCOSE 85 01/11/2022    PROT 6.5 01/03/2022    CALCIUM 9.4 01/11/2022    BILITOT 0.5 01/03/2022    ALKPHOS 82 01/03/2022    AST 32 01/03/2022    ALT 22 01/03/2022     Coags:    INR 1.03 03/09/2021    APTT 27.6 03/09/2021     COVID-19 Screening (If Applicable): COVID19 Not Detected 02/09/2022    COVID19 NEGATIVE 03/09/2021     Anesthesia Evaluation  Patient summary reviewed and Nursing notes reviewed no history of anesthetic complications:   Airway: Mallampati: II  TM distance: >3 FB   Neck ROM: full  Mouth opening: > = 3 FB Dental:    (+) upper dentures      Pulmonary: breath sounds clear to auscultation  (+) COPD:  shortness of breath: chronic and no interval change,  current smoker    (-) sleep apnea and wheezes                           Cardiovascular:  Exercise tolerance: good (>4 METS),   (+) hypertension:, pacemaker: pacemaker, CAD: obstructive, CABG/stent: no interval change, dysrhythmias (+SSS):, REYNOSO:,     (-) murmur    ECG reviewed  Rhythm: regular  Rate: normal  Echocardiogram reviewed                  Neuro/Psych:   (+) neuromuscular disease:, TIA, psychiatric history:depression/anxiety    (-) seizures            ROS comment: +Cervical DDD GI/Hepatic/Renal:   (+) GERD: well controlled,      (-) hepatitis, liver disease and no renal disease       Endo/Other:    (+) blood dyscrasia: arthritis: OA., .    (-) diabetes mellitus, hypothyroidism               Abdominal:             Vascular:   + PVD, aortic or cerebral, DVT, .        ROS comment: DVT of left arm 2 years ago. Other Findings:           Anesthesia Plan      general     ASA 3       Induction: intravenous. MIPS: Prophylactic antiemetics administered. Anesthetic plan and risks discussed with patient, child/children and spouse. Plan discussed with CRNA.           Best Castillo MD

## 2022-02-14 NOTE — PROGRESS NOTES
Pt and family verbalized understanding of discharge instructions and written instructions also given to patient's caregiver. Dressing clean/ dry / intact. Pt tolerating PO intake (liquids). PIV removed. D/C via wheelchair to car with  / family. Discharged home in good condition.

## 2022-02-14 NOTE — BRIEF OP NOTE
Brief Postoperative Note      Patient: Fei Nicole  YOB: 1950  MRN: [id number]    Date of Procedure: 2/14/2022    Pre-Op Diagnosis: RIGHT KNEE MEDIAL MENISCUS TEAR    Post-Op Diagnosis: Same       Procedure(s):  RIGHT KNEE VIDEO ARTHROSCOPY, MEDIAL MENISECTOMY    Surgeon(s):  Abad Osborne MD    Assistant:  Surgical Assistant: Liyah Yuan    Anesthesia: General    Estimated Blood Loss (mL): Minimal    Complications: None    Specimens:   * No specimens in log *    Implants:  * No implants in log *      Drains: * No LDAs found *    Findings: bunny    Electronically signed by Abad Osborne MD on 2/14/2022 at 11:02 AM

## 2022-02-14 NOTE — H&P
Update History & Physical    The patient's History and Physical was reviewed with the patient and I examined the patient. There was no change. The surgical site was confirmed by the patient and me. Plan: The risks, benefits, expected outcome, and alternative to the recommended procedure have been discussed with the patient. Patient understands and wants to proceed with the procedure.      Electronically signed by Grace Flower MD on 2/14/2022 at 9:42 AM

## 2022-02-14 NOTE — PROGRESS NOTES
Received report from [place], CRNA and [place] RN. VSS with Sp02 93% on r/a with LMA intact. Pt in a deep sleep not responsive to voice. Rt knee ace bandage c,d, & I. Rt pedal pulse 1+. Will continue to monitor.

## 2022-02-17 NOTE — OP NOTE
[address]                 [address] [place] [address]                                OPERATIVE REPORT    PATIENT NAME: Sophia Palacios                      :        1950  MED REC NO:   [id number]                          ROOM:  ACCOUNT NO:   [de-identified]                           ADMIT DATE: 2022  PROVIDER:     Fred Maya MD    DATE OF PROCEDURE:  2022    PREOPERATIVE DIAGNOSIS:  Right knee medial meniscus tear, degenerative. POSTOPERATIVE DIAGNOSIS:  Right knee medial meniscus tear, degenerative. OPERATION PERFORMED:  Right knee video arthroscopy with partial medial  meniscectomy. SURGEON:  Fred Maya MD    ANESTHESIA:  General.    BLOOD LOSS:  Less than 5 mL. COMPLICATIONS:  None noted. INDICATIONS FOR OPERATION:  This 69-year-old female with history, exam,  and testing consistent with degenerative medial meniscus tear consistent  with her symptoms. After failure of all other modalities, she elected  for further recommendations of surgical intervention. DESCRIPTION OF OPERATION:  The patient was taken to the operating room  where general anesthetic was obtained and antibiotics were given in IV  piggyback preoperatively. The right knee and leg were sterilely prepped  and draped and the two-port arthroscopy of the right knee was carried  out in the usual fashion using a 30-degree arthroscope. Upon entry into  the knee, she was noted to have normal patellofemoral articulation. Medially, she had a complex tear through the posterior third of the  medial meniscus with some grade 2 osteoarthritic change. ACL, PCL, and  lateral compartment were free of pathology. Partial medial meniscectomy  was then performed with both rigid and power instrumentation, leaving a  well-contoured and balanced inner rim.   The knee was then thoroughly  irrigated and evacuated of all fibrocartilage, loose debris, and  instilled with 20 mL of 0.25% Marcaine plain. The port was repaired  with 4-0 nylon in a figure-of-eight fashion. The knee was then dressed  with Steri-Strips, dressings, sponge, ABDs, Webril, and Ace wrap. She  appeared to tolerate the procedure well and was taken to the recovery  room stable. Less than 5 mL of blood loss and no noted complications.         Lam Mack MD    D: 02/17/2022 11:58:17       T: 02/17/2022 13:41:51     [email or web address]  Job#: [id number]     Doc#: [id number]    CC:

## 2022-02-23 ENCOUNTER — OFFICE VISIT (OUTPATIENT)
Dept: ORTHOPEDIC SURGERY | Age: 72
End: 2022-02-23

## 2022-02-23 VITALS — WEIGHT: 112 LBS | BODY MASS INDEX: 19.12 KG/M2 | HEIGHT: 64 IN

## 2022-02-23 DIAGNOSIS — M23.203 DEGENERATIVE TEAR OF MEDIAL MENISCUS OF RIGHT KNEE: Primary | ICD-10-CM

## 2022-02-23 PROCEDURE — 99024 POSTOP FOLLOW-UP VISIT: CPT | Performed by: ORTHOPAEDIC SURGERY

## 2022-02-23 NOTE — PROGRESS NOTES
FOLLOW-UP VISIT      The patient returns for follow-up s/p right knee video arthroscopy with partial medial meniscectomy    Date of Surgery    2/14/2022    Wound Status    Sutures Removed, Incisions are healing well with no surrounding erythema, and minimal ecchymosis. Exam    No signs of infection DVT. Tolerating oral diet and pain medicine well. Is satisfied with result to date    Plan    Slow return to normal activity    Follow-up Appointment    4 weeks.

## 2022-03-02 ENCOUNTER — OFFICE VISIT (OUTPATIENT)
Dept: FAMILY MEDICINE CLINIC | Age: 72
End: 2022-03-02
Payer: MEDICARE

## 2022-03-02 VITALS
OXYGEN SATURATION: 96 % | BODY MASS INDEX: 19.12 KG/M2 | HEIGHT: 64 IN | SYSTOLIC BLOOD PRESSURE: 98 MMHG | WEIGHT: 112 LBS | DIASTOLIC BLOOD PRESSURE: 60 MMHG | HEART RATE: 60 BPM

## 2022-03-02 DIAGNOSIS — Z00.00 MEDICARE ANNUAL WELLNESS VISIT, SUBSEQUENT: Primary | ICD-10-CM

## 2022-03-02 PROCEDURE — 3017F COLORECTAL CA SCREEN DOC REV: CPT

## 2022-03-02 PROCEDURE — G0439 PPPS, SUBSEQ VISIT: HCPCS

## 2022-03-02 PROCEDURE — 1123F ACP DISCUSS/DSCN MKR DOCD: CPT

## 2022-03-02 PROCEDURE — G8484 FLU IMMUNIZE NO ADMIN: HCPCS

## 2022-03-02 PROCEDURE — 4040F PNEUMOC VAC/ADMIN/RCVD: CPT

## 2022-03-02 ASSESSMENT — PATIENT HEALTH QUESTIONNAIRE - PHQ9
SUM OF ALL RESPONSES TO PHQ9 QUESTIONS 1 & 2: 1
1. LITTLE INTEREST OR PLEASURE IN DOING THINGS: 0
SUM OF ALL RESPONSES TO PHQ QUESTIONS 1-9: 1
SUM OF ALL RESPONSES TO PHQ QUESTIONS 1-9: 1
2. FEELING DOWN, DEPRESSED OR HOPELESS: 1
SUM OF ALL RESPONSES TO PHQ QUESTIONS 1-9: 1
SUM OF ALL RESPONSES TO PHQ QUESTIONS 1-9: 1

## 2022-03-02 ASSESSMENT — LIFESTYLE VARIABLES: HOW OFTEN DO YOU HAVE A DRINK CONTAINING ALCOHOL: NEVER

## 2022-03-02 NOTE — PROGRESS NOTES
Medicare Annual Wellness Visit    Yessenia Hendrix is here for Medicare [address] was seen today for medicare awv. Diagnoses and all orders for this visit:    Medicare annual wellness visit, subsequent  Discussed with the patient patient her current she is eligible for  -Shingles vaccine, advised could get at health department. -DTaP, patient declined  -Covid booster, patient states does not want at this time    Not due for any updated lab work at this time. Does not need any medication refills at this time. Recommendations for Preventive Services Due: see orders and patient instructions/AVS.  Recommended screening schedule for the next 5-10 years is provided to the patient in written form: see Patient Instructions/AVS.     Return for Medicare Annual Wellness Visit in 1 year. Subjective       Patient's complete Health Risk Assessment and screening values have been reviewed and are found in Flowsheets. The following problems were reviewed today and where indicated follow up appointments were made and/or referrals ordered. Positive Risk Factor Screenings with Interventions:    Fall Risk:  Do you feel unsteady or are you worried about falling? : (!) yes  2 or more falls in past year?: (!) yes  Fall with injury in past year?: no     Feels falls r/t miss judgement of distance stepping down off of steps. Not r/t weakness or neuro symptoms. Fall Risk Interventions:    · Home safety tips provided      Tobacco Use:     Tobacco Use: High Risk    Smoking Tobacco Use: Current Every Day Smoker    Smokeless Tobacco Use: Never Used     E-Cigarettes/Vaping Use     Questions Responses    E-Cigarette/Vaping Use Never User    Start Date     Passive Exposure     Quit Date     Counseling Given     Comments         Substance Abuse - Tobacco Interventions:  pt is actively working on reducing her daily smoking habit to stop completely.  recently stopped smoking and she wants to also. General Health and ACP:  General  In general, how would you say your health is?: Good  In the past 7 days, have you experienced any of the following: New or Increased Pain, New or Increased Fatigue, Loneliness, Social Isolation, Stress or Anger?: No  Select all that apply: (!) New or Increased Fatigue,Social Isolation,Stress(R/t  being double amputee and confined to home a lot. Feels isolated). Feels like now that temperatures are warming up and she can get outdoors safe will help with her symptoms. Do you get the social and emotional support that you need?: Yes  Do you have a Living Will?: Yes    Advance Directives     [facility] ACP-Advance Directive ACP-Power of     Not on File Not on File Not on File Not on File      General Health Risk Interventions:  · Will have living will placed on [facility] with [facility]. Health Habits/Nutrition:     Physical Activity: Insufficiently Active    Days of Exercise per Week: 3 days    Minutes of Exercise per Session: 30 min     Have you lost any weight without trying in the past 3 months?: No  Body mass index: 19.22  Have you seen the dentist within the past year?: (!) No    Health Habits/Nutrition Interventions:  · Dental exam overdue:  patient encouraged to make appointment with his/her dentist             Objective   Vitals:    03/02/22 0841   BP: 98/60   Pulse: 60   SpO2: 96%   Weight: 112 lb (50.8 kg)   Height: 5' 4\" (1.626 m)      Body mass index is 19.22 kg/m².         General Appearance: alert and oriented to person, place and time, well-developed and well-nourished, in no acute distress  Pulmonary/Chest: clear to auscultation bilaterally- no wheezes, rales or rhonchi, normal air movement, no respiratory distress  Cardiovascular: normal rate, normal S1 and S2, no gallops, intact distal pulses and no carotid bruits  Abdomen: soft, non-tender, non-distended, normal bowel sounds, no masses or organomegaly  Musculoskeletal: Right knee tenderness and walking with mild limp due to recent right knee arthroscopy surgery with . Neurologic: speech normal       Allergies   Allergen Reactions    Iodides Hives and Itching    Nsaids      Previous stomach bleed      Sulfa Antibiotics Rash     Prior to Visit Medications    Medication Sig Taking? Authorizing Provider   fluticasone-umeclidin-vilant (TRELEGY ELLIPTA) 100-62.5-25 MCG/INH AEPB Inhale 1 puff into the lungs daily Yes Michael Crowder MD   albuterol (PROVENTIL) (5 MG/ML) 0.5% nebulizer solution Take 1 mL by nebulization 4 times daily as needed for Wheezing Yes JAYLYN Aguilera CNP   albuterol sulfate HFA (VENTOLIN HFA) 108 (90 Base) MCG/ACT inhaler Inhale 2 puffs into the lungs 2 times daily as needed for Wheezing or Shortness of Breath Yes JAYLYN Chery CNP   potassium chloride (MICRO-K) 10 MEQ extended release capsule Take 1 capsule by mouth daily Yes JAYLYN Chery CNP   apixaban (ELIQUIS) 5 MG TABS tablet Take 1 tablet by mouth 2 times daily Yes JAYLYN Chery - CNP   ezetimibe (ZETIA) 10 MG tablet TAKE 1 TABLET BY MOUTH DAILY. Yes JAYLYN Chery CNP   dicyclomine (BENTYL) 10 MG capsule Take 1 capsule by mouth 4 times daily (before meals and nightly) Yes JAYLYN Chery - CNP   omeprazole (PRILOSEC) 20 MG delayed release capsule Take 1 capsule by mouth 2 times daily (before meals) Yes JAYLYN Chery CNP   VORTIoxetine (TRINTELLIX) 10 MG TABS tablet TAKE 1 TABLET BY MOUTH EVERY DAY Yes JAYLYN Chery - CNP   Lactobacillus (PROBIOTIC ACIDOPHILUS PO) Take 1 tablet by mouth daily  Yes Historical Provider, MD   fluticasone (FLONASE) 50 MCG/ACT nasal spray 2 sprays by Nasal route daily Yes JAYLYN Chery CNP   aspirin EC 81 MG EC tablet Take 1 tablet by mouth daily.  Yes Azra Riddle MD   metoprolol succinate (TOPROL XL) 25 MG extended release tablet Take 1 tablet by mouth daily  Patient taking differently: Take 25 mg by mouth in the morning and at bedtime   Shahzad Ho MD   atorvastatin (LIPITOR) 80 MG tablet Take 1 tablet by mouth nightly  JAYLYN Rod CNP       [facility] (Including outside providers/suppliers regularly involved in providing care):   Patient Care Team:  Shahzad Ho MD as PCP - General  Shahzad Ho MD as PCP - [facility] Empaneled Provider  Wedny Godfrey MD as Consulting Physician (Physical Medicine and Rehab)  Robert Green PA-C (Physician Assistant)  Kash Richardson RN as Nurse Navigator    Reviewed and updated this visit:  Tobacco  Allergies  Meds  Med Hx  Surg Hx  Soc Hx  Fam Hx          This document was prepared by a combination of typing and transcription through a voice recognition software.     JAYLYN Giles - MICHELINE

## 2022-03-02 NOTE — PATIENT INSTRUCTIONS
Patient is coming in for test  inj and writer made a mistake in typing INR in below message.  Sorry.   Personalized Preventive Plan for Pepe Mcduffie - 3/2/2022  Medicare offers a range of preventive health benefits. Some of the tests and screenings are paid in full while other may be subject to a deductible, co-insurance, and/or copay. Some of these benefits include a comprehensive review of your medical history including lifestyle, illnesses that may run in your family, and various assessments and screenings as appropriate. After reviewing your medical record and screening and assessments performed today your provider may have ordered immunizations, labs, imaging, and/or referrals for you. A list of these orders (if applicable) as well as your Preventive Care list are included within your After Visit Summary for your review. Other Preventive Recommendations:    · A preventive eye exam performed by an eye specialist is recommended every 1-2 years to screen for glaucoma; cataracts, macular degeneration, and other eye disorders. · A preventive dental visit is recommended every 6 months. · Try to get at least 150 minutes of exercise per week or 10,000 steps per day on a pedometer . · Order or download the FREE \"Exercise & Physical Activity: Your Everyday Guide\" from The "[employer]. Call 1-521.659.3643 or search The [employer] online. · You need 8846-9108 mg of calcium and 0798-1858 IU of vitamin D per day. It is possible to meet your calcium requirement with diet alone, but a vitamin D supplement is usually necessary to meet this goal.  · When exposed to the sun, use a sunscreen that protects against both UVA and UVB radiation with an SPF of 30 or greater. Reapply every 2 to 3 hours or after sweating, drying off with a towel, or swimming. · Always wear a seat belt when traveling in a car. Always wear a helmet when riding a bicycle or motorcycle.

## 2022-03-15 DIAGNOSIS — Z98.890 S/P CAROTID ENDARTERECTOMY: ICD-10-CM

## 2022-03-15 DIAGNOSIS — I65.22 CAROTID STENOSIS, LEFT: ICD-10-CM

## 2022-03-15 RX ORDER — ATORVASTATIN CALCIUM 80 MG/1
80 TABLET, FILM COATED ORAL NIGHTLY
Qty: 90 TABLET | Refills: 3 | Status: SHIPPED | OUTPATIENT
Start: 2022-03-15 | End: 2022-03-17 | Stop reason: SDUPTHER

## 2022-03-15 RX ORDER — ATORVASTATIN CALCIUM 80 MG/1
80 TABLET, FILM COATED ORAL NIGHTLY
Qty: 30 TABLET | Refills: 0 | Status: SHIPPED | OUTPATIENT
Start: 2022-03-15 | End: 2022-03-15 | Stop reason: SDUPTHER

## 2022-03-15 NOTE — TELEPHONE ENCOUNTER
Patient called and is out of Atorvastatin. She is asking for a 30 day supply to be sent to [facility] and 90 supply sent to mail order. 30 day supply sent and 90 pended.

## 2022-03-17 ENCOUNTER — PATIENT MESSAGE (OUTPATIENT)
Dept: FAMILY MEDICINE CLINIC | Age: 72
End: 2022-03-17

## 2022-03-17 DIAGNOSIS — Z98.890 S/P CAROTID ENDARTERECTOMY: ICD-10-CM

## 2022-03-17 DIAGNOSIS — I65.22 CAROTID STENOSIS, LEFT: ICD-10-CM

## 2022-03-17 RX ORDER — ATORVASTATIN CALCIUM 80 MG/1
80 TABLET, FILM COATED ORAL NIGHTLY
Qty: 90 TABLET | Refills: 3 | Status: SHIPPED | OUTPATIENT
Start: 2022-03-17 | End: 2022-04-11

## 2022-03-17 NOTE — TELEPHONE ENCOUNTER
From: Louie Dunn  To: Dr. Hopkins Clink: 3/17/2022 10:09 AM EDT  Subject: Atorvastatin    My scripts for the [facility] have to be filled via [address] [facility] meds by mail ([facility]) at [phone]. I got a call from [facility] and they said to let you know they cannot fill scripts it has to be done through the meds by mail [place] [facility]. Please contact the above # for all my prescriptions unless I ask you to use [facility]. Eliudry for any problem with this. Thank you.

## 2022-04-11 DIAGNOSIS — I65.22 CAROTID STENOSIS, LEFT: ICD-10-CM

## 2022-04-11 DIAGNOSIS — Z98.890 S/P CAROTID ENDARTERECTOMY: ICD-10-CM

## 2022-04-11 RX ORDER — ATORVASTATIN CALCIUM 80 MG/1
80 TABLET, FILM COATED ORAL NIGHTLY
Qty: 90 TABLET | Refills: 3 | Status: SHIPPED | OUTPATIENT
Start: 2022-04-11 | End: 2022-07-19

## 2022-04-15 ENCOUNTER — OFFICE VISIT (OUTPATIENT)
Dept: FAMILY MEDICINE CLINIC | Age: 72
End: 2022-04-15
Payer: MEDICARE

## 2022-04-15 ENCOUNTER — TELEPHONE (OUTPATIENT)
Dept: FAMILY MEDICINE CLINIC | Age: 72
End: 2022-04-15

## 2022-04-15 VITALS
OXYGEN SATURATION: 95 % | SYSTOLIC BLOOD PRESSURE: 132 MMHG | DIASTOLIC BLOOD PRESSURE: 80 MMHG | WEIGHT: 109.4 LBS | HEART RATE: 67 BPM | BODY MASS INDEX: 18.78 KG/M2

## 2022-04-15 DIAGNOSIS — N39.0 URINARY TRACT INFECTION WITH HEMATURIA, SITE UNSPECIFIED: ICD-10-CM

## 2022-04-15 DIAGNOSIS — R31.9 URINARY TRACT INFECTION WITH HEMATURIA, SITE UNSPECIFIED: ICD-10-CM

## 2022-04-15 DIAGNOSIS — R39.15 URGENCY OF URINATION: ICD-10-CM

## 2022-04-15 DIAGNOSIS — R35.0 FREQUENCY OF URINATION: Primary | ICD-10-CM

## 2022-04-15 LAB
BILIRUBIN, POC: NORMAL
BLOOD URINE, POC: NORMAL
CLARITY, POC: NORMAL
COLOR, POC: NORMAL
GLUCOSE URINE, POC: NEGATIVE
KETONES, POC: NORMAL
LEUKOCYTE EST, POC: NORMAL
NITRITE, POC: NEGATIVE
PH, POC: 6
PROTEIN, POC: 100
SPECIFIC GRAVITY, POC: 1.02
UROBILINOGEN, POC: 1

## 2022-04-15 PROCEDURE — G8427 DOCREV CUR MEDS BY ELIG CLIN: HCPCS | Performed by: FAMILY MEDICINE

## 2022-04-15 PROCEDURE — 1090F PRES/ABSN URINE INCON ASSESS: CPT | Performed by: FAMILY MEDICINE

## 2022-04-15 PROCEDURE — G8420 CALC BMI NORM PARAMETERS: HCPCS | Performed by: FAMILY MEDICINE

## 2022-04-15 PROCEDURE — 99213 OFFICE O/P EST LOW 20 MIN: CPT | Performed by: FAMILY MEDICINE

## 2022-04-15 PROCEDURE — 4040F PNEUMOC VAC/ADMIN/RCVD: CPT | Performed by: FAMILY MEDICINE

## 2022-04-15 PROCEDURE — G8400 PT W/DXA NO RESULTS DOC: HCPCS | Performed by: FAMILY MEDICINE

## 2022-04-15 PROCEDURE — 1123F ACP DISCUSS/DSCN MKR DOCD: CPT | Performed by: FAMILY MEDICINE

## 2022-04-15 PROCEDURE — 3017F COLORECTAL CA SCREEN DOC REV: CPT | Performed by: FAMILY MEDICINE

## 2022-04-15 PROCEDURE — 81002 URINALYSIS NONAUTO W/O SCOPE: CPT | Performed by: FAMILY MEDICINE

## 2022-04-15 PROCEDURE — 4004F PT TOBACCO SCREEN RCVD TLK: CPT | Performed by: FAMILY MEDICINE

## 2022-04-15 RX ORDER — CIPROFLOXACIN 250 MG/1
250 TABLET, FILM COATED ORAL 2 TIMES DAILY
Qty: 14 TABLET | Refills: 0 | Status: SHIPPED | OUTPATIENT
Start: 2022-04-15 | End: 2022-04-22

## 2022-04-15 ASSESSMENT — PATIENT HEALTH QUESTIONNAIRE - PHQ9
SUM OF ALL RESPONSES TO PHQ QUESTIONS 1-9: 7
SUM OF ALL RESPONSES TO PHQ QUESTIONS 1-9: 7
4. FEELING TIRED OR HAVING LITTLE ENERGY: 1
SUM OF ALL RESPONSES TO PHQ QUESTIONS 1-9: 7
8. MOVING OR SPEAKING SO SLOWLY THAT OTHER PEOPLE COULD HAVE NOTICED. OR THE OPPOSITE, BEING SO FIGETY OR RESTLESS THAT YOU HAVE BEEN MOVING AROUND A LOT MORE THAN USUAL: 1
SUM OF ALL RESPONSES TO PHQ QUESTIONS 1-9: 7
3. TROUBLE FALLING OR STAYING ASLEEP: 1
SUM OF ALL RESPONSES TO PHQ9 QUESTIONS 1 & 2: 2
2. FEELING DOWN, DEPRESSED OR HOPELESS: 1
10. IF YOU CHECKED OFF ANY PROBLEMS, HOW DIFFICULT HAVE THESE PROBLEMS MADE IT FOR YOU TO DO YOUR WORK, TAKE CARE OF THINGS AT HOME, OR GET ALONG WITH OTHER PEOPLE: 1
9. THOUGHTS THAT YOU WOULD BE BETTER OFF DEAD, OR OF HURTING YOURSELF: 0
6. FEELING BAD ABOUT YOURSELF - OR THAT YOU ARE A FAILURE OR HAVE LET YOURSELF OR YOUR FAMILY DOWN: 0
7. TROUBLE CONCENTRATING ON THINGS, SUCH AS READING THE NEWSPAPER OR WATCHING TELEVISION: 1
1. LITTLE INTEREST OR PLEASURE IN DOING THINGS: 1
5. POOR APPETITE OR OVEREATING: 1

## 2022-04-15 NOTE — PROGRESS NOTES
Chief Complaint   Patient presents with    Urinary Frequency     low back pain for 3 days        HPI:  Ramana Baez is a 66 y.o. (: 1950) here today   for   HPI  Has not felt good over past few days. Has had some right flank pain > left over past 2-3 days. Some reddish discoloration and specks of blood past few days. No sig pain w/ urination. Some sig urinary urgency, mild frequency. No fevers. No assoc n/v over past few days. Patient's medications, allergies, past medical, surgical, social and family histories were reviewed and updated asappropriate. ROS:  Review of Systems   Constitutional: Negative for fever. Genitourinary: Positive for dysuria, frequency, hematuria and urgency. Prior to Visit Medications    Medication Sig Taking?  Authorizing Provider   ciprofloxacin (CIPRO) 250 MG tablet Take 1 tablet by mouth 2 times daily for 7 days Yes Mina Denver, MD   atorvastatin (LIPITOR) 80 MG tablet TAKE 1 TABLET BY MOUTH NIGHTLY Yes Mina Denver, MD   Respiratory Therapy Supplies (NEBULIZER) DEBO 1 each by Does not apply route every 6 hours as needed (sob) Yes Mina Denver, MD   Respiratory Therapy Supplies (NEBULIZER/TUBING/MOUTHPIECE) KIT 1 kit by Does not apply route daily as needed (sob) Yes Mina Denver, MD   metoprolol succinate (TOPROL XL) 25 MG extended release tablet Take 1 tablet by mouth daily  Patient taking differently: Take 25 mg by mouth in the morning and at bedtime  Yes Mina Denver, MD   fluticasone-umeclidin-vilant (TRELEGY ELLIPTA) 100-62.5-25 MCG/INH AEPB Inhale 1 puff into the lungs daily Yes Mina Denver, MD   albuterol (PROVENTIL) (5 MG/ML) 0.5% nebulizer solution Take 1 mL by nebulization 4 times daily as needed for Wheezing Yes JAYLYN Claros CNP   albuterol sulfate HFA (VENTOLIN HFA) 108 (90 Base) MCG/ACT inhaler Inhale 2 puffs into the lungs 2 times daily as needed for Wheezing or Shortness of Breath Yes JAYLYN Yeboah - MICHELINE potassium chloride (MICRO-K) 10 MEQ extended release capsule Take 1 capsule by mouth daily Yes Veneda Blend, APRN - CNP   apixaban (ELIQUIS) 5 MG TABS tablet Take 1 tablet by mouth 2 times daily Yes Veneda Blend, APRN - CNP   ezetimibe (ZETIA) 10 MG tablet TAKE 1 TABLET BY MOUTH DAILY. Yes Veneda Blend, APRN - CNP   dicyclomine (BENTYL) 10 MG capsule Take 1 capsule by mouth 4 times daily (before meals and nightly) Yes Veneda Blend, APRN - CNP   omeprazole (PRILOSEC) 20 MG delayed release capsule Take 1 capsule by mouth 2 times daily (before meals) Yes Veneda Blend, APRN - CNP   VORTIoxetine (TRINTELLIX) 10 MG TABS tablet TAKE 1 TABLET BY MOUTH EVERY DAY Yes Veneda Blend, APRN - CNP   Lactobacillus (PROBIOTIC ACIDOPHILUS PO) Take 1 tablet by mouth daily  Yes Historical Provider, MD   fluticasone (FLONASE) 50 MCG/ACT nasal spray 2 sprays by Nasal route daily Yes Veneda Blend, APRN - CNP   aspirin EC 81 MG EC tablet Take 1 tablet by mouth daily. Yes Venancio Lau MD       Allergies   Allergen Reactions    Iodides Hives and Itching    Nsaids      Previous stomach bleed      Sulfa Antibiotics Rash       OBJECTIVE:    /80   Pulse 67   Wt 109 lb 6.4 oz (49.6 kg)   SpO2 95%   Breastfeeding No   BMI 18.78 kg/m²     BP Readings from Last 2 Encounters:   04/15/22 132/80   03/02/22 98/60       Wt Readings from Last 3 Encounters:   04/15/22 109 lb 6.4 oz (49.6 kg)   03/02/22 112 lb (50.8 kg)   02/23/22 112 lb (50.8 kg)       Physical Exam  Constitutional:       Appearance: Normal appearance. HENT:      Head: Normocephalic and atraumatic. Cardiovascular:      Rate and Rhythm: Normal rate and regular rhythm. Pulmonary:      Effort: Pulmonary effort is normal.      Breath sounds: Normal breath sounds. Abdominal:      Palpations: Abdomen is soft. Tenderness: There is abdominal tenderness in the suprapubic area. There is no right CVA tenderness or left CVA tenderness.    Neurological: General: No focal deficit present. Mental Status: She is alert and oriented to person, place, and time. Psychiatric:         Mood and Affect: Mood normal.           ASSESSMENT/PLAN:    1. Frequency of urination  UA abnormal.  Symptoms as listed above. Likely urinary tract infection. See below. Antibiotics as below  - POCT Urinalysis no Micro  - Culture, Urine    2. Urgency of urination  See above and below  - Culture, Urine    3. Urinary tract infection with hematuria, site unspecified  Likely etiology to symptoms. However, given her smoking history and other history, recommend follow-up to make sure the blood in her urine clears. If it does not, may require further evaluation for hematuria. Patient to follow-up in 2 to 3 weeks for repeat urinalysis  - Culture, Urine  - ciprofloxacin (CIPRO) 250 MG tablet; Take 1 tablet by mouth 2 times daily for 7 days  Dispense: 14 tablet; Refill: 0  This document was prepared by a combination of typing and transcription through a voice recognition software.

## 2022-04-16 LAB — URINE CULTURE, ROUTINE: NORMAL

## 2022-04-19 ENCOUNTER — PATIENT MESSAGE (OUTPATIENT)
Dept: FAMILY MEDICINE CLINIC | Age: 72
End: 2022-04-19

## 2022-04-19 DIAGNOSIS — F41.9 ANXIETY: ICD-10-CM

## 2022-04-19 RX ORDER — ALPRAZOLAM 0.25 MG/1
0.25 TABLET ORAL NIGHTLY PRN
Qty: 15 TABLET | Refills: 0 | Status: SHIPPED | OUTPATIENT
Start: 2022-04-19 | End: 2022-09-27 | Stop reason: SDUPTHER

## 2022-04-19 NOTE — TELEPHONE ENCOUNTER
From: Marisol Gill  To: Dr. Daniel Morales  Sent: 4/19/2022 8:17 AM EDT  Subject: Refill    Would you please refill my alprazaloam at [address] [facility].  Thank you

## 2022-05-06 ENCOUNTER — NURSE ONLY (OUTPATIENT)
Dept: FAMILY MEDICINE CLINIC | Age: 72
End: 2022-05-06
Payer: MEDICARE

## 2022-05-06 DIAGNOSIS — R39.15 URGENCY OF URINATION: ICD-10-CM

## 2022-05-06 DIAGNOSIS — R35.0 FREQUENCY OF URINATION: Primary | ICD-10-CM

## 2022-05-06 LAB
BILIRUBIN, POC: NEGATIVE
BLOOD URINE, POC: NEGATIVE
CLARITY, POC: CLEAR
COLOR, POC: YELLOW
GLUCOSE URINE, POC: NEGATIVE
KETONES, POC: NEGATIVE
LEUKOCYTE EST, POC: NORMAL
NITRITE, POC: NEGATIVE
PH, POC: 6
PROTEIN, POC: NEGATIVE
SPECIFIC GRAVITY, POC: 1.02
UROBILINOGEN, POC: 0.2

## 2022-05-06 PROCEDURE — 81002 URINALYSIS NONAUTO W/O SCOPE: CPT | Performed by: FAMILY MEDICINE

## 2022-05-08 LAB — URINE CULTURE, ROUTINE: NORMAL

## 2022-07-19 ENCOUNTER — OFFICE VISIT (OUTPATIENT)
Dept: FAMILY MEDICINE CLINIC | Age: 72
End: 2022-07-19
Payer: MEDICARE

## 2022-07-19 VITALS
WEIGHT: 106.8 LBS | DIASTOLIC BLOOD PRESSURE: 78 MMHG | SYSTOLIC BLOOD PRESSURE: 132 MMHG | OXYGEN SATURATION: 92 % | BODY MASS INDEX: 18.33 KG/M2 | HEART RATE: 65 BPM

## 2022-07-19 DIAGNOSIS — I10 PRIMARY HYPERTENSION: ICD-10-CM

## 2022-07-19 DIAGNOSIS — E53.8 B12 DEFICIENCY: ICD-10-CM

## 2022-07-19 DIAGNOSIS — F17.210 CIGARETTE NICOTINE DEPENDENCE WITHOUT COMPLICATION: ICD-10-CM

## 2022-07-19 DIAGNOSIS — H93.8X2 EAR FULLNESS, LEFT: ICD-10-CM

## 2022-07-19 DIAGNOSIS — E78.5 HYPERLIPIDEMIA, UNSPECIFIED HYPERLIPIDEMIA TYPE: ICD-10-CM

## 2022-07-19 DIAGNOSIS — H93.8X1 EAR FULLNESS, RIGHT: ICD-10-CM

## 2022-07-19 DIAGNOSIS — R25.2 LEG CRAMPS: Primary | ICD-10-CM

## 2022-07-19 PROCEDURE — 1123F ACP DISCUSS/DSCN MKR DOCD: CPT

## 2022-07-19 PROCEDURE — 3017F COLORECTAL CA SCREEN DOC REV: CPT

## 2022-07-19 PROCEDURE — G8400 PT W/DXA NO RESULTS DOC: HCPCS

## 2022-07-19 PROCEDURE — G8427 DOCREV CUR MEDS BY ELIG CLIN: HCPCS

## 2022-07-19 PROCEDURE — G8419 CALC BMI OUT NRM PARAM NOF/U: HCPCS

## 2022-07-19 PROCEDURE — 4004F PT TOBACCO SCREEN RCVD TLK: CPT

## 2022-07-19 PROCEDURE — 99215 OFFICE O/P EST HI 40 MIN: CPT

## 2022-07-19 PROCEDURE — 1090F PRES/ABSN URINE INCON ASSESS: CPT

## 2022-07-19 PROCEDURE — 36415 COLL VENOUS BLD VENIPUNCTURE: CPT

## 2022-07-19 RX ORDER — NICOTINE 21 MG/24HR
PATCH, TRANSDERMAL 24 HOURS TRANSDERMAL
Qty: 42 PATCH | Refills: 0 | Status: SHIPPED | OUTPATIENT
Start: 2022-07-19 | End: 2022-09-20

## 2022-07-19 ASSESSMENT — ENCOUNTER SYMPTOMS
SINUS PAIN: 0
VOICE CHANGE: 0
SORE THROAT: 0
SINUS PRESSURE: 0
WHEEZING: 1
GASTROINTESTINAL NEGATIVE: 1

## 2022-07-19 NOTE — PATIENT INSTRUCTIONS
THE MOST IMPORTANT ACTION YOU CAN TAKE TO IMPROVE YOUR CURRENT AND FUTURE HEALTH IS TO QUIT SMOKING. Call the [facility] [place] NOW ([phone])    Smoking harms nonsmokers. When nonsmokers are around people who smoke, they absorb nicotine, carbon monoxide, and other ingredients of tobacco smoke. DO NOT SMOKE AROUND CHILDREN    Children exposed to secondhand smoke are at an increased risk of:  Sudden Infant Death Syndrome (SIDS), acute respiratory infections, inflammation of the middle ear, and severe asthma. Over a longer time, it causes heart disease and lung cancer. There is no safe level of exposure to secondhand smoke.

## 2022-07-19 NOTE — PROGRESS NOTES
Chief Complaint   Patient presents with    Joint Pain     Leg pain     Nicotine Dependence     Discuss smoking cessation     Ear Fullness     Left ear fullness and hearing loss        HPI:  Renay Baer is a 66 y.o. (: 1950) here today   for multiple reasons. Joint pain: started several months ago. Not new issues. Saw Eva Reasons in 2022 for this. Is getting lauren horses in lower extremities at night time when in bed. Was instructed to use compression stockings. Pt is not wearing unless going to be on long driving trips. Vascular specialist advised compression stockings also. Is not drinking much water daily. Left ear fullness and hearing loss x3 months. Is having to turn tv up louder. Not hearing people as well. Denies pain or drainage. Has not tried any self treatments for ear. Smoking: wants to stop smoking. Has tried tried nicotine patches in the past but admits would make up reasons why wouldn't work. Is having more difficulty breathing r/t COPD and wants to make a  good effort to stop smoking. Smokes a pack per day. /78 today. Patient take metoprolol 25 mg twice daily. Hyperlipidemia: Patient taking Lipitor 80 mg nightly. Patient's medications, allergies, past medical, surgical, social and family histories were reviewed and updated asappropriate. ROS:  Review of Systems   Constitutional: Negative. HENT:  Negative for congestion, ear discharge, ear pain, sinus pressure, sinus pain, sore throat and voice change. Respiratory:  Positive for wheezing. Cardiovascular: Negative. Gastrointestinal: Negative. Musculoskeletal:  Positive for myalgias. Neurological: Negative. Psychiatric/Behavioral: Negative. Prior to Visit Medications    Medication Sig Taking? Authorizing Provider   nicotine (NICODERM DESTINY) 21 MG/24HR Apply 21 mg patch daily x6 weeks, then apply 14 mg patch daily x2 weeks, then apply 7 mg patch daily x2 weeks.  Stop cigarette use at treatment onset. Yes JAYLYN Wolf CNP   atorvastatin (LIPITOR) 80 MG tablet TAKE 1 TABLET BY MOUTH NIGHTLY Yes Ann Miles MD   Respiratory Therapy Supplies (NEBULIZER) DEBO 1 each by Does not apply route every 6 hours as needed (sob) Yes Ann Miles MD   Respiratory Therapy Supplies (NEBULIZER/TUBING/MOUTHPIECE) KIT 1 kit by Does not apply route daily as needed (sob) Yes Ann Miles MD   metoprolol succinate (TOPROL XL) 25 MG extended release tablet Take 1 tablet by mouth daily  Patient taking differently: Take 25 mg by mouth in the morning and at bedtime Yes Ann Miles MD   fluticasone-umeclidin-vilant (TRELEGY ELLIPTA) 100-62.5-25 MCG/INH AEPB Inhale 1 puff into the lungs daily Yes Ann Miles MD   albuterol (PROVENTIL) (5 MG/ML) 0.5% nebulizer solution Take 1 mL by nebulization 4 times daily as needed for Wheezing Yes Lizette Runner, APRN - CNP   albuterol sulfate HFA (VENTOLIN HFA) 108 (90 Base) MCG/ACT inhaler Inhale 2 puffs into the lungs 2 times daily as needed for Wheezing or Shortness of Breath Yes JAYLYN Donahue CNP   potassium chloride (MICRO-K) 10 MEQ extended release capsule Take 1 capsule by mouth daily Yes JAYLYN Donahue CNP   apixaban (ELIQUIS) 5 MG TABS tablet Take 1 tablet by mouth 2 times daily Yes JAYLYN Donahue CNP   ezetimibe (ZETIA) 10 MG tablet TAKE 1 TABLET BY MOUTH DAILY.  Yes JAYLYN Donahue CNP   dicyclomine (BENTYL) 10 MG capsule Take 1 capsule by mouth 4 times daily (before meals and nightly) Yes JAYLYN Donahue CNP   omeprazole (PRILOSEC) 20 MG delayed release capsule Take 1 capsule by mouth 2 times daily (before meals) Yes JAYLYN Donahue CNP   VORTIoxetine (TRINTELLIX) 10 MG TABS tablet TAKE 1 TABLET BY MOUTH EVERY DAY Yes JAYLYN Donahue CNP   Lactobacillus (PROBIOTIC ACIDOPHILUS PO) Take 1 tablet by mouth daily  Yes Historical MD Merle   fluticasone (FLONASE) 50 MCG/ACT nasal spray 2 sprays by Nasal route daily Yes Mukund JAYLYN Kelley - CNP   aspirin EC 81 MG EC tablet Take 1 tablet by mouth daily. Yes Mary Gray MD       Allergies   Allergen Reactions    Iodides Hives and Itching    Nsaids      Previous stomach bleed      Sulfa Antibiotics Rash       OBJECTIVE:    /78   Pulse 65   Wt 106 lb 12.8 oz (48.4 kg)   SpO2 92%   BMI 18.33 kg/m²     BP Readings from Last 2 Encounters:   07/19/22 132/78   04/15/22 132/80       Wt Readings from Last 3 Encounters:   07/19/22 106 lb 12.8 oz (48.4 kg)   04/15/22 109 lb 6.4 oz (49.6 kg)   03/02/22 112 lb (50.8 kg)       Physical Exam  HENT:      Right Ear: Tympanic membrane, ear canal and external ear normal. There is impacted cerumen. Left Ear: Tympanic membrane, ear canal and external ear normal. There is impacted cerumen. Ears:      Comments: Bilateral normal tympanic membranes and ear canals once cerumen impaction was removed with irrigation bilaterally. Eyes:      Extraocular Movements: Extraocular movements intact. Pupils: Pupils are equal, round, and reactive to light. Cardiovascular:      Rate and Rhythm: Normal rate and regular rhythm. Pulses: Normal pulses. Heart sounds: Normal heart sounds. Pulmonary:      Effort: Pulmonary effort is normal.      Breath sounds: Wheezing present. Abdominal:      General: Bowel sounds are normal.   Musculoskeletal:         General: Normal range of motion. Skin:     General: Skin is warm and dry. Psychiatric:         Mood and Affect: Mood normal.         Behavior: Behavior normal.         ASSESSMENT/PLAN:    1. Leg cramps  See above HPI for patient symptom and onset. We will reassess labs, see below. Do believe patient's leg cramps are related to her varicose veins. Patient was advised by Dr. Nguyen Butler and her vascular specialist to wear compression stockings which the patient is not currently doing on a frequent basis at this time.   Patient verbalized understanding will start using compression stockings on a routine basis to see if she has any symptom improvement. We will follow-up with patient on testing results. - Magnesium  - Comprehensive Metabolic Panel  - Vitamin B12 & Folate    2. Cigarette nicotine dependence without complication  Patient desires to have nicotine patch at this time to help with smoking cessation. Patient agreed to make a conscious effort this time around to really eliminate smoking from her daily habit. - nicotine (NICODERM CQ) 21 MG/24HR; Apply 21 mg patch daily x6 weeks, then apply 14 mg patch daily x2 weeks, then apply 7 mg patch daily x2 weeks. Stop cigarette use at treatment onset. Dispense: 42 patch; Refill: 0    3. Ear fullness, left  Performed in office today  - Ear wax removal    4. Ear fullness, right  Performed in office today  - Ear wax removal    5. Primary hypertension  BP stable today at 132/78. Continue on current medication regimen as ordered. 6. Hyperlipidemia, unspecified hyperlipidemia type  Drawn in office today. Continue on current medication regimen as ordered at this time.  - LIPID PANEL    7. B12 deficiency  Drawn in office today  - Vitamin B12 & Folate    40 minutes spent on patient care today. This document was prepared by a combination of typing and transcription through a voice recognition software.     JAYLYN Guallpa - CNP

## 2022-07-20 LAB
A/G RATIO: 2 (ref 1.1–2.2)
ALBUMIN SERPL-MCNC: 4.1 G/DL (ref 3.4–5)
ALP BLD-CCNC: 84 U/L (ref 40–129)
ALT SERPL-CCNC: 19 U/L (ref 10–40)
ANION GAP SERPL CALCULATED.3IONS-SCNC: 8 MMOL/L (ref 3–16)
AST SERPL-CCNC: 29 U/L (ref 15–37)
BILIRUB SERPL-MCNC: 0.3 MG/DL (ref 0–1)
BUN BLDV-MCNC: 14 MG/DL (ref 7–20)
CALCIUM SERPL-MCNC: 9 MG/DL (ref 8.3–10.6)
CHLORIDE BLD-SCNC: 102 MMOL/L (ref 99–110)
CHOLESTEROL, TOTAL: 125 MG/DL (ref 0–199)
CO2: 31 MMOL/L (ref 21–32)
CREAT SERPL-MCNC: 0.8 MG/DL (ref 0.6–1.2)
FOLATE: 16.55 NG/ML (ref 4.78–24.2)
GFR AFRICAN AMERICAN: >60
GFR NON-AFRICAN AMERICAN: >60
GLUCOSE BLD-MCNC: 88 MG/DL (ref 70–99)
HDLC SERPL-MCNC: 58 MG/DL (ref 40–60)
LDL CHOLESTEROL CALCULATED: 54 MG/DL
MAGNESIUM: 1.9 MG/DL (ref 1.8–2.4)
POTASSIUM SERPL-SCNC: 4 MMOL/L (ref 3.5–5.1)
SODIUM BLD-SCNC: 141 MMOL/L (ref 136–145)
TOTAL PROTEIN: 6.2 G/DL (ref 6.4–8.2)
TRIGL SERPL-MCNC: 66 MG/DL (ref 0–150)
VITAMIN B-12: 519 PG/ML (ref 211–911)
VLDLC SERPL CALC-MCNC: 13 MG/DL

## 2022-08-19 ENCOUNTER — PATIENT MESSAGE (OUTPATIENT)
Dept: FAMILY MEDICINE CLINIC | Age: 72
End: 2022-08-19

## 2022-08-19 DIAGNOSIS — I10 PRIMARY HYPERTENSION: Primary | ICD-10-CM

## 2022-08-19 DIAGNOSIS — F33.41 RECURRENT MAJOR DEPRESSIVE DISORDER IN PARTIAL REMISSION (HCC): ICD-10-CM

## 2022-08-19 DIAGNOSIS — J30.2 OTHER SEASONAL ALLERGIC RHINITIS: ICD-10-CM

## 2022-08-19 DIAGNOSIS — J44.1 CHRONIC OBSTRUCTIVE PULMONARY DISEASE WITH ACUTE EXACERBATION (HCC): ICD-10-CM

## 2022-08-19 DIAGNOSIS — R10.83 ABDOMINAL COLIC: ICD-10-CM

## 2022-08-19 RX ORDER — ASPIRIN 81 MG/1
81 TABLET ORAL DAILY
Qty: 30 TABLET | Refills: 3 | OUTPATIENT
Start: 2022-08-19

## 2022-08-19 RX ORDER — FLUTICASONE PROPIONATE 50 MCG
2 SPRAY, SUSPENSION (ML) NASAL DAILY
Qty: 3 EACH | Refills: 3 | Status: SHIPPED | OUTPATIENT
Start: 2022-08-19

## 2022-08-19 RX ORDER — FLUTICASONE FUROATE, UMECLIDINIUM BROMIDE AND VILANTEROL TRIFENATATE 100; 62.5; 25 UG/1; UG/1; UG/1
1 POWDER RESPIRATORY (INHALATION) DAILY
Qty: 3 EACH | Refills: 3 | Status: SHIPPED | OUTPATIENT
Start: 2022-08-19

## 2022-08-19 RX ORDER — ALBUTEROL SULFATE 90 UG/1
2 AEROSOL, METERED RESPIRATORY (INHALATION) 2 TIMES DAILY PRN
Qty: 3 EACH | Refills: 3 | Status: SHIPPED | OUTPATIENT
Start: 2022-08-19

## 2022-08-19 RX ORDER — EZETIMIBE 10 MG/1
TABLET ORAL
Qty: 90 TABLET | Refills: 3 | Status: SHIPPED | OUTPATIENT
Start: 2022-08-19 | End: 2022-08-23 | Stop reason: SDUPTHER

## 2022-08-19 RX ORDER — DICYCLOMINE HYDROCHLORIDE 10 MG/1
10 CAPSULE ORAL
Qty: 120 CAPSULE | Refills: 3 | Status: SHIPPED | OUTPATIENT
Start: 2022-08-19 | End: 2022-08-23 | Stop reason: SDUPTHER

## 2022-08-23 RX ORDER — DICYCLOMINE HYDROCHLORIDE 10 MG/1
10 CAPSULE ORAL
Qty: 120 CAPSULE | Refills: 3 | Status: SHIPPED | OUTPATIENT
Start: 2022-08-23

## 2022-08-23 RX ORDER — METOPROLOL SUCCINATE 25 MG/1
25 TABLET, EXTENDED RELEASE ORAL DAILY
Qty: 90 TABLET | Refills: 3 | Status: SHIPPED | OUTPATIENT
Start: 2022-08-23 | End: 2022-09-22

## 2022-08-23 RX ORDER — EZETIMIBE 10 MG/1
TABLET ORAL
Qty: 90 TABLET | Refills: 3 | Status: SHIPPED | OUTPATIENT
Start: 2022-08-23

## 2022-08-23 RX ORDER — OMEPRAZOLE 20 MG/1
20 CAPSULE, DELAYED RELEASE ORAL
Qty: 180 CAPSULE | Refills: 3 | Status: SHIPPED | OUTPATIENT
Start: 2022-08-23

## 2022-08-23 RX ORDER — POTASSIUM CHLORIDE 750 MG/1
10 CAPSULE, EXTENDED RELEASE ORAL DAILY
Qty: 90 CAPSULE | Refills: 3 | Status: SHIPPED | OUTPATIENT
Start: 2022-08-23

## 2022-08-23 RX ORDER — ASPIRIN 81 MG/1
81 TABLET ORAL DAILY
Qty: 90 TABLET | Refills: 3 | Status: SHIPPED | OUTPATIENT
Start: 2022-08-23

## 2022-08-23 NOTE — TELEPHONE ENCOUNTER
From: Delaney Asher  Sent: 8/23/2022 12:18 PM EDT  To: Aakash Galindo  Clinical Staff  Subject: V A MBM    Multivitamin w/o iron, trintellix ,eliquis, Omeprazole, ezetimibe, metoprolol, potassium 99mg, aspirin ec 81 mg, dicyclomine. Please have filled by [facility] by mail.  Thank you

## 2022-08-26 DIAGNOSIS — N39.0 URINARY TRACT INFECTION WITHOUT HEMATURIA, SITE UNSPECIFIED: Primary | ICD-10-CM

## 2022-08-26 RX ORDER — NITROFURANTOIN 25; 75 MG/1; MG/1
100 CAPSULE ORAL 2 TIMES DAILY
Qty: 10 CAPSULE | Refills: 0 | Status: SHIPPED | OUTPATIENT
Start: 2022-08-26 | End: 2022-08-31

## 2022-08-26 RX ORDER — FLUCONAZOLE 150 MG/1
150 TABLET ORAL ONCE
Qty: 1 TABLET | Refills: 0 | Status: SHIPPED | OUTPATIENT
Start: 2022-08-26 | End: 2022-08-26

## 2022-09-20 ENCOUNTER — OFFICE VISIT (OUTPATIENT)
Dept: FAMILY MEDICINE CLINIC | Age: 72
End: 2022-09-20
Payer: MEDICARE

## 2022-09-20 ENCOUNTER — TELEPHONE (OUTPATIENT)
Dept: FAMILY MEDICINE CLINIC | Age: 72
End: 2022-09-20

## 2022-09-20 VITALS
DIASTOLIC BLOOD PRESSURE: 94 MMHG | HEART RATE: 65 BPM | BODY MASS INDEX: 18.57 KG/M2 | OXYGEN SATURATION: 95 % | SYSTOLIC BLOOD PRESSURE: 156 MMHG | WEIGHT: 108.8 LBS | HEIGHT: 64 IN

## 2022-09-20 DIAGNOSIS — R25.2 LEG CRAMPS: ICD-10-CM

## 2022-09-20 DIAGNOSIS — F33.41 RECURRENT MAJOR DEPRESSIVE DISORDER IN PARTIAL REMISSION (HCC): Primary | ICD-10-CM

## 2022-09-20 DIAGNOSIS — H11.31 SUBCONJUNCTIVAL HEMORRHAGE OF RIGHT EYE: ICD-10-CM

## 2022-09-20 DIAGNOSIS — I10 PRIMARY HYPERTENSION: ICD-10-CM

## 2022-09-20 PROCEDURE — G8420 CALC BMI NORM PARAMETERS: HCPCS | Performed by: FAMILY MEDICINE

## 2022-09-20 PROCEDURE — 1123F ACP DISCUSS/DSCN MKR DOCD: CPT | Performed by: FAMILY MEDICINE

## 2022-09-20 PROCEDURE — G8427 DOCREV CUR MEDS BY ELIG CLIN: HCPCS | Performed by: FAMILY MEDICINE

## 2022-09-20 PROCEDURE — G8400 PT W/DXA NO RESULTS DOC: HCPCS | Performed by: FAMILY MEDICINE

## 2022-09-20 PROCEDURE — 3017F COLORECTAL CA SCREEN DOC REV: CPT | Performed by: FAMILY MEDICINE

## 2022-09-20 PROCEDURE — 4004F PT TOBACCO SCREEN RCVD TLK: CPT | Performed by: FAMILY MEDICINE

## 2022-09-20 PROCEDURE — 99214 OFFICE O/P EST MOD 30 MIN: CPT | Performed by: FAMILY MEDICINE

## 2022-09-20 PROCEDURE — 1090F PRES/ABSN URINE INCON ASSESS: CPT | Performed by: FAMILY MEDICINE

## 2022-09-20 ASSESSMENT — ENCOUNTER SYMPTOMS
EYE REDNESS: 1
SINUS PRESSURE: 1

## 2022-09-20 NOTE — TELEPHONE ENCOUNTER
LMOM for the patient to call me back regarding a telephone appt with Enedina Salazar. It is scheduled for 9/27/22 at 10am.   It is a TELEPHONE call so she can take the call from wherever.

## 2022-09-20 NOTE — PROGRESS NOTES
Chief Complaint   Patient presents with    Depression    Eye Problem     Right eye redness       HPI:  Andie Aragon is a 66 y.o. (: 1950) here today   for depression. Patient is also having redness and pressure to right eye that started this morning. HPI  Began to have some redness to right eye this am.  No vision changes. Just pressure. Unsure last eye dr siu. No drainage. Thought more sinus issue. Mood overall has been worse. Gradually worse over past few mo. Inc stressors over past 2 yrs or so. Trintellix has been doing fair. Feels may need counseling. Feels much of issues related to situations. Feels frustrated as well. Had counseling in remote past.  Did fairly well w/ that. Still having some leg cramps. In hands as well. Patient's medications, allergies, past medical, surgical, social and family histories were reviewed and updated as appropriate. ROS:  Review of Systems   Constitutional:  Negative for fever. HENT:  Positive for sinus pressure. Eyes:  Positive for redness. Negative for visual disturbance. Psychiatric/Behavioral:  Positive for dysphoric mood.           LDL Calculated (mg/dL)   Date Value   2022 54       Past Medical History:   Diagnosis Date    Arthritis     COPD (chronic obstructive pulmonary disease) (HCC)     Depression     Hyperlipidemia     Hypertension     Irritable bowel syndrome (IBS)     Pacemaker        Family History   Problem Relation Age of Onset    Heart Disease Father     High Blood Pressure Father     Cancer Sister         uterine       Social History     Socioeconomic History    Marital status:      Spouse name: Not on file    Number of children: Not on file    Years of education: Not on file    Highest education level: Not on file   Occupational History    Not on file   Tobacco Use    Smoking status: Every Day     Packs/day: 1.00     Years: 40.00     Pack years: 40.00     Types: Cigarettes     Start date: 5/10/2019    Smokeless tobacco: Never   Vaping Use    Vaping Use: Never used   Substance and Sexual Activity    Alcohol use: No     Alcohol/week: 0.0 standard drinks    Drug use: No    Sexual activity: Yes     Partners: Male   Other Topics Concern    Not on file   Social History Narrative    Not on file     Social Determinants of Health     Financial Resource Strain: Not on file   Food Insecurity: Not on file   Transportation Needs: Not on file   Physical Activity: Insufficiently Active    Days of Exercise per Week: 3 days    Minutes of Exercise per Session: 30 min   Stress: Not on file   Social Connections: Not on file   Intimate Partner Violence: Not on file   Housing Stability: Not on file       Prior to Visit Medications    Medication Sig Taking? Authorizing Provider   VORTIoxetine (TRINTELLIX) 10 MG TABS tablet TAKE 1 TABLET BY MOUTH EVERY DAY Yes Kayli Kraft MD   apixaban (ELIQUIS) 5 MG TABS tablet Take 1 tablet by mouth 2 times daily Yes Kayli Kraft MD   omeprazole (PRILOSEC) 20 MG delayed release capsule Take 1 capsule by mouth 2 times daily (before meals) Yes Kayli Kraft MD   ezetimibe (ZETIA) 10 MG tablet TAKE 1 TABLET BY MOUTH DAILY.  Yes Kayli Kraft MD   metoprolol succinate (TOPROL XL) 25 MG extended release tablet Take 1 tablet by mouth daily Yes Kayli Kraft MD   potassium chloride (MICRO-K) 10 MEQ extended release capsule Take 1 capsule by mouth daily Yes Kayli Kraft MD   aspirin EC 81 MG EC tablet Take 1 tablet by mouth daily Yes Kayli Kraft MD   dicyclomine (BENTYL) 10 MG capsule Take 1 capsule by mouth 4 times daily (before meals and nightly) Yes Kayli Kraft MD   fluticasone-umeclidin-vilant (TRELEGY ELLIPTA) 100-62.5-25 MCG/INH AEPB Inhale 1 puff into the lungs daily Yes Kayli Kraft MD   fluticasone (FLONASE) 50 MCG/ACT nasal spray 2 sprays by Nasal route daily Yes Kayli Kraft MD   albuterol sulfate HFA (VENTOLIN HFA) 108 (90 Base) MCG/ACT inhaler Inhale 2 puffs into the lungs 2 times daily as needed for Wheezing or Shortness of Breath Yes Pardeep Butler MD   Respiratory Therapy Supplies (NEBULIZER) DEBO 1 each by Does not apply route every 6 hours as needed (sob) Yes Pardeep Butler MD   Respiratory Therapy Supplies (NEBULIZER/TUBING/MOUTHPIECE) KIT 1 kit by Does not apply route daily as needed (sob) Yes Pardeep Butler MD   albuterol (PROVENTIL) (5 MG/ML) 0.5% nebulizer solution Take 1 mL by nebulization 4 times daily as needed for Wheezing Yes Hua Lopez APRN - CNP   Lactobacillus (PROBIOTIC ACIDOPHILUS PO) Take 1 tablet by mouth daily  Yes Historical Provider, MD   atorvastatin (LIPITOR) 80 MG tablet TAKE 1 TABLET BY MOUTH NIGHTLY  Pardeep Butler MD       Allergies   Allergen Reactions    Iodides Hives and Itching    Nsaids      Previous stomach bleed      Sulfa Antibiotics Rash       OBJECTIVE:    BP (!) 156/94   Pulse 65   Ht 5' 4\" (1.626 m)   Wt 108 lb 12.8 oz (49.4 kg)   SpO2 95%   BMI 18.68 kg/m²     BP Readings from Last 2 Encounters:   09/20/22 (!) 156/94   07/19/22 132/78       Wt Readings from Last 3 Encounters:   09/20/22 108 lb 12.8 oz (49.4 kg)   07/19/22 106 lb 12.8 oz (48.4 kg)   04/15/22 109 lb 6.4 oz (49.6 kg)       Physical Exam  Constitutional:       Appearance: She is well-developed. HENT:      Head: Normocephalic and atraumatic. Right Ear: Hearing normal.      Left Ear: Hearing normal.   Eyes:      Conjunctiva/sclera: Conjunctivae normal.   Neck:      Trachea: No tracheal deviation. Cardiovascular:      Rate and Rhythm: Normal rate and regular rhythm. Pulmonary:      Effort: Pulmonary effort is normal.      Breath sounds: Normal breath sounds. Abdominal:      Palpations: Abdomen is soft. Tenderness: There is no abdominal tenderness. Skin:     General: Skin is warm and dry. Neurological:      General: No focal deficit present. Mental Status: She is alert and oriented to person, place, and time. Psychiatric:         Mood and Affect: Mood is depressed. Behavior: Behavior normal.         ASSESSMENT/PLAN:    1. Recurrent major depressive disorder in partial remission (HCC)  Mood overall worse. Feels trintellix has helped. Worsening sxs recently seems more related to stressors at home, others. Pt desires counseling. Will attempt to arrange to help pt. 2. Subconjunctival hemorrhage of right eye  Mild pressure this am.  No vision changes. No other s/sxs of infxn. Inc bleeding risk due to anticoagulation. Rec call eye dr for appt. More urgent/ ER if vision changes or inc pain . 3. Leg cramps  Ongoing issues. Discussed inc water intake. Otc magnesium supplement recommended. 4. Primary hypertension  Elevated today. Typically well controlled at home. Daughter is a nurse. Monitor bp closely at home. Will need to adjust meds if remains elevated.

## 2022-09-27 ENCOUNTER — PATIENT MESSAGE (OUTPATIENT)
Dept: FAMILY MEDICINE CLINIC | Age: 72
End: 2022-09-27

## 2022-09-27 DIAGNOSIS — F41.9 ANXIETY: ICD-10-CM

## 2022-09-27 RX ORDER — ALPRAZOLAM 0.25 MG/1
0.25 TABLET ORAL NIGHTLY PRN
Qty: 15 TABLET | Refills: 0 | Status: SHIPPED | OUTPATIENT
Start: 2022-09-27 | End: 2022-11-26

## 2022-09-27 NOTE — TELEPHONE ENCOUNTER
Date of last refill of this med was 4/19/2022, # of pills given 15 and # of refills given 0. Their next appointment is 0, the last date patient was seen was 9/20/2022. Does patient have medication agreement on file? No  Has drug screen been done in last 12 months if needed?  no

## 2022-10-06 ENCOUNTER — TELEPHONE (OUTPATIENT)
Dept: FAMILY MEDICINE CLINIC | Age: 72
End: 2022-10-06

## 2022-10-06 ENCOUNTER — OFFICE VISIT (OUTPATIENT)
Dept: FAMILY MEDICINE CLINIC | Age: 72
End: 2022-10-06
Payer: MEDICARE

## 2022-10-06 VITALS
OXYGEN SATURATION: 95 % | TEMPERATURE: 99.1 F | SYSTOLIC BLOOD PRESSURE: 84 MMHG | DIASTOLIC BLOOD PRESSURE: 56 MMHG | HEART RATE: 62 BPM

## 2022-10-06 DIAGNOSIS — R10.10 UPPER ABDOMINAL PAIN: ICD-10-CM

## 2022-10-06 DIAGNOSIS — R42 LIGHTHEADEDNESS: ICD-10-CM

## 2022-10-06 DIAGNOSIS — R50.9 FEVER, UNSPECIFIED FEVER CAUSE: ICD-10-CM

## 2022-10-06 DIAGNOSIS — I95.9 HYPOTENSION, UNSPECIFIED HYPOTENSION TYPE: Primary | ICD-10-CM

## 2022-10-06 PROCEDURE — 4004F PT TOBACCO SCREEN RCVD TLK: CPT | Performed by: FAMILY MEDICINE

## 2022-10-06 PROCEDURE — G8400 PT W/DXA NO RESULTS DOC: HCPCS | Performed by: FAMILY MEDICINE

## 2022-10-06 PROCEDURE — 99214 OFFICE O/P EST MOD 30 MIN: CPT | Performed by: FAMILY MEDICINE

## 2022-10-06 PROCEDURE — 1123F ACP DISCUSS/DSCN MKR DOCD: CPT | Performed by: FAMILY MEDICINE

## 2022-10-06 PROCEDURE — G8420 CALC BMI NORM PARAMETERS: HCPCS | Performed by: FAMILY MEDICINE

## 2022-10-06 PROCEDURE — 1090F PRES/ABSN URINE INCON ASSESS: CPT | Performed by: FAMILY MEDICINE

## 2022-10-06 PROCEDURE — G8484 FLU IMMUNIZE NO ADMIN: HCPCS | Performed by: FAMILY MEDICINE

## 2022-10-06 PROCEDURE — 3017F COLORECTAL CA SCREEN DOC REV: CPT | Performed by: FAMILY MEDICINE

## 2022-10-06 PROCEDURE — G8427 DOCREV CUR MEDS BY ELIG CLIN: HCPCS | Performed by: FAMILY MEDICINE

## 2022-10-06 ASSESSMENT — ENCOUNTER SYMPTOMS
NAUSEA: 1
BLOOD IN STOOL: 1
VOMITING: 1
DIARRHEA: 1

## 2022-10-06 NOTE — PROGRESS NOTES
Chief Complaint   Patient presents with    Dizziness     Feels like she is about to pass out     Diarrhea       HPI:  Makenzie Keyes is a 67 y.o. (: 1950) here today   for   HPI  Sxs began on Monday. Began to have sig leg pain. Body aches. Sxs of diffuse aches. Emesis. Temp 101. Severe headache. Pain in eyes w/ movement. Feels hot. Has had sig fatigue, no energy. Has been in bed over past couple of days. Feels as if could pass out when up. Some diarrhea as well. Fever has persisted over past few days. Has been taking tylenol 4 x per day. Bp has been low at home. Has been lightheaded. Feels as if has to sit down somewhere. Has been trying to stay well hydrated, but dec appetite overall. Took covid test Tuesday x 2, Wednesday x 1. All 3 negative. Has been taking medications as prescribed. No bleeding, but, when she wipes (bm), looks like old blood. Has been having blurred vision, zachary when gets up as well. Patient's medications, allergies, past medical, surgical, social and family histories were reviewed and updated as appropriate. ROS:  Review of Systems   Constitutional:  Positive for fatigue and fever. Eyes:  Positive for visual disturbance. Gastrointestinal:  Positive for blood in stool, diarrhea, nausea and vomiting. Neurological:  Positive for light-headedness. Prior to Visit Medications    Medication Sig Taking? Authorizing Provider   ALPRAZolam Heavenly Cameron) 0.25 MG tablet Take 1 tablet by mouth nightly as needed for Sleep or Anxiety for up to 60 days. Ravindra Eckert MD   VORTIoxetine (TRINTELLIX) 10 MG TABS tablet TAKE 1 TABLET BY MOUTH EVERY DAY  Ravindra Eckert MD   apixaban (ELIQUIS) 5 MG TABS tablet Take 1 tablet by mouth 2 times daily  Ravindra Eckert MD   omeprazole (PRILOSEC) 20 MG delayed release capsule Take 1 capsule by mouth 2 times daily (before meals)  Ravindra Eckert MD   ezetimibe (ZETIA) 10 MG tablet TAKE 1 TABLET BY MOUTH DAILY.   Ravindra Eckert MD metoprolol succinate (TOPROL XL) 25 MG extended release tablet Take 1 tablet by mouth daily  Giovanni Baumgarten, MD   potassium chloride (MICRO-K) 10 MEQ extended release capsule Take 1 capsule by mouth daily  Giovanni Baumgarten, MD   aspirin EC 81 MG EC tablet Take 1 tablet by mouth daily  Giovanni Baumgarten, MD   dicyclomine (BENTYL) 10 MG capsule Take 1 capsule by mouth 4 times daily (before meals and nightly)  Giovanni Baumgarten, MD   fluticasone-umeclidin-vilant (TRELEGY ELLIPTA) 332-30.8-41 MCG/INH AEPB Inhale 1 puff into the lungs daily  Giovanni Baumgarten, MD   fluticasone Leela Magdalena) 50 MCG/ACT nasal spray 2 sprays by Nasal route daily  Giovanni Baumgarten, MD   albuterol sulfate HFA (VENTOLIN HFA) 108 (90 Base) MCG/ACT inhaler Inhale 2 puffs into the lungs 2 times daily as needed for Wheezing or Shortness of Breath  Giovanni Baumgarten, MD   atorvastatin (LIPITOR) 80 MG tablet TAKE 1 TABLET BY MOUTH NIGHTLY  Giovanni Baumgarten, MD   Respiratory Therapy Supplies (NEBULIZER) DEBO 1 each by Does not apply route every 6 hours as needed (sob)  Giovanni Baumgarten, MD   Respiratory Therapy Supplies (NEBULIZER/TUBING/MOUTHPIECE) KIT 1 kit by Does not apply route daily as needed (sob)  Giovanni Baumgarten, MD   albuterol (PROVENTIL) (5 MG/ML) 0.5% nebulizer solution Take 1 mL by nebulization 4 times daily as needed for Wheezing  Dedra Bowens, APRN - CNP   Lactobacillus (PROBIOTIC ACIDOPHILUS PO) Take 1 tablet by mouth daily   Historical Provider, MD     Past Medical History:   Diagnosis Date    Arthritis     COPD (chronic obstructive pulmonary disease) ([facility] [address]     CVA (cerebral vascular accident) ([facility] [address]     Depression     Hyperlipidemia     Hypertension     Irritable bowel syndrome (IBS)     Left carotid stenosis     Pacemaker     Sick sinus syndrome [facility])       Past Surgical History:   Procedure Laterality Date    CAROTID ENDARTERECTOMY Left 03/12/2021    CARPAL TUNNEL RELEASE Right 07/13/2017    CATARACT REMOVAL WITH IMPLANT Left 12/23/2016 Phacoemulsification/ Posterior Chamber Intraocular Lens Implantation with dr Burgess To  2012    CHOLECYSTECTOMY, LAPAROSCOPIC  05/25/2011    COLONOSCOPY  07/28/2014    CORONARY ANGIOPLASTY      EYE SURGERY Right 03/09/2017    catarct removal with lens implant    HERNIA REPAIR  08/09/2016    open ventral incisional    HYSTERECTOMY (CERVIX STATUS UNKNOWN)      KNEE ARTHROSCOPY Right 2/14/2022    RIGHT KNEE VIDEO ARTHROSCOPY, MEDIAL MENISECTOMY performed by Lyn Chen MD at [place] [address]  08/09/2016    OPEN VENTRAL INCISIONAL HERNIA REPAIR     PACEMAKER PLACEMENT  06/25/2019    [facility]    PAIN MANAGEMENT PROCEDURE Right 03/10/2020    RIGHT LUMBAR FOUR AND LUMBAR FIVE TRANSFORAMINAL EPIDURAL STEROID INJECTION SITE CONFIRMED BY FLUOROSCOPY performed by Andi Altamirano MD at [facility] Right 06/16/2020    RIGHT LUMBAR FOUR AND LUMBAR FIVE TRANSFORAMINAL EPIDURAL STEROID INJECTION SITE CONFIRMED BY FLUOROSCOPY performed by Andi Altamirano MD at [address] [employer] Bilateral 10/13/2020    BILATERAL LUMBAR THREE LUMBAR FOUR LUMBAR DORSAL RAMUS LUMBAR MEDIAL BRANCH BLOCKS performed by Andi Altamirano MD at [facility] Right 11/17/2020    RIGHT SACROILIAC JOINT INJECTION SITE CONFIRMED BY FLUOROSCOPY performed by Andi Altamirano MD at [facility] Right 12/29/2020    RIGHT HIP INJECTION SITE CONFIRMED BY FLUOROSCOPY performed by Andi Altamirano MD at [address] ENDOSCOPY        Allergies   Allergen Reactions    Iodides Hives and Itching    Nsaids      Previous stomach bleed      Sulfa Antibiotics Rash     Patient Active Problem List   Diagnosis    Abdominal colic    COPD (chronic obstructive pulmonary disease) ([facility] [address]    Hyperlipidemia    Depression    Carotid stenosis, left    REYNOSO (dyspnea on exertion)    Anxiety Incarcerated ventral hernia    Carpal tunnel syndrome of right wrist    Cervical myelopathy (HCC)    B12 deficiency    Recurrent major depressive disorder in partial remission (HCC)    Pacemaker    S/P carotid endarterectomy    Sick sinus syndrome (HCC)    Hip pain    Right sided sciatica    Lumbosacral spondylosis with radiculopathy    Greater trochanteric bursitis of right hip    Degenerative tear of medial meniscus of right knee    Chronic pain of right knee    Hypertension        OBJECTIVE:    BP (!) 84/56   Pulse 62   Temp 99.1 °F (37.3 °C)   SpO2 95%     BP Readings from Last 2 Encounters:   10/06/22 (!) 84/56   09/20/22 (!) 156/94       Wt Readings from Last 3 Encounters:   09/20/22 108 lb 12.8 oz (49.4 kg)   07/19/22 106 lb 12.8 oz (48.4 kg)   04/15/22 109 lb 6.4 oz (49.6 kg)       Physical Exam  Constitutional:       Appearance: She is well-developed. HENT:      Head: Normocephalic and atraumatic. Right Ear: Hearing normal.      Left Ear: Hearing normal.   Eyes:      Conjunctiva/sclera: Conjunctivae normal.   Neck:      Trachea: No tracheal deviation. Cardiovascular:      Rate and Rhythm: Normal rate and regular rhythm. Heart sounds: Heart sounds are distant. Pulmonary:      Effort: Pulmonary effort is normal.      Breath sounds: Normal breath sounds. Abdominal:      Palpations: Abdomen is soft. Tenderness: There is abdominal tenderness in the right upper quadrant and left upper quadrant. Skin:     General: Skin is warm and dry. Neurological:      General: No focal deficit present. Mental Status: She is alert and oriented to person, place, and time. Psychiatric:         Mood and Affect: Mood is depressed. Behavior: Behavior normal.     Unsteady gait    Patient with somewhat disconjugate gaze at baseline. Pupils are equal and reactive. No focal neurologic deficit noted. ASSESSMENT/PLAN:    1.  Hypotension, unspecified hypotension type  Significantly low blood pressure noted today. My concerns are patient being septic versus blood loss due to history of dried blood in her stool. Blood pressure was actually high when she was last seen here approximately 2 weeks ago. Flu swab was negative today. She did have prior negative COVID test, but COVID still could be a possibility. She does have a history of cerebrovascular disease, but no focal neurologic deficits on exam today. She has some upper abdominal pain noted as well. Due to her low blood pressure and other symptoms, will refer to the ER for further evaluation and treatment. I did speak with the ER physician and relayed the issues that are going on with her. 2. Fever, unspecified fever cause  See above. Flu was negative. Home COVID-negative. Will likely need repeat testing and additional labs. Possible sepsis    3. Lightheadedness  See above. She also has some blurred vision, but this seems worse with standing and seems to be related to hypotension    4. Upper abdominal pain  May be related to emesis versus possible intra-abdominal etiology to her illness. Patient to be seen in the emergency department.

## 2022-10-06 NOTE — TELEPHONE ENCOUNTER
Pt called and said that on Saturday or Sunday she started having bodyaches, now its all in her head congestion, fever right at 100, head is packed she said. Pt took 3 COVID test all negative on Saturday, Monday, Wednesday.  Pt thinks she has the flu cant get out of bed when she moves her eyes back and fourth theyhurt

## 2022-10-13 ENCOUNTER — TELEMEDICINE (OUTPATIENT)
Dept: PSYCHOLOGY | Age: 72
End: 2022-10-13
Payer: MEDICARE

## 2022-10-13 DIAGNOSIS — F41.8 ANXIETY WITH DEPRESSION: Primary | ICD-10-CM

## 2022-10-13 DIAGNOSIS — F43.20 ADJUSTMENT DISORDER, UNSPECIFIED TYPE: ICD-10-CM

## 2022-10-13 PROCEDURE — 99443 PR PHYS/QHP TELEPHONE EVALUATION 21-30 MIN: CPT | Performed by: SOCIAL WORKER

## 2022-10-13 NOTE — PATIENT INSTRUCTIONS
Try the handout on PMR    Youtube \"the power of vulnerability\" and \"listening to shame\"-[employer]UARDO Talk   Write 3-5 things that you are grateful for and why, right before bed  Return to see Nathalia Castellanos in 2 weeks. Progressive muscle relaxation (PMR) is an exercise that anyone can use to alleviate disturbing and disruptive emotional symptoms such as anxiety or insomnia. Like breathing exercises, visualization, and yoga, PMR is considered a relaxation technique. It's especially helpful in moments of high stress or nervousness, and even can help someone get through a panic attack. History of PMR  PMR was developed by an [employer] [place] physician, Loreta Smith, in the 1920s. Yazan Purcell noted that regardless of their illness, the majority of his patients suffered from muscle pain and tension. When he suggested that they relax, he noticed that most people didn't seem connected enough to their physical tension to release it. This inspired Yazan Purcell to develop a sequence of steps for tightening and then relaxing groups of muscles. He found this allowed his patients to become more aware of their tension, to learn how to let go of it, and to recognize what it feels like to be in a relaxed state. Since then the technique has been modified many times but all modern variations of PMR are based on Aguilars original idea of systematically squeezing and then releasing isolated muscle groups. Does It Work--and How? PMR works in part by helping to overcome a normal reaction to stress known as the flight-or-fight response. In evolutionary terms, this reaction developed as a way to help animals survive a threat--either by running away or by meeting the opposition head-on. Over time the flight-or-fight response has become a common reaction to feelings of fear that often are out of proportion with reality.       Unfortunately, when it's not needed for actual survival, the flight-or-fight reaction tends to bring on many uncomfortable physical symptoms, including accelerated heart rate, sweating, shaking, and shortness of breath--largely the product of an influx of stress hormones. Also, muscle pain, tension, and stiffness are common symptoms brought on by stress and anxiety. Relaxation techniques, including PMR, have the reverse effect on the body, eliciting the relaxation response, lowering heart rate, calming the mind, and reducing bodily tension. PMR also can help a person become more aware of how their physical stress may be contributing to their emotional state. By relaxing the body, a person may be able to let go of anxious thoughts and feelings. PMR Step-by-Step  For a quick taste of how PMR works, squeeze one of your fists as hard as you can. Notice how tight your fingers and forearm feel. Count to ten and then release the clinch. Allow your hand to relax completely and let go of any tension. Let your hand go limp and notice how relaxed it feels now compared to before your clinched your fist.       This methodical approach to increasing and releasing tension throughout your body is the linchpin of PMR: By systematically constricting and releasing various muscle groups it is possible to relieve physical stress and quiet and calm the mind. Here are the steps for one version of PMR that anyone can do. Try it next time you're feeling nervous, anxious, or find yourself tossing, turning, and unable to sleep. Step 1    Get comfortable. You don't have to lie down to do PMR; it will work if you're sitting up in a chair. Do make sure you're in a place that's free of distraction. Close your eyes if that feels best for you. Step 2    Breathe. Inhale deeply through your nose, feeling your abdomen rise as you fill your body with air. Then slowly exhale from your mouth, drawing your navel toward your spine. Repeat three to five times. Step 3    Starting with your feet, tighten and release your muscles.  Clench your toes and pressing your heels toward the ground. Squeeze tightly for a few breaths and then release. Now flex your feet in, pointing your toes up towards your head. Hold for a few seconds and then release. Step 4    Continue to work your way up your body, tightening and releasing each muscle group. Work your way up in this order: legs, glutes, abdomen, back, hands, arms, shoulders, neck, and face. Try to tighten each muscle group for a few breaths and then slowly release. Repeat any areas that feel especially stiff. Step 5     End the practice by taking a few more deep breaths, noting how much more calm and relaxed you feel. PMR is a skill, one that takes practice to master. In order to be able to draw on PMR when you need it--in other words, when you're truly in a stressful or anxiety-provoking situation--you'll want to learn how to do it while you aren't under pressure. Practice PMR several times a week to become aware of what it's like to feel relaxed. Understanding this feeling can help you to more readily let go of tension when anxiety rises.

## 2022-10-13 NOTE — PROGRESS NOTES
Behavioral Health Consultation  Nancy Recinos. MADISON MejíaTeriS  10/13/2022  8:37 AM EDT      Time spent with Patient: 30 minutes  This is patient's first [facility] appointment. Reason for Consult:    Chief Complaint   Patient presents with    Anxiety    Depression       Referring Provider: Sanjiv Ramon MD  [address] [place] [address],  [address]    Feedback given to PCP. TELEHEALTH VISIT -- Audio Only (During BQJFG-07 public health emergency)  }  Pursuant to the emergency declaration under the [facility] [place] [address] waiver authority and the [employer] Act, this phone call was conducted, with patient's consent, to reduce the patient's risk of exposure to COVID-19 and provide continuity of care for an established patient. Services were provided through a phone call discussion to substitute for in-person clinic visit. Pt gave verbal informed consent to participate in telehealth services. Consent:  She and/or health care decision maker is aware that that she may receive a bill for this telephone service, depending on her insurance coverage, and has provided verbal consent to proceed: Yes    Conducted a risk-benefit analysis and determined that the patient's presenting problems are consistent with the use of telepsychology. Determined that the patient has sufficient knowledge and skills in the use of technology enabling them to adequately benefit from telepsychology. It was determined that this patient was able to be properly treated without an in-person session. Patient verified that they were currently located at the [facility] address that was provided during registration.     Verified the following information:  Patient's identification: Yes  Patient location: [address] [place] [address]  Patient's call back number: [phone]  Patient's emergency contact's name and number, as well as permission to contact them if needed: Extended Emergency Contact Information  Primary Emergency Contact: Rosangela Alexander  Address: [facility] [address], [address]  Phone: [phone]  Mobile Phone: [phone]  Relation: Spouse  Secondary Emergency Contact: [address] [place] Phone: [phone]  Relation: Child    Provider location: [place], [address] affirm this is an episode with a Patient who has not had a related appointment within my department in the past 7 days or scheduled within the next 24 hours. S:  Pt endorses that her  is a double amputee, very ill and has had many surgeries and \"is pretty much plastic   Struggles with vascular disease. Pt has had a few TIA's which she feels has compromised her memory.  for 30 yrs, friends before for about 5 yr before they got to . She is blown away with his resiliency. Pt struggles with depression and anxiety. She found herself going down a hole of depression and stopped doing as much, so reached out for help. Lots of changes with covid and being in isolation. Was tough with not being with family as much. Finds self worrying about  and future at times. Pt has been very close always with her family. Covid has really been impacted as she and  are very involved with kids, grand kids and great grand kids. Pt's  was in [employer], combat vet. Dx with PTSD. Still has some triggers, especially with the war in [facility]. Pt has twp daughters and a son. Son is , youngest mali is nurse and lives close by. Middle mali is a farmer. Very close with family. Interested in working with [employer] [facility] as opposed to specialty.      O:  MSE:    Attitude: cooperative and friendly  Consciousness: alert  Orientation: oriented to person, place, time, general circumstance  Memory: recent and remote memory intact  Attention/Concentration: intact during session  Speech: normal rate and volume, well-articulated  Mood: anxious and depressed   Affect: euthymic  Perception: within normal limits  Thought Content: within normal limits  Thought Process: logical, coherent and goal-directed  Insight: good  Judgment: intact  Ability to understand instructions: Yes  Ability to respond meaningfully: Yes  Morbid Ideation: no   Suicide Assessment: no suicidal ideation, plan, or intent  Homicidal Ideation: no    History:    Medications:   Current Outpatient Medications   Medication Sig Dispense Refill    ALPRAZolam (XANAX) 0.25 MG tablet Take 1 tablet by mouth nightly as needed for Sleep or Anxiety for up to 60 days. 15 tablet 0    VORTIoxetine (TRINTELLIX) 10 MG TABS tablet TAKE 1 TABLET BY MOUTH EVERY DAY 90 tablet 3    apixaban (ELIQUIS) 5 MG TABS tablet Take 1 tablet by mouth 2 times daily 180 tablet 3    omeprazole (PRILOSEC) 20 MG delayed release capsule Take 1 capsule by mouth 2 times daily (before meals) 180 capsule 3    ezetimibe (ZETIA) 10 MG tablet TAKE 1 TABLET BY MOUTH DAILY.  90 tablet 3    metoprolol succinate (TOPROL XL) 25 MG extended release tablet Take 1 tablet by mouth daily 90 tablet 3    potassium chloride (MICRO-K) 10 MEQ extended release capsule Take 1 capsule by mouth daily 90 capsule 3    aspirin EC 81 MG EC tablet Take 1 tablet by mouth daily 90 tablet 3    dicyclomine (BENTYL) 10 MG capsule Take 1 capsule by mouth 4 times daily (before meals and nightly) 120 capsule 3    fluticasone-umeclidin-vilant (TRELEGY ELLIPTA) 100-62.5-25 MCG/INH AEPB Inhale 1 puff into the lungs daily 3 each 3    fluticasone (FLONASE) 50 MCG/ACT nasal spray 2 sprays by Nasal route daily 3 each 3    albuterol sulfate HFA (VENTOLIN HFA) 108 (90 Base) MCG/ACT inhaler Inhale 2 puffs into the lungs 2 times daily as needed for Wheezing or Shortness of Breath 3 each 3    atorvastatin (LIPITOR) 80 MG tablet TAKE 1 TABLET BY MOUTH NIGHTLY 90 tablet 3    Respiratory Therapy Supplies (NEBULIZER) DEBO 1 each by Does not apply route every 6 hours as needed (sob) 1 each 0    Respiratory Therapy Supplies (NEBULIZER/TUBING/MOUTHPIECE) KIT 1 kit by Does not apply route daily as needed (sob) 1 kit 3    albuterol (PROVENTIL) (5 MG/ML) 0.5% nebulizer solution Take 1 mL by nebulization 4 times daily as needed for Wheezing 120 each 3    Lactobacillus (PROBIOTIC ACIDOPHILUS PO) Take 1 tablet by mouth daily        No current facility-administered medications for this visit. Social History:   Social History     Socioeconomic History    Marital status:      Spouse name: Not on file    Number of children: Not on file    Years of education: Not on file    Highest education level: Not on file   Occupational History    Not on file   Tobacco Use    Smoking status: Every Day     Packs/day: 1.00     Years: 40.00     Pack years: 40.00     Types: Cigarettes     Start date: 5/10/2019    Smokeless tobacco: Never   Vaping Use    Vaping Use: Never used   Substance and Sexual Activity    Alcohol use: No     Alcohol/week: 0.0 standard drinks    Drug use: No    Sexual activity: Yes     Partners: Male   Other Topics Concern    Not on file   Social History Narrative    Not on file     Social Determinants of Health     Financial Resource Strain: Not on file   Food Insecurity: Not on file   Transportation Needs: Not on file   Physical Activity: Insufficiently Active    Days of Exercise per Week: 3 days    Minutes of Exercise per Session: 30 min   Stress: Not on file   Social Connections: Not on file   Intimate Partner Violence: Not on file   Housing Stability: Not on file     TOBACCO:   reports that she has been smoking cigarettes. She started smoking about 3 years ago. She has a 40.00 pack-year smoking history. She has never used smokeless tobacco.  ETOH:   reports no history of alcohol use.   Family History:   Family History   Problem Relation Age of Onset    Heart Disease Father     High Blood Pressure Father     Cancer Sister         uterine       A:  Pt affected by isolation of covid, and also some worry with 's health. Great family support, very close with family. Like to work with [employer] [facility]. Diagnosis:    1. Anxiety with depression    2. Adjustment disorder, unspecified type          Diagnosis Date    Arthritis     COPD (chronic obstructive pulmonary disease) (HCC)     CVA (cerebral vascular accident) ([facility]     Depression     Hyperlipidemia     Hypertension     Irritable bowel syndrome (IBS)     Left carotid stenosis     Pacemaker     S[facility] syndrome ([facility]      Plan:  Pt interventions:  Provided handout on  anxiety, Trained in strategies for increasing balanced thinking, Discussed and set plan for behavioral activation, Trained in relaxation strategies, Trained in improving communication skills, Discussed self-care (sleep, nutrition, rewarding activities, social support, exercise), Established rapport, and Supportive techniques    Pt Behavioral Change Plan:   See Pt Instructions       was evaluated through a synchronous (real-time) audio-video encounter. The patient (or guardian if applicable) is aware that this is a billable service, which includes applicable co-pays. This Virtual Visit was conducted with patient's (and/or legal guardian's) consent. The visit was conducted pursuant to the emergency declaration under the [facility], [address] [place] [address] waiver authority and the [employer] Act. Patient identification was verified, and a caregiver was present when appropriate. The patient was located in a state where the provider was licensed to provide care.

## 2022-10-17 ENCOUNTER — TELEPHONE (OUTPATIENT)
Dept: FAMILY MEDICINE CLINIC | Age: 72
End: 2022-10-17

## 2022-10-17 DIAGNOSIS — R50.9 FEVER, UNSPECIFIED FEVER CAUSE: ICD-10-CM

## 2022-10-17 DIAGNOSIS — I49.5 SICK SINUS SYNDROME (HCC): Primary | ICD-10-CM

## 2022-10-17 RX ORDER — AZITHROMYCIN 250 MG/1
250 TABLET, FILM COATED ORAL SEE ADMIN INSTRUCTIONS
Qty: 6 TABLET | Refills: 0 | Status: SHIPPED | OUTPATIENT
Start: 2022-10-17 | End: 2022-10-22

## 2022-10-17 NOTE — TELEPHONE ENCOUNTER
Pt given keflex, tessalon and steroids. She is finished with steroids. Her cough is really bad and still having sinus pressure. No fevers.  Whatever you think will help

## 2022-10-17 NOTE — TELEPHONE ENCOUNTER
She had been sent to the hospital.  When she sent home with any medication including steroids or antibiotics?

## 2022-10-17 NOTE — TELEPHONE ENCOUNTER
If already had a cephalosporin such as Keflex, could try a Z-Dg if not allergic. Continue Tessalon for cough. Could add Robitussin to that as well.   Call if symptoms fail to improve

## 2022-10-17 NOTE — TELEPHONE ENCOUNTER
----- Message from Tonya Cartagena sent at 10/17/2022  9:39 AM EDT -----  Subject: Message to Provider    QUESTIONS  Information for Provider? Patient currently has COVID, diagnosed on 10/6. She is having cough, congestion, headache. She wants to know if the doctor   can call something in for her or does she need to be seen? Please call her   asap to advise.   ---------------------------------------------------------------------------  --------------  Betty STONER  [phone]; OK to leave message on voicemail  ---------------------------------------------------------------------------  --------------  SCRIPT ANSWERS  Relationship to Patient?  Self

## 2022-11-10 ENCOUNTER — TELEMEDICINE (OUTPATIENT)
Dept: PSYCHOLOGY | Age: 72
End: 2022-11-10
Payer: MEDICARE

## 2022-11-10 DIAGNOSIS — F32.1 MDD (MAJOR DEPRESSIVE DISORDER), SINGLE EPISODE, MODERATE (HCC): Primary | ICD-10-CM

## 2022-11-10 DIAGNOSIS — F43.20 ADJUSTMENT DISORDER, UNSPECIFIED TYPE: ICD-10-CM

## 2022-11-10 DIAGNOSIS — F33.41 RECURRENT MAJOR DEPRESSIVE DISORDER IN PARTIAL REMISSION (HCC): ICD-10-CM

## 2022-11-10 DIAGNOSIS — F41.1 GAD (GENERALIZED ANXIETY DISORDER): ICD-10-CM

## 2022-11-10 PROCEDURE — 90832 PSYTX W PT 30 MINUTES: CPT | Performed by: SOCIAL WORKER

## 2022-11-10 PROCEDURE — 1123F ACP DISCUSS/DSCN MKR DOCD: CPT | Performed by: SOCIAL WORKER

## 2022-11-10 NOTE — PROGRESS NOTES
Behavioral Health Consultation  Alma Rosa Norton. MADISON Mejía-S  11/10/2022  8:44 AM EST      Time spent with Patient: 30 minutes  This is patient's second [facility] appointment. Reason for Consult:    Chief Complaint   Patient presents with    Anxiety    Depression       Referring Provider: No referring provider defined for this encounter. Feedback given to PCP. TELEHEALTH VISIT -- Audio/Visual (During UGWDG-27 public health emergency)  }  Pursuant to the emergency declaration under the [address] [place] [address] waiver authority and the [employer] and [employer] Act, this Virtual Visit was conducted, with patient's consent, to reduce the patient's risk of exposure to COVID-19 and provide continuity of care for an established patient. Services were provided through a video synchronous discussion virtually to substitute for in-person clinic visit. Pt gave verbal informed consent to participate in telehealth services. Conducted a risk-benefit analysis and determined that the patient's presenting problems are consistent with the use of telepsychology. Determined that the patient has sufficient knowledge and skills in the use of technology enabling them to adequately benefit from telepsychology. It was determined that this patient was able to be properly treated without an in-person session. Patient verified that they were currently located at the [facility] address that was provided during registration.     Verified the following information:  Patient's identification: Yes  Patient location: [address] [place] [address]   Patient's call back number: [phone]   Patient's emergency contact's name and number, as well as permission to contact them if needed: Extended Emergency Contact Information  Primary Emergency Contact: Nguyen Sullivan  Address: [address], [address] [place] [address] [facility] of [address] [place] [address] Phone: [phone]  Mobile Phone: [phone]  Relation: Spouse  Secondary Emergency Contact: [address] [facility] Phone: [phone]  Relation: Child     Provider location: [address]:  Pt endorses that anxiety has been really bad this past week, feeling like she reached the overflow point. Dr. Fito Brandon prescribed Xanax to help, but the last two days had to take a whole pill which she usually only take a half. Pt has many worries, concerned about getting alzheimer's as her sister  from it. She is feeling more forgetful, struggling to get moving and highly anxious. Told neurologist her sxs and he voiced that he thought she depressed and would benefit from an anti-depressant. She does not ice that memory is affected when stressed. Smaller tasks like paying bills are taking more time and she gets really frustrated. O:  MSE:    Appearance: good hygiene   Attitude: cooperative and friendly  Consciousness: alert  Orientation: oriented to person, place, time, general circumstance  Memory: recent and remote memory intact  Attention/Concentration: intact during session  Psychomotor Activity:normal  Eye Contact: normal  Speech: normal rate and volume, well-articulated  Mood: anxious and depressed  Affect: dysphoric and anxious  Perception: within normal limits  Thought Content: within normal limits  Thought Process: logical, coherent and goal-directed  Insight: good  Judgment: intact  Ability to understand instructions: Yes  Ability to respond meaningfully: Yes  Morbid Ideation: no   Suicide Assessment: no suicidal ideation, plan, or intent  Homicidal Ideation: no    History:    Medications:   Current Outpatient Medications   Medication Sig Dispense Refill    ALPRAZolam (XANAX) 0.25 MG tablet Take 1 tablet by mouth nightly as needed for Sleep or Anxiety for up to 60 days.  15 tablet 0    VORTIoxetine (TRINTELLIX) 10 MG TABS tablet TAKE 1 TABLET BY MOUTH EVERY DAY 90 tablet 3    apixaban (ELIQUIS) 5 MG TABS tablet Take 1 tablet by mouth 2 times daily 180 tablet 3    omeprazole (PRILOSEC) 20 MG delayed release capsule Take 1 capsule by mouth 2 times daily (before meals) 180 capsule 3    ezetimibe (ZETIA) 10 MG tablet TAKE 1 TABLET BY MOUTH DAILY. 90 tablet 3    metoprolol succinate (TOPROL XL) 25 MG extended release tablet Take 1 tablet by mouth daily 90 tablet 3    potassium chloride (MICRO-K) 10 MEQ extended release capsule Take 1 capsule by mouth daily 90 capsule 3    aspirin EC 81 MG EC tablet Take 1 tablet by mouth daily 90 tablet 3    dicyclomine (BENTYL) 10 MG capsule Take 1 capsule by mouth 4 times daily (before meals and nightly) 120 capsule 3    fluticasone-umeclidin-vilant (TRELEGY ELLIPTA) 100-62.5-25 MCG/INH AEPB Inhale 1 puff into the lungs daily 3 each 3    fluticasone (FLONASE) 50 MCG/ACT nasal spray 2 sprays by Nasal route daily 3 each 3    albuterol sulfate HFA (VENTOLIN HFA) 108 (90 Base) MCG/ACT inhaler Inhale 2 puffs into the lungs 2 times daily as needed for Wheezing or Shortness of Breath 3 each 3    atorvastatin (LIPITOR) 80 MG tablet TAKE 1 TABLET BY MOUTH NIGHTLY 90 tablet 3    Respiratory Therapy Supplies (NEBULIZER) DEBO 1 each by Does not apply route every 6 hours as needed (sob) 1 each 0    Respiratory Therapy Supplies (NEBULIZER/TUBING/MOUTHPIECE) KIT 1 kit by Does not apply route daily as needed (sob) 1 kit 3    albuterol (PROVENTIL) (5 MG/ML) 0.5% nebulizer solution Take 1 mL by nebulization 4 times daily as needed for Wheezing 120 each 3    Lactobacillus (PROBIOTIC ACIDOPHILUS PO) Take 1 tablet by mouth daily        No current facility-administered medications for this visit.      Social History:   Social History     Socioeconomic History    Marital status:      Spouse name: Not on file    Number of children: Not on file    Years of education: Not on file    Highest education level: Not on file   Occupational History    Not on file   Tobacco Use    Smoking status: Every Day     Packs/day: 1.00 Years: 40.00     Pack years: 40.00     Types: Cigarettes     Start date: 5/10/2019    Smokeless tobacco: Never   Vaping Use    Vaping Use: Never used   Substance and Sexual Activity    Alcohol use: No     Alcohol/week: 0.0 standard drinks    Drug use: No    Sexual activity: Yes     Partners: Male   Other Topics Concern    Not on file   Social History Narrative    Not on file     Social Determinants of Health     Financial Resource Strain: Not on file   Food Insecurity: Not on file   Transportation Needs: Not on file   Physical Activity: Insufficiently Active    Days of Exercise per Week: 3 days    Minutes of Exercise per Session: 30 min   Stress: Not on file   Social Connections: Not on file   Intimate Partner Violence: Not on file   Housing Stability: Not on file     TOBACCO:   reports that she has been smoking cigarettes. She started smoking about 3 years ago. She has a 40.00 pack-year smoking history. She has never used smokeless tobacco.  ETOH:   reports no history of alcohol use. Family History:   Family History   Problem Relation Age of Onset    Heart Disease Father     High Blood Pressure Father     Cancer Sister         uterine       A:  Pt endorses struggles with anxiety and increased depression. C/o alzheimer's. Stress, anxiety, poor sleep and depression are likely affecting memory. Will consider increase in tritellix and continued work on anxiety and depression with tx. No SI/HI, insight and motivation good. Diagnosis:    1. MDD (major depressive disorder), single episode, moderate ([address]    2. ADAM (generalized anxiety disorder)    3.  Adjustment disorder, unspecified type          Diagnosis Date    Arthritis     COPD (chronic obstructive pulmonary disease) (HCC)     CVA (cerebral vascular accident) ([address]     Depression     Hyperlipidemia     Hypertension     Irritable bowel syndrome (IBS)     Left carotid stenosis     Pacemaker     Sick sinus syndrome ([address]      Plan:  Pt interventions:  Trained in strategies for increasing balanced thinking, Discussed and set plan for behavioral activation, Trained in relaxation strategies, Trained in improving communication skills, Discussed self-care (sleep, nutrition, rewarding activities, social support, exercise), Discussed benefits of referral for specialty care, and Supportive techniques    Pt Behavioral Change Plan:   See Pt Instructions      Patient was evaluated through a synchronous (real-time) audio-video encounter. The patient (or guardian if applicable) is aware that this is a billable service, which includes applicable co-pays. This Virtual Visit was conducted with patient's (and/or legal guardian's) consent. The visit was conducted pursuant to the emergency declaration under the [facility], [address] [place] [address] waiver authority and the [employer] and [employer] General Act. Patient identification was verified, and a caregiver was present when appropriate. The patient was located in a state where the provider was licensed to provide care.

## 2022-11-10 NOTE — PATIENT INSTRUCTIONS
Review handouts on fight or flight and diaphragmatic breathing  Practice diaphragmatic breathing at least 2-3 times a day and anytime you are symptomatic  Let's work to get some walks in  Happiness Lab-episode We can change, steeping off the path of anxiety  5. Write 3-5 things that you are grateful for and why, right before bed  6. Return to see Consuelo in 3 weeks. The Stress Response and How It Can Affect You   The stress response, or fight or flight response is the emergency reaction system of the body. It is there to keep you safe in emergencies. The stress response includes physical and thought responses to your perception of various situations. When the stress response is turned on, your body may release substances like adrenaline and cortisol. Your organs are programmed to respond in certain ways to situations that are viewed as challenging or threatening. The stress response can work against you. You can turn it on when you dont really need it and, as a result, perceive something as an emergency when its really not. It can turn on when you are just thinking about past or future events. Harmless, chronic conditions can be intensified by the stress response activating too often, with too much intensity, or for too long. Stress responses can be different for different individuals. Below is a list of some common stress related responses people have. ([place] the responses you have had in the last 2 weeks.)     Physical Responses   Muscle aches   Insomnia   ? Heart rate   Headache   Weight gain   Nausea   Constipation   Dry mouth   Muscle twitching  Weight loss   Low energy   Weakness   Tight chest   Diarrhea   Dizziness   Trembling   Stomach cramps  Chills    Hot flashes   Sweating   Pounding heart  Choking feeling  Chest pain   Leg cramps   Numb hands/feet Dry throat   Appetite change  Face flushing   ? Blood pressure  Light-headedness  Feeling faint      Troubleswallowing   Rash ?    Urination Neck pain     Tingling hands/feet     Emotional and Thought Responses   Restlessness   Agitation   Insecurity            Worthlessness   Anxiety   Stress    Depression            Hopelessness   Guilt    Defensiveness  Anger           Racing thoughts   Nightmares   Intense thinking  Sensitivity          Expecting the worst   Numbness   Lack of motivation  Mood swings             Forgetfulness   ? Concentration  Rigidity              Preoccupation  Intolerance     Behavioral Responses   Avoidance   Withdrawal   Neglect   ? Alcohol use    Smoking   ? Eating   Arguing       Poor appearance   ? Spending   Poor hygiene   ? Eating  Seeking reassurance   Nail biting   Skin picking   ? Talking        ? Body checking   Sexual problems  Foot tapping  Fidgeting Rapid walking    ? Exercise   Teeth clenching          Multitasking  Aggressive speaking       ? Fun activities  ? Sleeping       ? Relaxing activities     Seeking information     The parasympathetic nervous system in your body is designed to turn on your bodys relaxation response. Your behaviors and thinking can keep your bodys natural relaxation response from operating at its best.   Getting your body to relax on a daily basis for at least brief periods can help decrease unpleasant stress responses. Learning to relax your body, through specific breathing and relaxation exercises as well as by minimizing stressful thinking, can help your bodys natural relaxation system be more effective. Deep Breathing Exercises      Exercise 1:  The Stimulating Breath (also called the Royal Breath)   Its aim is to raise energy level and increase alertness. Inhale and exhale rapidly through your nose, keeping your mouth closed but relaxed. Your breaths in and out should be equal in duration, but as short as possible. This is a noisy breathing exercise. Try for three in-and-out breath cycles per second. This produces a quick movement of the diaphragm, suggesting a royal. Breathe normally after each cycle. Do not do for more than 15 seconds on your first try. Each time you practice the Stimulating Breath, you can increase your time by five seconds or so, until you reach a full minute. If done properly, you may feel invigorated, comparable to the heightened awareness you feel after a good workout. You should feel the effort at the back of the neck, the diaphragm, the chest and the abdomen. Try this breathing exercise the next time you need an energy boost and feel yourself reaching for a cup of coffee. Exercise 2:  The 4-7-8 (or Relaxing Breath) Exercise  This exercise is utterly simple, takes almost no time, requires no equipment and can be done anywhere. Although you can do the exercise in any position, sit with your back straight while learning the exercise. Place the tip of your tongue against the ridge of tissue just behind your upper front teeth, and keep it there through the entire exercise. You will be exhaling through your mouth around your tongue; try pursing your lips slightly if this seems awkward. Exhale completely through your mouth, making a whoosh sound. Close your mouth and inhale quietly through your nose to a mental count of four. Hold your breath for a count of seven. Exhale completely through your mouth, making a whoosh sound to a count of eight. This is one breath. Now inhale again and repeat the cycle three more times for a total of four breaths. Note that you always inhale quietly through your nose and exhale audibly through your mouth. The tip of your tongue stays in position the whole time. Exhalation takes twice as long as inhalation. The absolute time you spend on each phase is not important; the ratio of 4:7:8 is important. If you have trouble holding your breath, speed the exercise up but keep to the ratio of 4:7:8 for the three phases. With practice you can slow it all down and get used to inhaling and exhaling more and more deeply.   This exercise is a natural tranquilizer for the nervous system. Unlike tranquilizing drugs, which are often effective when you first take them but then lose their power over time, this exercise is subtle when you first try it but gains in power with repetition and practice. Do it at least twice a day. You cannot do it too frequently. Do NOT do more than 4 breaths at one time for the first month of practice. Later, if you wish, you can extend it to eight breaths. If you feel a little lightheaded when you first breathe this way, do not be concerned; it will pass. Once you develop this technique by practicing it every day, it will be a very useful tool that you will always have with you. Use it whenever anything upsetting happens - before you react. Use it whenever you are aware of internal tension. Use it to help you fall asleep. This exercise cannot be recommended too highly. Everyone can benefit from it. Exercise 3: Meditation exercise  Breath Counting  If you want to get a feel for this challenging work, try your hand at breath counting, a deceptively simple technique. Sit in a comfortable position with the spine straight and head inclined slightly forward. Gently close your eyes and take a few deep breaths. Then let the breath come naturally without trying to influence it. Ideally it will be quiet and slow, but depth and rhythm may vary. To begin the exercise, count \"one\" to yourself as you exhale. The next time you exhale, count \"two,\" and so on up to [place]. \"  Then begin a new cycle, counting \"one\" on the next exhalation. Never count higher than \"five,\" and count only when you exhale. You will know your attention has wandered when you find yourself up to \"eight,\" \"12,\" even \"19. \"  Try to do 10 minutes of this form of meditation.

## 2022-11-10 NOTE — TELEPHONE ENCOUNTER
Hi,  She is struggling with increased anxiety and depression, much related to her concerns of Alzheimer's with her sister having this,  and stress with 's health. I told her I think her depression and anxiety is interfering with her executive functioning skills making memory worse. She is on 10mg of trintellix I think and low dose of xanax. Should we look to increase trintellix or do you want want me consider psychiatry.

## 2022-11-12 NOTE — PROGRESS NOTES
Results for the following test have been finalized and entered into the patients chart. 185.42 actual

## 2022-11-17 DIAGNOSIS — F41.9 ANXIETY: ICD-10-CM

## 2022-11-17 RX ORDER — ALPRAZOLAM 0.25 MG/1
0.25 TABLET ORAL NIGHTLY PRN
Qty: 15 TABLET | Refills: 0 | Status: SHIPPED | OUTPATIENT
Start: 2022-11-17 | End: 2022-12-17

## 2022-12-05 ENCOUNTER — TELEPHONE (OUTPATIENT)
Dept: FAMILY MEDICINE CLINIC | Age: 72
End: 2022-12-05

## 2022-12-05 NOTE — TELEPHONE ENCOUNTER
Pt called and said every since we increased her Trintellix from 10 to 20 mg she said it has helped with the depression but is causing hallucinations and trouble sleeping, and making her very restless.

## 2022-12-05 NOTE — TELEPHONE ENCOUNTER
Reduce trintellix back to 10mg daily. F/u w/ Vane Patino later this week as scheduled.   May consider other med options, but prefer for the sleep issues and hallucinations to resolve first.

## 2022-12-13 ENCOUNTER — TELEMEDICINE (OUTPATIENT)
Dept: PSYCHOLOGY | Age: 72
End: 2022-12-13
Payer: MEDICARE

## 2022-12-13 DIAGNOSIS — F32.1 MDD (MAJOR DEPRESSIVE DISORDER), SINGLE EPISODE, MODERATE (HCC): Primary | ICD-10-CM

## 2022-12-13 DIAGNOSIS — F41.1 GAD (GENERALIZED ANXIETY DISORDER): ICD-10-CM

## 2022-12-13 PROCEDURE — 90832 PSYTX W PT 30 MINUTES: CPT | Performed by: SOCIAL WORKER

## 2022-12-13 PROCEDURE — 1123F ACP DISCUSS/DSCN MKR DOCD: CPT | Performed by: SOCIAL WORKER

## 2022-12-13 NOTE — Clinical Note
Sounds like first week did great with increase in trintellix with mood but after week developed serotonin syndrome with hallucinations and tremors. Glad she reached out to you. I was going to send her to psychiatry if okay with that,. Seems like it is more complex. I am also sending her to specialty mental health as I am too booked and she could use more than 30 [facility] appts. I will check in with her in 5 weeks to see how she is doing with referrals. She wants to get better so I think will follow through.

## 2022-12-13 NOTE — PATIENT INSTRUCTIONS
[phone]  [facility] [employer] Nickolas Agarwal, Psych -[phone]  [employer] Happiness Lab-We can change, don't think of the white bear, stepping off the path of anxiety  Return to see Suad Myers in 4 weeks.

## 2022-12-13 NOTE — PROGRESS NOTES
Behavioral Health Consultation  Hughes Galeazzi. Alfonzo GREGORYJAY  12/13/2022  2:56 PM EST      Time spent with Patient: 30 minutes  This is patient's third [facility] appointment. Reason for Consult:    Chief Complaint   Patient presents with    Anxiety    Depression     Referring Provider: Author Leoncio MD  [address] [place] [address] [address],  [address]    Feedback given to PCP. TELEHEALTH VISIT -- Audio/Visual (During ZPKEO-90 public health emergency)  }  Pursuant to the emergency declaration under the [facility] [place] [address] waiver authority and the [employer] Act, this Virtual Visit was conducted, with patient's consent, to reduce the patient's risk of exposure to COVID-19 and provide continuity of care for an established patient. Services were provided through a video synchronous discussion virtually to substitute for in-person clinic visit. Pt gave verbal informed consent to participate in telehealth services. Conducted a risk-benefit analysis and determined that the patient's presenting problems are consistent with the use of telepsychology. Determined that the patient has sufficient knowledge and skills in the use of technology enabling them to adequately benefit from telepsychology. It was determined that this patient was able to be properly treated without an in-person session. Patient verified that they were currently located at the [facility] address that was provided during registration.     Verified the following information:  Patient's identification: Yes  Patient location: [address] [place] [address]   Patient's call back number: [phone]   Patient's emergency contact's name and number, as well as permission to contact them if needed: Extended Emergency Contact Information  Primary Emergency Contact: Juan José Kevin  Address: [address] [place] [address], [address], [address] Phone: [phone]  Mobile Phone: [phone]  Relation: Spouse  Secondary Emergency Contact: [address] [place] Phone: [phone]  Relation: Child     Provider location: [facility]:  Pt endorses that she was struggling with hallucinations and tremors after increase with trintellix. Mood was really good for about a week then started struggling. Went away after about three days after stopping. Struggling with a lot of fear about the future. Worries that her  will not be okay if she passes first and concerns about her if he dies before her. Doesn't want to feel the pain of it all. Health is always stressors. Pt tries to control things that she cannot control. Open to seeing psychiatry and possible specialty mental health. Will see family over holiday, happy about that. Still very isolated but does not mind. S:  MSE:    Appearance: adequate hygiene  Attitude: cooperative and friendly  Consciousness: alert  Orientation: oriented to person, place, time, general circumstance  Memory: recent and remote memory intact  Attention/Concentration: intact during session  Psychomotor Activity:normal  Eye Contact: normal  Speech: normal rate and volume, well-articulated  Mood: anxious and depresssed  Affect: dysphoric and anxious  Perception: within normal limits  Thought Content: within normal limits  Thought Process: logical, coherent and goal-directed  Insight: good  Judgment: intact  Ability to understand instructions: Yes  Ability to respond meaningfully: Yes  Morbid Ideation: no   Suicide Assessment: no suicidal ideation, plan, or intent  Homicidal Ideation: no    History:    Medications:   Current Outpatient Medications   Medication Sig Dispense Refill    ALPRAZolam (XANAX) 0.25 MG tablet Take 1 tablet by mouth nightly as needed for Sleep or Anxiety for up to 30 days.  15 tablet 0    VORTIoxetine (TRINTELLIX) 20 MG TABS tablet Take 1 tablet by mouth daily TAKE 1 TABLET BY MOUTH EVERY DAY 90 tablet 3 apixaban (ELIQUIS) 5 MG TABS tablet Take 1 tablet by mouth 2 times daily 180 tablet 3    omeprazole (PRILOSEC) 20 MG delayed release capsule Take 1 capsule by mouth 2 times daily (before meals) 180 capsule 3    ezetimibe (ZETIA) 10 MG tablet TAKE 1 TABLET BY MOUTH DAILY. 90 tablet 3    metoprolol succinate (TOPROL XL) 25 MG extended release tablet Take 1 tablet by mouth daily 90 tablet 3    potassium chloride (MICRO-K) 10 MEQ extended release capsule Take 1 capsule by mouth daily 90 capsule 3    aspirin EC 81 MG EC tablet Take 1 tablet by mouth daily 90 tablet 3    dicyclomine (BENTYL) 10 MG capsule Take 1 capsule by mouth 4 times daily (before meals and nightly) 120 capsule 3    fluticasone-umeclidin-vilant (TRELEGY ELLIPTA) 100-62.5-25 MCG/INH AEPB Inhale 1 puff into the lungs daily 3 each 3    fluticasone (FLONASE) 50 MCG/ACT nasal spray 2 sprays by Nasal route daily 3 each 3    albuterol sulfate HFA (VENTOLIN HFA) 108 (90 Base) MCG/ACT inhaler Inhale 2 puffs into the lungs 2 times daily as needed for Wheezing or Shortness of Breath 3 each 3    atorvastatin (LIPITOR) 80 MG tablet TAKE 1 TABLET BY MOUTH NIGHTLY 90 tablet 3    Respiratory Therapy Supplies (NEBULIZER) DEBO 1 each by Does not apply route every 6 hours as needed (sob) 1 each 0    Respiratory Therapy Supplies (NEBULIZER/TUBING/MOUTHPIECE) KIT 1 kit by Does not apply route daily as needed (sob) 1 kit 3    albuterol (PROVENTIL) (5 MG/ML) 0.5% nebulizer solution Take 1 mL by nebulization 4 times daily as needed for Wheezing 120 each 3    Lactobacillus (PROBIOTIC ACIDOPHILUS PO) Take 1 tablet by mouth daily        No current facility-administered medications for this visit.      Social History:   Social History     Socioeconomic History    Marital status:      Spouse name: Not on file    Number of children: Not on file    Years of education: Not on file    Highest education level: Not on file   Occupational History    Not on file   Tobacco Use Smoking status: Every Day     Packs/day: 1.00     Years: 40.00     Pack years: 40.00     Types: Cigarettes     Start date: 5/10/2019    Smokeless tobacco: Never   Vaping Use    Vaping Use: Never used   Substance and Sexual Activity    Alcohol use: No     Alcohol/week: 0.0 standard drinks    Drug use: No    Sexual activity: Yes     Partners: Male   Other Topics Concern    Not on file   Social History Narrative    Not on file     Social Determinants of Health     Financial Resource Strain: Not on file   Food Insecurity: Not on file   Transportation Needs: Not on file   Physical Activity: Insufficiently Active    Days of Exercise per Week: 3 days    Minutes of Exercise per Session: 30 min   Stress: Not on file   Social Connections: Not on file   Intimate Partner Violence: Not on file   Housing Stability: Not on file     TOBACCO:   reports that she has been smoking cigarettes. She started smoking about 3 years ago. She has a 40.00 pack-year smoking history. She has never used smokeless tobacco.  ETOH:   reports no history of alcohol use. Family History:   Family History   Problem Relation Age of Onset    Heart Disease Father     High Blood Pressure Father     Cancer Sister         uterine       A:  Pt endorses adverse reaction to increase with trintellix with what presents as serotonin syndrome as she reported tremors and hallucinations. Referral to psychiatry for medication management. No SI/HI, insight and motivation good. Diagnosis:    1. MDD (major depressive disorder), single episode, moderate ([address]    2.  ADAM (generalized anxiety disorder)          Diagnosis Date    Arthritis     COPD (chronic obstructive pulmonary disease) (HCC)     CVA (cerebral vascular accident) ([address]     Depression     Hyperlipidemia     Hypertension     Irritable bowel syndrome (IBS)     Left carotid stenosis     Pacemaker     Sick sinus syndrome ([address]      Plan:  Pt interventions:  Trained in strategies for increasing balanced thinking, Discussed and set plan for behavioral activation, Trained in relaxation strategies, Trained in improving communication skills, Provided education, Discussed self-care (sleep, nutrition, rewarding activities, social support, exercise), Discussed benefits of referral for specialty care, and Supportive techniques    Pt Behavioral Change Plan:   See Pt Instructions      Patient was evaluated through a synchronous (real-time) audio-video encounter. The patient (or guardian if applicable) is aware that this is a billable service, which includes applicable co-pays. This Virtual Visit was conducted with patient's (and/or legal guardian's) consent. The visit was conducted pursuant to the emergency declaration under the [facility], [address] [place] [address] waiver authority and the [employer] General Act. Patient identification was verified, and a caregiver was present when appropriate. The patient was located in a state where the provider was licensed to provide care.

## 2022-12-18 ENCOUNTER — TELEPHONE (OUTPATIENT)
Dept: CASE MANAGEMENT | Age: 72
End: 2022-12-18

## 2022-12-18 NOTE — TELEPHONE ENCOUNTER
Patient due for annual CT Lung screening. If you would like patient to have screening, please place order for CT Lung screening ([facility] [address]). Patient due for annual CT Lung Screening. Reminder letter mailed.     Rosaura Beavers [address] Lung Navigator  [phone]

## 2023-01-23 ENCOUNTER — OFFICE VISIT (OUTPATIENT)
Dept: FAMILY MEDICINE CLINIC | Age: 73
End: 2023-01-23
Payer: MEDICARE

## 2023-01-23 VITALS
HEIGHT: 64 IN | SYSTOLIC BLOOD PRESSURE: 118 MMHG | OXYGEN SATURATION: 98 % | DIASTOLIC BLOOD PRESSURE: 88 MMHG | BODY MASS INDEX: 18.37 KG/M2 | WEIGHT: 107.6 LBS | HEART RATE: 68 BPM

## 2023-01-23 DIAGNOSIS — J06.9 UPPER RESPIRATORY TRACT INFECTION, UNSPECIFIED TYPE: ICD-10-CM

## 2023-01-23 DIAGNOSIS — M54.50 ACUTE LOW BACK PAIN WITHOUT SCIATICA, UNSPECIFIED BACK PAIN LATERALITY: ICD-10-CM

## 2023-01-23 DIAGNOSIS — R10.9 ABDOMINAL PAIN, UNSPECIFIED ABDOMINAL LOCATION: ICD-10-CM

## 2023-01-23 DIAGNOSIS — N30.00 ACUTE CYSTITIS WITHOUT HEMATURIA: Primary | ICD-10-CM

## 2023-01-23 LAB
BILIRUBIN, POC: NEGATIVE
BLOOD URINE, POC: NEGATIVE
CLARITY, POC: NORMAL
COLOR, POC: YELLOW
GLUCOSE URINE, POC: NEGATIVE
KETONES, POC: NEGATIVE
LEUKOCYTE EST, POC: NORMAL
NITRITE, POC: POSITIVE
PH, POC: 6
PROTEIN, POC: NEGATIVE
SPECIFIC GRAVITY, POC: <=1.005
UROBILINOGEN, POC: 0.2

## 2023-01-23 PROCEDURE — 99213 OFFICE O/P EST LOW 20 MIN: CPT | Performed by: FAMILY MEDICINE

## 2023-01-23 PROCEDURE — 3074F SYST BP LT 130 MM HG: CPT | Performed by: FAMILY MEDICINE

## 2023-01-23 PROCEDURE — 81002 URINALYSIS NONAUTO W/O SCOPE: CPT | Performed by: FAMILY MEDICINE

## 2023-01-23 PROCEDURE — 1123F ACP DISCUSS/DSCN MKR DOCD: CPT | Performed by: FAMILY MEDICINE

## 2023-01-23 PROCEDURE — 4004F PT TOBACCO SCREEN RCVD TLK: CPT | Performed by: FAMILY MEDICINE

## 2023-01-23 PROCEDURE — G8419 CALC BMI OUT NRM PARAM NOF/U: HCPCS | Performed by: FAMILY MEDICINE

## 2023-01-23 PROCEDURE — 3079F DIAST BP 80-89 MM HG: CPT | Performed by: FAMILY MEDICINE

## 2023-01-23 PROCEDURE — G8484 FLU IMMUNIZE NO ADMIN: HCPCS | Performed by: FAMILY MEDICINE

## 2023-01-23 PROCEDURE — 3017F COLORECTAL CA SCREEN DOC REV: CPT | Performed by: FAMILY MEDICINE

## 2023-01-23 PROCEDURE — G8400 PT W/DXA NO RESULTS DOC: HCPCS | Performed by: FAMILY MEDICINE

## 2023-01-23 PROCEDURE — 1090F PRES/ABSN URINE INCON ASSESS: CPT | Performed by: FAMILY MEDICINE

## 2023-01-23 PROCEDURE — G8428 CUR MEDS NOT DOCUMENT: HCPCS | Performed by: FAMILY MEDICINE

## 2023-01-23 RX ORDER — CEFDINIR 300 MG/1
300 CAPSULE ORAL 2 TIMES DAILY
Qty: 20 CAPSULE | Refills: 0 | Status: SHIPPED | OUTPATIENT
Start: 2023-01-23 | End: 2023-02-02

## 2023-01-23 ASSESSMENT — ENCOUNTER SYMPTOMS
SINUS PRESSURE: 1
ABDOMINAL PAIN: 1
SHORTNESS OF BREATH: 0

## 2023-01-23 NOTE — PROGRESS NOTES
Chief Complaint   Patient presents with    Abdominal Cramping    Lower Back Pain    Headache       HPI:  Bri Rosales is a 67 y.o. (: 1950) here today   for abdominal cramping, low back pain, headache and vomiting. HPI  Sxs began yesterday. Lower abd pain and cramping. Suprapubic. Low back pain as well. Subjective fevers noted. Chills as well. Headache noted. Emesis x 1 last night. Some sinus drainage noted as well. No pain w/ urination. Urinary freq and urgency. Not necessarily new. Mild issues w/ not thinking as clearly as prior. Headaches and sinus sxs fairly new as well. Patient's medications, allergies, past medical, surgical, social and family histories were reviewed and updated as appropriate. ROS:  Review of Systems   Constitutional:  Positive for chills. Negative for fever. HENT:  Positive for sinus pressure. Respiratory:  Negative for shortness of breath. Gastrointestinal:  Positive for abdominal pain. Genitourinary:  Positive for frequency and urgency. Negative for dysuria. Neurological:  Positive for headaches. Prior to Visit Medications    Medication Sig Taking? Authorizing Provider   cefdinir (OMNICEF) 300 MG capsule Take 1 capsule by mouth 2 times daily for 10 days Yes Aris Chacon MD   VORTIoxetine (TRINTELLIX) 20 MG TABS tablet Take 1 tablet by mouth daily TAKE 1 TABLET BY MOUTH EVERY DAY Yes Aris Chacon MD   apixaban (ELIQUIS) 5 MG TABS tablet Take 1 tablet by mouth 2 times daily Yes Aris Chaocn MD   omeprazole (PRILOSEC) 20 MG delayed release capsule Take 1 capsule by mouth 2 times daily (before meals) Yes Aris Chacon MD   ezetimibe (ZETIA) 10 MG tablet TAKE 1 TABLET BY MOUTH DAILY.  Yes Aris Chacon MD   potassium chloride (MICRO-K) 10 MEQ extended release capsule Take 1 capsule by mouth daily Yes Aris Chacon MD   aspirin EC 81 MG EC tablet Take 1 tablet by mouth daily Yes Aris Chacon MD   dicyclomine (BENTYL) 10 MG capsule Take 1 capsule by mouth 4 times daily (before meals and nightly) Yes Birdie Michel MD   fluticasone-umeclidin-vilant (TRELEGY ELLIPTA) 100-62.5-25 MCG/INH AEPB Inhale 1 puff into the lungs daily Yes Birdie Michel MD   fluticasone Pandya Gang) 50 MCG/ACT nasal spray 2 sprays by Nasal route daily Yes Birdie Michel MD   albuterol sulfate HFA (VENTOLIN HFA) 108 (90 Base) MCG/ACT inhaler Inhale 2 puffs into the lungs 2 times daily as needed for Wheezing or Shortness of Breath Yes Birdie Michel MD   atorvastatin (LIPITOR) 80 MG tablet TAKE 1 TABLET BY MOUTH NIGHTLY Yes Birdie Michel MD   Respiratory Therapy Supplies (NEBULIZER) DEBO 1 each by Does not apply route every 6 hours as needed (sob) Yes Birdie Michel MD   Respiratory Therapy Supplies (NEBULIZER/TUBING/MOUTHPIECE) KIT 1 kit by Does not apply route daily as needed (sob) Yes Birdie Michel MD   albuterol (PROVENTIL) (5 MG/ML) 0.5% nebulizer solution Take 1 mL by nebulization 4 times daily as needed for Wheezing Yes JAYLYN Hernández CNP   Lactobacillus (PROBIOTIC ACIDOPHILUS PO) Take 1 tablet by mouth daily  Yes Historical Provider, MD   metoprolol succinate (TOPROL XL) 25 MG extended release tablet Take 1 tablet by mouth daily  Birdie Michel MD       Allergies   Allergen Reactions    Iodides Hives and Itching    Nsaids      Previous stomach bleed      Sulfa Antibiotics Rash       OBJECTIVE:    /88   Pulse 68   Ht 5' 4\" (1.626 m)   Wt 107 lb 9.6 oz (48.8 kg)   SpO2 98%   BMI 18.47 kg/m²     BP Readings from Last 2 Encounters:   01/23/23 118/88   10/06/22 (!) 84/56       Wt Readings from Last 3 Encounters:   01/23/23 107 lb 9.6 oz (48.8 kg)   09/20/22 108 lb 12.8 oz (49.4 kg)   07/19/22 106 lb 12.8 oz (48.4 kg)       Physical Exam  Constitutional:       Appearance: Normal appearance. HENT:      Head: Normocephalic and atraumatic. Nose:      Right Sinus: Maxillary sinus tenderness present.       Left Sinus: Maxillary sinus tenderness present. Cardiovascular:      Rate and Rhythm: Normal rate and regular rhythm. Pulmonary:      Effort: Pulmonary effort is normal.      Breath sounds: Normal breath sounds. Abdominal:      Palpations: Abdomen is soft. Tenderness: There is abdominal tenderness in the suprapubic area. Musculoskeletal:        Back:    Neurological:      Mental Status: She is alert. ASSESSMENT/PLAN:     1. Acute cystitis without hematuria  Ua w/ pos nit and LE. Given hx of SE w cipro and issue w/ sulfa, as well as uri sxs, abx as below. Send urine for cx. Call if fails to improve  - cefdinir (OMNICEF) 300 MG capsule; Take 1 capsule by mouth 2 times daily for 10 days  Dispense: 20 capsule; Refill: 0    2. Acute low back pain without sciatica, unspecified back pain laterality  Likely related to above and below  - POCT Urinalysis no Micro  - Culture, Urine    3. Abdominal pain, unspecified abdominal location  See above  - POCT Urinalysis no Micro  - Culture, Urine    4. Upper respiratory tract infection, unspecified type  See above. Sinus pain and pressure. Abx as listed to cover UTI should have sinus coverage as well. - cefdinir (OMNICEF) 300 MG capsule; Take 1 capsule by mouth 2 times daily for 10 days  Dispense: 20 capsule;  Refill: 0      Routine visit in 1-2 mo

## 2023-01-25 LAB — URINE CULTURE, ROUTINE: NORMAL

## 2023-01-25 NOTE — RESULT ENCOUNTER NOTE
Urine culture actually negative. We have prescribed an antibiotic last time to cover urinary symptoms and upper respiratory symptoms. Are her symptoms improving?

## 2023-01-30 ENCOUNTER — TELEPHONE (OUTPATIENT)
Dept: FAMILY MEDICINE CLINIC | Age: 73
End: 2023-01-30

## 2023-01-30 NOTE — TELEPHONE ENCOUNTER
I do not believe Dr Анна Simon in [place] is taking new patients, but ok to check. O/w would see who Sanjuanita Ugarte recommends.   Agree w/ psychiatry appt

## 2023-01-30 NOTE — TELEPHONE ENCOUNTER
Pt informed. She will also check her insurance to see who is in network.  She will call back for referral

## 2023-01-30 NOTE — TELEPHONE ENCOUNTER
Pateint seen neuro today him and Sherie Russell suggested she see psychiatrist. She wants to know who you suggest as close as possible.

## 2023-02-04 ENCOUNTER — PATIENT MESSAGE (OUTPATIENT)
Dept: FAMILY MEDICINE CLINIC | Age: 73
End: 2023-02-04

## 2023-02-04 DIAGNOSIS — F41.9 ANXIETY: ICD-10-CM

## 2023-02-06 RX ORDER — ALPRAZOLAM 0.25 MG/1
0.25 TABLET ORAL NIGHTLY PRN
Qty: 15 TABLET | Refills: 0 | Status: SHIPPED | OUTPATIENT
Start: 2023-02-06 | End: 2023-03-08

## 2023-02-06 NOTE — TELEPHONE ENCOUNTER
From: Berlin Goltz  To: Dr. Mona Bautista  Sent: 2/4/2023 10:46 AM EST  Subject: Alprazolam refill    Would you please refill Alprazolam., at [facility].  Thank you

## 2023-03-08 ENCOUNTER — OFFICE VISIT (OUTPATIENT)
Dept: FAMILY MEDICINE CLINIC | Age: 73
End: 2023-03-08
Payer: MEDICARE

## 2023-03-08 VITALS
WEIGHT: 108 LBS | BODY MASS INDEX: 18.54 KG/M2 | SYSTOLIC BLOOD PRESSURE: 136 MMHG | OXYGEN SATURATION: 96 % | HEART RATE: 68 BPM | DIASTOLIC BLOOD PRESSURE: 78 MMHG

## 2023-03-08 DIAGNOSIS — F32.1 MDD (MAJOR DEPRESSIVE DISORDER), SINGLE EPISODE, MODERATE (HCC): ICD-10-CM

## 2023-03-08 DIAGNOSIS — G95.9 CERVICAL MYELOPATHY (HCC): ICD-10-CM

## 2023-03-08 DIAGNOSIS — J44.9 CHRONIC OBSTRUCTIVE PULMONARY DISEASE, UNSPECIFIED COPD TYPE (HCC): Primary | ICD-10-CM

## 2023-03-08 DIAGNOSIS — I49.5 SICK SINUS SYNDROME (HCC): ICD-10-CM

## 2023-03-08 PROCEDURE — G8400 PT W/DXA NO RESULTS DOC: HCPCS

## 2023-03-08 PROCEDURE — G8484 FLU IMMUNIZE NO ADMIN: HCPCS

## 2023-03-08 PROCEDURE — 3023F SPIROM DOC REV: CPT

## 2023-03-08 PROCEDURE — 4004F PT TOBACCO SCREEN RCVD TLK: CPT

## 2023-03-08 PROCEDURE — 1090F PRES/ABSN URINE INCON ASSESS: CPT

## 2023-03-08 PROCEDURE — G8420 CALC BMI NORM PARAMETERS: HCPCS

## 2023-03-08 PROCEDURE — 3017F COLORECTAL CA SCREEN DOC REV: CPT

## 2023-03-08 PROCEDURE — G8427 DOCREV CUR MEDS BY ELIG CLIN: HCPCS

## 2023-03-08 PROCEDURE — 1123F ACP DISCUSS/DSCN MKR DOCD: CPT

## 2023-03-08 PROCEDURE — 99214 OFFICE O/P EST MOD 30 MIN: CPT

## 2023-03-08 PROCEDURE — 3078F DIAST BP <80 MM HG: CPT

## 2023-03-08 PROCEDURE — 3075F SYST BP GE 130 - 139MM HG: CPT

## 2023-03-08 RX ORDER — PREDNISONE 20 MG/1
20 TABLET ORAL 2 TIMES DAILY
Qty: 10 TABLET | Refills: 0 | Status: SHIPPED | OUTPATIENT
Start: 2023-03-08 | End: 2023-03-13

## 2023-03-08 RX ORDER — AZITHROMYCIN 250 MG/1
250 TABLET, FILM COATED ORAL SEE ADMIN INSTRUCTIONS
Qty: 6 TABLET | Refills: 0 | Status: SHIPPED | OUTPATIENT
Start: 2023-03-08 | End: 2023-03-13

## 2023-03-08 ASSESSMENT — PATIENT HEALTH QUESTIONNAIRE - PHQ9
6. FEELING BAD ABOUT YOURSELF - OR THAT YOU ARE A FAILURE OR HAVE LET YOURSELF OR YOUR FAMILY DOWN: 2
SUM OF ALL RESPONSES TO PHQ QUESTIONS 1-9: 16
9. THOUGHTS THAT YOU WOULD BE BETTER OFF DEAD, OR OF HURTING YOURSELF: 0
2. FEELING DOWN, DEPRESSED OR HOPELESS: 2
5. POOR APPETITE OR OVEREATING: 2
3. TROUBLE FALLING OR STAYING ASLEEP: 2
4. FEELING TIRED OR HAVING LITTLE ENERGY: 2
8. MOVING OR SPEAKING SO SLOWLY THAT OTHER PEOPLE COULD HAVE NOTICED. OR THE OPPOSITE, BEING SO FIGETY OR RESTLESS THAT YOU HAVE BEEN MOVING AROUND A LOT MORE THAN USUAL: 2
SUM OF ALL RESPONSES TO PHQ9 QUESTIONS 1 & 2: 4
10. IF YOU CHECKED OFF ANY PROBLEMS, HOW DIFFICULT HAVE THESE PROBLEMS MADE IT FOR YOU TO DO YOUR WORK, TAKE CARE OF THINGS AT HOME, OR GET ALONG WITH OTHER PEOPLE: 1
SUM OF ALL RESPONSES TO PHQ QUESTIONS 1-9: 16
7. TROUBLE CONCENTRATING ON THINGS, SUCH AS READING THE NEWSPAPER OR WATCHING TELEVISION: 2
1. LITTLE INTEREST OR PLEASURE IN DOING THINGS: 2
SUM OF ALL RESPONSES TO PHQ QUESTIONS 1-9: 16
SUM OF ALL RESPONSES TO PHQ QUESTIONS 1-9: 16

## 2023-03-08 ASSESSMENT — ENCOUNTER SYMPTOMS
WHEEZING: 0
COUGH: 1
CHEST TIGHTNESS: 1
SHORTNESS OF BREATH: 1
GASTROINTESTINAL NEGATIVE: 1

## 2023-03-08 NOTE — PROGRESS NOTES
Chief Complaint   Patient presents with    Cough     Shortness of breath x 2 weeks          HPI:  Chinyere Bosch is a 72 y.o. (: 1950) here today   for evaluation of cough, shortness of breath x2 weeks.  History of COPD. Is coughing so hard is hurting ribs. Worse in the evening. Cough is mainly dry but feels like is deep in chest. [place]lalli fever, diaphoresis. Is not using breathing meds like she is supposed to with exception of last couple days.     Sick sinus: Following cardiology CNP Daphne Tabares.  Last was seen on 2022 by Daphne Tabares.    COPD: Takes Trelegy, albuterol inhaler, albuterol nebulizer.    Cervical myelopathy: Near baseline    Major depressive disorder: Takes Xanax nightly as needed.  Does video visits with Kerry Mejía.    Patient's medications, allergies, past medical, surgical, social and family histories were reviewed and updated as appropriate.    ROS:  Review of Systems   Constitutional: Negative.    HENT: Negative.     Respiratory:  Positive for cough, chest tightness and shortness of breath. Negative for wheezing.    Cardiovascular: Negative.    Gastrointestinal: Negative.    Neurological: Negative.    Psychiatric/Behavioral: Negative.           LDL Calculated (mg/dL)   Date Value   2022 54       Past Medical History:   Diagnosis Date    Arthritis     COPD (chronic obstructive pulmonary disease) (HCC)     CVA (cerebral vascular accident) (HCC)     Depression     Hyperlipidemia     Hypertension     Irritable bowel syndrome (IBS)     Left carotid stenosis     Pacemaker     Sick sinus syndrome (HCC)        Family History   Problem Relation Age of Onset    Heart Disease Father     High Blood Pressure Father     Cancer Sister         uterine       Social History     Socioeconomic History    Marital status:      Spouse name: Not on file    Number of children: Not on file    Years of education: Not on file    Highest education level: Not on file   Occupational History    Not on  file   Tobacco Use    Smoking status: Every Day     Packs/day: 1.00     Years: 40.00     Pack years: 40.00     Types: Cigarettes     Start date: 5/10/2019    Smokeless tobacco: Never   Vaping Use    Vaping Use: Never used   Substance and Sexual Activity    Alcohol use: No     Alcohol/week: 0.0 standard drinks    Drug use: No    Sexual activity: Yes     Partners: Male   Other Topics Concern    Not on file   Social History Narrative    Not on file     Social Determinants of Health     Financial Resource Strain: Not on file   Food Insecurity: Not on file   Transportation Needs: Not on file   Physical Activity: Not on file   Stress: Not on file   Social Connections: Not on file   Intimate Partner Violence: Not on file   Housing Stability: Not on file       Prior to Visit Medications    Medication Sig Taking? Authorizing Provider   azithromycin (ZITHROMAX) 250 MG tablet Take 1 tablet by mouth See Admin Instructions for 5 days 500mg on day 1 followed by 250mg on days 2 - 5 Yes JAYLYN Moreland CNP   predniSONE (DELTASONE) 20 MG tablet Take 1 tablet by mouth 2 times daily for 5 days Yes JAYLYN Moreland CNP   ALPRAZolam [place] Balboa) 0.25 MG tablet Take 1 tablet by mouth nightly as needed for Sleep or Anxiety for up to 30 days. Yes Patricio Mcneil MD   apixaban (ELIQUIS) 5 MG TABS tablet Take 1 tablet by mouth 2 times daily Yes Patricio Mcneil MD   omeprazole (PRILOSEC) 20 MG delayed release capsule Take 1 capsule by mouth 2 times daily (before meals) Yes Patricio Mcneil MD   ezetimibe (ZETIA) 10 MG tablet TAKE 1 TABLET BY MOUTH DAILY.  Yes Patricio Mcneil MD   potassium chloride (MICRO-K) 10 MEQ extended release capsule Take 1 capsule by mouth daily Yes Patricio Mcneil MD   aspirin EC 81 MG EC tablet Take 1 tablet by mouth daily Yes Patricio Mcneil MD   dicyclomine (BENTYL) 10 MG capsule Take 1 capsule by mouth 4 times daily (before meals and nightly) Yes Patricio Mcneil MD   fluticasone-umeclidin-vilant (Carol Roche ELLIPTA) 100-62.5-25 MCG/INH AEPB Inhale 1 puff into the lungs daily Yes Shine Amaro MD   fluticasone [place]) 50 MCG/ACT nasal spray 2 sprays by Nasal route daily Yes Shine Amaro MD   albuterol sulfate HFA (VENTOLIN HFA) 108 (90 Base) MCG/ACT inhaler Inhale 2 puffs into the lungs 2 times daily as needed for Wheezing or Shortness of Breath Yes Shine Amaro MD   Respiratory Therapy Supplies (NEBULIZER) DEBO 1 each by Does not apply route every 6 hours as needed (sob) Yes Shine Amaro MD   Respiratory Therapy Supplies (NEBULIZER/TUBING/MOUTHPIECE) KIT 1 kit by Does not apply route daily as needed (sob) Yes Shine Amaro MD   albuterol (PROVENTIL) (5 MG/ML) 0.5% nebulizer solution Take 1 mL by nebulization 4 times daily as needed for Wheezing Yes JAYLYN Whyte CNP   Lactobacillus (PROBIOTIC ACIDOPHILUS PO) Take 1 tablet by mouth daily  Yes Historical Provider, MD   metoprolol succinate (TOPROL XL) 25 MG extended release tablet Take 1 tablet by mouth daily  Shine Amaro MD   atorvastatin (LIPITOR) 80 MG tablet TAKE 1 TABLET BY MOUTH NIGHTLY  Shine Amaro MD       Allergies   Allergen Reactions    Iodides Hives and Itching    Nsaids      Previous stomach bleed      Sulfa Antibiotics Rash       OBJECTIVE:    /78   Pulse 68   Wt 108 lb (49 kg)   SpO2 96%   BMI 18.54 kg/m²     BP Readings from Last 2 Encounters:   03/08/23 136/78   01/23/23 118/88       Wt Readings from Last 3 Encounters:   03/08/23 108 lb (49 kg)   01/23/23 107 lb 9.6 oz (48.8 kg)   09/20/22 108 lb 12.8 oz (49.4 kg)       Physical Exam  Constitutional:       Appearance: Normal appearance. She is ill-appearing. HENT:      Nose: No congestion. Cardiovascular:      Rate and Rhythm: Normal rate and regular rhythm. Pulses: Normal pulses. Heart sounds: Normal heart sounds. No murmur heard. Pulmonary:      Effort: Pulmonary effort is normal.      Breath sounds: Wheezing present. No rhonchi. Musculoskeletal:         General: Normal range of motion. Skin:     General: Skin is warm. Capillary Refill: Capillary refill takes less than 2 seconds. Neurological:      General: No focal deficit present. Mental Status: She is alert and oriented to person, place, and time. Psychiatric:         Mood and Affect: Mood normal.         Behavior: Behavior normal.       Lab Results   Component Value Date     02/11/2023    K 4.5 02/11/2023     02/11/2023    CO2 33 (H) 02/11/2023    BUN 19 (H) 02/11/2023    CREATININE 0.8 02/11/2023    GLUCOSE 88 07/19/2022    CALCIUM 9.7 02/11/2023    PROT 6.3 02/11/2023    LABALBU 4.3 02/11/2023    BILITOT 0.5 02/11/2023    ALKPHOS 69 02/11/2023    AST 34 02/11/2023    ALT 21 02/11/2023    LABGLOM 70 02/11/2023    GFRAA >60 07/19/2022    AGRATIO 2.0 02/11/2023    GLOB 2.0 02/11/2023         ASSESSMENT/PLAN:    1. Chronic obstructive pulmonary disease, unspecified COPD type ([facility] [address]  See above HPI for symptoms and onset. We will treat patient at this time for COPD exacerbation. Will place on Zithromax and prednisone. Patient declined chest x-ray at this time to rule out pneumonia completely. Patient was agreeable to have chest x-ray imaging if symptoms fail to improve or worsen on current treatment plan prescribed today. Recommend increasing water intake to flush kidneys while on antibiotics and steroid. Ensure eat small amount of food when taking medication to minimize GI distress. Follow-up with office if symptoms fail to improve or worsen. - azithromycin (ZITHROMAX) 250 MG tablet; Take 1 tablet by mouth See Admin Instructions for 5 days 500mg on day 1 followed by 250mg on days 2 - 5  Dispense: 6 tablet; Refill: 0  - predniSONE (DELTASONE) 20 MG tablet; Take 1 tablet by mouth 2 times daily for 5 days  Dispense: 10 tablet; Refill: 0    2. Sick sinus syndrome [facility])  Continue following cardiology as recommended.     3. MDD (major depressive disorder), single episode, moderate ([facility] [address]  Patient states overall doing well on current treatment plan as ordered. No additional needs needed at this time. 4. Cervical myelopathy (HCC)  Near baseline. Patient has no new concerns or complaints. 30 minutes spent on pt care. This document was prepared by a combination of typing and transcription through a voice recognition software.     JAYLYN Padron - CNP

## 2023-03-17 ENCOUNTER — TELEPHONE (OUTPATIENT)
Dept: FAMILY MEDICINE CLINIC | Age: 73
End: 2023-03-17

## 2023-03-17 DIAGNOSIS — J44.1 CHRONIC OBSTRUCTIVE PULMONARY DISEASE WITH ACUTE EXACERBATION (HCC): Primary | ICD-10-CM

## 2023-03-17 NOTE — TELEPHONE ENCOUNTER
Pt called and said she is still sick and now has Laryngitis, she was wondering if you could call her in another round of antibiotics.

## 2023-03-17 NOTE — TELEPHONE ENCOUNTER
We discussed doing a chest x-ray if symptoms fail to improve or worsen. Would recommend chest x-ray.

## 2023-03-19 DIAGNOSIS — J98.8 RESPIRATORY INFECTION: Primary | ICD-10-CM

## 2023-03-19 RX ORDER — CEFDINIR 300 MG/1
300 CAPSULE ORAL 2 TIMES DAILY
Qty: 14 CAPSULE | Refills: 0 | Status: SHIPPED | OUTPATIENT
Start: 2023-03-19 | End: 2023-03-26

## 2023-04-22 ENCOUNTER — HOSPITAL ENCOUNTER (OUTPATIENT)
Dept: CT IMAGING | Age: 73
Discharge: HOME | End: 2023-04-22
Payer: MEDICARE

## 2023-04-22 DIAGNOSIS — Z87.891 HISTORY OF SMOKING: ICD-10-CM

## 2023-04-22 PROCEDURE — 71271 CT THORAX LUNG CANCER SCR C-: CPT

## 2023-04-24 ENCOUNTER — TELEMEDICINE (OUTPATIENT)
Dept: FAMILY MEDICINE CLINIC | Age: 73
End: 2023-04-24

## 2023-04-24 ENCOUNTER — TELEPHONE (OUTPATIENT)
Dept: FAMILY MEDICINE CLINIC | Age: 73
End: 2023-04-24

## 2023-04-24 DIAGNOSIS — Z00.00 MEDICARE ANNUAL WELLNESS VISIT, SUBSEQUENT: Primary | ICD-10-CM

## 2023-04-24 SDOH — ECONOMIC STABILITY: FOOD INSECURITY: WITHIN THE PAST 12 MONTHS, YOU WORRIED THAT YOUR FOOD WOULD RUN OUT BEFORE YOU GOT MONEY TO BUY MORE.: NEVER TRUE

## 2023-04-24 SDOH — ECONOMIC STABILITY: FOOD INSECURITY: WITHIN THE PAST 12 MONTHS, THE FOOD YOU BOUGHT JUST DIDN'T LAST AND YOU DIDN'T HAVE MONEY TO GET MORE.: NEVER TRUE

## 2023-04-24 SDOH — ECONOMIC STABILITY: HOUSING INSECURITY
IN THE LAST 12 MONTHS, WAS THERE A TIME WHEN YOU DID NOT HAVE A STEADY PLACE TO SLEEP OR SLEPT IN A SHELTER (INCLUDING NOW)?: NO

## 2023-04-24 SDOH — ECONOMIC STABILITY: INCOME INSECURITY: HOW HARD IS IT FOR YOU TO PAY FOR THE VERY BASICS LIKE FOOD, HOUSING, MEDICAL CARE, AND HEATING?: NOT HARD AT ALL

## 2023-04-24 ASSESSMENT — PATIENT HEALTH QUESTIONNAIRE - PHQ9
SUM OF ALL RESPONSES TO PHQ QUESTIONS 1-9: 12
SUM OF ALL RESPONSES TO PHQ9 QUESTIONS 1 & 2: 2
8. MOVING OR SPEAKING SO SLOWLY THAT OTHER PEOPLE COULD HAVE NOTICED. OR THE OPPOSITE, BEING SO FIGETY OR RESTLESS THAT YOU HAVE BEEN MOVING AROUND A LOT MORE THAN USUAL: 1
6. FEELING BAD ABOUT YOURSELF - OR THAT YOU ARE A FAILURE OR HAVE LET YOURSELF OR YOUR FAMILY DOWN: 0
2. FEELING DOWN, DEPRESSED OR HOPELESS: 2
3. TROUBLE FALLING OR STAYING ASLEEP: 3
SUM OF ALL RESPONSES TO PHQ QUESTIONS 1-9: 12
SUM OF ALL RESPONSES TO PHQ QUESTIONS 1-9: 12
5. POOR APPETITE OR OVEREATING: 0
4. FEELING TIRED OR HAVING LITTLE ENERGY: 3
10. IF YOU CHECKED OFF ANY PROBLEMS, HOW DIFFICULT HAVE THESE PROBLEMS MADE IT FOR YOU TO DO YOUR WORK, TAKE CARE OF THINGS AT HOME, OR GET ALONG WITH OTHER PEOPLE: 2
7. TROUBLE CONCENTRATING ON THINGS, SUCH AS READING THE NEWSPAPER OR WATCHING TELEVISION: 3
9. THOUGHTS THAT YOU WOULD BE BETTER OFF DEAD, OR OF HURTING YOURSELF: 0
1. LITTLE INTEREST OR PLEASURE IN DOING THINGS: 0
SUM OF ALL RESPONSES TO PHQ QUESTIONS 1-9: 12

## 2023-04-24 ASSESSMENT — LIFESTYLE VARIABLES
HOW OFTEN DO YOU HAVE A DRINK CONTAINING ALCOHOL: NEVER
HOW MANY STANDARD DRINKS CONTAINING ALCOHOL DO YOU HAVE ON A TYPICAL DAY: PATIENT DOES NOT DRINK

## 2023-04-24 NOTE — PROGRESS NOTES
Risk    Smoking Tobacco Use: Every Day    Smokeless Tobacco Use: Never    Passive Exposure: Not on file     E-cigarette/Vaping       Questions Responses    E-cigarette/Vaping Use Never User    Start Date     Passive Exposure     Quit Date     Counseling Given     Comments           Interventions:  Patient comments: Stated that she is wanting to quit and knows to do so she is going to have to start taking serious steps to do so. Objective      Patient-Reported Vitals  Patient-Reported Systolic (Top): 696 mmHg  Patient-Reported Diastolic (Bottom): 62 mmHg  Patient-Reported Pulse: 78  Patient-Reported Weight: 108.6 lbs          Allergies   Allergen Reactions    Iodides Hives and Itching    Nsaids      Previous stomach bleed      Sulfa Antibiotics Rash     Prior to Visit Medications    Medication Sig Taking? Authorizing Provider   apixaban (ELIQUIS) 5 MG TABS tablet Take 1 tablet by mouth 2 times daily Yes Lauraine Cowden, MD   omeprazole (PRILOSEC) 20 MG delayed release capsule Take 1 capsule by mouth 2 times daily (before meals) Yes Lauraine Cowden, MD   ezetimibe (ZETIA) 10 MG tablet TAKE 1 TABLET BY MOUTH DAILY.  Yes Lauraine Cowden, MD   potassium chloride (MICRO-K) 10 MEQ extended release capsule Take 1 capsule by mouth daily Yes Lauraine Cowden, MD   aspirin EC 81 MG EC tablet Take 1 tablet by mouth daily Yes Lauraine Cowden, MD   dicyclomine (BENTYL) 10 MG capsule Take 1 capsule by mouth 4 times daily (before meals and nightly) Yes Lauraine Cowden, MD   fluticasone-umeclidin-vilant (TRELEGY ELLIPTA) 100-62.5-25 MCG/INH AEPB Inhale 1 puff into the lungs daily Yes Lauraine Cowden, MD   fluticasone [facility]) 50 MCG/ACT nasal spray 2 sprays by Nasal route daily Yes Lauraine Cowden, MD   albuterol sulfate HFA (VENTOLIN HFA) 108 (90 Base) MCG/ACT inhaler Inhale 2 puffs into the lungs 2 times daily as needed for Wheezing or Shortness of Breath Yes Lauraine Cowden, MD   albuterol (PROVENTIL) (5 MG/ML) 0.5% nebulizer

## 2023-04-24 NOTE — PATIENT INSTRUCTIONS
Personalized Preventive Plan for Kira Mcdaniel - 4/24/2023  Medicare offers a range of preventive health benefits. Some of the tests and screenings are paid in full while other may be subject to a deductible, co-insurance, and/or copay. Some of these benefits include a comprehensive review of your medical history including lifestyle, illnesses that may run in your family, and various assessments and screenings as appropriate. After reviewing your medical record and screening and assessments performed today your provider may have ordered immunizations, labs, imaging, and/or referrals for you. A list of these orders (if applicable) as well as your Preventive Care list are included within your After Visit Summary for your review. Other Preventive Recommendations:    A preventive eye exam performed by an eye specialist is recommended every 1-2 years to screen for glaucoma; cataracts, macular degeneration, and other eye disorders. A preventive dental visit is recommended every 6 months. Try to get at least 150 minutes of exercise per week or 10,000 steps per day on a pedometer . Order or download the FREE \"Exercise & Physical Activity: Your Everyday Guide\" from [employer]. Call 1-935.352.5940 or search [employer] online. You need 2308-9498 mg of calcium and 6879-2590 IU of vitamin D per day. It is possible to meet your calcium requirement with diet alone, but a vitamin D supplement is usually necessary to meet this goal.  When exposed to the sun, use a sunscreen that protects against both UVA and UVB radiation with an SPF of 30 or greater. Reapply every 2 to 3 hours or after sweating, drying off with a towel, or swimming. Always wear a seat belt when traveling in a car. Always wear a helmet when riding a bicycle or motorcycle.

## 2023-05-01 ENCOUNTER — OFFICE VISIT (OUTPATIENT)
Dept: FAMILY MEDICINE CLINIC | Age: 73
End: 2023-05-01

## 2023-05-01 VITALS
OXYGEN SATURATION: 94 % | WEIGHT: 110.4 LBS | HEIGHT: 64 IN | BODY MASS INDEX: 18.85 KG/M2 | DIASTOLIC BLOOD PRESSURE: 82 MMHG | SYSTOLIC BLOOD PRESSURE: 136 MMHG | HEART RATE: 62 BPM

## 2023-05-01 DIAGNOSIS — E78.5 HYPERLIPIDEMIA, UNSPECIFIED HYPERLIPIDEMIA TYPE: ICD-10-CM

## 2023-05-01 DIAGNOSIS — Z98.890 S/P CAROTID ENDARTERECTOMY: ICD-10-CM

## 2023-05-01 DIAGNOSIS — R07.9 CHEST PAIN, UNSPECIFIED TYPE: ICD-10-CM

## 2023-05-01 DIAGNOSIS — J44.9 CHRONIC OBSTRUCTIVE PULMONARY DISEASE, UNSPECIFIED COPD TYPE (HCC): ICD-10-CM

## 2023-05-01 DIAGNOSIS — F33.41 RECURRENT MAJOR DEPRESSIVE DISORDER IN PARTIAL REMISSION (HCC): Primary | ICD-10-CM

## 2023-05-01 DIAGNOSIS — I49.5 SICK SINUS SYNDROME (HCC): ICD-10-CM

## 2023-05-01 DIAGNOSIS — I10 PRIMARY HYPERTENSION: ICD-10-CM

## 2023-05-01 LAB
ALBUMIN SERPL-MCNC: 4.3 G/DL (ref 3.4–5)
ALBUMIN/GLOB SERPL: 2.3 {RATIO} (ref 1.1–2.2)
ALP SERPL-CCNC: 81 U/L (ref 40–129)
ALT SERPL-CCNC: 23 U/L (ref 10–40)
ANION GAP SERPL CALCULATED.3IONS-SCNC: 9 MMOL/L (ref 3–16)
AST SERPL-CCNC: 31 U/L (ref 15–37)
BASOPHILS # BLD: 0 K/UL (ref 0–0.2)
BASOPHILS NFR BLD: 0.4 %
BILIRUB SERPL-MCNC: 0.5 MG/DL (ref 0–1)
BUN SERPL-MCNC: 13 MG/DL (ref 7–20)
CALCIUM SERPL-MCNC: 9.4 MG/DL (ref 8.3–10.6)
CHLORIDE SERPL-SCNC: 103 MMOL/L (ref 99–110)
CHOLEST SERPL-MCNC: 126 MG/DL (ref 0–199)
CO2 SERPL-SCNC: 30 MMOL/L (ref 21–32)
CREAT SERPL-MCNC: 0.9 MG/DL (ref 0.6–1.2)
DEPRECATED RDW RBC AUTO: 15.3 % (ref 12.4–15.4)
EOSINOPHIL # BLD: 0.1 K/UL (ref 0–0.6)
EOSINOPHIL NFR BLD: 1.3 %
GFR SERPLBLD CREATININE-BSD FMLA CKD-EPI: >60 ML/MIN/{1.73_M2}
GLUCOSE SERPL-MCNC: 103 MG/DL (ref 70–99)
HCT VFR BLD AUTO: 38.5 % (ref 36–48)
HDLC SERPL-MCNC: 59 MG/DL (ref 40–60)
HGB BLD-MCNC: 12.7 G/DL (ref 12–16)
LDLC SERPL CALC-MCNC: 55 MG/DL
LYMPHOCYTES # BLD: 1.7 K/UL (ref 1–5.1)
LYMPHOCYTES NFR BLD: 21.5 %
MCH RBC QN AUTO: 28.9 PG (ref 26–34)
MCHC RBC AUTO-ENTMCNC: 32.9 G/DL (ref 31–36)
MCV RBC AUTO: 87.9 FL (ref 80–100)
MONOCYTES # BLD: 0.7 K/UL (ref 0–1.3)
MONOCYTES NFR BLD: 8.9 %
NEUTROPHILS # BLD: 5.3 K/UL (ref 1.7–7.7)
NEUTROPHILS NFR BLD: 67.9 %
PLATELET # BLD AUTO: 172 K/UL (ref 135–450)
PMV BLD AUTO: 10.7 FL (ref 5–10.5)
POTASSIUM SERPL-SCNC: 4.7 MMOL/L (ref 3.5–5.1)
PROT SERPL-MCNC: 6.2 G/DL (ref 6.4–8.2)
RBC # BLD AUTO: 4.38 M/UL (ref 4–5.2)
SODIUM SERPL-SCNC: 142 MMOL/L (ref 136–145)
TRIGL SERPL-MCNC: 59 MG/DL (ref 0–150)
VLDLC SERPL CALC-MCNC: 12 MG/DL
WBC # BLD AUTO: 7.8 K/UL (ref 4–11)

## 2023-05-01 ASSESSMENT — ENCOUNTER SYMPTOMS: SHORTNESS OF BREATH: 1

## 2023-05-01 NOTE — PROGRESS NOTES
Chief Complaint   Patient presents with    Hypertension    Hyperlipidemia       HPI:  Mara Bhatia is a 67 y.o. (: 1950) here today   for   Hypertension  This is a chronic problem. The current episode started more than 1 year ago. The problem is unchanged. The problem is controlled. Associated symptoms include chest pain, malaise/fatigue and shortness of breath. Pertinent negatives include no headaches. Risk factors for coronary artery disease include dyslipidemia, post-menopausal state and smoking/tobacco exposure. Past treatments include beta blockers. The current treatment provides moderate improvement. There are no compliance problems. Hyperlipidemia  This is a chronic problem. The current episode started more than 1 year ago. Associated symptoms include chest pain and shortness of breath. Current antihyperlipidemic treatment includes ezetimibe. There are no compliance problems. Risk factors for coronary artery disease include post-menopausal, hypertension and dyslipidemia. Prev on higher dose of metoprolol, but bp was too low. Current dose seems to be doing ok. Seen by cardio (EP) approx 3 mo ago. Had episode of chest, back, neck pain. ? Some pain to left arm as well. This was a couple of mo ago. Had assoc nausea. Lasted 3-4 minutes. Happened at night/ early am.  Did have coronary atherosclerosis on recent CT. Last stress test approx 15 mo ago. Neg at that time. Still taking eliquis. Still smoking 1ppd. Breathing has been a little worse. Some sob when lying down, zachary on right side or back. Doing fairly well w/ trintellix. In need of refill. Had seen counseling in the past. Working on dealing w/ stress. [facility]w [facility] as well. Patient's medications, allergies, past medical, surgical, social and family histories were reviewed and updated as appropriate. ROS:  Review of Systems   Constitutional:  Positive for malaise/fatigue. Negative for fever.

## 2023-05-15 LAB
ANION GAP SERPL CALCULATED.3IONS-SCNC: 5.5 MMOL/L (ref 8–16)
BUN BLDV-MCNC: 13 MG/DL (ref 7–17)
CALCIUM SERPL-MCNC: 9.3 MG/DL (ref 8.6–10.3)
CHLORIDE BLD-SCNC: 105 MMOL/L (ref 98–107)
CHOLESTEROL: 128 MG/DL (ref 0–200)
CO2: 34 MMOL/L (ref 22–30)
CREAT SERPL-MCNC: 0.8 MG/DL (ref 0.52–1.04)
GFR, ESTIMATED: 70
GLUCOSE: 89 MG/DL (ref 74–100)
HCT VFR BLD CALC: 39.6 % (ref 35.7–46.7)
HDLC SERPL-MCNC: 69 MG/DL (ref 40–59)
HGB: 12.4 G/DL (ref 11.9–15.9)
LDL CHOLESTEROL: 44.2 MG/DL
LDL/HDL RATIO: 0.6
MCH RBC QN AUTO: 28.5 PG (ref 28.1–33.6)
MCHC RBC AUTO-ENTMCNC: 31.3 G/DL (ref 32.8–34.7)
MCV RBC AUTO: 91 FL (ref 82.9–99.9)
PDW BLD-RTO: 14.7 % (ref 11.6–14.8)
PLATELET # BLD: 191 X1000 (ref 175–420)
POTASSIUM SERPL-SCNC: 4.5 MMOL/L (ref 3.4–5.1)
RBC: 4.35 X1000000 (ref 3.72–5.31)
SODIUM BLD-SCNC: 140 MMOL/L (ref 137–145)
TRIGL SERPL-MCNC: 74 MG/DL (ref 0–149)
VLDLC SERPL CALC-MCNC: 14.8 MG/DL (ref 10–50)
WBC: 6.5 X1000 (ref 4.5–10.3)

## 2023-05-16 LAB
ANION GAP SERPL CALCULATED.3IONS-SCNC: 10 MMOL/L (ref 5–13)
BUN / CREAT RATIO: 18
BUN BLDV-MCNC: 15 MG/DL (ref 7–25)
CALCIUM SERPL-MCNC: 10 MG/DL (ref 8.5–10.5)
CHLORIDE BLD-SCNC: 102 MMOL/L (ref 98–110)
CO2: 29 MMOL/L (ref 22–29)
CREAT SERPL-MCNC: 0.85 MG/DL (ref 0.5–1.2)
EGFR (CKD-EPI): 72 SEE NOTE
GLUCOSE TOLERANCE TEST FASTING: 95 MG/DL (ref 71–99)
HCT VFR BLD CALC: 41.4 % (ref 35–46)
HEMOGLOBIN: 13.1 G/DL (ref 11.7–15.5)
MCH RBC QN AUTO: 28.5 PG (ref 27–33)
MCHC RBC AUTO-ENTMCNC: 31.6 G/DL (ref 30–36)
MCV RBC AUTO: 90 FL (ref 80–100)
PDW BLD-RTO: 14.7 % (ref 11–15)
PLATELET # BLD: 195 10*3/UL (ref 140–400)
PMV BLD AUTO: 11.9 FL (ref 9–13)
POTASSIUM SERPL-SCNC: 4.5 MMOL/L (ref 3.5–5.1)
RBC # BLD: 4.6 10*6/UL (ref 3.8–5.1)
SODIUM BLD-SCNC: 141 MMOL/L (ref 135–146)
WBC # BLD: 10.99 10*3/UL (ref 3.8–10.8)

## 2023-08-15 ENCOUNTER — HOSPITAL ENCOUNTER (OUTPATIENT)
Dept: MRI IMAGING | Age: 73
Discharge: HOME | End: 2023-08-15
Attending: OPHTHALMOLOGY
Payer: MEDICARE

## 2023-08-15 DIAGNOSIS — G43.109 MIGRAINE WITH AURA AND WITHOUT STATUS MIGRAINOSUS, NOT INTRACTABLE: ICD-10-CM

## 2023-08-15 PROCEDURE — 70551 MRI BRAIN STEM W/O DYE: CPT

## 2023-09-07 DIAGNOSIS — F33.41 RECURRENT MAJOR DEPRESSIVE DISORDER IN PARTIAL REMISSION (HCC): ICD-10-CM

## 2023-09-07 DIAGNOSIS — Z98.890 S/P CAROTID ENDARTERECTOMY: ICD-10-CM

## 2023-09-07 DIAGNOSIS — R10.83 ABDOMINAL COLIC: ICD-10-CM

## 2023-09-07 DIAGNOSIS — I65.22 CAROTID STENOSIS, LEFT: ICD-10-CM

## 2023-09-07 DIAGNOSIS — I10 PRIMARY HYPERTENSION: ICD-10-CM

## 2023-09-07 RX ORDER — POTASSIUM CHLORIDE 750 MG/1
10 CAPSULE, EXTENDED RELEASE ORAL DAILY
Qty: 90 CAPSULE | Refills: 3 | Status: SHIPPED | OUTPATIENT
Start: 2023-09-07

## 2023-09-07 RX ORDER — ATORVASTATIN CALCIUM 80 MG/1
80 TABLET, FILM COATED ORAL NIGHTLY
Qty: 90 TABLET | Refills: 3 | Status: SHIPPED | OUTPATIENT
Start: 2023-09-07 | End: 2024-09-01

## 2023-09-07 RX ORDER — OMEPRAZOLE 20 MG/1
20 CAPSULE, DELAYED RELEASE ORAL
Qty: 180 CAPSULE | Refills: 3 | Status: SHIPPED | OUTPATIENT
Start: 2023-09-07

## 2023-09-07 RX ORDER — DICYCLOMINE HYDROCHLORIDE 10 MG/1
10 CAPSULE ORAL
Qty: 120 CAPSULE | Refills: 3 | Status: SHIPPED | OUTPATIENT
Start: 2023-09-07

## 2023-09-07 RX ORDER — EZETIMIBE 10 MG/1
TABLET ORAL
Qty: 90 TABLET | Refills: 3 | Status: SHIPPED | OUTPATIENT
Start: 2023-09-07

## 2023-09-07 RX ORDER — METOPROLOL SUCCINATE 25 MG/1
25 TABLET, EXTENDED RELEASE ORAL DAILY
Qty: 90 TABLET | Refills: 3 | Status: SHIPPED | OUTPATIENT
Start: 2023-09-07 | End: 2024-09-01

## 2023-09-07 RX ORDER — ASPIRIN 81 MG/1
81 TABLET ORAL DAILY
Qty: 90 TABLET | Refills: 3 | Status: SHIPPED | OUTPATIENT
Start: 2023-09-07

## 2023-09-11 DIAGNOSIS — F33.41 RECURRENT MAJOR DEPRESSIVE DISORDER IN PARTIAL REMISSION (HCC): ICD-10-CM

## 2023-09-11 NOTE — TELEPHONE ENCOUNTER
It appears her Trintellix dose was 20 mg at her office visit in May. That is the dose that we refilled at that time.   If she is tolerating it well, continue that for now

## 2023-09-11 NOTE — TELEPHONE ENCOUNTER
Brook Serra called and said she just noticed her Trintillix is 20 mg. She thought that a while ago it was changed back to 10 mg. I really do not see any where in her chart that it was changed. Just need to know which dose you want her on.

## 2023-09-12 NOTE — TELEPHONE ENCOUNTER
Patient stated 20mg making her have memory issues and palpitations, stated that she did good on the 10mg advised to cut the 20 in half and let me know how she does and when refill needed.

## 2023-09-20 ENCOUNTER — PATIENT MESSAGE (OUTPATIENT)
Dept: FAMILY MEDICINE CLINIC | Age: 73
End: 2023-09-20

## 2023-09-20 DIAGNOSIS — F33.41 RECURRENT MAJOR DEPRESSIVE DISORDER IN PARTIAL REMISSION (HCC): ICD-10-CM

## 2023-09-20 NOTE — TELEPHONE ENCOUNTER
Pts daughter called and said the trintellix that was filled at [facility] on 09/12/23 was for 20MG and she is suppose to be on 10MG, they were trying to just cut them in half but that doesn't seem to be working for her. The higher doses makes her agitated, she is wondering if you can send in a new partial script for 10MG to [facility]? I pended a 30 DS script to [facility], she normally gets her meds by mail.

## 2023-09-21 NOTE — TELEPHONE ENCOUNTER
From: Cynthia Renteria  To: Dr. Abbey Ugarte  Sent: 9/20/2023 6:46 PM EDT  Subject: Trintellix dose    This is Chinyere Jim's daughter. Doc's note from 12/5/22, talks about her going back to the 10 mg dose d/t the side effects from the higher dose. That is what the mail order pharmacy has been filling. She's not doing well with the 20mg, even with cutting it in half. She's more agitated and emotional. She needs if filled via the mail in pharmacy but needs a short supply to get her through until the mail order one comes in.  Thank you

## 2023-09-25 ENCOUNTER — TELEPHONE (OUTPATIENT)
Dept: FAMILY MEDICINE CLINIC | Age: 73
End: 2023-09-25

## 2023-10-04 ENCOUNTER — TELEPHONE (OUTPATIENT)
Dept: FAMILY MEDICINE CLINIC | Age: 73
End: 2023-10-04

## 2023-10-04 ENCOUNTER — COMMUNITY OUTREACH (OUTPATIENT)
Dept: FAMILY MEDICINE CLINIC | Age: 73
End: 2023-10-04

## 2023-10-04 DIAGNOSIS — M54.6 ACUTE RIGHT-SIDED THORACIC BACK PAIN: Primary | ICD-10-CM

## 2023-10-04 DIAGNOSIS — M54.50 ACUTE RIGHT-SIDED LOW BACK PAIN, UNSPECIFIED WHETHER SCIATICA PRESENT: Primary | ICD-10-CM

## 2023-10-04 NOTE — TELEPHONE ENCOUNTER
----- Message from Laurie Daniels [place] sent at 10/4/2023  9:25 AM EDT -----  Subject: Message to Provider    QUESTIONS  Information for Provider? pt calling in with back back x 2 wks, wants to   know if she should get an x-ray before making appt, please advise and call   back. ---------------------------------------------------------------------------  --------------  Lala VICTORIA  [phone]; OK to leave message on voicemail  ---------------------------------------------------------------------------  --------------  SCRIPT ANSWERS  Relationship to Patient?  Self

## 2023-10-05 ENCOUNTER — OFFICE VISIT (OUTPATIENT)
Dept: FAMILY MEDICINE CLINIC | Age: 73
End: 2023-10-05

## 2023-10-05 VITALS
SYSTOLIC BLOOD PRESSURE: 126 MMHG | HEART RATE: 81 BPM | WEIGHT: 112.4 LBS | HEIGHT: 64 IN | DIASTOLIC BLOOD PRESSURE: 72 MMHG | OXYGEN SATURATION: 92 % | BODY MASS INDEX: 19.19 KG/M2

## 2023-10-05 DIAGNOSIS — J44.9 CHRONIC OBSTRUCTIVE PULMONARY DISEASE, UNSPECIFIED COPD TYPE (HCC): ICD-10-CM

## 2023-10-05 DIAGNOSIS — S29.012A STRAIN OF THORACIC BACK REGION: Primary | ICD-10-CM

## 2023-10-05 RX ORDER — TIZANIDINE 4 MG/1
4 TABLET ORAL EVERY 8 HOURS PRN
Qty: 30 TABLET | Refills: 0 | Status: SHIPPED | OUTPATIENT
Start: 2023-10-05

## 2023-10-05 RX ORDER — METHYLPREDNISOLONE 4 MG/1
TABLET ORAL
Qty: 1 KIT | Refills: 0 | Status: SHIPPED | OUTPATIENT
Start: 2023-10-05 | End: 2023-10-11

## 2023-10-05 RX ORDER — AZITHROMYCIN 250 MG/1
250 TABLET, FILM COATED ORAL SEE ADMIN INSTRUCTIONS
Qty: 6 TABLET | Refills: 0 | Status: SHIPPED | OUTPATIENT
Start: 2023-10-05 | End: 2023-10-10

## 2023-10-05 ASSESSMENT — ENCOUNTER SYMPTOMS
BACK PAIN: 1
COUGH: 1
SHORTNESS OF BREATH: 0

## 2023-10-05 NOTE — RESULT ENCOUNTER NOTE
Decreased bone density. Degenerative changes. Mild scoliosis.   Can discuss further at appointment later today

## 2023-10-25 DIAGNOSIS — F33.41 RECURRENT MAJOR DEPRESSIVE DISORDER IN PARTIAL REMISSION (HCC): ICD-10-CM

## 2023-11-01 ENCOUNTER — OFFICE VISIT (OUTPATIENT)
Dept: FAMILY MEDICINE CLINIC | Age: 73
End: 2023-11-01

## 2023-11-01 VITALS
WEIGHT: 111.6 LBS | OXYGEN SATURATION: 97 % | SYSTOLIC BLOOD PRESSURE: 132 MMHG | HEIGHT: 64 IN | DIASTOLIC BLOOD PRESSURE: 78 MMHG | HEART RATE: 64 BPM | BODY MASS INDEX: 19.05 KG/M2

## 2023-11-01 DIAGNOSIS — F33.41 RECURRENT MAJOR DEPRESSIVE DISORDER IN PARTIAL REMISSION (HCC): Primary | ICD-10-CM

## 2023-11-01 DIAGNOSIS — Z12.31 ENCOUNTER FOR SCREENING MAMMOGRAM FOR BREAST CANCER: ICD-10-CM

## 2023-11-01 DIAGNOSIS — Z23 NEED FOR IMMUNIZATION AGAINST INFLUENZA: ICD-10-CM

## 2023-11-01 DIAGNOSIS — I10 PRIMARY HYPERTENSION: ICD-10-CM

## 2023-11-01 DIAGNOSIS — Z98.890 S/P CAROTID ENDARTERECTOMY: ICD-10-CM

## 2023-11-01 DIAGNOSIS — E78.5 HYPERLIPIDEMIA, UNSPECIFIED HYPERLIPIDEMIA TYPE: ICD-10-CM

## 2023-11-01 DIAGNOSIS — F41.9 ANXIETY: ICD-10-CM

## 2023-11-01 DIAGNOSIS — I49.5 SICK SINUS SYNDROME (HCC): ICD-10-CM

## 2023-11-01 DIAGNOSIS — D64.9 LOW HEMOGLOBIN: ICD-10-CM

## 2023-11-01 LAB
ALBUMIN SERPL-MCNC: 4.4 G/DL (ref 3.4–5)
ALBUMIN/GLOB SERPL: 2.4 {RATIO} (ref 1.1–2.2)
ALP SERPL-CCNC: 76 U/L (ref 40–129)
ALT SERPL-CCNC: 14 U/L (ref 10–40)
ANION GAP SERPL CALCULATED.3IONS-SCNC: 9 MMOL/L (ref 3–16)
AST SERPL-CCNC: 27 U/L (ref 15–37)
BASOPHILS # BLD: 0.1 K/UL (ref 0–0.2)
BASOPHILS NFR BLD: 0.9 %
BILIRUB SERPL-MCNC: 0.4 MG/DL (ref 0–1)
BUN SERPL-MCNC: 14 MG/DL (ref 7–20)
CALCIUM SERPL-MCNC: 9.4 MG/DL (ref 8.3–10.6)
CHLORIDE SERPL-SCNC: 102 MMOL/L (ref 99–110)
CHOLEST SERPL-MCNC: 134 MG/DL (ref 0–199)
CO2 SERPL-SCNC: 30 MMOL/L (ref 21–32)
CREAT SERPL-MCNC: 0.9 MG/DL (ref 0.6–1.2)
DEPRECATED RDW RBC AUTO: 15.6 % (ref 12.4–15.4)
EOSINOPHIL # BLD: 0.1 K/UL (ref 0–0.6)
EOSINOPHIL NFR BLD: 2.3 %
GFR SERPLBLD CREATININE-BSD FMLA CKD-EPI: >60 ML/MIN/{1.73_M2}
GLUCOSE SERPL-MCNC: 92 MG/DL (ref 70–99)
HCT VFR BLD AUTO: 36.1 % (ref 36–48)
HDLC SERPL-MCNC: 63 MG/DL (ref 40–60)
HGB BLD-MCNC: 11.7 G/DL (ref 12–16)
LDLC SERPL CALC-MCNC: 58 MG/DL
LYMPHOCYTES # BLD: 1.5 K/UL (ref 1–5.1)
LYMPHOCYTES NFR BLD: 25.2 %
MCH RBC QN AUTO: 27.9 PG (ref 26–34)
MCHC RBC AUTO-ENTMCNC: 32.5 G/DL (ref 31–36)
MCV RBC AUTO: 85.7 FL (ref 80–100)
MONOCYTES # BLD: 0.5 K/UL (ref 0–1.3)
MONOCYTES NFR BLD: 9.2 %
NEUTROPHILS # BLD: 3.7 K/UL (ref 1.7–7.7)
NEUTROPHILS NFR BLD: 62.4 %
PLATELET # BLD AUTO: 210 K/UL (ref 135–450)
PLATELET BLD QL SMEAR: ADEQUATE
PMV BLD AUTO: 10.7 FL (ref 5–10.5)
POTASSIUM SERPL-SCNC: 4.2 MMOL/L (ref 3.5–5.1)
PROT SERPL-MCNC: 6.2 G/DL (ref 6.4–8.2)
RBC # BLD AUTO: 4.21 M/UL (ref 4–5.2)
SLIDE REVIEW: ABNORMAL
SODIUM SERPL-SCNC: 141 MMOL/L (ref 136–145)
TRIGL SERPL-MCNC: 65 MG/DL (ref 0–150)
VLDLC SERPL CALC-MCNC: 13 MG/DL
WBC # BLD AUTO: 6 K/UL (ref 4–11)

## 2023-11-01 RX ORDER — ALPRAZOLAM 0.25 MG/1
0.25 TABLET ORAL NIGHTLY PRN
Qty: 15 TABLET | Refills: 0 | Status: SHIPPED | OUTPATIENT
Start: 2023-11-01 | End: 2023-12-01

## 2023-11-01 ASSESSMENT — ENCOUNTER SYMPTOMS: SHORTNESS OF BREATH: 0

## 2023-11-01 NOTE — PROGRESS NOTES
[facility])  Still on anticoagulation. Rpt labs. - CBC with Auto Differential    4. Primary hypertension  The current medical regimen is effective;  continue present plan and medications. - CBC with Auto Differential    5. Hyperlipidemia, unspecified hyperlipidemia type  Rpt labs. F/u w/ cardio as sche  - Lipid Panel  - Comprehensive Metabolic Panel    6. Need for immunization against influenza    - Influenza, FLUAD, (age 72 y+), IM, Preservative Free, 0.5 mL    7. Encounter for screening mammogram for breast cancer    - CECILIO DIGITAL SCREEN W OR WO CAD BILATERAL; Future    8. S/P carotid endarterectomy  Recent u/s neg.

## 2023-11-02 DIAGNOSIS — D64.9 LOW HEMOGLOBIN: ICD-10-CM

## 2023-11-02 LAB
FERRITIN SERPL IA-MCNC: 21.8 NG/ML (ref 15–150)
IRON SATN MFR SERPL: 8 % (ref 15–50)
IRON SERPL-MCNC: 37 UG/DL (ref 37–145)
TIBC SERPL-MCNC: 461 UG/DL (ref 260–445)

## 2023-11-02 NOTE — RESULT ENCOUNTER NOTE
Mild anemia. Otherwise CBC essentially normal.  Could add iron studies and check stool for occult blood. .  Recommend repeat CBC in 4 months or so to monitor. Lipids are excellent.   CMP essentially normal.  No other changes at this time

## 2023-11-03 DIAGNOSIS — D64.9 ANEMIA, UNSPECIFIED TYPE: Primary | ICD-10-CM

## 2024-02-05 DIAGNOSIS — F33.41 RECURRENT MAJOR DEPRESSIVE DISORDER IN PARTIAL REMISSION (HCC): ICD-10-CM

## 2024-02-08 DIAGNOSIS — F33.41 RECURRENT MAJOR DEPRESSIVE DISORDER IN PARTIAL REMISSION (HCC): ICD-10-CM

## 2024-02-08 NOTE — TELEPHONE ENCOUNTER
Full scipt coming from [facility] but she will be out before she receives her refill. Just needs enough to get through till the medication gets here from [facility]

## 2024-03-20 ENCOUNTER — OFFICE VISIT (OUTPATIENT)
Dept: FAMILY MEDICINE CLINIC | Age: 74
End: 2024-03-20

## 2024-03-20 VITALS
DIASTOLIC BLOOD PRESSURE: 84 MMHG | HEART RATE: 66 BPM | SYSTOLIC BLOOD PRESSURE: 132 MMHG | HEIGHT: 64 IN | BODY MASS INDEX: 18.44 KG/M2 | OXYGEN SATURATION: 93 % | WEIGHT: 108 LBS

## 2024-03-20 DIAGNOSIS — F33.41 RECURRENT MAJOR DEPRESSIVE DISORDER IN PARTIAL REMISSION (HCC): ICD-10-CM

## 2024-03-20 DIAGNOSIS — M25.50 PAIN IN JOINT, MULTIPLE SITES: ICD-10-CM

## 2024-03-20 DIAGNOSIS — J44.9 CHRONIC OBSTRUCTIVE PULMONARY DISEASE, UNSPECIFIED COPD TYPE (HCC): ICD-10-CM

## 2024-03-20 DIAGNOSIS — I49.5 SICK SINUS SYNDROME (HCC): ICD-10-CM

## 2024-03-20 DIAGNOSIS — J30.2 OTHER SEASONAL ALLERGIC RHINITIS: ICD-10-CM

## 2024-03-20 DIAGNOSIS — R25.2 MUSCLE CRAMPS: ICD-10-CM

## 2024-03-20 DIAGNOSIS — R53.83 OTHER FATIGUE: Primary | ICD-10-CM

## 2024-03-20 DIAGNOSIS — M25.50 PAIN IN JOINT INVOLVING MULTIPLE SITES: ICD-10-CM

## 2024-03-20 RX ORDER — FLUTICASONE PROPIONATE 50 MCG
2 SPRAY, SUSPENSION (ML) NASAL DAILY
Qty: 3 EACH | Refills: 3 | Status: SHIPPED | OUTPATIENT
Start: 2024-03-20

## 2024-03-20 RX ORDER — FLUTICASONE FUROATE, UMECLIDINIUM BROMIDE AND VILANTEROL TRIFENATATE 100; 62.5; 25 UG/1; UG/1; UG/1
1 POWDER RESPIRATORY (INHALATION) DAILY
Qty: 3 EACH | Refills: 3 | Status: SHIPPED | OUTPATIENT
Start: 2024-03-20

## 2024-03-20 ASSESSMENT — PATIENT HEALTH QUESTIONNAIRE - PHQ9
3. TROUBLE FALLING OR STAYING ASLEEP: NOT AT ALL
5. POOR APPETITE OR OVEREATING: NOT AT ALL
SUM OF ALL RESPONSES TO PHQ QUESTIONS 1-9: 0
1. LITTLE INTEREST OR PLEASURE IN DOING THINGS: NOT AT ALL
10. IF YOU CHECKED OFF ANY PROBLEMS, HOW DIFFICULT HAVE THESE PROBLEMS MADE IT FOR YOU TO DO YOUR WORK, TAKE CARE OF THINGS AT HOME, OR GET ALONG WITH OTHER PEOPLE: NOT DIFFICULT AT ALL
6. FEELING BAD ABOUT YOURSELF - OR THAT YOU ARE A FAILURE OR HAVE LET YOURSELF OR YOUR FAMILY DOWN: NOT AT ALL
SUM OF ALL RESPONSES TO PHQ9 QUESTIONS 1 & 2: 0
4. FEELING TIRED OR HAVING LITTLE ENERGY: NOT AT ALL
SUM OF ALL RESPONSES TO PHQ QUESTIONS 1-9: 0
2. FEELING DOWN, DEPRESSED OR HOPELESS: NOT AT ALL
SUM OF ALL RESPONSES TO PHQ QUESTIONS 1-9: 0
8. MOVING OR SPEAKING SO SLOWLY THAT OTHER PEOPLE COULD HAVE NOTICED. OR THE OPPOSITE, BEING SO FIGETY OR RESTLESS THAT YOU HAVE BEEN MOVING AROUND A LOT MORE THAN USUAL: NOT AT ALL
7. TROUBLE CONCENTRATING ON THINGS, SUCH AS READING THE NEWSPAPER OR WATCHING TELEVISION: NOT AT ALL
9. THOUGHTS THAT YOU WOULD BE BETTER OFF DEAD, OR OF HURTING YOURSELF: NOT AT ALL
SUM OF ALL RESPONSES TO PHQ QUESTIONS 1-9: 0

## 2024-03-20 ASSESSMENT — ENCOUNTER SYMPTOMS
CONSTIPATION: 0
SHORTNESS OF BREATH: 0
DIARRHEA: 1
BLOOD IN STOOL: 0

## 2024-03-20 NOTE — PROGRESS NOTES
Chief Complaint   Patient presents with    Abdominal Cramping    Spasms    Fatigue       HPI:  Chinyere Bosch is a 73 y.o. (: 1950) here today   for bowel spasms.     Patient also has issues with muscle cramps in her legs at night and fatigue.  HPI  Sxs present for some time.  Bilat hand pain.  Zachary base of thumbs.  Now over to other fingers.  No hx of sig trauma.  Gradually worse.  Aggravated by gripping, picking things up.  No sig swelling noted.  Joints will pop. Some stiffness noted. Improves in less than an hr.  Does have some issues w/ shoulder, zachary right.  Hip and knee issues on the right as well.      Has been having some muscle cramps.  Zachary RLE.  Worse at night.      Fatigue worse recently.  Feels quite tired over past few weeks.      Mood has been fair overall.  Regina meds.      Breathing overall near baseline.  Still using trelegy.      Had been having chest pain.  Given nitro from . Has been seen by cardio since that time.  Cardio wondered about gerd.  Sxs occurred at night.  No sig sxs since.      Patient's medications, allergies, past medical, surgical, social and family histories were reviewed and updated as appropriate.    ROS:  Review of Systems   Constitutional:  Negative for fever.   Respiratory:  Negative for shortness of breath.    Cardiovascular:  Negative for palpitations.   Gastrointestinal:  Positive for diarrhea (w/ ibs.  prn bentyl). Negative for blood in stool and constipation.   Genitourinary:  Negative for vaginal bleeding.   Musculoskeletal:  Positive for arthralgias and myalgias.           LDL Calculated (mg/dL)   Date Value   2023 58     LDL Direct (MG/DL)   Date Value   2020 93.8       Past Medical History:   Diagnosis Date    Arthritis     COPD (chronic obstructive pulmonary disease) ([facility])     CVA (cerebral vascular accident) ([facility])     Depression     Hyperlipidemia     Hypertension     Irritable bowel syndrome (IBS)     Left carotid stenosis     Pacemaker

## 2024-03-21 LAB
ALBUMIN SERPL-MCNC: 4.4 G/DL (ref 3.4–5)
ALBUMIN/GLOB SERPL: 2.4 {RATIO} (ref 1.1–2.2)
ALP SERPL-CCNC: 75 U/L (ref 40–129)
ALT SERPL-CCNC: 21 U/L (ref 10–40)
ANA SER QL IA: NEGATIVE
ANION GAP SERPL CALCULATED.3IONS-SCNC: 7 MMOL/L (ref 3–16)
AST SERPL-CCNC: 31 U/L (ref 15–37)
BASOPHILS # BLD: 0 K/UL (ref 0–0.2)
BASOPHILS NFR BLD: 0.3 %
BILIRUB SERPL-MCNC: 0.4 MG/DL (ref 0–1)
BUN SERPL-MCNC: 14 MG/DL (ref 7–20)
CALCIUM SERPL-MCNC: 9.5 MG/DL (ref 8.3–10.6)
CCP IGG SERPL-ACNC: <0.5 U/ML (ref 0–2.9)
CHLORIDE SERPL-SCNC: 105 MMOL/L (ref 99–110)
CO2 SERPL-SCNC: 31 MMOL/L (ref 21–32)
CREAT SERPL-MCNC: 0.8 MG/DL (ref 0.6–1.2)
DEPRECATED RDW RBC AUTO: 15.1 % (ref 12.4–15.4)
EOSINOPHIL # BLD: 0.1 K/UL (ref 0–0.6)
EOSINOPHIL NFR BLD: 1.7 %
ERYTHROCYTE [SEDIMENTATION RATE] IN BLOOD BY WESTERGREN METHOD: 13 MM/HR (ref 0–30)
GFR SERPLBLD CREATININE-BSD FMLA CKD-EPI: >60 ML/MIN/{1.73_M2}
GLUCOSE SERPL-MCNC: 95 MG/DL (ref 70–99)
HCT VFR BLD AUTO: 42.9 % (ref 36–48)
HGB BLD-MCNC: 14.7 G/DL (ref 12–16)
LYMPHOCYTES # BLD: 1.7 K/UL (ref 1–5.1)
LYMPHOCYTES NFR BLD: 32.9 %
MAGNESIUM SERPL-MCNC: 2 MG/DL (ref 1.8–2.4)
MCH RBC QN AUTO: 30.6 PG (ref 26–34)
MCHC RBC AUTO-ENTMCNC: 34.2 G/DL (ref 31–36)
MCV RBC AUTO: 89.5 FL (ref 80–100)
MONOCYTES # BLD: 0.4 K/UL (ref 0–1.3)
MONOCYTES NFR BLD: 8 %
NEUTROPHILS # BLD: 3 K/UL (ref 1.7–7.7)
NEUTROPHILS NFR BLD: 57.1 %
PLATELET # BLD AUTO: 163 K/UL (ref 135–450)
PLATELET BLD QL SMEAR: ADEQUATE
PMV BLD AUTO: 10.1 FL (ref 5–10.5)
POTASSIUM SERPL-SCNC: 4.5 MMOL/L (ref 3.5–5.1)
PROT SERPL-MCNC: 6.2 G/DL (ref 6.4–8.2)
RBC # BLD AUTO: 4.79 M/UL (ref 4–5.2)
RHEUMATOID FACT SER IA-ACNC: 39 IU/ML
SLIDE REVIEW: NORMAL
SODIUM SERPL-SCNC: 143 MMOL/L (ref 136–145)
T4 FREE SERPL-MCNC: 1.2 NG/DL (ref 0.9–1.8)
TSH SERPL DL<=0.005 MIU/L-ACNC: 2.91 UIU/ML (ref 0.27–4.2)
VIT B12 SERPL-MCNC: 598 PG/ML (ref 211–911)
WBC # BLD AUTO: 5.3 K/UL (ref 4–11)

## 2024-03-21 NOTE — RESULT ENCOUNTER NOTE
CMP essentially normal.  Rheumatoid factor is elevated.  This could be consistent with rheumatoid arthritis.  Given her diffuse joint symptoms, would recommend referral to rheumatology for further evaluation.  Thyroid labs, magnesium, B12, CBC, sed rate all normal.  ASHKAN and anti-CCP antibody pending.

## 2024-04-11 ENCOUNTER — OFFICE VISIT (OUTPATIENT)
Dept: ORTHOPEDIC SURGERY | Age: 74
End: 2024-04-11
Payer: MEDICARE

## 2024-04-11 VITALS — HEIGHT: 64 IN | WEIGHT: 108 LBS | BODY MASS INDEX: 18.44 KG/M2

## 2024-04-11 DIAGNOSIS — M25.561 ACUTE PAIN OF RIGHT KNEE: ICD-10-CM

## 2024-04-11 DIAGNOSIS — M25.551 RIGHT HIP PAIN: ICD-10-CM

## 2024-04-11 DIAGNOSIS — M70.61 GREATER TROCHANTERIC BURSITIS OF RIGHT HIP: Primary | ICD-10-CM

## 2024-04-11 PROCEDURE — G8427 DOCREV CUR MEDS BY ELIG CLIN: HCPCS | Performed by: ORTHOPAEDIC SURGERY

## 2024-04-11 PROCEDURE — 3017F COLORECTAL CA SCREEN DOC REV: CPT | Performed by: ORTHOPAEDIC SURGERY

## 2024-04-11 PROCEDURE — G8400 PT W/DXA NO RESULTS DOC: HCPCS | Performed by: ORTHOPAEDIC SURGERY

## 2024-04-11 PROCEDURE — 99203 OFFICE O/P NEW LOW 30 MIN: CPT | Performed by: ORTHOPAEDIC SURGERY

## 2024-04-11 PROCEDURE — G8420 CALC BMI NORM PARAMETERS: HCPCS | Performed by: ORTHOPAEDIC SURGERY

## 2024-04-11 PROCEDURE — 1090F PRES/ABSN URINE INCON ASSESS: CPT | Performed by: ORTHOPAEDIC SURGERY

## 2024-04-11 PROCEDURE — 4004F PT TOBACCO SCREEN RCVD TLK: CPT | Performed by: ORTHOPAEDIC SURGERY

## 2024-04-11 PROCEDURE — 1123F ACP DISCUSS/DSCN MKR DOCD: CPT | Performed by: ORTHOPAEDIC SURGERY

## 2024-04-11 RX ORDER — PREDNISONE 5 MG/1
TABLET ORAL
Qty: 55 TABLET | Refills: 0 | Status: SHIPPED | OUTPATIENT
Start: 2024-04-11

## 2024-04-11 NOTE — PROGRESS NOTES
KNEE VISIT      HISTORY OF PRESENT ILLNESS    Chinyere Bosch is a 73 y.o. female who presents for evaluation of right hip and right knee pain.  She has had knee pain over the last few months with no mechanism of injury.  Her main complaint is right hip where she has pain laterally when she stands or steps up and getting in and out of a car seems aggravated.  She says her right knee sabas at times she has anterolateral joint line tenderness to where she points.  It causes her to stagger.  She did have a right knee partial medial meniscectomy done 2 years ago.    ROS    Well-documented patient history form dated 4/11/2024  All other ROS negative except for above.    Past Surgical history    Past Surgical History:   Procedure Laterality Date    CAROTID ENDARTERECTOMY Left 03/12/2021    CARPAL TUNNEL RELEASE Right 07/13/2017    CATARACT REMOVAL WITH IMPLANT Left 12/23/2016    Phacoemulsification/ Posterior Chamber Intraocular Lens Implantation with dr sánchez     CERVICAL SPINE SURGERY  2012    CHOLECYSTECTOMY, LAPAROSCOPIC  05/25/2011    COLONOSCOPY  07/28/2014    CORONARY ANGIOPLASTY      EYE SURGERY Right 03/09/2017    catarct removal with lens implant    HERNIA REPAIR  08/09/2016    open ventral incisional    HYSTERECTOMY (CERVIX STATUS UNKNOWN)      KNEE ARTHROSCOPY Right 2/14/2022    RIGHT KNEE VIDEO ARTHROSCOPY, MEDIAL MENISECTOMY performed by [place] Shipman [place] [facility]    OTHER SURGICAL HISTORY  08/09/2016    OPEN VENTRAL INCISIONAL HERNIA REPAIR     PACEMAKER PLACEMENT  06/25/2019    [facility]    PAIN MANAGEMENT PROCEDURE Right 03/10/2020    RIGHT LUMBAR FOUR AND LUMBAR FIVE TRANSFORAMINAL EPIDURAL STEROID INJECTION SITE CONFIRMED BY FLUOROSCOPY performed by Gloria Loza MD at [facility] OR    PAIN MANAGEMENT PROCEDURE Right 06/16/2020    RIGHT LUMBAR FOUR AND LUMBAR FIVE TRANSFORAMINAL EPIDURAL STEROID INJECTION SITE CONFIRMED BY FLUOROSCOPY performed by Gloria Loza MD at [facility] OR    PAIN

## 2024-05-20 DIAGNOSIS — F33.41 RECURRENT MAJOR DEPRESSIVE DISORDER IN PARTIAL REMISSION (HCC): ICD-10-CM

## 2024-06-03 SDOH — ECONOMIC STABILITY: FOOD INSECURITY: WITHIN THE PAST 12 MONTHS, YOU WORRIED THAT YOUR FOOD WOULD RUN OUT BEFORE YOU GOT MONEY TO BUY MORE.: NEVER TRUE

## 2024-06-03 SDOH — ECONOMIC STABILITY: INCOME INSECURITY: HOW HARD IS IT FOR YOU TO PAY FOR THE VERY BASICS LIKE FOOD, HOUSING, MEDICAL CARE, AND HEATING?: NOT HARD AT ALL

## 2024-06-03 SDOH — ECONOMIC STABILITY: FOOD INSECURITY: WITHIN THE PAST 12 MONTHS, THE FOOD YOU BOUGHT JUST DIDN'T LAST AND YOU DIDN'T HAVE MONEY TO GET MORE.: NEVER TRUE

## 2024-06-03 SDOH — ECONOMIC STABILITY: TRANSPORTATION INSECURITY
IN THE PAST 12 MONTHS, HAS LACK OF TRANSPORTATION KEPT YOU FROM MEETINGS, WORK, OR FROM GETTING THINGS NEEDED FOR DAILY LIVING?: NO

## 2024-06-06 ENCOUNTER — OFFICE VISIT (OUTPATIENT)
Dept: FAMILY MEDICINE CLINIC | Age: 74
End: 2024-06-06

## 2024-06-06 VITALS
HEIGHT: 64 IN | OXYGEN SATURATION: 95 % | BODY MASS INDEX: 18.1 KG/M2 | DIASTOLIC BLOOD PRESSURE: 66 MMHG | WEIGHT: 106 LBS | SYSTOLIC BLOOD PRESSURE: 112 MMHG | HEART RATE: 66 BPM

## 2024-06-06 DIAGNOSIS — K58.2 IRRITABLE BOWEL SYNDROME WITH BOTH CONSTIPATION AND DIARRHEA: ICD-10-CM

## 2024-06-06 DIAGNOSIS — R10.13 EPIGASTRIC PAIN: Primary | ICD-10-CM

## 2024-06-06 RX ORDER — FAMOTIDINE 20 MG/1
20 TABLET, FILM COATED ORAL 2 TIMES DAILY
Qty: 60 TABLET | Refills: 3 | Status: SHIPPED | OUTPATIENT
Start: 2024-06-06

## 2024-06-06 ASSESSMENT — ENCOUNTER SYMPTOMS
DIARRHEA: 1
ABDOMINAL PAIN: 1
CONSTIPATION: 1

## 2024-06-06 NOTE — PROGRESS NOTES
Chief Complaint   Patient presents with    Abdominal Cramping       HPI:  Chinyere Bosch is a 73 y.o. (: 1950) here today   for abdominal cramping after eating.  HPI  Does not matter what she eats or how much.  Sig lower abd cramping after eating.  Occas has to use heating pad.  Pain around to the back as well.  Will alternate b/t constipation and diarrhea, but more diarrhea.  Had been using bentyl w/ some relief.  Now taking more often.  Some relief, but ongoing issues.  Mood has not been great overall.  Pain mainly typically just above umbilicus.  Sxs present for some time, but overall worse over past several mo.      Appt w/ rheum next mo.      Patient's medications, allergies, past medical, surgical, social and family histories were reviewed and updated as appropriate.    ROS:  Review of Systems   Constitutional:  Positive for fatigue. Negative for fever.   Gastrointestinal:  Positive for abdominal pain, constipation and diarrhea.           LDL Direct (MG/DL)   Date Value   2020 93.8       Past Medical History:   Diagnosis Date    Arthritis     COPD (chronic obstructive pulmonary disease) (HCC)     CVA (cerebral vascular accident) (HCC)     Depression     Hyperlipidemia     Hypertension     Irritable bowel syndrome (IBS)     Left carotid stenosis     Pacemaker     Sick sinus syndrome (HCC)        Family History   Problem Relation Age of Onset    Heart Disease Father     High Blood Pressure Father     Cancer Sister         uterine       Social History     Socioeconomic History    Marital status:      Spouse name: Not on file    Number of children: Not on file    Years of education: Not on file    Highest education level: Not on file   Occupational History    Not on file   Tobacco Use    Smoking status: Every Day     Current packs/day: 1.00     Average packs/day: 1 pack/day for 40.2 years (40.2 ttl pk-yrs)     Types: Cigarettes     Start date: 5/10/2019    Smokeless tobacco: Never   Vaping Use

## 2024-06-07 DIAGNOSIS — K58.2 IRRITABLE BOWEL SYNDROME WITH BOTH CONSTIPATION AND DIARRHEA: ICD-10-CM

## 2024-06-25 ENCOUNTER — HOSPITAL ENCOUNTER (OUTPATIENT)
Dept: CT IMAGING | Age: 74
Discharge: HOME | End: 2024-06-25
Payer: MEDICARE

## 2024-06-25 ENCOUNTER — HOSPITAL ENCOUNTER (OUTPATIENT)
Age: 74
Discharge: HOME | End: 2024-06-25
Payer: MEDICARE

## 2024-06-25 DIAGNOSIS — R10.9 ABDOMINAL PAIN, UNSPECIFIED ABDOMINAL LOCATION: ICD-10-CM

## 2024-06-25 LAB
CREAT SERPL-MCNC: 0.9 MG/DL (ref 0.6–1.2)
GFR SERPLBLD CREATININE-BSD FMLA CKD-EPI: 67 ML/MIN/{1.73_M2}

## 2024-06-25 PROCEDURE — 82565 ASSAY OF CREATININE: CPT

## 2024-06-25 PROCEDURE — 36415 COLL VENOUS BLD VENIPUNCTURE: CPT

## 2024-06-25 PROCEDURE — 6360000004 HC RX CONTRAST MEDICATION: Performed by: INTERNAL MEDICINE

## 2024-06-25 PROCEDURE — 74177 CT ABD & PELVIS W/CONTRAST: CPT

## 2024-06-25 RX ADMIN — DIATRIZOATE MEGLUMINE AND DIATRIZOATE SODIUM 12 ML: 660; 100 LIQUID ORAL; RECTAL at 12:58

## 2024-06-25 RX ADMIN — IOMEPROL INJECTION 75 ML: 714 INJECTION, SOLUTION INTRAVASCULAR at 13:00

## 2024-07-03 DIAGNOSIS — F41.9 ANXIETY: ICD-10-CM

## 2024-07-03 RX ORDER — ALPRAZOLAM 0.25 MG/1
0.25 TABLET ORAL NIGHTLY PRN
Qty: 15 TABLET | Refills: 0 | Status: SHIPPED | OUTPATIENT
Start: 2024-07-03 | End: 2024-08-02

## 2024-07-03 NOTE — TELEPHONE ENCOUNTER
Date of last refill of this med was 11/1/2023, # of pills given 15 and # of refills given 0.  Their next appointment is 0, the last date patient was seen was 6/6/2024.  Does patient have medication agreement on file? No  Has drug screen been done in last 12 months if needed? no

## 2024-08-06 NOTE — TELEPHONE ENCOUNTER
Pt called requesting rx for muscle relaxer, for her nerves she stated something temporary, uses luke [FreeTextEntry1] : Telemetry demonstrates continued interval improvement

## 2024-08-08 ENCOUNTER — TELEMEDICINE (OUTPATIENT)
Dept: FAMILY MEDICINE CLINIC | Age: 74
End: 2024-08-08

## 2024-08-08 DIAGNOSIS — Z00.00 MEDICARE ANNUAL WELLNESS VISIT, SUBSEQUENT: Primary | ICD-10-CM

## 2024-08-08 DIAGNOSIS — Z87.891 PERSONAL HISTORY OF TOBACCO USE: ICD-10-CM

## 2024-08-08 ASSESSMENT — PATIENT HEALTH QUESTIONNAIRE - PHQ9
4. FEELING TIRED OR HAVING LITTLE ENERGY: SEVERAL DAYS
6. FEELING BAD ABOUT YOURSELF - OR THAT YOU ARE A FAILURE OR HAVE LET YOURSELF OR YOUR FAMILY DOWN: NOT AT ALL
9. THOUGHTS THAT YOU WOULD BE BETTER OFF DEAD, OR OF HURTING YOURSELF: NOT AT ALL
SUM OF ALL RESPONSES TO PHQ QUESTIONS 1-9: 3
8. MOVING OR SPEAKING SO SLOWLY THAT OTHER PEOPLE COULD HAVE NOTICED. OR THE OPPOSITE, BEING SO FIGETY OR RESTLESS THAT YOU HAVE BEEN MOVING AROUND A LOT MORE THAN USUAL: NOT AT ALL
3. TROUBLE FALLING OR STAYING ASLEEP: SEVERAL DAYS
10. IF YOU CHECKED OFF ANY PROBLEMS, HOW DIFFICULT HAVE THESE PROBLEMS MADE IT FOR YOU TO DO YOUR WORK, TAKE CARE OF THINGS AT HOME, OR GET ALONG WITH OTHER PEOPLE: SOMEWHAT DIFFICULT
2. FEELING DOWN, DEPRESSED OR HOPELESS: NOT AT ALL
SUM OF ALL RESPONSES TO PHQ9 QUESTIONS 1 & 2: 0
SUM OF ALL RESPONSES TO PHQ QUESTIONS 1-9: 3
7. TROUBLE CONCENTRATING ON THINGS, SUCH AS READING THE NEWSPAPER OR WATCHING TELEVISION: SEVERAL DAYS
5. POOR APPETITE OR OVEREATING: NOT AT ALL
1. LITTLE INTEREST OR PLEASURE IN DOING THINGS: NOT AT ALL
SUM OF ALL RESPONSES TO PHQ QUESTIONS 1-9: 3
SUM OF ALL RESPONSES TO PHQ QUESTIONS 1-9: 3

## 2024-08-08 NOTE — PROGRESS NOTES
Medicare Annual Wellness Visit    Chinyere Bosch is here for Medicare AWV    Assessment & Plan   Medicare annual wellness visit, subsequent  Personal history of tobacco use  -     RI VISIT TO DISCUSS LUNG CA SCREEN W LDCT  Recommendations for Preventive Services Due: see orders and patient instructions/AVS.  Recommended screening schedule for the next 5-10 years is provided to the patient in written form: see Patient Instructions/AVS.     No follow-ups on file.     Subjective   The following acute and/or chronic problems were also addressed today:  Patient evaluated today through telephone call for Medicare annual wellness visit.  Care gaps addressed today.    Patient's complete Health Risk Assessment and screening values have been reviewed and are found in Flowsheets. The following problems were reviewed today and where indicated follow up appointments were made and/or referrals ordered.    Positive Risk Factor Screenings with Interventions:               General HRA Questions:  Select all that apply: (!) Anger  Interventions - Anger:  Patient declined any further interventions or treatment      Inactivity:  On average, how many days per week do you engage in moderate to strenuous exercise (like a brisk walk)?: 2 days (!) Abnormal  On average, how many minutes do you engage in exercise at this level?: 20 min  Interventions:  Patient declined any further interventions or treatment     Abnormal BMI (underweight):  There is no height or weight on file to calculate BMI. (!) Abnormal  Interventions:  Patient declines any further evaluation or treatment    Dentist Screen:  Have you seen the dentist within the past year?: (!) No    Intervention:  Advised to schedule with their dentist    Hearing Screen:  Do you or your family notice any trouble with your hearing that hasn't been managed with hearing aids?: (!) Yes    Interventions:  Patient declines any further evaluation or treatment    Vision Screen:  Do you have

## 2024-08-19 ENCOUNTER — PATIENT MESSAGE (OUTPATIENT)
Dept: FAMILY MEDICINE CLINIC | Age: 74
End: 2024-08-19

## 2024-08-19 DIAGNOSIS — R10.83 ABDOMINAL COLIC: ICD-10-CM

## 2024-08-19 DIAGNOSIS — I10 PRIMARY HYPERTENSION: ICD-10-CM

## 2024-08-19 DIAGNOSIS — Z98.890 S/P CAROTID ENDARTERECTOMY: ICD-10-CM

## 2024-08-19 DIAGNOSIS — R10.13 EPIGASTRIC PAIN: ICD-10-CM

## 2024-08-19 DIAGNOSIS — J44.9 CHRONIC OBSTRUCTIVE PULMONARY DISEASE, UNSPECIFIED COPD TYPE (HCC): ICD-10-CM

## 2024-08-19 DIAGNOSIS — I65.22 CAROTID STENOSIS, LEFT: ICD-10-CM

## 2024-08-19 DIAGNOSIS — J30.2 OTHER SEASONAL ALLERGIC RHINITIS: ICD-10-CM

## 2024-08-19 RX ORDER — DICYCLOMINE HYDROCHLORIDE 10 MG/1
10 CAPSULE ORAL
Qty: 120 CAPSULE | Refills: 2 | Status: SHIPPED | OUTPATIENT
Start: 2024-08-19 | End: 2024-11-17

## 2024-08-19 RX ORDER — OMEPRAZOLE 20 MG/1
20 CAPSULE, DELAYED RELEASE ORAL
Qty: 180 CAPSULE | Refills: 3 | Status: SHIPPED | OUTPATIENT
Start: 2024-08-19

## 2024-08-19 RX ORDER — ATORVASTATIN CALCIUM 80 MG/1
80 TABLET, FILM COATED ORAL NIGHTLY
Qty: 90 TABLET | Refills: 3 | Status: SHIPPED | OUTPATIENT
Start: 2024-08-19 | End: 2025-08-14

## 2024-08-19 RX ORDER — FAMOTIDINE 20 MG/1
20 TABLET, FILM COATED ORAL 2 TIMES DAILY
Qty: 180 TABLET | Refills: 3 | Status: SHIPPED | OUTPATIENT
Start: 2024-08-19

## 2024-08-19 RX ORDER — FLUTICASONE PROPIONATE 50 MCG
2 SPRAY, SUSPENSION (ML) NASAL DAILY
Qty: 3 EACH | Refills: 3 | Status: SHIPPED | OUTPATIENT
Start: 2024-08-19

## 2024-08-19 RX ORDER — METOPROLOL SUCCINATE 25 MG/1
25 TABLET, EXTENDED RELEASE ORAL DAILY
Qty: 90 TABLET | Refills: 3 | Status: SHIPPED | OUTPATIENT
Start: 2024-08-19 | End: 2025-08-14

## 2024-08-19 RX ORDER — EZETIMIBE 10 MG/1
TABLET ORAL
Qty: 90 TABLET | Refills: 3 | Status: SHIPPED | OUTPATIENT
Start: 2024-08-19

## 2024-08-19 RX ORDER — POTASSIUM CHLORIDE 750 MG/1
10 CAPSULE, EXTENDED RELEASE ORAL DAILY
Qty: 90 CAPSULE | Refills: 3 | Status: SHIPPED | OUTPATIENT
Start: 2024-08-19

## 2024-08-19 RX ORDER — FLUTICASONE FUROATE, UMECLIDINIUM BROMIDE AND VILANTEROL TRIFENATATE 100; 62.5; 25 UG/1; UG/1; UG/1
1 POWDER RESPIRATORY (INHALATION) DAILY
Qty: 3 EACH | Refills: 3 | Status: SHIPPED | OUTPATIENT
Start: 2024-08-19

## 2024-08-19 RX ORDER — FAMOTIDINE 20 MG/1
20 TABLET, FILM COATED ORAL 2 TIMES DAILY
Qty: 60 TABLET | Refills: 3 | Status: SHIPPED | OUTPATIENT
Start: 2024-08-19 | End: 2024-08-19 | Stop reason: SDUPTHER

## 2024-09-16 ENCOUNTER — TELEPHONE (OUTPATIENT)
Dept: FAMILY MEDICINE CLINIC | Age: 74
End: 2024-09-16

## 2024-09-16 DIAGNOSIS — R53.83 OTHER FATIGUE: Primary | ICD-10-CM

## 2024-09-17 ENCOUNTER — LAB (OUTPATIENT)
Dept: FAMILY MEDICINE CLINIC | Age: 74
End: 2024-09-17
Payer: MEDICARE

## 2024-09-17 DIAGNOSIS — R53.83 OTHER FATIGUE: ICD-10-CM

## 2024-09-17 LAB
ALBUMIN SERPL-MCNC: 4.3 G/DL (ref 3.4–5)
ALBUMIN/GLOB SERPL: 2.4 {RATIO} (ref 1.1–2.2)
ALP SERPL-CCNC: 82 U/L (ref 40–129)
ALT SERPL-CCNC: 20 U/L (ref 10–40)
ANION GAP SERPL CALCULATED.3IONS-SCNC: 9 MMOL/L (ref 3–16)
AST SERPL-CCNC: 32 U/L (ref 15–37)
BASOPHILS # BLD: 0 K/UL (ref 0–0.2)
BASOPHILS NFR BLD: 0.6 %
BILIRUB SERPL-MCNC: 0.5 MG/DL (ref 0–1)
BUN SERPL-MCNC: 13 MG/DL (ref 7–20)
CALCIUM SERPL-MCNC: 9.7 MG/DL (ref 8.3–10.6)
CHLORIDE SERPL-SCNC: 99 MMOL/L (ref 99–110)
CO2 SERPL-SCNC: 30 MMOL/L (ref 21–32)
CREAT SERPL-MCNC: 0.8 MG/DL (ref 0.6–1.2)
DEPRECATED RDW RBC AUTO: 13.9 % (ref 12.4–15.4)
EOSINOPHIL # BLD: 0.1 K/UL (ref 0–0.6)
EOSINOPHIL NFR BLD: 1.7 %
GFR SERPLBLD CREATININE-BSD FMLA CKD-EPI: 77 ML/MIN/{1.73_M2}
GLUCOSE SERPL-MCNC: 88 MG/DL (ref 70–99)
HCT VFR BLD AUTO: 44.9 % (ref 36–48)
HGB BLD-MCNC: 14.6 G/DL (ref 12–16)
LYMPHOCYTES # BLD: 1.9 K/UL (ref 1–5.1)
LYMPHOCYTES NFR BLD: 28.4 %
MCH RBC QN AUTO: 30 PG (ref 26–34)
MCHC RBC AUTO-ENTMCNC: 32.6 G/DL (ref 31–36)
MCV RBC AUTO: 91.8 FL (ref 80–100)
MONOCYTES # BLD: 0.6 K/UL (ref 0–1.3)
MONOCYTES NFR BLD: 8.5 %
NEUTROPHILS # BLD: 4.1 K/UL (ref 1.7–7.7)
NEUTROPHILS NFR BLD: 60.8 %
PLATELET # BLD AUTO: 186 K/UL (ref 135–450)
PMV BLD AUTO: 10.9 FL (ref 5–10.5)
POTASSIUM SERPL-SCNC: 4.2 MMOL/L (ref 3.5–5.1)
PROT SERPL-MCNC: 6.1 G/DL (ref 6.4–8.2)
RBC # BLD AUTO: 4.89 M/UL (ref 4–5.2)
SODIUM SERPL-SCNC: 138 MMOL/L (ref 136–145)
T4 FREE SERPL-MCNC: 1.2 NG/DL (ref 0.9–1.8)
TSH SERPL DL<=0.005 MIU/L-ACNC: 3.13 UIU/ML (ref 0.27–4.2)
VIT B12 SERPL-MCNC: 489 PG/ML (ref 211–911)
WBC # BLD AUTO: 6.8 K/UL (ref 4–11)

## 2024-09-17 PROCEDURE — 36415 COLL VENOUS BLD VENIPUNCTURE: CPT | Performed by: FAMILY MEDICINE

## 2024-09-18 DIAGNOSIS — R53.83 OTHER FATIGUE: ICD-10-CM

## 2024-09-18 DIAGNOSIS — R53.83 OTHER FATIGUE: Primary | ICD-10-CM

## 2024-09-18 LAB — MAGNESIUM SERPL-MCNC: 2.15 MG/DL (ref 1.8–2.4)

## 2024-09-23 ENCOUNTER — OFFICE VISIT (OUTPATIENT)
Dept: FAMILY MEDICINE CLINIC | Age: 74
End: 2024-09-23
Payer: MEDICARE

## 2024-09-23 VITALS
HEIGHT: 64 IN | DIASTOLIC BLOOD PRESSURE: 64 MMHG | BODY MASS INDEX: 17.99 KG/M2 | OXYGEN SATURATION: 96 % | WEIGHT: 105.4 LBS | SYSTOLIC BLOOD PRESSURE: 110 MMHG | HEART RATE: 62 BPM

## 2024-09-23 DIAGNOSIS — E78.5 HYPERLIPIDEMIA, UNSPECIFIED HYPERLIPIDEMIA TYPE: ICD-10-CM

## 2024-09-23 DIAGNOSIS — Z23 NEED FOR IMMUNIZATION AGAINST INFLUENZA: ICD-10-CM

## 2024-09-23 DIAGNOSIS — I49.5 SICK SINUS SYNDROME (HCC): ICD-10-CM

## 2024-09-23 DIAGNOSIS — M25.50 PAIN IN JOINT, MULTIPLE SITES: Primary | ICD-10-CM

## 2024-09-23 DIAGNOSIS — F33.41 RECURRENT MAJOR DEPRESSIVE DISORDER IN PARTIAL REMISSION (HCC): ICD-10-CM

## 2024-09-23 DIAGNOSIS — J44.9 CHRONIC OBSTRUCTIVE PULMONARY DISEASE, UNSPECIFIED COPD TYPE (HCC): ICD-10-CM

## 2024-09-23 DIAGNOSIS — R76.8 ELEVATED RHEUMATOID FACTOR: ICD-10-CM

## 2024-09-23 DIAGNOSIS — I10 PRIMARY HYPERTENSION: ICD-10-CM

## 2024-09-23 DIAGNOSIS — Z98.890 S/P CAROTID ENDARTERECTOMY: ICD-10-CM

## 2024-09-23 PROBLEM — G95.9 CERVICAL MYELOPATHY (HCC): Status: RESOLVED | Noted: 2017-09-11 | Resolved: 2024-09-23

## 2024-09-23 PROCEDURE — 99214 OFFICE O/P EST MOD 30 MIN: CPT | Performed by: FAMILY MEDICINE

## 2024-09-23 PROCEDURE — G8427 DOCREV CUR MEDS BY ELIG CLIN: HCPCS | Performed by: FAMILY MEDICINE

## 2024-09-23 PROCEDURE — 3074F SYST BP LT 130 MM HG: CPT | Performed by: FAMILY MEDICINE

## 2024-09-23 PROCEDURE — 3078F DIAST BP <80 MM HG: CPT | Performed by: FAMILY MEDICINE

## 2024-09-23 PROCEDURE — 90653 IIV ADJUVANT VACCINE IM: CPT | Performed by: FAMILY MEDICINE

## 2024-09-23 PROCEDURE — G0008 ADMIN INFLUENZA VIRUS VAC: HCPCS | Performed by: FAMILY MEDICINE

## 2024-09-23 PROCEDURE — G8400 PT W/DXA NO RESULTS DOC: HCPCS | Performed by: FAMILY MEDICINE

## 2024-09-23 PROCEDURE — 4004F PT TOBACCO SCREEN RCVD TLK: CPT | Performed by: FAMILY MEDICINE

## 2024-09-23 PROCEDURE — 3017F COLORECTAL CA SCREEN DOC REV: CPT | Performed by: FAMILY MEDICINE

## 2024-09-23 PROCEDURE — 3023F SPIROM DOC REV: CPT | Performed by: FAMILY MEDICINE

## 2024-09-23 PROCEDURE — 1090F PRES/ABSN URINE INCON ASSESS: CPT | Performed by: FAMILY MEDICINE

## 2024-09-23 PROCEDURE — 1123F ACP DISCUSS/DSCN MKR DOCD: CPT | Performed by: FAMILY MEDICINE

## 2024-09-23 PROCEDURE — G8419 CALC BMI OUT NRM PARAM NOF/U: HCPCS | Performed by: FAMILY MEDICINE

## 2024-09-23 RX ORDER — PREDNISONE 10 MG/1
TABLET ORAL
Qty: 32 TABLET | Refills: 0 | Status: SHIPPED | OUTPATIENT
Start: 2024-09-23 | End: 2024-10-08

## 2024-09-23 ASSESSMENT — ENCOUNTER SYMPTOMS: SHORTNESS OF BREATH: 0

## 2024-09-30 ENCOUNTER — PATIENT MESSAGE (OUTPATIENT)
Dept: FAMILY MEDICINE CLINIC | Age: 74
End: 2024-09-30

## 2024-09-30 DIAGNOSIS — F41.9 ANXIETY: ICD-10-CM

## 2024-09-30 RX ORDER — ALPRAZOLAM 0.25 MG
0.25 TABLET ORAL NIGHTLY PRN
Qty: 15 TABLET | Refills: 0 | Status: SHIPPED | OUTPATIENT
Start: 2024-09-30 | End: 2024-10-30

## 2024-09-30 NOTE — TELEPHONE ENCOUNTER
Date of last refill of this med was 7/3/2024, # of pills given 15 and # of refills given 0.  Their next appointment is 0, the last date patient was seen was 9/23/2024.  Does patient have medication agreement on file? No  Has drug screen been done in last 12 months if needed? no  Med contract has been mailed

## 2024-10-07 ENCOUNTER — OFFICE VISIT (OUTPATIENT)
Dept: FAMILY MEDICINE CLINIC | Age: 74
End: 2024-10-07

## 2024-10-07 VITALS
HEART RATE: 66 BPM | SYSTOLIC BLOOD PRESSURE: 120 MMHG | BODY MASS INDEX: 17.96 KG/M2 | WEIGHT: 105.2 LBS | HEIGHT: 64 IN | OXYGEN SATURATION: 97 % | DIASTOLIC BLOOD PRESSURE: 82 MMHG

## 2024-10-07 DIAGNOSIS — F17.200 TOBACCO USE DISORDER: Primary | ICD-10-CM

## 2024-10-07 RX ORDER — POLYETHYLENE GLYCOL 3350 17 G
2 POWDER IN PACKET (EA) ORAL PRN
Qty: 100 EACH | Refills: 3 | Status: SHIPPED | OUTPATIENT
Start: 2024-10-07

## 2024-10-07 RX ORDER — NICOTINE 21 MG/24HR
1 PATCH, TRANSDERMAL 24 HOURS TRANSDERMAL DAILY
Qty: 42 PATCH | Refills: 0 | Status: SHIPPED | OUTPATIENT
Start: 2024-10-07 | End: 2024-11-18

## 2024-10-07 ASSESSMENT — ENCOUNTER SYMPTOMS: SHORTNESS OF BREATH: 1

## 2024-10-07 NOTE — PROGRESS NOTES
times daily Yes Lv Rosales APRN - CNP   ezetimibe (ZETIA) 10 MG tablet TAKE 1 TABLET BY MOUTH DAILY. Yes Lv Rosales APRN - CNP   metoprolol succinate (TOPROL XL) 25 MG extended release tablet Take 1 tablet by mouth daily Yes Lv Rosales APRN - CNP   potassium chloride (MICRO-K) 10 MEQ extended release capsule Take 1 capsule by mouth daily Yes Lv Rosales APRN - CNP   atorvastatin (LIPITOR) 80 MG tablet Take 1 tablet by mouth nightly Yes Lv Rosaels APRN - CNP   omeprazole (PRILOSEC) 20 MG delayed release capsule Take 1 capsule by mouth 2 times daily (before meals) Yes Lv Rosales APRN - CNP   famotidine (PEPCID) 20 MG tablet Take 1 tablet by mouth 2 times daily Yes Lv Rosales APRN - CNP   dicyclomine (BENTYL) 10 MG capsule Take 1 capsule by mouth 4 times daily (before meals and nightly) Yes Lv Rosales APRN - CNP   fluticasone (FLONASE) 50 MCG/ACT nasal spray 2 sprays by Nasal route daily Yes Lv Rosales APRN - CNP   fluticasone-umeclidin-vilant (TRELEGY ELLIPTA) 100-62.5-25 MCG/ACT AEPB inhaler Inhale 1 puff into the lungs daily Yes Lv Rosales APRN - CNP   VORTIoxetine (TRINTELLIX) 10 MG TABS tablet Take 1 tablet by mouth daily Yes Linus Garcia MD   aspirin 81 MG EC tablet Take 1 tablet by mouth daily Yes Linus Garcia MD   albuterol sulfate HFA (VENTOLIN HFA) 108 (90 Base) MCG/ACT inhaler Inhale 2 puffs into the lungs 2 times daily as needed for Wheezing or Shortness of Breath Yes Linus Garcia MD   albuterol (PROVENTIL) (5 MG/ML) 0.5% nebulizer solution Take 1 mL by nebulization 4 times daily as needed for Wheezing Yes Dedra Shipman APRN - CNP       Allergies   Allergen Reactions    Iodides Hives and Itching    Nsaids      Previous stomach bleed      Sulfa Antibiotics Rash       OBJECTIVE:    /82   Pulse 66   Ht 1.626 m (5' 4\")   Wt 47.7 kg (105 lb 3.2 oz)   SpO2 97%   BMI 18.06 kg/m²     BP Readings from Last

## 2024-10-09 ENCOUNTER — HOSPITAL ENCOUNTER (OUTPATIENT)
Dept: GENERAL RADIOLOGY | Age: 74
Discharge: HOME | End: 2024-10-09
Payer: MEDICARE

## 2024-10-09 ENCOUNTER — HOSPITAL ENCOUNTER (OUTPATIENT)
Age: 74
Discharge: HOME | End: 2024-10-09
Payer: MEDICARE

## 2024-10-09 DIAGNOSIS — M25.50 PAIN IN JOINT, MULTIPLE SITES: ICD-10-CM

## 2024-10-09 PROCEDURE — 73130 X-RAY EXAM OF HAND: CPT

## 2024-10-09 PROCEDURE — 73521 X-RAY EXAM HIPS BI 2 VIEWS: CPT

## 2024-10-10 DIAGNOSIS — F41.9 ANXIETY: ICD-10-CM

## 2024-10-10 RX ORDER — ALPRAZOLAM 0.25 MG/1
0.25 TABLET ORAL NIGHTLY PRN
Qty: 15 TABLET | Refills: 0 | OUTPATIENT
Start: 2024-10-10 | End: 2024-11-09

## 2024-10-10 NOTE — TELEPHONE ENCOUNTER
Date of last refill of this med was 9/30/2024, # of pills given 15 and # of refills given 0.  Their next appointment is 0, the last date patient was seen was 10/7/2024.  Does patient have medication agreement on file? No  Has drug screen been done in last 12 months if needed? no

## 2024-10-10 NOTE — TELEPHONE ENCOUNTER
This medication was just filled on 10/4.  The time before that it had been filled approximately 2-1/2 months prior.  Appears too soon to refill

## 2024-12-30 ENCOUNTER — OFFICE VISIT (OUTPATIENT)
Dept: FAMILY MEDICINE CLINIC | Age: 74
End: 2024-12-30

## 2024-12-30 VITALS
HEIGHT: 64 IN | TEMPERATURE: 100.1 F | DIASTOLIC BLOOD PRESSURE: 76 MMHG | WEIGHT: 107 LBS | BODY MASS INDEX: 18.27 KG/M2 | HEART RATE: 116 BPM | SYSTOLIC BLOOD PRESSURE: 118 MMHG | OXYGEN SATURATION: 94 %

## 2024-12-30 DIAGNOSIS — R09.89 CHEST CONGESTION: ICD-10-CM

## 2024-12-30 DIAGNOSIS — I49.5 SICK SINUS SYNDROME (HCC): ICD-10-CM

## 2024-12-30 DIAGNOSIS — J06.9 UPPER RESPIRATORY TRACT INFECTION, UNSPECIFIED TYPE: Primary | ICD-10-CM

## 2024-12-30 DIAGNOSIS — R05.1 ACUTE COUGH: ICD-10-CM

## 2024-12-30 LAB
INFLUENZA A ANTIGEN, POC: NEGATIVE
INFLUENZA B ANTIGEN, POC: NEGATIVE
LOT EXPIRE DATE: NORMAL
LOT KIT NUMBER: NORMAL
SARS-COV-2, POC: NORMAL
VALID INTERNAL CONTROL: PRESENT
VENDOR AND KIT NAME POC: NORMAL

## 2024-12-30 RX ORDER — CEFDINIR 300 MG/1
300 CAPSULE ORAL 2 TIMES DAILY
Qty: 20 CAPSULE | Refills: 0 | Status: SHIPPED | OUTPATIENT
Start: 2024-12-30 | End: 2025-01-09

## 2024-12-30 RX ORDER — BENZONATATE 200 MG/1
200 CAPSULE ORAL 3 TIMES DAILY PRN
Qty: 30 CAPSULE | Refills: 0 | Status: SHIPPED | OUTPATIENT
Start: 2024-12-30 | End: 2025-01-09

## 2024-12-30 ASSESSMENT — ENCOUNTER SYMPTOMS
SHORTNESS OF BREATH: 1
COUGH: 1

## 2024-12-30 NOTE — PROGRESS NOTES
Chief Complaint   Patient presents with    Cough    Headache    Chest Congestion       HPI:  Chinyere Bosch is a 74 y.o. (: 1950) here today   for cough, headache, and chest congestion.  HPI  Cough for a period of time, but not abn for this time of yr.  Over past few days, sxs sig worse.  Felt sig worse yesterday.  Cough worse, inc fatigue.  Greenish/ yellow sputum.  Low grade fever (up to 101), body aches, ST, headaches.  No sig ill contacts noted.  Feels similar today.  Sig headache noted.      Took some extra methotrexate inadvertently approx 1 week ago.  Plans to explore other options w/ rheum.      Patient's medications, allergies, past medical, surgical, social and family histories were reviewed and updated asappropriate.    ROS:  Review of Systems   Constitutional:  Positive for fever.   HENT:  Positive for congestion and postnasal drip.    Respiratory:  Positive for cough and shortness of breath.    Musculoskeletal:  Positive for myalgias.   Neurological:  Positive for headaches.           Prior to Visit Medications    Medication Sig Taking? Authorizing Provider   FOLIC ACID PO Take by mouth Yes Betzy Bloom MD   benzonatate (TESSALON) 200 MG capsule Take 1 capsule by mouth 3 times daily as needed for Cough Yes Linus Garcia MD   cefdinir (OMNICEF) 300 MG capsule Take 1 capsule by mouth 2 times daily for 10 days Yes Linus Garcia MD   nicotine polacrilex (NICOTINE MINI) 2 MG lozenge Take 1 lozenge by mouth as needed for Smoking cessation Yes Linus Garcia MD   Cyanocobalamin (VITAMIN B-12 PO) Take by mouth Yes Betzy Bloom MD   apixaban (ELIQUIS) 5 MG TABS tablet Take 1 tablet by mouth 2 times daily Yes Lv Rosales, APRN - CNP   ezetimibe (ZETIA) 10 MG tablet TAKE 1 TABLET BY MOUTH DAILY. Yes Lv Rosales, APRN - CNP   metoprolol succinate (TOPROL XL) 25 MG extended release tablet Take 1 tablet by mouth daily Yes Lv Rosales, APRN - CNP   potassium chloride

## 2025-01-16 DIAGNOSIS — F33.41 RECURRENT MAJOR DEPRESSIVE DISORDER IN PARTIAL REMISSION (HCC): ICD-10-CM

## 2025-01-16 NOTE — TELEPHONE ENCOUNTER
Refill Request     CONFIRM preferrred pharmacy with the patient.    If Mail Order Rx - Pend for 90 day refill.      Last Seen: Last Seen Department: 12/30/2024  Last Seen by PCP: 12/30/2024    Last Written: 05/20/2024    If no future appointment scheduled, route STAFF MESSAGE with patient name to the  Pool for scheduling.      Next Appointment:   No future appointments.    Message sent to  to schedule appt with patient?  NO      Requested Prescriptions     Pending Prescriptions Disp Refills    VORTIoxetine (TRINTELLIX) 10 MG TABS tablet 90 tablet 3     Sig: Take 1 tablet by mouth daily

## 2025-01-23 ENCOUNTER — TELEPHONE (OUTPATIENT)
Dept: FAMILY MEDICINE CLINIC | Age: 75
End: 2025-01-23

## 2025-02-25 ENCOUNTER — OFFICE VISIT (OUTPATIENT)
Dept: FAMILY MEDICINE CLINIC | Age: 75
End: 2025-02-25

## 2025-02-25 VITALS
WEIGHT: 105 LBS | HEIGHT: 64 IN | BODY MASS INDEX: 17.93 KG/M2 | HEART RATE: 70 BPM | DIASTOLIC BLOOD PRESSURE: 76 MMHG | SYSTOLIC BLOOD PRESSURE: 122 MMHG | OXYGEN SATURATION: 95 %

## 2025-02-25 DIAGNOSIS — E53.8 B12 DEFICIENCY: ICD-10-CM

## 2025-02-25 DIAGNOSIS — R41.3 MEMORY LOSS: ICD-10-CM

## 2025-02-25 DIAGNOSIS — F33.41 RECURRENT MAJOR DEPRESSIVE DISORDER IN PARTIAL REMISSION: Primary | ICD-10-CM

## 2025-02-25 DIAGNOSIS — I49.5 SICK SINUS SYNDROME (HCC): ICD-10-CM

## 2025-02-25 DIAGNOSIS — R53.83 OTHER FATIGUE: ICD-10-CM

## 2025-02-25 DIAGNOSIS — M25.50 PAIN IN JOINT, MULTIPLE SITES: ICD-10-CM

## 2025-02-25 RX ORDER — PREDNISONE 2.5 MG/1
2.5 TABLET ORAL DAILY
COMMUNITY

## 2025-02-25 SDOH — ECONOMIC STABILITY: FOOD INSECURITY: WITHIN THE PAST 12 MONTHS, YOU WORRIED THAT YOUR FOOD WOULD RUN OUT BEFORE YOU GOT MONEY TO BUY MORE.: NEVER TRUE

## 2025-02-25 SDOH — ECONOMIC STABILITY: FOOD INSECURITY: WITHIN THE PAST 12 MONTHS, THE FOOD YOU BOUGHT JUST DIDN'T LAST AND YOU DIDN'T HAVE MONEY TO GET MORE.: NEVER TRUE

## 2025-02-25 ASSESSMENT — PATIENT HEALTH QUESTIONNAIRE - PHQ9
SUM OF ALL RESPONSES TO PHQ QUESTIONS 1-9: 9
10. IF YOU CHECKED OFF ANY PROBLEMS, HOW DIFFICULT HAVE THESE PROBLEMS MADE IT FOR YOU TO DO YOUR WORK, TAKE CARE OF THINGS AT HOME, OR GET ALONG WITH OTHER PEOPLE: SOMEWHAT DIFFICULT
4. FEELING TIRED OR HAVING LITTLE ENERGY: NEARLY EVERY DAY
SUM OF ALL RESPONSES TO PHQ9 QUESTIONS 1 & 2: 2
3. TROUBLE FALLING OR STAYING ASLEEP: NEARLY EVERY DAY
SUM OF ALL RESPONSES TO PHQ QUESTIONS 1-9: 9
SUM OF ALL RESPONSES TO PHQ QUESTIONS 1-9: 9
2. FEELING DOWN, DEPRESSED OR HOPELESS: SEVERAL DAYS
SUM OF ALL RESPONSES TO PHQ QUESTIONS 1-9: 9
1. LITTLE INTEREST OR PLEASURE IN DOING THINGS: SEVERAL DAYS
8. MOVING OR SPEAKING SO SLOWLY THAT OTHER PEOPLE COULD HAVE NOTICED. OR THE OPPOSITE, BEING SO FIGETY OR RESTLESS THAT YOU HAVE BEEN MOVING AROUND A LOT MORE THAN USUAL: NOT AT ALL
7. TROUBLE CONCENTRATING ON THINGS, SUCH AS READING THE NEWSPAPER OR WATCHING TELEVISION: SEVERAL DAYS
9. THOUGHTS THAT YOU WOULD BE BETTER OFF DEAD, OR OF HURTING YOURSELF: NOT AT ALL
5. POOR APPETITE OR OVEREATING: NOT AT ALL
6. FEELING BAD ABOUT YOURSELF - OR THAT YOU ARE A FAILURE OR HAVE LET YOURSELF OR YOUR FAMILY DOWN: NOT AT ALL

## 2025-02-25 NOTE — PROGRESS NOTES
Chief Complaint   Patient presents with    Fatigue    Altered Mental Status       HPI:  Chinyere Bosch is a 74 y.o. (: 1950) here today   for fatigue and confusion.  HPI  Had been taking methotrexate.  Had sig nausea, headache, upset stomach while on med.  Had been on pills and shots.  Told by family that more confused than normal.  More fatigue as well.  Last shot approx 10d ago.  Some days better than others.  No motivation.  Was being prescribed by rheum.  Has f/u appt planned next mo.  Still on low dose steroid.  Did not see much benefit w/ med.  Has not noted much difference in how she feels being off med.      Did not magda higher dose of trintellix in the past.  Has had some confusion at times as well.  Has had at least 3 episodes since on methotrexate.  Ongoing issues w/ word finding.  Daily.     Still seeing cardiology.      Prior hx of TIA.  Had seen neurology in the past.  Prior hx of neuropsych testing.      Patient's medications, allergies, past medical, surgical, social and family histories were reviewed and updated asappropriate.    ROS:  Review of Systems   Constitutional:  Positive for fatigue. Negative for fever.   Musculoskeletal:  Positive for gait problem.   Neurological:         Memory loss   Psychiatric/Behavioral:  Positive for dysphoric mood.            Prior to Visit Medications    Medication Sig Taking? Authorizing Provider   predniSONE (DELTASONE) 2.5 MG tablet Take 1 tablet by mouth daily Yes Betzy Bloom MD   VORTIoxetine (TRINTELLIX) 10 MG TABS tablet Take 1 tablet by mouth daily Yes Linus Garcia MD   FOLIC ACID PO Take by mouth Yes Betzy Bloom MD   Cyanocobalamin (VITAMIN B-12 PO) Take by mouth Yes Betzy Bloom MD   apixaban (ELIQUIS) 5 MG TABS tablet Take 1 tablet by mouth 2 times daily Yes Lv Rosales APRN - CNP   ezetimibe (ZETIA) 10 MG tablet TAKE 1 TABLET BY MOUTH DAILY. Yes Lv Rosales APRN - CNP   metoprolol succinate (TOPROL

## 2025-02-26 ENCOUNTER — TELEPHONE (OUTPATIENT)
Dept: FAMILY MEDICINE CLINIC | Age: 75
End: 2025-02-26

## 2025-02-26 DIAGNOSIS — F41.9 ANXIETY: Primary | ICD-10-CM

## 2025-02-26 LAB
ALBUMIN SERPL-MCNC: 4.2 G/DL (ref 3.4–5)
ALBUMIN/GLOB SERPL: 2.2 {RATIO} (ref 1.1–2.2)
ALP SERPL-CCNC: 67 U/L (ref 40–129)
ALT SERPL-CCNC: 21 U/L (ref 10–40)
ANION GAP SERPL CALCULATED.3IONS-SCNC: 10 MMOL/L (ref 3–16)
AST SERPL-CCNC: 28 U/L (ref 15–37)
BASOPHILS # BLD: 0.1 K/UL (ref 0–0.2)
BASOPHILS NFR BLD: 0.7 %
BILIRUB SERPL-MCNC: 0.4 MG/DL (ref 0–1)
BUN SERPL-MCNC: 17 MG/DL (ref 7–20)
CALCIUM SERPL-MCNC: 9.5 MG/DL (ref 8.3–10.6)
CHLORIDE SERPL-SCNC: 100 MMOL/L (ref 99–110)
CO2 SERPL-SCNC: 30 MMOL/L (ref 21–32)
CREAT SERPL-MCNC: 0.8 MG/DL (ref 0.6–1.2)
DEPRECATED RDW RBC AUTO: 15.1 % (ref 12.4–15.4)
EOSINOPHIL # BLD: 0 K/UL (ref 0–0.6)
EOSINOPHIL NFR BLD: 0.5 %
FOLATE SERPL-MCNC: >40 NG/ML (ref 4.78–24.2)
GFR SERPLBLD CREATININE-BSD FMLA CKD-EPI: 77 ML/MIN/{1.73_M2}
GLUCOSE SERPL-MCNC: 100 MG/DL (ref 70–99)
HCT VFR BLD AUTO: 40.8 % (ref 36–48)
HGB BLD-MCNC: 13.8 G/DL (ref 12–16)
LYMPHOCYTES # BLD: 1.3 K/UL (ref 1–5.1)
LYMPHOCYTES NFR BLD: 14.5 %
MCH RBC QN AUTO: 31.6 PG (ref 26–34)
MCHC RBC AUTO-ENTMCNC: 33.8 G/DL (ref 31–36)
MCV RBC AUTO: 93.4 FL (ref 80–100)
MONOCYTES # BLD: 0.5 K/UL (ref 0–1.3)
MONOCYTES NFR BLD: 5.8 %
NEUTROPHILS # BLD: 7.2 K/UL (ref 1.7–7.7)
NEUTROPHILS NFR BLD: 78.5 %
PLATELET # BLD AUTO: 213 K/UL (ref 135–450)
PMV BLD AUTO: 10 FL (ref 5–10.5)
POTASSIUM SERPL-SCNC: 4.4 MMOL/L (ref 3.5–5.1)
PROT SERPL-MCNC: 6.1 G/DL (ref 6.4–8.2)
RBC # BLD AUTO: 4.37 M/UL (ref 4–5.2)
SODIUM SERPL-SCNC: 140 MMOL/L (ref 136–145)
T4 FREE SERPL-MCNC: 1.1 NG/DL (ref 0.9–1.8)
TSH SERPL DL<=0.005 MIU/L-ACNC: 2.1 UIU/ML (ref 0.27–4.2)
VIT B12 SERPL-MCNC: 2156 PG/ML (ref 211–911)
WBC # BLD AUTO: 9.1 K/UL (ref 4–11)

## 2025-02-26 RX ORDER — BUSPIRONE HYDROCHLORIDE 5 MG/1
5 TABLET ORAL 2 TIMES DAILY
Qty: 60 TABLET | Refills: 0 | Status: SHIPPED | OUTPATIENT
Start: 2025-02-26 | End: 2025-03-28

## 2025-02-26 NOTE — RESULT ENCOUNTER NOTE
Plenty of B12 and folic acid.  If is taking supplementation, could likely reduce that to every other day.  CMP is essentially normal.  CBC and thyroid labs normal.  Does not necessarily explain her symptoms.  We discussed neurology referral versus trying to add a low-dose of medication to help from an anxiety perspective.  Which route would she prefer?

## 2025-03-10 DIAGNOSIS — F41.9 ANXIETY: ICD-10-CM

## 2025-03-10 RX ORDER — ALPRAZOLAM 0.25 MG
0.25 TABLET ORAL NIGHTLY PRN
Qty: 15 TABLET | Refills: 0 | Status: SHIPPED | OUTPATIENT
Start: 2025-03-10 | End: 2025-04-09

## 2025-03-10 NOTE — TELEPHONE ENCOUNTER
LOV 02/25/25  FOV 08/11/25    Pt requested refill on alprazolam    Pt also wanted you know that she had to cut back on her Buspar to once daily, she felt like twice a day was too much

## 2025-04-09 DIAGNOSIS — F41.9 ANXIETY: ICD-10-CM

## 2025-04-09 RX ORDER — BUSPIRONE HYDROCHLORIDE 5 MG/1
5 TABLET ORAL 2 TIMES DAILY
Qty: 60 TABLET | Refills: 3 | Status: SHIPPED | OUTPATIENT
Start: 2025-04-09 | End: 2025-08-07

## 2025-05-28 ENCOUNTER — OFFICE VISIT (OUTPATIENT)
Dept: FAMILY MEDICINE CLINIC | Age: 75
End: 2025-05-28

## 2025-05-28 VITALS
HEIGHT: 64 IN | DIASTOLIC BLOOD PRESSURE: 78 MMHG | WEIGHT: 102 LBS | BODY MASS INDEX: 17.42 KG/M2 | OXYGEN SATURATION: 95 % | SYSTOLIC BLOOD PRESSURE: 116 MMHG | HEART RATE: 62 BPM

## 2025-05-28 DIAGNOSIS — R41.3 MEMORY LOSS: ICD-10-CM

## 2025-05-28 DIAGNOSIS — Z98.890 S/P CAROTID ENDARTERECTOMY: ICD-10-CM

## 2025-05-28 DIAGNOSIS — F33.41 RECURRENT MAJOR DEPRESSIVE DISORDER IN PARTIAL REMISSION: Primary | ICD-10-CM

## 2025-05-28 DIAGNOSIS — E53.8 B12 DEFICIENCY: ICD-10-CM

## 2025-05-28 DIAGNOSIS — M25.50 PAIN IN JOINT, MULTIPLE SITES: ICD-10-CM

## 2025-05-28 DIAGNOSIS — E78.5 HYPERLIPIDEMIA, UNSPECIFIED HYPERLIPIDEMIA TYPE: ICD-10-CM

## 2025-05-28 RX ORDER — DESVENLAFAXINE 25 MG/1
25 TABLET, EXTENDED RELEASE ORAL DAILY
Qty: 30 TABLET | Refills: 1 | Status: SHIPPED | OUTPATIENT
Start: 2025-05-28

## 2025-05-28 NOTE — PROGRESS NOTES
(102 lb)   02/25/25 47.6 kg (105 lb)   12/30/24 48.5 kg (107 lb)       Physical Exam  Constitutional:       Appearance: She is well-developed.   HENT:      Head: Normocephalic and atraumatic.      Right Ear: External ear normal.      Left Ear: External ear normal.      Nose:      Right Sinus: No maxillary sinus tenderness or frontal sinus tenderness.      Left Sinus: No maxillary sinus tenderness or frontal sinus tenderness.   Eyes:      Conjunctiva/sclera: Conjunctivae normal.   Neck:      Trachea: No tracheal deviation.   Pulmonary:      Effort: Pulmonary effort is normal.   Skin:     General: Skin is warm and dry.   Neurological:      Mental Status: She is alert and oriented to person, place, and time.   Psychiatric:         Mood and Affect: Mood normal.         Behavior: Behavior normal.      Comments: Somewhat forgetfulness noted           ASSESSMENT/PLAN:    1. Recurrent major depressive disorder in partial remission  Ongoing issues with mood nerves.  May be contributing to memory loss and balance issues as well.  We discussed options.  She had been on sertraline, Wellbutrin, Viibryd, Cymbalta in the past.  Medications either seem to cause side effects or lose effectiveness.  Still with some issues with motivation.  Trial tapering Trintellix.  She was provided samples of 5 mg dose for tapering purposes.  Cross taper with Pristiq as below.  Follow-up in approximately 6 weeks.  Call sooner if does not tolerate these changes.  We discussed other options as well.  We discussed the potential of atypical antipsychotics, psychiatry referral, others.  - desvenlafaxine succinate (PRISTIQ) 25 MG TB24 extended release tablet; Take 1 tablet by mouth daily  Dispense: 30 tablet; Refill: 1    2. Hyperlipidemia, unspecified hyperlipidemia type  Repeat labs.  Could consider trying to stop statin to see if that helps any of her joint aches and pains.  Follow-up with cardiology as planned.  - Comprehensive Metabolic Panel;

## 2025-05-28 NOTE — PATIENT INSTRUCTIONS
Alternate trintellix 10mg and 5mg every other day x 1 week, then go to trintellix 5mg daily x 1 week.  Then 5mg every other day x 1 week, then stop.      Start pristiq (desvenlafaxine) 25mg daily when starting 5mg every other day of trintellix

## 2025-07-17 NOTE — TELEPHONE ENCOUNTER
Date of last refill of this med was 11/9/2020, # of pills given 15 and # of refills given 0. Their next appointment is 0, the last date patient was seen was 4/5/2021. Does patient have medication agreement on file? No  Has drug screen been done in last 12 months if needed?  no yes

## 2025-07-24 ENCOUNTER — OFFICE VISIT (OUTPATIENT)
Dept: FAMILY MEDICINE CLINIC | Age: 75
End: 2025-07-24

## 2025-07-24 VITALS
HEART RATE: 60 BPM | WEIGHT: 103 LBS | HEIGHT: 64 IN | BODY MASS INDEX: 17.58 KG/M2 | OXYGEN SATURATION: 95 % | SYSTOLIC BLOOD PRESSURE: 118 MMHG | DIASTOLIC BLOOD PRESSURE: 80 MMHG

## 2025-07-24 DIAGNOSIS — J44.9 CHRONIC OBSTRUCTIVE PULMONARY DISEASE, UNSPECIFIED COPD TYPE (HCC): ICD-10-CM

## 2025-07-24 DIAGNOSIS — R10.83 ABDOMINAL COLIC: ICD-10-CM

## 2025-07-24 DIAGNOSIS — E78.5 HYPERLIPIDEMIA, UNSPECIFIED HYPERLIPIDEMIA TYPE: ICD-10-CM

## 2025-07-24 DIAGNOSIS — E53.8 B12 DEFICIENCY: ICD-10-CM

## 2025-07-24 DIAGNOSIS — I65.22 CAROTID STENOSIS, LEFT: ICD-10-CM

## 2025-07-24 DIAGNOSIS — Z98.890 S/P CAROTID ENDARTERECTOMY: ICD-10-CM

## 2025-07-24 DIAGNOSIS — R41.3 MEMORY LOSS: ICD-10-CM

## 2025-07-24 DIAGNOSIS — Z87.891 PERSONAL HISTORY OF TOBACCO USE: ICD-10-CM

## 2025-07-24 DIAGNOSIS — F33.41 RECURRENT MAJOR DEPRESSIVE DISORDER IN PARTIAL REMISSION: Primary | ICD-10-CM

## 2025-07-24 RX ORDER — DESVENLAFAXINE 25 MG/1
25 TABLET, EXTENDED RELEASE ORAL DAILY
Qty: 90 TABLET | Refills: 3 | Status: SHIPPED | OUTPATIENT
Start: 2025-07-24

## 2025-07-24 RX ORDER — EZETIMIBE 10 MG/1
TABLET ORAL
Qty: 90 TABLET | Refills: 3 | Status: SHIPPED | OUTPATIENT
Start: 2025-07-24

## 2025-07-24 RX ORDER — OMEPRAZOLE 20 MG/1
20 CAPSULE, DELAYED RELEASE ORAL
Qty: 180 CAPSULE | Refills: 3 | Status: SHIPPED | OUTPATIENT
Start: 2025-07-24

## 2025-07-24 RX ORDER — ATORVASTATIN CALCIUM 80 MG/1
80 TABLET, FILM COATED ORAL NIGHTLY
Qty: 90 TABLET | Refills: 3 | Status: SHIPPED | OUTPATIENT
Start: 2025-07-24 | End: 2026-07-19

## 2025-07-24 ASSESSMENT — ENCOUNTER SYMPTOMS: SHORTNESS OF BREATH: 0

## 2025-07-24 NOTE — PROGRESS NOTES
unspecified hyperlipidemia type  See above.  Repeat labs as below  - Magnesium  - Lipid Panel  - Comprehensive Metabolic Panel    7. Personal history of tobacco use  Patient has a long smoking history.  Prior low-dose CT had been ordered, but was never done.  She had had some previous weight loss.  This has stabilized.  Given her history, low-dose CT as below  - MS VISIT TO DISCUSS LUNG CA SCREEN W LDCT  - CT Lung Screen (Initial/Annual/Baseline); Future    8. Abdominal colic  Overall improved with less smoking.  Refilled proton pump inhibitor.  We did discuss bone broth and other homeopathic remedies  - omeprazole (PRILOSEC) 20 MG delayed release capsule; Take 1 capsule by mouth 2 times daily (before meals)  Dispense: 180 capsule; Refill: 3    9.  Memory loss  Plans on additional neuropsych testing.  She recently saw neurology.  We did again discuss creatine as an option.  Follow-up with neurology as planned            This document was prepared by a combination of typing and transcription through a voice recognition software.            Discussed with the patient the current USPSTF guidelines released March 9, 2021 for screening for lung cancer.    For adults aged 50 to 80 years who have a 20 pack-year smoking history and currently smoke or have quit within the past 15 years the grade B recommendation is to:  Screen for lung cancer with low-dose computed tomography (LDCT) every year.  Stop screening once a person has not smoked for 15 years or has a health problem that limits life expectancy or the ability to have lung surgery.    The patient  reports that she has been smoking cigarettes. She started smoking about 6 years ago. She has a 41.3 pack-year smoking history. She has never used smokeless tobacco.. Discussed with patient the risks and benefits of screening, including over-diagnosis, false positive rate, and total radiation exposure.  The patient currently exhibits no signs or symptoms suggestive of lung

## 2025-07-24 NOTE — PATIENT INSTRUCTIONS
follow-up, he or she will help you understand what to do next.  After a lung cancer screening, you can go back to your usual activities right away.  A lung cancer screening test can't tell if you have lung cancer. If your results are positive, your doctor can't tell whether an abnormal finding is a harmless nodule, cancer, or something else without doing more tests.  What can you do to help prevent lung cancer?  Some lung cancers can't be prevented. But if you smoke, quitting smoking is the best step you can take to prevent lung cancer. If you want to quit, your doctor can recommend medicines or other ways to help.  Follow-up care is a key part of your treatment and safety. Be sure to make and go to all appointments, and call your doctor if you are having problems. It's also a good idea to know your test results and keep a list of the medicines you take.  Where can you learn more?  Go to https://[email or web address].net/patientEd and enter Q940 to learn more about \"Learning About Lung Cancer Screening.\"  Current as of: October 25, 2024  Content Version: 14.5  © 6620-5136 [employer].   Care instructions adapted under license by [employer]. If you have questions about a medical condition or this instruction, always ask your healthcare professional. [employer] disclaims any warranty or liability for your use of this information.

## 2025-07-25 LAB
ALBUMIN SERPL-MCNC: 4.1 G/DL (ref 3.4–5)
ALBUMIN/GLOB SERPL: 2.4 {RATIO} (ref 1.1–2.2)
ALP SERPL-CCNC: 77 U/L (ref 40–129)
ALT SERPL-CCNC: 22 U/L (ref 10–40)
ANION GAP SERPL CALCULATED.3IONS-SCNC: 12 MMOL/L (ref 3–16)
AST SERPL-CCNC: 30 U/L (ref 15–37)
BILIRUB SERPL-MCNC: 0.3 MG/DL (ref 0–1)
BUN SERPL-MCNC: 14 MG/DL (ref 7–20)
CALCIUM SERPL-MCNC: 8.8 MG/DL (ref 8.3–10.6)
CHLORIDE SERPL-SCNC: 102 MMOL/L (ref 99–110)
CHOLEST SERPL-MCNC: 144 MG/DL (ref 0–199)
CO2 SERPL-SCNC: 28 MMOL/L (ref 21–32)
CREAT SERPL-MCNC: 0.9 MG/DL (ref 0.6–1.2)
FOLATE SERPL-MCNC: 10.7 NG/ML (ref 4.78–24.2)
GFR SERPLBLD CREATININE-BSD FMLA CKD-EPI: 66 ML/MIN/{1.73_M2}
GLUCOSE SERPL-MCNC: 81 MG/DL (ref 70–99)
HDLC SERPL-MCNC: 58 MG/DL (ref 40–60)
LDLC SERPL CALC-MCNC: 62 MG/DL
MAGNESIUM SERPL-MCNC: 2.16 MG/DL (ref 1.8–2.4)
POTASSIUM SERPL-SCNC: 4.3 MMOL/L (ref 3.5–5.1)
PROT SERPL-MCNC: 5.8 G/DL (ref 6.4–8.2)
SODIUM SERPL-SCNC: 142 MMOL/L (ref 136–145)
TRIGL SERPL-MCNC: 118 MG/DL (ref 0–150)
VIT B12 SERPL-MCNC: 1365 PG/ML (ref 211–911)
VLDLC SERPL CALC-MCNC: 24 MG/DL

## 2025-08-01 ENCOUNTER — HOSPITAL ENCOUNTER (OUTPATIENT)
Dept: CT IMAGING | Age: 75
Discharge: HOME | End: 2025-08-01
Attending: FAMILY MEDICINE
Payer: MEDICARE

## 2025-08-01 DIAGNOSIS — Z87.891 PERSONAL HISTORY OF TOBACCO USE: ICD-10-CM

## 2025-08-01 PROCEDURE — 71271 CT THORAX LUNG CANCER SCR C-: CPT

## 2025-08-05 DIAGNOSIS — J30.2 OTHER SEASONAL ALLERGIC RHINITIS: ICD-10-CM

## 2025-08-05 DIAGNOSIS — R10.83 ABDOMINAL COLIC: ICD-10-CM

## 2025-08-05 DIAGNOSIS — F33.41 RECURRENT MAJOR DEPRESSIVE DISORDER IN PARTIAL REMISSION: ICD-10-CM

## 2025-08-05 DIAGNOSIS — J44.9 CHRONIC OBSTRUCTIVE PULMONARY DISEASE, UNSPECIFIED COPD TYPE (HCC): ICD-10-CM

## 2025-08-05 DIAGNOSIS — Z98.890 S/P CAROTID ENDARTERECTOMY: ICD-10-CM

## 2025-08-05 DIAGNOSIS — J44.1 CHRONIC OBSTRUCTIVE PULMONARY DISEASE WITH ACUTE EXACERBATION (HCC): ICD-10-CM

## 2025-08-05 DIAGNOSIS — R10.13 EPIGASTRIC PAIN: ICD-10-CM

## 2025-08-05 DIAGNOSIS — I10 PRIMARY HYPERTENSION: ICD-10-CM

## 2025-08-05 DIAGNOSIS — I65.22 CAROTID STENOSIS, LEFT: ICD-10-CM

## 2025-08-06 RX ORDER — ALBUTEROL SULFATE 90 UG/1
2 INHALANT RESPIRATORY (INHALATION) 2 TIMES DAILY PRN
Qty: 3 EACH | Refills: 3 | Status: SHIPPED | OUTPATIENT
Start: 2025-08-06

## 2025-08-06 RX ORDER — FLUTICASONE PROPIONATE 50 MCG
2 SPRAY, SUSPENSION (ML) NASAL DAILY
Qty: 3 EACH | Refills: 3 | Status: SHIPPED | OUTPATIENT
Start: 2025-08-06

## 2025-08-06 RX ORDER — ASPIRIN 81 MG/1
81 TABLET ORAL DAILY
Qty: 90 TABLET | Refills: 3 | Status: SHIPPED | OUTPATIENT
Start: 2025-08-06

## 2025-08-06 RX ORDER — FLUTICASONE FUROATE, UMECLIDINIUM BROMIDE AND VILANTEROL TRIFENATATE 100; 62.5; 25 UG/1; UG/1; UG/1
1 POWDER RESPIRATORY (INHALATION) DAILY
Qty: 3 EACH | Refills: 3 | Status: SHIPPED | OUTPATIENT
Start: 2025-08-06

## 2025-08-06 RX ORDER — ATORVASTATIN CALCIUM 80 MG/1
80 TABLET, FILM COATED ORAL NIGHTLY
Qty: 90 TABLET | Refills: 3 | Status: SHIPPED | OUTPATIENT
Start: 2025-08-06 | End: 2026-08-01

## 2025-08-06 RX ORDER — FAMOTIDINE 20 MG/1
20 TABLET, FILM COATED ORAL 2 TIMES DAILY
Qty: 180 TABLET | Refills: 3 | Status: SHIPPED | OUTPATIENT
Start: 2025-08-06

## 2025-08-06 RX ORDER — EZETIMIBE 10 MG/1
TABLET ORAL
Qty: 90 TABLET | Refills: 3 | Status: SHIPPED | OUTPATIENT
Start: 2025-08-06

## 2025-08-06 RX ORDER — DICYCLOMINE HYDROCHLORIDE 10 MG/1
10 CAPSULE ORAL
Qty: 120 CAPSULE | Refills: 2 | Status: SHIPPED | OUTPATIENT
Start: 2025-08-06 | End: 2025-11-04

## 2025-08-06 RX ORDER — OMEPRAZOLE 20 MG/1
20 CAPSULE, DELAYED RELEASE ORAL
Qty: 180 CAPSULE | Refills: 3 | Status: SHIPPED | OUTPATIENT
Start: 2025-08-06

## 2025-08-06 RX ORDER — DESVENLAFAXINE 25 MG/1
25 TABLET, EXTENDED RELEASE ORAL DAILY
Qty: 90 TABLET | Refills: 3 | Status: SHIPPED | OUTPATIENT
Start: 2025-08-06

## 2025-08-06 RX ORDER — METOPROLOL SUCCINATE 25 MG/1
25 TABLET, EXTENDED RELEASE ORAL DAILY
Qty: 90 TABLET | Refills: 3 | Status: SHIPPED | OUTPATIENT
Start: 2025-08-06 | End: 2026-08-01

## 2025-08-06 RX ORDER — POTASSIUM CHLORIDE 750 MG/1
10 CAPSULE, EXTENDED RELEASE ORAL DAILY
Qty: 90 CAPSULE | Refills: 3 | Status: SHIPPED | OUTPATIENT
Start: 2025-08-06

## 2025-08-11 ENCOUNTER — TELEMEDICINE (OUTPATIENT)
Dept: FAMILY MEDICINE CLINIC | Age: 75
End: 2025-08-11

## 2025-08-11 DIAGNOSIS — Z00.00 MEDICARE ANNUAL WELLNESS VISIT, SUBSEQUENT: Primary | ICD-10-CM

## 2025-08-11 ASSESSMENT — PATIENT HEALTH QUESTIONNAIRE - PHQ9
SUM OF ALL RESPONSES TO PHQ QUESTIONS 1-9: 2
2. FEELING DOWN, DEPRESSED OR HOPELESS: SEVERAL DAYS
SUM OF ALL RESPONSES TO PHQ QUESTIONS 1-9: 2
1. LITTLE INTEREST OR PLEASURE IN DOING THINGS: SEVERAL DAYS
10. IF YOU CHECKED OFF ANY PROBLEMS, HOW DIFFICULT HAVE THESE PROBLEMS MADE IT FOR YOU TO DO YOUR WORK, TAKE CARE OF THINGS AT HOME, OR GET ALONG WITH OTHER PEOPLE: NOT DIFFICULT AT ALL
8. MOVING OR SPEAKING SO SLOWLY THAT OTHER PEOPLE COULD HAVE NOTICED. OR THE OPPOSITE, BEING SO FIGETY OR RESTLESS THAT YOU HAVE BEEN MOVING AROUND A LOT MORE THAN USUAL: NOT AT ALL
SUM OF ALL RESPONSES TO PHQ QUESTIONS 1-9: 2
6. FEELING BAD ABOUT YOURSELF - OR THAT YOU ARE A FAILURE OR HAVE LET YOURSELF OR YOUR FAMILY DOWN: NOT AT ALL
7. TROUBLE CONCENTRATING ON THINGS, SUCH AS READING THE NEWSPAPER OR WATCHING TELEVISION: NOT AT ALL
4. FEELING TIRED OR HAVING LITTLE ENERGY: NOT AT ALL
5. POOR APPETITE OR OVEREATING: NOT AT ALL
3. TROUBLE FALLING OR STAYING ASLEEP: NOT AT ALL
SUM OF ALL RESPONSES TO PHQ QUESTIONS 1-9: 2
9. THOUGHTS THAT YOU WOULD BE BETTER OFF DEAD, OR OF HURTING YOURSELF: NOT AT ALL

## 2025-08-11 ASSESSMENT — LIFESTYLE VARIABLES
HOW MANY STANDARD DRINKS CONTAINING ALCOHOL DO YOU HAVE ON A TYPICAL DAY: PATIENT DOES NOT DRINK
HOW OFTEN DO YOU HAVE A DRINK CONTAINING ALCOHOL: NEVER

## 2025-09-02 ENCOUNTER — OFFICE VISIT (OUTPATIENT)
Dept: FAMILY MEDICINE CLINIC | Age: 75
End: 2025-09-02

## 2025-09-02 VITALS
DIASTOLIC BLOOD PRESSURE: 71 MMHG | HEART RATE: 94 BPM | WEIGHT: 103 LBS | OXYGEN SATURATION: 97 % | BODY MASS INDEX: 17.68 KG/M2 | RESPIRATION RATE: 20 BRPM | SYSTOLIC BLOOD PRESSURE: 125 MMHG

## 2025-09-02 DIAGNOSIS — F33.41 RECURRENT MAJOR DEPRESSIVE DISORDER IN PARTIAL REMISSION: Primary | ICD-10-CM

## (undated) DEVICE — PADDING CAST W6INXL4YD COT LO LINTING WYTEX

## (undated) DEVICE — Z INACTIVE USE 2660664 SOLUTION IRRIG 3000ML 0.9% SOD CHL USP UROMATIC PLAS CONT

## (undated) DEVICE — Device

## (undated) DEVICE — GOWN,AURORA,NONREINF,RAGLAN,XXL,STERILE: Brand: MEDLINE

## (undated) DEVICE — TOWEL,OR,DSP,ST,BLUE,STD,4/PK,20PK/CS: Brand: MEDLINE

## (undated) DEVICE — UNIVERSAL BLOCK TRAY: Brand: MEDLINE INDUSTRIES, INC.

## (undated) DEVICE — TUBE IRRIG L8IN LNG PT W/ CONN FOR PMP SYS REDEUCE

## (undated) DEVICE — ALCOHOL RUBBING 16OZ 70% ISO

## (undated) DEVICE — CHLORAPREP 26ML ORANGE

## (undated) DEVICE — YANKAUER,BULB TIP,W/O VENT,RIGID,STERILE: Brand: MEDLINE

## (undated) DEVICE — APPLICATOR PREP 26ML 0.7% IOD POVACRYLEX 74% ISO ALC ST

## (undated) DEVICE — TUBING, SUCTION, 3/16" X 10', STRAIGHT: Brand: MEDLINE

## (undated) DEVICE — GLOVE ORANGE PI 8 1/2   MSG9085

## (undated) DEVICE — GAUZE,SPONGE,4"X4",16PLY,STRL,LF,10/TRAY: Brand: MEDLINE

## (undated) DEVICE — AVANOS* UNIVERSAL BLOCK TRAYS: Brand: AVANOS

## (undated) DEVICE — STERILE POLYISOPRENE POWDER-FREE SURGICAL GLOVES: Brand: PROTEXIS

## (undated) DEVICE — SUTURE NONABSORBABLE MONOFILAMENT 4-0 FS-2 18 IN ETHILON 662H

## (undated) DEVICE — BLADE SHV L13CM DIA4MM EXCALIBUR AGG COOLCUT

## (undated) DEVICE — MANIFOLD SURG NEPTUNE WST MGMT

## (undated) DEVICE — 3M™ STERI-STRIP™ REINFORCED ADHESIVE SKIN CLOSURES, R1546, 1/4 IN X 4 IN (6 MM X 100 MM), 10 STRIPS/ENVELOPE: Brand: 3M™ STERI-STRIP™

## (undated) DEVICE — TUBING PMP L8FT LNG W/ CONN FOR AR-6400 REDEUCE